# Patient Record
Sex: MALE | Race: WHITE | NOT HISPANIC OR LATINO | Employment: OTHER | URBAN - METROPOLITAN AREA
[De-identification: names, ages, dates, MRNs, and addresses within clinical notes are randomized per-mention and may not be internally consistent; named-entity substitution may affect disease eponyms.]

---

## 2017-03-28 ENCOUNTER — HISTORICAL (OUTPATIENT)
Dept: ADMINISTRATIVE | Facility: HOSPITAL | Age: 67
End: 2017-03-28

## 2017-09-21 ENCOUNTER — HISTORICAL (OUTPATIENT)
Dept: ADMINISTRATIVE | Facility: HOSPITAL | Age: 67
End: 2017-09-21

## 2017-09-21 LAB
ALBUMIN SERPL-MCNC: 3.6 GM/DL (ref 3.4–5)
ALBUMIN/GLOB SERPL: 1.2 RATIO (ref 1.1–2)
ALP SERPL-CCNC: 73 UNIT/L (ref 50–136)
ALT SERPL-CCNC: 25 UNIT/L (ref 12–78)
AST SERPL-CCNC: 12 UNIT/L (ref 15–37)
BILIRUB SERPL-MCNC: 0.4 MG/DL (ref 0.2–1)
BILIRUBIN DIRECT+TOT PNL SERPL-MCNC: 0.1 MG/DL (ref 0–0.5)
BILIRUBIN DIRECT+TOT PNL SERPL-MCNC: 0.3 MG/DL (ref 0–0.8)
BUN SERPL-MCNC: 13 MG/DL (ref 7–18)
CALCIUM SERPL-MCNC: 9.1 MG/DL (ref 8.5–10.1)
CHLORIDE SERPL-SCNC: 106 MMOL/L (ref 98–107)
CO2 SERPL-SCNC: 29 MMOL/L (ref 21–32)
CREAT SERPL-MCNC: 0.72 MG/DL (ref 0.7–1.3)
GLOBULIN SER-MCNC: 2.9 GM/DL (ref 2.4–3.5)
GLUCOSE SERPL-MCNC: 75 MG/DL (ref 74–106)
POTASSIUM SERPL-SCNC: 4.3 MMOL/L (ref 3.5–5.1)
PROT SERPL-MCNC: 6.5 GM/DL (ref 6.4–8.2)
SODIUM SERPL-SCNC: 144 MMOL/L (ref 136–145)
TSH SERPL-ACNC: 1.1 MIU/ML (ref 0.36–3.74)

## 2018-09-26 ENCOUNTER — HISTORICAL (OUTPATIENT)
Dept: ADMINISTRATIVE | Facility: HOSPITAL | Age: 68
End: 2018-09-26

## 2018-09-26 LAB
ABS NEUT (OLG): 2.91 X10(3)/MCL (ref 2.1–9.2)
ALBUMIN SERPL-MCNC: 3.6 GM/DL (ref 3.4–5)
ALBUMIN/GLOB SERPL: 1.2 {RATIO}
ALP SERPL-CCNC: 68 UNIT/L (ref 50–136)
ALT SERPL-CCNC: 26 UNIT/L (ref 12–78)
AST SERPL-CCNC: 17 UNIT/L (ref 15–37)
BASOPHILS # BLD AUTO: 0 X10(3)/MCL (ref 0–0.2)
BASOPHILS NFR BLD AUTO: 1 %
BILIRUB SERPL-MCNC: 0.4 MG/DL (ref 0.2–1)
BILIRUBIN DIRECT+TOT PNL SERPL-MCNC: 0.1 MG/DL (ref 0–0.2)
BILIRUBIN DIRECT+TOT PNL SERPL-MCNC: 0.3 MG/DL (ref 0–0.8)
BUN SERPL-MCNC: 9 MG/DL (ref 7–18)
CALCIUM SERPL-MCNC: 8.5 MG/DL (ref 8.5–10.1)
CHLORIDE SERPL-SCNC: 110 MMOL/L (ref 98–107)
CHOLEST SERPL-MCNC: 199 MG/DL (ref 0–200)
CHOLEST/HDLC SERPL: 3.6 {RATIO} (ref 0–5)
CO2 SERPL-SCNC: 30 MMOL/L (ref 21–32)
CREAT SERPL-MCNC: 0.8 MG/DL (ref 0.7–1.3)
EOSINOPHIL # BLD AUTO: 0.1 X10(3)/MCL (ref 0–0.9)
EOSINOPHIL NFR BLD AUTO: 2 %
ERYTHROCYTE [DISTWIDTH] IN BLOOD BY AUTOMATED COUNT: 14.3 % (ref 11.5–17)
GLOBULIN SER-MCNC: 2.9 GM/DL (ref 2.4–3.5)
GLUCOSE SERPL-MCNC: 83 MG/DL (ref 74–106)
HCT VFR BLD AUTO: 42.6 % (ref 42–52)
HDLC SERPL-MCNC: 55 MG/DL (ref 35–60)
HGB BLD-MCNC: 13.4 GM/DL (ref 14–18)
LDLC SERPL CALC-MCNC: 115 MG/DL (ref 0–129)
LYMPHOCYTES # BLD AUTO: 2.3 X10(3)/MCL (ref 0.6–4.6)
LYMPHOCYTES NFR BLD AUTO: 40 %
MCH RBC QN AUTO: 29.3 PG (ref 27–31)
MCHC RBC AUTO-ENTMCNC: 31.5 GM/DL (ref 33–36)
MCV RBC AUTO: 93 FL (ref 80–94)
MONOCYTES # BLD AUTO: 0.3 X10(3)/MCL (ref 0.1–1.3)
MONOCYTES NFR BLD AUTO: 6 %
NEUTROPHILS # BLD AUTO: 2.91 X10(3)/MCL (ref 1.4–7.9)
NEUTROPHILS NFR BLD AUTO: 51 %
PLATELET # BLD AUTO: 188 X10(3)/MCL (ref 130–400)
PMV BLD AUTO: 11.7 FL (ref 9.4–12.4)
POTASSIUM SERPL-SCNC: 4.6 MMOL/L (ref 3.5–5.1)
PROT SERPL-MCNC: 6.5 GM/DL (ref 6.4–8.2)
RBC # BLD AUTO: 4.58 X10(6)/MCL (ref 4.7–6.1)
SODIUM SERPL-SCNC: 144 MMOL/L (ref 136–145)
TRIGL SERPL-MCNC: 143 MG/DL (ref 30–150)
TSH SERPL-ACNC: 1.46 MIU/L (ref 0.36–3.74)
VLDLC SERPL CALC-MCNC: 29 MG/DL
WBC # SPEC AUTO: 5.7 X10(3)/MCL (ref 4.5–11.5)

## 2019-09-30 ENCOUNTER — HISTORICAL (OUTPATIENT)
Dept: ADMINISTRATIVE | Facility: HOSPITAL | Age: 69
End: 2019-09-30

## 2019-09-30 LAB
ABS NEUT (OLG): 4.16 X10(3)/MCL (ref 2.1–9.2)
ALBUMIN SERPL-MCNC: 3.8 GM/DL (ref 3.4–5)
ALBUMIN/GLOB SERPL: 1.4 {RATIO}
ALP SERPL-CCNC: 73 UNIT/L (ref 50–136)
ALT SERPL-CCNC: 28 UNIT/L (ref 12–78)
APPEARANCE, UA: CLEAR
AST SERPL-CCNC: 14 UNIT/L (ref 15–37)
BACTERIA SPEC CULT: ABNORMAL /HPF
BASOPHILS # BLD AUTO: 0.1 X10(3)/MCL (ref 0–0.2)
BASOPHILS NFR BLD AUTO: 1 %
BILIRUB SERPL-MCNC: 0.8 MG/DL (ref 0.2–1)
BILIRUB UR QL STRIP: NEGATIVE
BILIRUBIN DIRECT+TOT PNL SERPL-MCNC: 0.1 MG/DL (ref 0–0.2)
BILIRUBIN DIRECT+TOT PNL SERPL-MCNC: 0.7 MG/DL (ref 0–0.8)
BUN SERPL-MCNC: 12 MG/DL (ref 7–18)
CALCIUM SERPL-MCNC: 9.4 MG/DL (ref 8.5–10.1)
CHLORIDE SERPL-SCNC: 107 MMOL/L (ref 98–107)
CHOLEST SERPL-MCNC: 218 MG/DL (ref 0–200)
CHOLEST/HDLC SERPL: 3.4 {RATIO} (ref 0–5)
CO2 SERPL-SCNC: 31 MMOL/L (ref 21–32)
COLOR UR: YELLOW
CREAT SERPL-MCNC: 0.81 MG/DL (ref 0.7–1.3)
EOSINOPHIL # BLD AUTO: 0.1 X10(3)/MCL (ref 0–0.9)
EOSINOPHIL NFR BLD AUTO: 1 %
ERYTHROCYTE [DISTWIDTH] IN BLOOD BY AUTOMATED COUNT: 14.6 % (ref 11.5–17)
GLOBULIN SER-MCNC: 2.8 GM/DL (ref 2.4–3.5)
GLUCOSE (UA): NEGATIVE
GLUCOSE SERPL-MCNC: 89 MG/DL (ref 74–106)
HCT VFR BLD AUTO: 43.4 % (ref 42–52)
HDLC SERPL-MCNC: 65 MG/DL (ref 35–60)
HGB BLD-MCNC: 13.7 GM/DL (ref 14–18)
HGB UR QL STRIP: NEGATIVE
KETONES UR QL STRIP: NEGATIVE
LDLC SERPL CALC-MCNC: 129 MG/DL (ref 0–129)
LEUKOCYTE ESTERASE UR QL STRIP: NEGATIVE
LYMPHOCYTES # BLD AUTO: 2.8 X10(3)/MCL (ref 0.6–4.6)
LYMPHOCYTES NFR BLD AUTO: 37 %
MCH RBC QN AUTO: 28.7 PG (ref 27–31)
MCHC RBC AUTO-ENTMCNC: 31.6 GM/DL (ref 33–36)
MCV RBC AUTO: 90.8 FL (ref 80–94)
MONOCYTES # BLD AUTO: 0.4 X10(3)/MCL (ref 0.1–1.3)
MONOCYTES NFR BLD AUTO: 6 %
NEUTROPHILS # BLD AUTO: 4.16 X10(3)/MCL (ref 2.1–9.2)
NEUTROPHILS NFR BLD AUTO: 55 %
NITRITE UR QL STRIP: NEGATIVE
PH UR STRIP: 5.5 [PH] (ref 5–9)
PLATELET # BLD AUTO: 201 X10(3)/MCL (ref 130–400)
PMV BLD AUTO: 10.9 FL (ref 9.4–12.4)
POTASSIUM SERPL-SCNC: 4.6 MMOL/L (ref 3.5–5.1)
PROT SERPL-MCNC: 6.6 GM/DL (ref 6.4–8.2)
PROT UR QL STRIP: NEGATIVE
RBC # BLD AUTO: 4.78 X10(6)/MCL (ref 4.7–6.1)
RBC #/AREA URNS HPF: 14 /HPF (ref 0–2)
SODIUM SERPL-SCNC: 142 MMOL/L (ref 136–145)
SP GR UR STRIP: 1.02 (ref 1–1.03)
SQUAMOUS EPITHELIAL, UA: ABNORMAL
TRIGL SERPL-MCNC: 122 MG/DL (ref 30–150)
TSH SERPL-ACNC: 2.1 MIU/L (ref 0.36–3.74)
UROBILINOGEN UR STRIP-ACNC: 0.2
VLDLC SERPL CALC-MCNC: 24 MG/DL
WBC # SPEC AUTO: 7.6 X10(3)/MCL (ref 4.5–11.5)
WBC #/AREA URNS HPF: ABNORMAL /HPF

## 2020-01-23 ENCOUNTER — HISTORICAL (OUTPATIENT)
Dept: RADIOLOGY | Facility: HOSPITAL | Age: 70
End: 2020-01-23

## 2020-10-13 ENCOUNTER — HISTORICAL (OUTPATIENT)
Dept: ADMINISTRATIVE | Facility: HOSPITAL | Age: 70
End: 2020-10-13

## 2020-10-13 LAB
ABS NEUT (OLG): 2.78 X10(3)/MCL (ref 2.1–9.2)
ALBUMIN SERPL-MCNC: 4 GM/DL (ref 3.4–5)
ALBUMIN/GLOB SERPL: 1.5 RATIO (ref 1.1–2)
ALP SERPL-CCNC: 71 UNIT/L (ref 40–150)
ALT SERPL-CCNC: 21 UNIT/L (ref 0–55)
APPEARANCE, UA: CLEAR
AST SERPL-CCNC: 20 UNIT/L (ref 5–34)
BACTERIA SPEC CULT: NORMAL /HPF
BASOPHILS # BLD AUTO: 0.1 X10(3)/MCL (ref 0–0.2)
BASOPHILS NFR BLD AUTO: 1 %
BILIRUB SERPL-MCNC: 0.4 MG/DL
BILIRUB UR QL STRIP: NEGATIVE
BILIRUBIN DIRECT+TOT PNL SERPL-MCNC: 0.2 MG/DL (ref 0–0.5)
BILIRUBIN DIRECT+TOT PNL SERPL-MCNC: 0.2 MG/DL (ref 0–0.8)
BUN SERPL-MCNC: 11.8 MG/DL (ref 8.4–25.7)
CALCIUM SERPL-MCNC: 8.9 MG/DL (ref 8.8–10)
CHLORIDE SERPL-SCNC: 106 MMOL/L (ref 98–107)
CHOLEST SERPL-MCNC: 197 MG/DL
CHOLEST/HDLC SERPL: 4 {RATIO} (ref 0–5)
CO2 SERPL-SCNC: 27 MMOL/L (ref 23–31)
COLOR UR: YELLOW
CREAT SERPL-MCNC: 0.82 MG/DL (ref 0.73–1.18)
EOSINOPHIL # BLD AUTO: 0.1 X10(3)/MCL (ref 0–0.9)
EOSINOPHIL NFR BLD AUTO: 2 %
ERYTHROCYTE [DISTWIDTH] IN BLOOD BY AUTOMATED COUNT: 14.5 % (ref 11.5–17)
GLOBULIN SER-MCNC: 2.7 GM/DL (ref 2.4–3.5)
GLUCOSE (UA): NEGATIVE
GLUCOSE SERPL-MCNC: 91 MG/DL (ref 82–115)
HCT VFR BLD AUTO: 41.6 % (ref 42–52)
HDLC SERPL-MCNC: 55 MG/DL (ref 35–60)
HGB BLD-MCNC: 13.7 GM/DL (ref 14–18)
HGB UR QL STRIP: NEGATIVE
KETONES UR QL STRIP: NEGATIVE
LDLC SERPL CALC-MCNC: 124 MG/DL (ref 50–140)
LEUKOCYTE ESTERASE UR QL STRIP: NEGATIVE
LYMPHOCYTES # BLD AUTO: 2.5 X10(3)/MCL (ref 0.6–4.6)
LYMPHOCYTES NFR BLD AUTO: 42 %
MCH RBC QN AUTO: 29.7 PG (ref 27–31)
MCHC RBC AUTO-ENTMCNC: 32.9 GM/DL (ref 33–36)
MCV RBC AUTO: 90.2 FL (ref 80–94)
MONOCYTES # BLD AUTO: 0.4 X10(3)/MCL (ref 0.1–1.3)
MONOCYTES NFR BLD AUTO: 7 %
NEUTROPHILS # BLD AUTO: 2.78 X10(3)/MCL (ref 2.1–9.2)
NEUTROPHILS NFR BLD AUTO: 47 %
NITRITE UR QL STRIP: NEGATIVE
PH UR STRIP: 7.5 [PH] (ref 5–9)
PLATELET # BLD AUTO: 194 X10(3)/MCL (ref 130–400)
PMV BLD AUTO: 11.3 FL (ref 9.4–12.4)
POTASSIUM SERPL-SCNC: 4.8 MMOL/L (ref 3.5–5.1)
PROT SERPL-MCNC: 6.7 GM/DL (ref 5.8–7.6)
PROT UR QL STRIP: NEGATIVE
PSA SERPL-MCNC: 0.39 NG/ML
RBC # BLD AUTO: 4.61 X10(6)/MCL (ref 4.7–6.1)
RBC #/AREA URNS HPF: NORMAL /[HPF]
SODIUM SERPL-SCNC: 142 MMOL/L (ref 136–145)
SP GR UR STRIP: 1.01 (ref 1–1.03)
SQUAMOUS EPITHELIAL, UA: NORMAL
T4 FREE SERPL-MCNC: 0.83 NG/DL (ref 0.7–1.48)
TRIGL SERPL-MCNC: 91 MG/DL (ref 34–140)
TSH SERPL-ACNC: 1.31 UIU/ML (ref 0.35–4.94)
UA WBC MAN: NORMAL
UROBILINOGEN UR STRIP-ACNC: 0.2
VLDLC SERPL CALC-MCNC: 18 MG/DL
WBC # SPEC AUTO: 5.9 X10(3)/MCL (ref 4.5–11.5)

## 2021-01-11 ENCOUNTER — HISTORICAL (OUTPATIENT)
Dept: ADMINISTRATIVE | Facility: HOSPITAL | Age: 71
End: 2021-01-11

## 2021-04-27 LAB — CRC RECOMMENDATION EXT: NORMAL

## 2021-10-12 ENCOUNTER — HISTORICAL (OUTPATIENT)
Dept: ADMINISTRATIVE | Facility: HOSPITAL | Age: 71
End: 2021-10-12

## 2021-10-12 LAB
ABS NEUT (OLG): 3.71 X10(3)/MCL (ref 2.1–9.2)
ALBUMIN SERPL-MCNC: 3.8 GM/DL (ref 3.4–4.8)
ALBUMIN/GLOB SERPL: 1.5 RATIO (ref 1.1–2)
ALP SERPL-CCNC: 76 UNIT/L (ref 40–150)
ALT SERPL-CCNC: 16 UNIT/L (ref 0–55)
AST SERPL-CCNC: 15 UNIT/L (ref 5–34)
BASOPHILS # BLD AUTO: 0.1 X10(3)/MCL (ref 0–0.2)
BASOPHILS NFR BLD AUTO: 1 %
BILIRUB SERPL-MCNC: 0.5 MG/DL
BILIRUBIN DIRECT+TOT PNL SERPL-MCNC: 0.2 MG/DL (ref 0–0.5)
BILIRUBIN DIRECT+TOT PNL SERPL-MCNC: 0.3 MG/DL (ref 0–0.8)
BUN SERPL-MCNC: 7.6 MG/DL (ref 8.4–25.7)
CALCIUM SERPL-MCNC: 9.2 MG/DL (ref 8.8–10)
CHLORIDE SERPL-SCNC: 105 MMOL/L (ref 98–107)
CHOLEST SERPL-MCNC: 201 MG/DL
CHOLEST/HDLC SERPL: 4 {RATIO} (ref 0–5)
CO2 SERPL-SCNC: 27 MMOL/L (ref 23–31)
CREAT SERPL-MCNC: 0.81 MG/DL (ref 0.73–1.18)
EOSINOPHIL # BLD AUTO: 0.1 X10(3)/MCL (ref 0–0.9)
EOSINOPHIL NFR BLD AUTO: 2 %
ERYTHROCYTE [DISTWIDTH] IN BLOOD BY AUTOMATED COUNT: 14.4 % (ref 11.5–17)
GLOBULIN SER-MCNC: 2.6 GM/DL (ref 2.4–3.5)
GLUCOSE SERPL-MCNC: 91 MG/DL (ref 82–115)
HCT VFR BLD AUTO: 42.9 % (ref 42–52)
HDLC SERPL-MCNC: 49 MG/DL (ref 35–60)
HGB BLD-MCNC: 13.6 GM/DL (ref 14–18)
LDLC SERPL CALC-MCNC: 125 MG/DL (ref 50–140)
LYMPHOCYTES # BLD AUTO: 2 X10(3)/MCL (ref 0.6–4.6)
LYMPHOCYTES NFR BLD AUTO: 31 %
MCH RBC QN AUTO: 28.8 PG (ref 27–31)
MCHC RBC AUTO-ENTMCNC: 31.7 GM/DL (ref 33–36)
MCV RBC AUTO: 90.7 FL (ref 80–94)
MONOCYTES # BLD AUTO: 0.4 X10(3)/MCL (ref 0.1–1.3)
MONOCYTES NFR BLD AUTO: 6 %
NEUTROPHILS # BLD AUTO: 3.71 X10(3)/MCL (ref 2.1–9.2)
NEUTROPHILS NFR BLD AUTO: 59 %
PLATELET # BLD AUTO: 216 X10(3)/MCL (ref 130–400)
PMV BLD AUTO: 11.7 FL (ref 9.4–12.4)
POTASSIUM SERPL-SCNC: 4.7 MMOL/L (ref 3.5–5.1)
PROT SERPL-MCNC: 6.4 GM/DL (ref 5.8–7.6)
RBC # BLD AUTO: 4.73 X10(6)/MCL (ref 4.7–6.1)
SODIUM SERPL-SCNC: 144 MMOL/L (ref 136–145)
T4 FREE SERPL-MCNC: 0.87 NG/DL (ref 0.7–1.48)
TRIGL SERPL-MCNC: 133 MG/DL (ref 34–140)
TSH SERPL-ACNC: 1.99 UIU/ML (ref 0.35–4.94)
VLDLC SERPL CALC-MCNC: 27 MG/DL
WBC # SPEC AUTO: 6.2 X10(3)/MCL (ref 4.5–11.5)

## 2021-12-28 ENCOUNTER — HISTORICAL (OUTPATIENT)
Dept: ADMINISTRATIVE | Facility: HOSPITAL | Age: 71
End: 2021-12-28

## 2021-12-28 LAB — SARS-COV-2 RNA RESP QL NAA+PROBE: NEGATIVE

## 2022-01-24 ENCOUNTER — HISTORICAL (OUTPATIENT)
Dept: ADMINISTRATIVE | Facility: HOSPITAL | Age: 72
End: 2022-01-24

## 2022-01-24 LAB
APPEARANCE, UA: NORMAL
BACTERIA SPEC CULT: NORMAL /HPF
BILIRUB UR QL STRIP: NEGATIVE
COLOR UR: YELLOW
GLUCOSE (UA): NEGATIVE
HGB UR QL STRIP: NEGATIVE
KETONES UR QL STRIP: NEGATIVE
LEUKOCYTE ESTERASE UR QL STRIP: NEGATIVE
NITRITE UR QL STRIP: NEGATIVE
PH UR STRIP: 7.5 [PH] (ref 5–9)
PROT UR QL STRIP: NEGATIVE
RBC #/AREA URNS HPF: NORMAL /[HPF]
SP GR UR STRIP: 1.02 (ref 1–1.03)
SQUAMOUS EPITHELIAL, UA: NORMAL /HPF (ref 0–4)
UROBILINOGEN UR STRIP-ACNC: 0.2
WBC #/AREA URNS HPF: NORMAL /HPF

## 2022-01-25 LAB
ABS NEUT (OLG): 11 X10(3)/MCL (ref 2.1–9.2)
ALBUMIN SERPL-MCNC: 4.1 GM/DL (ref 3.4–4.8)
ALBUMIN/GLOB SERPL: 1.3 RATIO (ref 1.1–2)
ALP SERPL-CCNC: 86 UNIT/L (ref 40–150)
ALT SERPL-CCNC: 31 UNIT/L (ref 0–55)
AST SERPL-CCNC: 18 UNIT/L (ref 5–34)
BASOPHILS # BLD AUTO: 0 X10(3)/MCL (ref 0–0.2)
BASOPHILS NFR BLD AUTO: 0 %
BILIRUB SERPL-MCNC: 0.4 MG/DL
BILIRUBIN DIRECT+TOT PNL SERPL-MCNC: 0.2 MG/DL (ref 0–0.5)
BILIRUBIN DIRECT+TOT PNL SERPL-MCNC: 0.2 MG/DL (ref 0–0.8)
BUN SERPL-MCNC: 7.6 MG/DL (ref 8.4–25.7)
CALCIUM SERPL-MCNC: 10.5 MG/DL (ref 8.7–10.5)
CHLORIDE SERPL-SCNC: 101 MMOL/L (ref 98–107)
CO2 SERPL-SCNC: 29 MMOL/L (ref 23–31)
CREAT SERPL-MCNC: 0.83 MG/DL (ref 0.73–1.18)
EOSINOPHIL # BLD AUTO: 0 X10(3)/MCL (ref 0–0.9)
EOSINOPHIL NFR BLD AUTO: 0 %
ERYTHROCYTE [DISTWIDTH] IN BLOOD BY AUTOMATED COUNT: 14.6 % (ref 11.5–17)
GLOBULIN SER-MCNC: 3.2 GM/DL (ref 2.4–3.5)
GLUCOSE SERPL-MCNC: 144 MG/DL (ref 82–115)
HCT VFR BLD AUTO: 45 % (ref 42–52)
HGB BLD-MCNC: 14.5 GM/DL (ref 14–18)
LACTATE SERPL-SCNC: 2 MMOL/L (ref 0.5–2.2)
LACTATE SERPL-SCNC: 4.6 MMOL/L (ref 0.5–2.2)
LIPASE SERPL-CCNC: 36 U/L
LYMPHOCYTES # BLD AUTO: 1.3 X10(3)/MCL (ref 0.6–4.6)
LYMPHOCYTES NFR BLD AUTO: 10 %
MCH RBC QN AUTO: 29.2 PG (ref 27–31)
MCHC RBC AUTO-ENTMCNC: 32.2 GM/DL (ref 33–36)
MCV RBC AUTO: 90.5 FL (ref 80–94)
MONOCYTES # BLD AUTO: 0.5 X10(3)/MCL (ref 0.1–1.3)
MONOCYTES NFR BLD AUTO: 4 %
NEUTROPHILS # BLD AUTO: 11 X10(3)/MCL (ref 2.1–9.2)
NEUTROPHILS NFR BLD AUTO: 85 %
PLATELET # BLD AUTO: 246 X10(3)/MCL (ref 130–400)
PMV BLD AUTO: 11.2 FL (ref 9.4–12.4)
POTASSIUM SERPL-SCNC: 4.7 MMOL/L (ref 3.5–5.1)
PROT SERPL-MCNC: 7.3 GM/DL (ref 5.8–7.6)
RBC # BLD AUTO: 4.97 X10(6)/MCL (ref 4.7–6.1)
SARS-COV-2 AG RESP QL IA.RAPID: NEGATIVE
SODIUM SERPL-SCNC: 142 MMOL/L (ref 136–145)
WBC # SPEC AUTO: 12.9 X10(3)/MCL (ref 4.5–11.5)

## 2022-04-11 ENCOUNTER — HISTORICAL (OUTPATIENT)
Dept: ADMINISTRATIVE | Facility: HOSPITAL | Age: 72
End: 2022-04-11
Payer: MEDICARE

## 2022-04-27 VITALS
OXYGEN SATURATION: 96 % | WEIGHT: 175 LBS | SYSTOLIC BLOOD PRESSURE: 144 MMHG | DIASTOLIC BLOOD PRESSURE: 82 MMHG | HEIGHT: 69 IN | BODY MASS INDEX: 25.92 KG/M2

## 2022-04-30 NOTE — OP NOTE
DATE OF SURGERY:    01/25/2022    SURGEON:  Kirby Payne Jr., MD    RESIDENT SURGEONS:    1. Dr. Elizabeth Perez, PGY2.  2. Dr. Jojo Matthews, PGY4.    PREOPERATIVE DIAGNOSIS:  Acute appendicitis.    POSTOPERATIVE DIAGNOSIS:  Acute appendicitis.    PROCEDURE:  Laparoscopic appendectomy.    ANESTHESIA:  General endotracheal.    COMPLICATIONS:  None.    CONDITION:  Stable.    SPECIMEN:  Appendix.    ESTIMATED BLOOD LOSS:  Minimal.    FINDINGS:  Inflamed acute appendicitis, nonruptured.    PROCEDURE IN DETAIL:  After appropriate consents were obtained, the patient was brought back to the operative suite, placed in supine position, placed under general endotracheal anesthesia.  Next, the abdomen was prepped and draped in the usual sterile fashion.  At this time, a time-out was done to make sure we had the appropriate patient, appropriate operation, appropriate preoperative medications.  Next, a supraumbilical incision was made and a 5 mm optical trocar was used to enter the abdomen.  Once into the abdomen, pneumoperitoneum was created.  Next, a 5 mm suprapubic and a 12 mm left lower quadrant port were placed under direct visualization with no complications.  Next, we placed the patient head down and airplaned to the patient's left, and were able to easily visualize the appendix by following the tenia of the cecum down to the base of the appendix.  Once we found the base of the appendix, we grasped the appendix and made a window with the [place] dissector.  Once we had a large enough window to accommodate a laparoscopic Wiggins 55 mm stapler with a blue load, we then passed that in and stapled the base off.  Next, we used 2 white loads to come across the mesoappendix and then used 4 clips on the staple line to obtain hemostasis where there was some spot bleeding.  Next, we then placed the appendix in a laparoscopic EndoCatch bag and then evaluated the pelvis for any murky fluid; none was seen.  We then  removed the appendix from the left lower quadrant incision site and then closed the fascia with a laparoscopic suture passer of 0 Vicryl and then closed all skin incisions with 4-0 Vicryl and Dermabond.  At case end, all counts were correct.  Patient tolerated the procedure well, was ultimately transferred back to recovery in stable condition.        ______________________________  MD KIRIT Oquendo Jr./LAWSON  DD:  01/25/2022  Time:  08:23AM  DT:  01/25/2022  Time:  08:41AM  Job #:  [id number]

## 2022-04-30 NOTE — ED PROVIDER NOTES
"   Patient:   Scott Echeverria            MRN: [id number]            FIN: [id number]               Age:   71 years     Sex:  Male     :  1950   Associated Diagnoses:   Acute appendicitis   Author:   Jesenia Higgins MD      Basic Information   Time seen: Date & time 2022 01:55:00.   History source: Patient.   Arrival mode: Private vehicle.   History limitation: None.   Additional information: Chief Complaint from Nursing Triage Note : Chief Complaint   2022 22:04 CST      Chief Complaint           Complaint of intermittant abdominal pain, onset this afternoon. Nauseated, vomited x 1 on the way to ED.  .      History of Present Illness   The patient presents with 71 year old male present to the ED for abdominal pain. Pt states at around 1430 today he ate some pralines and drank hot chocolate. Pt states shortly after he began experiencing progressively worse abdominal pain. Pt states he has experienced episodes of belching. Pt's pain is worst when lying flat. Pt's abdominal pain is diffuse..  The onset was 12  hours ago.  The course/duration of symptoms is worsening and fluctuating in intensity.  The character of symptoms is "pain".  The degree at onset was minimal.  The Location of pain at onset was diffuse and abdominal.  The degree at present is moderate.  The Location of pain at present is diffuse and abdominal.  Radiating pain: none. The exacerbating factor is lying flat.  The relieving factor is none.  Therapy today: none.  Risk factors consist of none.  Associated symptoms: none.        Review of Systems   Constitutional symptoms:  No fever, no chills   Skin symptoms:  No jaundice, no rash   Eye symptoms:  Vision unchangedNo blurred vision   Respiratory symptoms:  No shortness of breath, no orthopnea, no cough, no sputum production   Cardiovascular symptoms:  No chest pain, no palpitations, no diaphoresis, no peripheral edema.    Gastrointestinal symptoms:  Abdominal pain, diffuse, " belchingNo nausea, no vomiting, no diarrhea, no constipation   Genitourinary symptoms:  No dysuria, no hematuria.    Neurologic symptoms:  No headache, no dizziness.    Hematologic/Lymphatic symptoms:  Bleeding tendency negative,    Allergy/immunologic symptoms:  No impaired immunity,              Additional review of systems information: All other systems reviewed and otherwise negative.      Health Status   Allergies:    Allergic Reactions (Selected)  Severity Not Documented  Iodine- No reactions were documented..   Medications:  (Selected)   Inpatient Medications  Ordered  Benadryl IV Push: 50 mg, form: Injection, IV Slow, Once, first dose 01/25/22 2:47:00 CST, stop date 01/25/22 2:47:00 CST, STAT  Solumedrol IV push / IM: 125 mg, form: Injection, IV Push, [id number], first dose 01/25/22 2:47:00 CST, STAT  Zofran 2 mg/mL injectable solution: 4 mg, form: Injection, IV Push, Once, first dose 01/25/22 2:46:00 CST, stop date 01/25/22 2:46:00 CST, STAT  morphine 4 mg/mL preservative-free intravenous solution: 4 mg, form: Injection, IV Push, Once, first dose 01/25/22 2:46:00 CST, stop date 01/25/22 2:46:00 CST, STAT  Prescriptions  Prescribed  amitriptyline 25 mg oral tablet: See Instructions, TAKE 1 TABLET BY MOUTH AT BEDTIME, # 90 tab(s), 3 Refill(s), Pharmacy: [facility]/pharmacy #[id number], 175, cm, Height/Length Dosing, 10/20/21 9:19:00 CDT, 79.83, kg, Weight Dosing, 10/20/21 9:19:00 CDT  levothyroxine 50 mcg (0.05 mg) oral tablet: See Instructions, TAKE 1 TABLET BY MOUTH EVERY DAY, # 90 tab(s), 3 Refill(s), Pharmacy: [facility]/pharmacy #[id number], 175, cm, Height/Length Dosing, 10/20/21 9:19:00 CDT, 79.83, kg, Weight Dosing, 10/20/21 9:19:00 CDT  tamsulosin 0.4 mg oral capsule: See Instructions, TAKE ONE CAPSULE BY MOUTH EVERY DAY, # 90 cap(s), 3 Refill(s), Pharmacy: [facility]/pharmacy #[id number], 175, cm, Height/Length Dosing, 10/20/21 9:19:00 CDT, 79.83, kg, Weight Dosing, 10/20/21 9:19:00 CDT  Documented Medications  Documented  aspirin 81 mg  oral tablet: 81 mg = 1 tab(s), Oral, Daily, # 90 tab(s), 0 Refill(s).      Past Medical/ Family/ Social History   Medical history:    Resolved  BPH - Benign prostatic hypertrophy ([id number]):  Resolved.  Chronic neck pain ([id number]):  Resolved.  Detached retina ([id number]):  Resolved.  Thyroid disease ([id number]):  Resolved.  Acute UTI (953283806):  Resolved..   Surgical history:    eye surgery on 3/12/2014 at 63 Years.  Retinal detachment ([id number]) on 7/3/2013 at 62 Years.  Comments:  2/12/2015 16:37 CST - Contributor_system, [email or web address]  09/24/2014 14:38:45 - Viv Woo: july 3 2014  Radiologic examination, chest, two views, frontal and lateral; on 7/22/2011 at 60 Years.  Back Surgery on 9/5/1990 at 39 Years.  eye surgery.  Comments:  2/12/2015 16:37 CST - Contributor_system, [email or web address]  04/07/2014 09:17:20 - Viv Woo: August and June 2013  sinus surgery.  eye surgery.  Comments:  2/12/2015 16:37 CST - Contributor_system, [email or web address]  09/24/2014 14:38:17 - Viv Woo: july 2014  Hemorrhoidectomy ([id number]).  Tonsillectomy ([id number])..   Family history:    Alzheimer's disease  Mother  CVA (cerebral vascular accident)  Father  Sickle cell trait  Sister  Cardiac arrest.  Father  Parkinsons.  Mother  Hypertension.  Brother  Sister  CVA (cerebral infarction).  Father  Thyroid disease.  Mother  Hyperlipidemia  Father  .   Social history:    Social & Psychosocial Habits    Alcohol  09/28/2015  Use: Never    Employment/School  09/28/2015  Status: Employed    Exercise  03/31/2016  Times per week: 1-2 times/week    Exercise type: Walking    Home/Environment  09/28/2015  Lives with: Spouse    Nutrition/Health  09/28/2015  Type of diet: Regular    Substance Use  09/28/2015  Use: Never    Tobacco  06/29/2014 Risk Assessment: Denies Tobacco Use    12/28/2021  Use: Never (less than 100 in l    Patient Wants Consult For Cessation Counseling No    Abuse/Neglect  12/06/2021  SHX Any signs of abuse or  neglect No    12/28/2021  SHX Any signs of abuse or neglect No  .      Physical Examination               Vital Signs   Vital Signs   1/24/2022 22:04 CST      Temperature Oral          36.6 DegC                             Temperature Oral (calculated)             97.88 DegF                             Peripheral Pulse Rate     84 bpm                             Respiratory Rate          20 br/min                             SpO2                      99 %                             Systolic Blood Pressure   151 mmHg  HI                             Diastolic Blood Pressure  91 mmHg  HI  .   Measurements   1/24/2022 22:04 CST      Weight Dosing             78 kg                             Weight Measured and Calculated in Lbs     171.96 lb                             Weight Estimated          78 kg                             Height/Length Dosing      175 cm                             Height/Length Estimated   175 cm                             Body Mass Index Estimated 25.47 kg/m2  .   Basic Oxygen Information   1/24/2022 22:04 CST      SpO2                      99 %  .   General:  Alert, no acute distress   Skin:  Warm, dry   Head:  Normocephalic, atraumatic   Neck:  Supple, trachea midline   Eye:  Normal conjunctiva   Ears, nose, mouth and throat:  Oral mucosa moist.   Cardiovascular:  Regular rate and rhythm, Normal peripheral perfusion, No edema   Respiratory:  Lungs are clear to auscultation, respirations are non-labored   Gastrointestinal:  Soft, abdominal distention, Tenderness: Generalized, right lower quadrant, Guarding: Moderate, Rebound: Negative, Bowel sounds: Hyperactive.    Neurological:  Alert and oriented to person, place, time, and situation   Psychiatric:  Cooperative      Medical Decision Making   Rationale:  Mr. Echeverria presented with progressively worsening abdominal pain over the last 12 hours or so, most tender in the right lower quadrant with a moderate amount of guarding noted.  No rebound  tenderness.  Hemodynamically stable and afebrile here.  Labs with leukocytosis, lactic acidosis, otherwise unremarkable.  CT scan with acute appendicitis without perforation or abscess.  Started on IV fluids, IV antibiotics and will admit to surgical service. Findings and plan discussed with the patient, and he is agreeable to admission at this time.   .   Documents reviewed:  Emergency department nurses' notes.   Orders  Launch Order Profile (Selected)   Inpatient Orders  Ordered  Benadryl IV Push: 50 mg, form: Injection, IV Slow, Once, first dose 01/25/22 2:47:00 CST, stop date 01/25/22 2:47:00 CST, STAT  EKG if >30 years of age and pain at or above umbilicus or history of ischemic heart disease.: 01/24/22 22:09:00 CST, EKG if >30 years of age and pain at or above umbilicus or history of ischemic heart disease.  If hemodynamically unstable (BP < 100 or Pulse > 110-Notify MD Stat). IV bolus with 500 ml NS.: 01/24/22 22:09:00 CST, If hemodynamically unstable (BP < 100 or Pulse > 110-Notify MD Stat). IV bolus with 500 ml NS.  NPO: 01/24/22 22:09:00 CST,  NPO  Saline Lock Insert: 01/24/22 22:09:00 CST, Stop date 01/24/22 22:09:00 CST  Solumedrol IV push / IM: 125 mg, form: Injection, IV Push, [id number], first dose 01/25/22 2:47:00 CST, STAT  Vital Signs Notify Provider: 01/24/22 22:09:00 CST, if hemodynamically unstable (BP < 100 or Pulse > 110-Notify MD Stat)., HR > 110, SBP < 100  Zofran 2 mg/mL injectable solution: 4 mg, form: Injection, IV Push, Once, first dose 01/25/22 2:46:00 CST, stop date 01/25/22 2:46:00 CST, STAT  morphine 4 mg/mL preservative-free intravenous solution: 4 mg, form: Injection, IV Push, Once, first dose 01/25/22 2:46:00 CST, stop date 01/25/22 2:46:00 CST, STAT  Ordered (Dispatched)  Lactic Acid: Stat collect, 01/25/22 2:46:00 CST, Blood, Stop date 01/25/22 2:46:00 CST, Lab Collect, Print Label By Order Location, 01/25/22 2:46:00 CST  Ordered (Exam Ordered)  CT Abdomen and Pelvis W  Contrast: Stat, 01/25/22 2:46:00 CST, Abdominal Pain, None, Wheelchair, Creatinine if needed per protocol, Rad Type, 01/25/22 2:46:00 CST  Ordered (In-Lab)  CMP: STAT collect, 01/25/22 1:14:31 CST, BLOOD, Collected, Stop date 01/25/22 1:14:00 CST, Lab Collect  Lipase Level: STAT collect, 01/25/22 1:14:31 CST, BLOOD, Collected, Stop date 01/25/22 1:14:00 CST, Lab Collect  Completed  Automated Diff: STAT collect, 01/25/22 1:14:00 CST, Blood, Collected, Stop date 01/25/22 1:14:00 CST, Lab Collect, Print Label By Order Location, 01/24/22 22:09:00 CST  CBC w/ Auto Diff: STAT collect, 01/25/22 1:14:31 CST, BLOOD, Collected, Stop date 01/25/22 1:14:00 CST, Lab Collect  EKG Adult 12 Lead: 01/24/22 22:09:00 CST, Stat, 01/24/22 22:09:00 CST, Wheelchair, Standard Precautions, EKG if >30 years of age and pain at or above umbilicus or history of ischemic heart disease., -1, -1, 01/24/22 22:09:00 CST  IVF Normal Saline NS Bolus 500ml 500 mL: 500 mL, 500 mL, IV, 500 mL/hr, hypotension, order duration: 1 dose(s), start date 01/24/22 22:09:00 CST, stop date 01/24/22 23:08:00 CST, bolus dose: 500 mL, Then 125 ml/hr if no history of CHF., 1.94, m2  Urinalysis with Reflex: Stat collect, Urine, 01/24/22 22:09:00 CST, Stop date 01/24/22 22:09:00 CST, Nurse collect, Print Label By Order Location  diPHENhydraMINE: 50 mg, 1 mL, Injection, N/A, Once, Stop date 01/25/22 2:48:36 CST, Physician Stop, 01/25/22 2:48:36 CST  methylPREDNISolone: 125 mg, 2 mL, Injection, N/A, Once, Stop date 01/25/22 2:48:28 CST, Physician Stop, 01/25/22 2:48:28 CST  morphine: 4 mg, 1 mL, Injection, N/A, Once, Stop date 01/25/22 2:48:56 CST, Physician Stop, 01/25/22 2:48:56 CST  ondansetron INJ: 4 mg, 2 mL, Injection, N/A, Once, Stop date 01/25/22 2:49:04 CST, Physician Stop, 01/25/22 2:49:04 CST.   Electrocardiogram:  Time 1/25/2022 22:04:00, rate 72, normal sinus rhythm, No ST-T changes, no ectopy, normal SC & QRS intervals, EP Interp, no STEMI.    Results review:   Lab results : Lab View   1/25/2022 3:11 CST       Est Creat Clearance Ser   82.54 mL/min    1/25/2022 3:10 CST       Lactic Acid Lvl           4.6 mmol/L  CRIT    1/25/2022 1:14 CST       Sodium Lvl                142 mmol/L                             Potassium Lvl             4.7 mmol/L                             Chloride                  101 mmol/L                             CO2                       29 mmol/L                             Calcium Lvl               10.5 mg/dL                             Glucose Lvl               144 mg/dL  HI                             BUN                       7.6 mg/dL  LOW                             Creatinine                0.83 mg/dL                             eGFR-AA                   >60  NA                             eGFR-TERESA                  >60 mL/min/1.73 m2  NA                             Bili Total                0.4 mg/dL                             Bili Direct               0.2 mg/dL                             Bili Indirect             0.20 mg/dL                             AST                       18 unit/L                             ALT                       31 unit/L                             Alk Phos                  86 unit/L                             Total Protein             7.3 gm/dL                             Albumin Lvl               4.1 gm/dL                             Globulin                  3.2 gm/dL                             A/G Ratio                 1.3 ratio                             Lipase Lvl                36 U/L                             WBC                       12.9 x10(3)/mcL  HI                             RBC                       4.97 x10(6)/mcL                             Hgb                       14.5 gm/dL                             Hct                       45.0 %                             Platelet                  246 x10(3)/mcL                             MCV                       90.5 fL                              MCH                       29.2 pg                             MCHC                      32.2 gm/dL  LOW                             RDW                       14.6 %                             MPV                       11.2 fL                             Abs Neut                  11.00 x10(3)/mcL  HI                             Neutro Auto               85 %  NA                             Lymph Auto                10 %  NA                             Mono Auto                 4 %  NA                             Eos Auto                  0 %  NA                             Abs Eos                   0.0 x10(3)/mcL                             Basophil Auto             0 %  NA                             Abs Neutro                11.00 x10(3)/mcL  HI                             Abs Lymph                 1.3 x10(3)/mcL                             Abs Mono                  0.5 x10(3)/mcL                             Abs Baso                  0.0 x10(3)/mcL    1/24/2022 20:09 CST      UA Appear                 CLOUDY                             UA Color                  YELLOW                             UA Spec Grav              1.017                             UA Bili                   Negative                             UA pH                     7.5                             UA Urobilinogen           0.2                             UA Blood                  Negative                             UA Glucose                Negative                             UA Ketones                Negative                             UA Protein                Negative                             UA Nitrite                Negative                             UA Leuk Est               Negative                             UA WBC                    NONE SEEN /HPF                             UA RBC                    NONE SEEN                             UA Bacteria               NONE SEEN /HPF                             UA Squam  Epithelial       NONE SEEN /HPF  , Interpretation Abnormal results  Leukocytosis, lactic acidosis.    Radiology results:  Interpretation:  Preliminary Radiologic Findings  Technique: CT of the abdomen and pelvis was performed with axial images as well as sagittal and coronal  reconstruction images with intravenous contrast.  Comparison: None available.  Clinical History: Complaint of intermittant abdominal pain, onset this afternoon. Nauseated, vomited x 1 on  the way to ED.  Dosage Information: Automated Exposure Control was utilized.  Findings:  Thorax:  Lungs: There is nonspecific dependent change at the lung bases.  Pleura: No effusions or thickening.  Heart: The heart size is within normal limits.  Abdomen:  Abdominal Wall: No abdominal wall pathology is seen.  Liver: The liver appears unremarkable.  Biliary System: No intrahepatic or extrahepatic biliary duct dilatation is seen.  Gallbladder: The gallbladder appears unremarkable.  Pancreas: The pancreas appears unremarkable.  Spleen: The spleen appears unremarkable.  Adrenals: The adrenal glands appear unremarkable.  Kidneys: Small bilateral renal cysts are seen. The kidneys otherwise appear unremarkable.  Aorta: There is moderate calcification of the abdominal aorta and its branches.  IVC: Unremarkable.  Bowel:  Esophagus: The visualized esophagus appears unremarkable.  Stomach: The stomach appears unremarkable.  Duodenum: Unremarkable appearing duodenum.  Small Bowel: The small bowel appears unremarkable.  Colon: There is moderate stool in the cecum and ascending colon which could reflect an element of  constipation.  Appendix: The appendix is distended and measures 11 mm on series 2, images 62-67 and demonstrates  wall thickening wall edema and periappendiceal inflammatory changes. This is consistent with appendicitis.  No perforation or abscess is seen.  Peritoneum: No intraperitoneal free air or ascites is seen. There is a heterogeneous area of a fat  stranding at the root of the mesentery with a thin peripheral curvilinear band of soft tissue attenuation limiting the fat  stranding from surrounding normal mesentery, and resembles a pseudocapsule. There are a few small  mesenteric lymph nodes with surrounding normal mesentery.  Pelvis:  Bladder: The bladder appears unremarkable.  Male:  Prostate gland: The prostate gland appears unremarkable.  Bony structures:  Dorsal Spine: There is mild spondylosis of the visualized dorsal spine.  Notifications: The results were discussed with the emergency room physician Dr. Higgins prior to dictation  at 2022-01-25 03:40:05 CST.  Impression:  1. The appendix is distended and measures 11 mm on series 2, images 62-67 and demonstrates wall  thickening wall edema and periappendiceal inflammatory changes. This is consistent with appendicitis. No  perforation or abscess is seen.  2. Additional findings may reflect superimposed mesenteric panniculitis.  3. Details as above..      Reexamination/ Reevaluation   Vital signs   Basic Oxygen Information   1/24/2022 22:04 CST      SpO2                      99 %        Impression and Plan   Diagnosis   Acute appendicitis ([id number] K35.80)      Calls-Consults   -  1/25/2022 03:43:00 , Erin Martinez MD, surgical hospitalist paged.    Plan   Condition: Stable.    Disposition: Admit time  1/25/2022 04:07:00, Place in Observation Unit, Felicia CRYSTAL, Max SIMEON    Counseled: Patient, Family, Regarding diagnosis, Regarding diagnostic results, Regarding treatment plan, Patient indicated understanding of instructions, family understood.    Notes: ITaz, acted solely as a scribe for and in the presence of Dr. Higgins who performed the service. , IJesenia, have independently performed the history, physical, medical decision making and procedures as documented above and agree with the scribe's documentation. .

## 2022-05-09 ENCOUNTER — OFFICE VISIT (OUTPATIENT)
Dept: ORTHOPEDICS | Facility: CLINIC | Age: 72
End: 2022-05-09
Payer: MEDICARE

## 2022-05-09 VITALS — WEIGHT: 175 LBS | BODY MASS INDEX: 25.92 KG/M2 | HEIGHT: 69 IN

## 2022-05-09 DIAGNOSIS — M75.82 ROTATOR CUFF TENDONITIS, LEFT: Primary | ICD-10-CM

## 2022-05-09 DIAGNOSIS — M54.12 CERVICAL RADICULOPATHY: ICD-10-CM

## 2022-05-09 PROCEDURE — 99213 OFFICE O/P EST LOW 20 MIN: CPT | Mod: ,,, | Performed by: ORTHOPAEDIC SURGERY

## 2022-05-09 PROCEDURE — 99213 PR OFFICE/OUTPT VISIT, EST, LEVL III, 20-29 MIN: ICD-10-PCS | Mod: ,,, | Performed by: ORTHOPAEDIC SURGERY

## 2022-05-09 RX ORDER — TAMSULOSIN HYDROCHLORIDE 0.4 MG/1
CAPSULE ORAL
Status: ON HOLD | COMMUNITY
Start: 2021-10-20 | End: 2022-08-05 | Stop reason: HOSPADM

## 2022-05-09 RX ORDER — NAPROXEN SODIUM 220 MG/1
81 TABLET, FILM COATED ORAL DAILY
Status: ON HOLD | COMMUNITY
End: 2022-08-05 | Stop reason: SDUPTHER

## 2022-05-09 RX ORDER — AMITRIPTYLINE HYDROCHLORIDE 25 MG/1
25 TABLET, FILM COATED ORAL DAILY
Status: ON HOLD | COMMUNITY
Start: 2022-04-12 | End: 2022-08-05 | Stop reason: HOSPADM

## 2022-05-09 RX ORDER — LEVOTHYROXINE SODIUM 50 UG/1
50 TABLET ORAL DAILY
Status: ON HOLD | COMMUNITY
Start: 2022-04-01 | End: 2022-08-05 | Stop reason: HOSPADM

## 2022-05-09 NOTE — PROGRESS NOTES
Subjective:    CC: Pain of the Left Shoulder and Shoulder Pain (F/u L shoulder inj 4/7/22, states it helped for about 2 days, c/o he heard a pop in his neck that caused pain down his L arm with tingling in his fingers)        HPI:  Patient returns to clinic for repeat evaluation of left shoulder.  Status post injection 04/07/2022.  Approximately 4 weeks out.  Patient states that the injection helped for about 2 days.  Patient states he does not have any pain in his shoulder today.  His main concern is pain about his neck that radiates down his arm.  Admits to numbness and tingling about his 2nd, 3rd, and 4th digits. He would like to discuss referral to for neck evaluation.    ROS: Refer to [facility] for pertinent ROS. All other 12 point systems negative.    Objective:    There were no vitals filed for this visit.     Physical Exam:  Left upper extremity compartment soft and warm.  Skin is intact.  There are no signs or symptoms of DVT or infection.  Nontender to palpation about the shoulder.  Able to forward flex and abduct the shoulder to 180°.  Negative Little and Neer's.  Negative empty can.  Negative sulcus.  Stable to stressing.  Neurovascularly intact distally.    Images:  Previous Images Reviewed and discussed with patient.    Assessment:  1. Cervical radiculopathy  - Ambulatory referral/consult to Pain Clinic; Future    2. Rotator cuff tendonitis, left       Plan:  Physical exam and previous imaging findings discussed with patient.  He is most symptomatic about his neck today.  We discussed referral to my partner Dr. Corrales.  I'd like to see the patient back with any problems or difficulties.    Follow up: Follow up if symptoms worsen or fail to improve.

## 2022-05-14 NOTE — DISCHARGE SUMMARY
Admit and Discharge Dates  Admit Date: 01/24/2022  Discharge Date: 01/25/2022  Physicians  Attending Physician - Felicia CRYSTAL, Max TRINIDAD  Admitting Physician - Felicia CRYSTAL, Max TRINIDAD  Primary Care Physician - Gary Reilly II, MD  Discharge Diagnosis  Abdominal pain?[id number]  Acute appendicitis?K35.80  Surgical Procedures  01/25/2022 - [id number] - Appendectomy Laparoscopic  Immunizations  No immunizations recorded for this visit.  Significant Findings  Pt was admitted after presenting with abdominal pain, found to have acute appendicitis. He was started on zosyn and was taken to the OR for a laparoscopic appendectomy. Pt tolerated the procedure well and was taken to PACU afterwards where he continued to recover. Pt had uncomplicated hospital course. At time of discharge, pt was tolerating PO intake without n/v, pain was well controlled.  Time Spent on discharge  30 mins  Objective  Vitals & Measurements  T:?37.4? ?C (Oral)? TMIN:?36.6? ?C (Oral)? TMAX:?37.4? ?C (Oral)? HR:?106(Peripheral)? RR:?19? BP:?121/69? SpO2:?93%? WT:?78?kg?  Physical Exam  NAD  No increased WOB, room air  Tachycardic  Abdomen mildly distended, tenderness greatest in RLQ  Moving all 4  Patient Discharge Condition  stable  Discharge Disposition  home   Discharge Medication Reconciliation  Prescribed  acetaminophen (acetaminophen 325 mg oral tablet)?650 mg, Oral, q4hr  ondansetron (Zofran 4 mg oral tablet)?4 mg, Oral, q8hr  oxyCODONE (oxycodone 5 mg oral tablet)?5 mg, Oral, q4hr, PRN pain, mild  Continue  amitriptyline (amitriptyline 25 mg oral tablet)?See Instructions  aspirin (aspirin 81 mg oral tablet)?81 mg, Oral, Daily  levothyroxine (levothyroxine 50 mcg (0.05 mg) oral tablet)?See Instructions  tamsulosin (tamsulosin 0.4 mg oral capsule)?See Instructions  Discontinue  methocarbamol (methocarbamol 100 mg/mL injectable solution)?750 mg, Oral, TID  Education and Orders Provided  Appendicitis,  Easy-to-Read  Laparoscopic Appendectomy, Adult, Care After  ***OPS Outpatient Sx general instructions (JANETEFORT) (KELEFORT)  Discharge - 01/25/22 9:00:00 CST, Home, Ok to discharge when tolerating dietGive all scheduled vaccinations prior to discharge.?  Discharge Activity - Activity as Tolerated, No heavy lifting above 10lbs for 6 weeks?  Discharge Diet - Regular?  Follow up  [facility]  ????Office will call you with a telemed visit on 2/8/22. Please call with any questions or concerns.POST OP INSTRUCTIONS/RECOVERY CARE:-Okay to shower in 48 hours (2 days) and gently clean incision sites with soap and water. -No soaking/swimming for 2 weeks or until cleared by doctor.-Remove band-aid over incision site before showering. Surgical glue will peel off over time. Do not pick at glue.PAIN CONTROL:-Take pain medications as prescribed. We recommend taking a stool softener with your pain meds to prevent constipation.No driving while on pain medication.-You may also take over the counter medications as directed to assist with pain control. ANESTHESIA/SEDATION:-Following anesthesia, it is recommended that you start with clear liquids and progress your diet as tolerated to avoid nausea.Nausea is common and can be expected up to 24 hours post surgery. INFECTION:-Call doctors office with any signs/symptoms of infection following surgery. See packet for more info.  [employer]  No Qualifying Data

## 2022-05-23 ENCOUNTER — OFFICE VISIT (OUTPATIENT)
Dept: ORTHOPEDICS | Facility: CLINIC | Age: 72
End: 2022-05-23
Payer: MEDICARE

## 2022-05-23 VITALS
BODY MASS INDEX: 25.6 KG/M2 | SYSTOLIC BLOOD PRESSURE: 142 MMHG | DIASTOLIC BLOOD PRESSURE: 86 MMHG | HEART RATE: 87 BPM | HEIGHT: 69 IN | WEIGHT: 172.81 LBS

## 2022-05-23 DIAGNOSIS — M54.12 CERVICAL RADICULOPATHY: ICD-10-CM

## 2022-05-23 DIAGNOSIS — M54.2 CERVICALGIA: Primary | Chronic | ICD-10-CM

## 2022-05-23 DIAGNOSIS — M54.12 CERVICAL RADICULITIS: Chronic | ICD-10-CM

## 2022-05-23 PROCEDURE — 99204 OFFICE O/P NEW MOD 45 MIN: CPT | Mod: ,,, | Performed by: ANESTHESIOLOGY

## 2022-05-23 PROCEDURE — 99204 PR OFFICE/OUTPT VISIT, NEW, LEVL IV, 45-59 MIN: ICD-10-PCS | Mod: ,,, | Performed by: ANESTHESIOLOGY

## 2022-05-23 NOTE — PROGRESS NOTES
Lina Stein MD        PATIENT NAME: Scott Echeverria  : 1950  DATE: 22  MRN: [id number]      Billing Provider: Lina Stein MD  Level of Service:   Patient PCP Information     Provider PCP Type    RON AUGUSTIN MD General          Reason for Visit / Chief Complaint: Neck Pain (Ref from Dr Rivas Walton, pt states he been dealing with neck pain for years but has gotten worse over the last 6 months, taking amitriptyline he got from neuro in [facility], c/o L shoulder pain that goes down his arm, c/o a lot of dizziness)       Update PCP  Update Chief Complaint         History of Present Illness / Problem Focused Workflow     Scott Echeverria presents to the clinic with Neck Pain (Ref from Dr Rivas Walton, pt states he been dealing with neck pain for years but has gotten worse over the last 6 months, taking amitriptyline he got from neuro in [facility], c/o L shoulder pain that goes down his arm, c/o a lot of dizziness)     This is a 71-year-old male who presents to clinic today for his initial consultation as a referral from Dr. Walton.  He complains of neck pain with radiation down the left upper extremity.  His pain has been present for a couple years now.  It initially started after he helped his daughter move 6 hours away.  He had helped her to a moving truck and made a total of 6 trips back and forth.  He began having neck pain radiating down the left upper extremity to her elbow and occasionally to his hand, with numbness and tingling in the 2nd 3rd digits of the left hand.  He went to physical therapy and his symptoms eventually resolved.  His pain came back in March when he had been with his grandson over the course of a few days.  He then saw Dr. Walton and had x-rays of the cervical spine done.  He was referred here for further evaluation and treatment.  Currently, he does not have any pain but it does get up to 10/10 at worst.  He has tried some over-the-counter oral pain relievers  that they did not help so he does not take them.  He does use over the over-the-counter topical medications.    Neck Pain         Review of Systems     Review of Systems   Musculoskeletal: Positive for neck pain and neck stiffness.   All other systems reviewed and are negative.       Medical / Social / Family History     Past Medical History:   Diagnosis Date    Thyroid disease        Past Surgical History:   Procedure Laterality Date    APPENDECTOMY      lower back surgery      SINUS SURGERY         Social History  Mr. Echeverria  reports that he has never smoked. He has never used smokeless tobacco. He reports previous alcohol use. He reports that he does not use drugs.    Family History  Mr.'s Echeverria family history is not on file.    Medications and Allergies     Medications  No outpatient medications have been marked as taking for the 5/23/22 encounter (Office Visit) with Lina Stein MD.       Allergies  Review of patient's allergies indicates:   Allergen Reactions    Iodine        Physical Examination     Vitals:    05/23/22 1306   BP: (!) 142/86   Pulse: 87     Spine Musculoskeletal Exam    General        Constitutional: appears stated age, well-developed and well-nourished    Scleral icterus: no    Labored breathing: no    Psychiatric: normal mood and affect and no acute distress    Neurological: alert and oriented x3    Skin: intact    Palpation      Cervical Spine      Masses: none    Spasms: none    Crepitus: none    Upper extremity muscle tone: normal    Tenderness: present      Paraspinous: right and left      Suboccipital triangle: right and left    Range of Motion      Cervical Spine      Cervical flexion: 3-4 finger breadths    Cervical flexion - associated detail: pain    Cervical extension: reduced extension (26-50%)    Cervical extension - associated detail: pain    Strength      Cervical Spine      Cervical spine motor exam is normal.    Sensory      Cervical Spine      Cervical spine  sensation is normal.       Assessment and Plan (including Health Maintenance)      Problem List  Smart Sets  Document Outside HM   :    Plan:   Cervicalgia  -     MRI Cervical Spine Without Contrast; Future; Expected date: 05/23/2022    Cervical radiculitis  -     MRI Cervical Spine Without Contrast; Future; Expected date: 05/23/2022       MRI C-spine and f/u for results and to discuss possible injections.    Problem List Items Addressed This Visit     Cervicalgia - Primary    Relevant Orders    MRI Cervical Spine Without Contrast    Cervical radiculitis    Relevant Orders    MRI Cervical Spine Without Contrast            Future Appointments   Date Time Provider Department Center   10/26/2022  9:40 AM Gary Reilly II, MD [facility]        There are no Patient Instructions on file for this visit.  No follow-ups on file.     Signature:  Lina Stein MD      Date of encounter: 5/23/22

## 2022-05-30 ENCOUNTER — HOSPITAL ENCOUNTER (OUTPATIENT)
Dept: RADIOLOGY | Facility: HOSPITAL | Age: 72
Discharge: HOME | End: 2022-05-30
Attending: ANESTHESIOLOGY
Payer: MEDICARE

## 2022-05-30 DIAGNOSIS — M54.2 CERVICALGIA: ICD-10-CM

## 2022-05-30 DIAGNOSIS — M54.12 CERVICAL RADICULITIS: ICD-10-CM

## 2022-06-02 ENCOUNTER — OFFICE VISIT (OUTPATIENT)
Dept: ORTHOPEDICS | Facility: CLINIC | Age: 72
End: 2022-06-02
Payer: MEDICARE

## 2022-06-02 VITALS
HEIGHT: 69 IN | DIASTOLIC BLOOD PRESSURE: 84 MMHG | WEIGHT: 172 LBS | HEART RATE: 74 BPM | SYSTOLIC BLOOD PRESSURE: 136 MMHG | BODY MASS INDEX: 25.48 KG/M2

## 2022-06-02 DIAGNOSIS — M54.2 CERVICALGIA: Primary | ICD-10-CM

## 2022-06-02 DIAGNOSIS — M48.02 CERVICAL SPINAL STENOSIS: ICD-10-CM

## 2022-06-02 DIAGNOSIS — M54.12 CERVICAL RADICULITIS: ICD-10-CM

## 2022-06-02 PROCEDURE — 99213 OFFICE O/P EST LOW 20 MIN: CPT | Mod: ,,, | Performed by: ANESTHESIOLOGY

## 2022-06-02 PROCEDURE — 99213 PR OFFICE/OUTPT VISIT, EST, LEVL III, 20-29 MIN: ICD-10-PCS | Mod: ,,, | Performed by: ANESTHESIOLOGY

## 2022-06-02 NOTE — PROGRESS NOTES
Lina Stein MD        PATIENT NAME: Scott Echeverria  : 1950  DATE: 22  MRN: [id number]      Billing Provider: Lina Stein MD  Level of Service:   Patient PCP Information     Provider PCP Type    RON AUGUSTIN MD General          Reason for Visit / Chief Complaint: Results (MRI results Cervical Spine. )       Update PCP  Update Chief Complaint         History of Present Illness / Problem Focused Workflow     Scott Echeverria presents to the clinic with Results (MRI results Cervical Spine. )     This is a 71-year-old male who returns to clinic today for f/u of MRI C-spine results.  He complains of neck pain with radiation down the left upper extremity.  His pain has been present for a couple years now.  It initially started after he helped his daughter move 6 hours away.  He had helped her to a moving truck and made a total of 6 trips back and forth.  He began having neck pain radiating down the left upper extremity to her elbow and occasionally to his hand, with numbness and tingling in the 2nd 3rd digits of the left hand.  He went to physical therapy and his symptoms eventually resolved.  His pain came back in March when he had been with his grandson over the course of a few days.  He then saw Dr. Walton and had x-rays of the cervical spine done.  He was referred here for further evaluation and treatment.  Currently, he does not have any pain but it does get up to 10/10 at worst.  He has tried some over-the-counter oral pain relievers that they did not help so he does not take them.  He does use over the over-the-counter topical medications.    Neck Pain         Review of Systems     Review of Systems   Musculoskeletal: Positive for neck pain and neck stiffness.   All other systems reviewed and are negative.       Medical / Social / Family History     Past Medical History:   Diagnosis Date    Thyroid disease        Past Surgical History:   Procedure Laterality Date    APPENDECTOMY       lower back surgery      SINUS SURGERY         Social History  Mr. Echeverria  reports that he has never smoked. He has never used smokeless tobacco. He reports previous alcohol use. He reports that he does not use drugs.    Family History  Mr.'s Echeverria family history is not on file.    Medications and Allergies     Medications  Outpatient Medications Marked as Taking for the 6/2/22 encounter (Office Visit) with Lina Stein MD   Medication Sig Dispense Refill    amitriptyline (ELAVIL) 25 MG tablet Take 25 mg by mouth once daily.      aspirin 81 MG Chew Take 81 mg by mouth once daily.      levothyroxine (SYNTHROID) 50 MCG tablet Take 50 mcg by mouth once daily.      tamsulosin (FLOMAX) 0.4 mg Cap   See Instructions, TAKE ONE CAPSULE BY MOUTH EVERY DAY, # 90 cap(s), 3 Refill(s), Pharmacy: [facility]/pharmacy #[id number], 175, cm, Height/Length Dosing, 10/20/21 9:19:00 CDT, 79.83, kg, Weight Dosing, 10/20/21 9:19:00 CDT         Allergies  Review of patient's allergies indicates:   Allergen Reactions    Iodine        Physical Examination     Vitals:    06/02/22 1029   BP: 136/84   Pulse: 74     Spine Musculoskeletal Exam    General        Constitutional: appears stated age, well-developed and well-nourished    Scleral icterus: no    Labored breathing: no    Psychiatric: normal mood and affect and no acute distress    Neurological: alert and oriented x3    Skin: intact    Palpation      Cervical Spine      Masses: none    Spasms: none    Crepitus: none    Upper extremity muscle tone: normal    Tenderness: present      Paraspinous: right and left      Suboccipital triangle: right and left    Range of Motion      Cervical Spine      Cervical flexion: 3-4 finger breadths    Cervical flexion - associated detail: pain    Cervical extension: reduced extension (26-50%)    Cervical extension - associated detail: pain    Strength      Cervical Spine      Cervical spine motor exam is normal.    Sensory      Cervical Spine      Cervical  spine sensation is normal.       Assessment and Plan (including Health Maintenance)      Problem List  Smart Sets  Document Outside HM   :    Plan:   Cervicalgia  -     Ambulatory referral/consult to Neurosurgery; Future; Expected date: 06/09/2022    Cervical spinal stenosis  -     Ambulatory referral/consult to Neurosurgery; Future; Expected date: 06/09/2022    Cervical radiculitis  -     Ambulatory referral/consult to Neurosurgery; Future; Expected date: 06/09/2022       Refer to Dr. Rao for neurosurgical consultation.    severe spinal canal stenosis and associated cord impingement at C3-C4 through C5-C6, with cord edema/myelomalacia    Problem List Items Addressed This Visit     Cervicalgia - Primary    Relevant Orders    Ambulatory referral/consult to Neurosurgery    Cervical radiculitis    Relevant Orders    Ambulatory referral/consult to Neurosurgery      Other Visit Diagnoses     Cervical spinal stenosis        Relevant Orders    Ambulatory referral/consult to Neurosurgery            Future Appointments   Date Time Provider Department Center   10/26/2022  9:40 AM Gary Reilly II, MD [facility]        There are no Patient Instructions on file for this visit.  No follow-ups on file.     Signature:  Lina Stein MD    Date of encounter: 6/2/22

## 2022-06-07 ENCOUNTER — TELEPHONE (OUTPATIENT)
Dept: NEUROSURGERY | Facility: CLINIC | Age: 72
End: 2022-06-07
Payer: MEDICARE

## 2022-06-07 DIAGNOSIS — G99.2 STENOSIS OF CERVICAL SPINE WITH MYELOPATHY: Primary | ICD-10-CM

## 2022-06-07 DIAGNOSIS — M48.02 STENOSIS OF CERVICAL SPINE WITH MYELOPATHY: Primary | ICD-10-CM

## 2022-06-07 NOTE — TELEPHONE ENCOUNTER
Patient was referred to Dr. Rao by Dr. Stein for severe spinal canal stenosis and associated cord impingement at C3-C4 through C5-C6, with cord edema/myelomalacia. Imaging is in Epic. He will have cervical xray flex/ext tomorrow at [facility] @ 12 pm. He will see Dr. Rao @ 2 pm.

## 2022-06-08 ENCOUNTER — OFFICE VISIT (OUTPATIENT)
Dept: NEUROSURGERY | Facility: CLINIC | Age: 72
End: 2022-06-08
Payer: MEDICARE

## 2022-06-08 ENCOUNTER — HOSPITAL ENCOUNTER (OUTPATIENT)
Dept: RADIOLOGY | Facility: HOSPITAL | Age: 72
Discharge: HOME | End: 2022-06-08
Attending: NEUROLOGICAL SURGERY
Payer: MEDICARE

## 2022-06-08 VITALS
HEART RATE: 89 BPM | HEIGHT: 69 IN | DIASTOLIC BLOOD PRESSURE: 78 MMHG | RESPIRATION RATE: 16 BRPM | BODY MASS INDEX: 25.48 KG/M2 | SYSTOLIC BLOOD PRESSURE: 122 MMHG | WEIGHT: 172 LBS

## 2022-06-08 DIAGNOSIS — M54.2 CERVICALGIA: ICD-10-CM

## 2022-06-08 DIAGNOSIS — M48.02 CERVICAL SPINAL STENOSIS: ICD-10-CM

## 2022-06-08 DIAGNOSIS — G99.2 STENOSIS OF CERVICAL SPINE WITH MYELOPATHY: ICD-10-CM

## 2022-06-08 DIAGNOSIS — M48.02 STENOSIS OF CERVICAL SPINE WITH MYELOPATHY: Primary | ICD-10-CM

## 2022-06-08 DIAGNOSIS — M48.02 STENOSIS OF CERVICAL SPINE WITH MYELOPATHY: ICD-10-CM

## 2022-06-08 DIAGNOSIS — G99.2 STENOSIS OF CERVICAL SPINE WITH MYELOPATHY: Primary | ICD-10-CM

## 2022-06-08 DIAGNOSIS — M48.02 SPINAL STENOSIS, CERVICAL REGION: ICD-10-CM

## 2022-06-08 DIAGNOSIS — M54.12 CERVICAL RADICULITIS: ICD-10-CM

## 2022-06-08 PROCEDURE — 72052 X-RAY EXAM NECK SPINE 6/>VWS: CPT | Mod: TC

## 2022-06-08 PROCEDURE — 99204 PR OFFICE/OUTPT VISIT, NEW, LEVL IV, 45-59 MIN: ICD-10-PCS | Mod: ,,, | Performed by: NEUROLOGICAL SURGERY

## 2022-06-08 PROCEDURE — 99204 OFFICE O/P NEW MOD 45 MIN: CPT | Mod: ,,, | Performed by: NEUROLOGICAL SURGERY

## 2022-06-08 RX ORDER — CEFDINIR 300 MG/1
300 CAPSULE ORAL 2 TIMES DAILY
COMMUNITY
Start: 2022-05-17 | End: 2022-07-07

## 2022-06-08 RX ORDER — AZITHROMYCIN 500 MG/1
500 TABLET, FILM COATED ORAL DAILY
COMMUNITY
Start: 2022-05-17 | End: 2022-07-07 | Stop reason: ALTCHOICE

## 2022-06-08 RX ORDER — ACETAMINOPHEN 325 MG/1
TABLET ORAL
Status: ON HOLD | COMMUNITY
Start: 2022-01-25 | End: 2022-07-25

## 2022-06-08 RX ORDER — MINERAL OIL
180 ENEMA (ML) RECTAL DAILY
Status: ON HOLD | COMMUNITY
End: 2022-07-25

## 2022-06-08 RX ORDER — ONDANSETRON 4 MG/1
4 TABLET, FILM COATED ORAL EVERY 8 HOURS
Status: ON HOLD | COMMUNITY
Start: 2022-01-25 | End: 2022-07-25

## 2022-06-08 RX ORDER — OXYCODONE HYDROCHLORIDE 5 MG/1
TABLET ORAL
Status: ON HOLD | COMMUNITY
Start: 2022-01-25 | End: 2022-07-25

## 2022-06-08 RX ORDER — PREDNISONE 20 MG/1
20 TABLET ORAL 2 TIMES DAILY
Status: ON HOLD | COMMUNITY
Start: 2022-05-31 | End: 2022-07-25

## 2022-06-08 NOTE — PATIENT INSTRUCTIONS
" - Recommend vestibular therapy at [facility] for dizziness/vertigo; discuss with ENT Friday   - Information given on "Comfy Comfy" buckwheat rosario pillow   - Left C5-6, C6-7 Foraminotomies, C3-7 laminoplasty, will get tentative surgery date and schedule follow up after CT of the cervical spine.  "

## 2022-06-08 NOTE — PROGRESS NOTES
[facility]  Neurosurgery    CHIEF COMPLAINT:  neck pain and arm pain     HPI:  Scott Echeverria is a 71 y.o. male who presents for neurosurgical evaluation.  He complains of intermittent episodes of neck and left arm pain.  Pain seems to radiate from the neck to the left shoulder and down the left arm to the elbow and into the hand.  He has numbness in the 2nd and 3rd fingers of the left hand.   He has had weakness in his  strength in both hands, but denies any problems with dexterity.  The patient rates the pain as a 0 on a pain scale of 1-10 currently, but at it's worst is a 10/10.  Onset of symptoms was gradual, starting about 2 years ago.  It initially started after he helped his daughter move 6 hours away.  He made a total of 6 trips back and forth in a moving truck.  He reports he would get relief of the pain with holding his left arm over his head.  He went to physical therapy and his symptoms eventually resolved.  His pain came back in March when he lifted his grandson.  He says the pain came on about one week later.  He was seen by Dr. Walton and had an injection into the left shoulder, but did not have any relief.  He resumed the home exercises that he was given at physical therapy.  Shortly thereafter his pain resolved.  However, he continued with numbness in the 2nd and 3rd fingers and dizziness.  The numbness is no longer occurring much, but the dizziness has persisted.  He says it occurs when he lays on his left side at night.  It lasts about 30 seconds then resolves.  He describes feeling as though the room is spinning.  He was evaluated at [facility].  He was given Prednisone and exercises for BPPV.  He stopped doing the exercises because he felt they were making the dizziness worse.  He experienced a previous episode of dizziness/vertigo in 2016.  His symptoms resolved at that time after two treatments for BPPV.  He wonders if the dizziness is due to the  problems in his neck, being that he has not had any improvement with the treatments thus far.  He is scheduled to follow up at [facility] on Friday.    Conservative measures he has tried include physical therapy, anti-inflammatories, topical pain creams, and amitriptyline with some relief.  Patient reports history of two previous accidents, one in which a tool fell on his head while wearing a hard hat.  He denies bowel or bladder symptoms, and reports difficulty with balance at times.  He has tightness in his neck and paracervical region, but is not in any pain currently.  However, he has concerns about future flare-ups as well as the persistent dizziness.      (Not in a hospital admission)      Review of patient's allergies indicates:   Allergen Reactions    Iodine        Past Medical History:   Diagnosis Date    Thyroid disease      Past Surgical History:   Procedure Laterality Date    APPENDECTOMY      lower back surgery      SINUS SURGERY       History reviewed. No pertinent family history.  Social History     Tobacco Use    Smoking status: Never Smoker    Smokeless tobacco: Never Used   Substance Use Topics    Alcohol use: Not Currently    Drug use: Never                OBJECTIVE:     Vital Signs (Most Recent)  Pulse: 89 (06/08/22 1323)  Resp: 16 (06/08/22 1323)  BP: 122/78 (06/08/22 1323)    Physical Exam:  General:  Pleasant. Well-nourished. Alert.    Lungs:  Quiet, non-labored     Neurological:    Oriented to Person, Place, Time     Strength  Deltoids Triceps Biceps Wrist Extension Intrinsics Hand    Upper: R 5/5 5/5 5/5 5/5 5/5 5/5    L 5/5 5/5 5/5 5/5 5/5 5/5     Hip Flexors Knee extensor Knee Flexion Ankle Dorsiflexion Plantar Flexion EHL   Lower: R          L           Reflexes:   Left Right   Triceps 1+ 1+   Biceps 1+ 1+   Brachioradialis 1+ 1+   Patellar 2+ 2+   Achilles 2+ 2+     Sensation is intact in bilateral upper extremities to a pinprick.    Positive Arana's: Left    Gait:  "normal  Cervical ROM: Denies pain with cervical ROM. mildly limited left rotation  Cervical spine palpation: Denies tenderness to cervical spine palpation.  Spurling's: Negative Spurling's test bilaterally.        ASSESSMENT/PLAN:     1. Stenosis of cervical spine with myelopathy        - Recommend vestibular therapy at [facility] for dizziness/vertigo.  We did talk about the very slight possibility that vertebral artery impingement when he turns his head to the left could be causing the episodic vertigo.  However, this does not happen if he is sitting up and also goes away after 30 seconds in spite of him keeping his head turned to the left.  Is very claustrophobic.  There is dye shortage for CTA.  We talked about all that and, at this point, I do not think we need to do any further vascular imaging.    - Information given on "Vasiliy Amanda" buckwheat rosario pillow    - Left C5-6, C6-7 Foraminotomies, C3-7 laminoplasty       Angely Vela RN acting as scribe for this encounter on 06/08/2022      I, Dr. Paulo Rao, personally performed the services described in this documentation. All medical record entries made by the scribe were at my direction and in my presence.  I have reviewed the chart and agree that the record reflects my personal performance and is accurate and complete. Paulo Rao MD.  2:02 PM 06/08/2022         "

## 2022-06-14 ENCOUNTER — HOSPITAL ENCOUNTER (OUTPATIENT)
Dept: RADIOLOGY | Facility: HOSPITAL | Age: 72
Discharge: HOME | End: 2022-06-14
Attending: NEUROLOGICAL SURGERY
Payer: MEDICARE

## 2022-06-14 DIAGNOSIS — M48.02 SPINAL STENOSIS, CERVICAL REGION: ICD-10-CM

## 2022-06-14 PROCEDURE — 72125 CT NECK SPINE W/O DYE: CPT | Mod: TC

## 2022-07-06 ENCOUNTER — OFFICE VISIT (OUTPATIENT)
Dept: NEUROSURGERY | Facility: CLINIC | Age: 72
End: 2022-07-06
Payer: MEDICARE

## 2022-07-06 VITALS
HEIGHT: 69 IN | BODY MASS INDEX: 25.48 KG/M2 | HEART RATE: 79 BPM | RESPIRATION RATE: 16 BRPM | SYSTOLIC BLOOD PRESSURE: 104 MMHG | WEIGHT: 172 LBS | DIASTOLIC BLOOD PRESSURE: 66 MMHG

## 2022-07-06 DIAGNOSIS — G99.2 STENOSIS OF CERVICAL SPINE WITH MYELOPATHY: Primary | ICD-10-CM

## 2022-07-06 DIAGNOSIS — M48.02 STENOSIS OF CERVICAL SPINE WITH MYELOPATHY: Primary | ICD-10-CM

## 2022-07-06 DIAGNOSIS — M99.61 OSSEOUS AND SUBLUXATION STENOSIS OF INTERVERTEBRAL FORAMINA OF CERVICAL REGION: ICD-10-CM

## 2022-07-06 PROCEDURE — 99213 OFFICE O/P EST LOW 20 MIN: CPT | Mod: ,,, | Performed by: NURSE PRACTITIONER

## 2022-07-06 PROCEDURE — 99213 PR OFFICE/OUTPT VISIT, EST, LEVL III, 20-29 MIN: ICD-10-PCS | Mod: ,,, | Performed by: NURSE PRACTITIONER
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
## 2022-07-06 NOTE — PATIENT INSTRUCTIONS
-STOP Aspirin  -Nothing to eat or drink after midnight, except for Synthroid with a sip of water the morning of surgery

## 2022-07-08 ENCOUNTER — HOSPITAL ENCOUNTER (OUTPATIENT)
Dept: RADIOLOGY | Facility: HOSPITAL | Age: 72
Discharge: HOME | End: 2022-07-08
Attending: NURSE PRACTITIONER
Payer: MEDICARE

## 2022-07-08 DIAGNOSIS — G99.2 STENOSIS OF CERVICAL SPINE WITH MYELOPATHY: ICD-10-CM

## 2022-07-08 DIAGNOSIS — M48.02 STENOSIS OF CERVICAL SPINE WITH MYELOPATHY: ICD-10-CM

## 2022-07-08 PROCEDURE — 93010 ELECTROCARDIOGRAM REPORT: CPT | Mod: ,,, | Performed by: INTERNAL MEDICINE

## 2022-07-08 PROCEDURE — 93005 ELECTROCARDIOGRAM TRACING: CPT

## 2022-07-08 PROCEDURE — 71046 X-RAY EXAM CHEST 2 VIEWS: CPT | Mod: TC

## 2022-07-08 PROCEDURE — 93010 EKG 12-LEAD: ICD-10-PCS | Mod: ,,, | Performed by: INTERNAL MEDICINE

## 2022-07-11 ENCOUNTER — ANESTHESIA EVENT (OUTPATIENT)
Dept: SURGERY | Facility: HOSPITAL | Age: 72
DRG: 518 | End: 2022-07-11
Payer: MEDICARE

## 2022-07-11 DIAGNOSIS — G99.2 STENOSIS OF CERVICAL SPINE WITH MYELOPATHY: Primary | ICD-10-CM

## 2022-07-11 DIAGNOSIS — M50.00 DISPLACEMENT OF CERVICAL INTERVERTEBRAL DISC WITH MYELOPATHY: ICD-10-CM

## 2022-07-11 DIAGNOSIS — M48.02 STENOSIS OF CERVICAL SPINE WITH MYELOPATHY: Primary | ICD-10-CM

## 2022-07-11 RX ORDER — MUPIROCIN 20 MG/G
1 OINTMENT TOPICAL 2 TIMES DAILY
Status: CANCELLED | OUTPATIENT
Start: 2022-07-11 | End: 2022-07-12

## 2022-07-12 ENCOUNTER — HOSPITAL ENCOUNTER (INPATIENT)
Facility: HOSPITAL | Age: 72
LOS: 13 days | Discharge: NURSING, REHAB OR LONG-TERM CARE | DRG: 518 | End: 2022-07-25
Attending: NEUROLOGICAL SURGERY | Admitting: NEUROLOGICAL SURGERY
Payer: MEDICARE

## 2022-07-12 ENCOUNTER — ANESTHESIA (OUTPATIENT)
Dept: SURGERY | Facility: HOSPITAL | Age: 72
DRG: 518 | End: 2022-07-12
Payer: MEDICARE

## 2022-07-12 DIAGNOSIS — M48.02 STENOSIS OF CERVICAL SPINE WITH MYELOPATHY: ICD-10-CM

## 2022-07-12 DIAGNOSIS — I82.409 DVT (DEEP VENOUS THROMBOSIS): ICD-10-CM

## 2022-07-12 DIAGNOSIS — G99.2 STENOSIS OF CERVICAL SPINE WITH MYELOPATHY: ICD-10-CM

## 2022-07-12 DIAGNOSIS — R60.0 EDEMA OF LEFT UPPER EXTREMITY: ICD-10-CM

## 2022-07-12 DIAGNOSIS — R41.82 ALTERED MENTAL STATUS: ICD-10-CM

## 2022-07-12 DIAGNOSIS — M99.61 OSSEOUS AND SUBLUXATION STENOSIS OF INTERVERTEBRAL FORAMINA OF CERVICAL REGION: ICD-10-CM

## 2022-07-12 DIAGNOSIS — M50.00 DISPLACEMENT OF CERVICAL INTERVERTEBRAL DISC WITH MYELOPATHY: ICD-10-CM

## 2022-07-12 DIAGNOSIS — R47.01 APHASIA: Primary | ICD-10-CM

## 2022-07-12 LAB
GROUP & RH: NORMAL
INDIRECT COOMBS GEL: NORMAL

## 2022-07-12 PROCEDURE — 25000003 PHARM REV CODE 250: Performed by: NURSE ANESTHETIST, CERTIFIED REGISTERED

## 2022-07-12 PROCEDURE — 63051 C-LAMINOPLASTY W/GRAFT/PLATE: CPT | Mod: ,,, | Performed by: NEUROLOGICAL SURGERY

## 2022-07-12 PROCEDURE — 86850 RBC ANTIBODY SCREEN: CPT | Performed by: NEUROLOGICAL SURGERY

## 2022-07-12 PROCEDURE — 25000003 PHARM REV CODE 250: Performed by: NURSE PRACTITIONER

## 2022-07-12 PROCEDURE — 63600175 PHARM REV CODE 636 W HCPCS: Performed by: NURSE PRACTITIONER

## 2022-07-12 PROCEDURE — 63600175 PHARM REV CODE 636 W HCPCS: Performed by: NEUROLOGICAL SURGERY

## 2022-07-12 PROCEDURE — C1713 ANCHOR/SCREW BN/BN,TIS/BN: HCPCS | Performed by: NEUROLOGICAL SURGERY

## 2022-07-12 PROCEDURE — 36620 INSERTION CATHETER ARTERY: CPT | Performed by: ANESTHESIOLOGY

## 2022-07-12 PROCEDURE — 71000039 HC RECOVERY, EACH ADD'L HOUR: Performed by: NEUROLOGICAL SURGERY

## 2022-07-12 PROCEDURE — 63600175 PHARM REV CODE 636 W HCPCS

## 2022-07-12 PROCEDURE — 63600175 PHARM REV CODE 636 W HCPCS: Performed by: NURSE ANESTHETIST, CERTIFIED REGISTERED

## 2022-07-12 PROCEDURE — 25000003 PHARM REV CODE 250: Performed by: NEUROLOGICAL SURGERY

## 2022-07-12 PROCEDURE — 37000009 HC ANESTHESIA EA ADD 15 MINS: Performed by: NEUROLOGICAL SURGERY

## 2022-07-12 PROCEDURE — 37000008 HC ANESTHESIA 1ST 15 MINUTES: Performed by: NEUROLOGICAL SURGERY

## 2022-07-12 PROCEDURE — 27800903 OPTIME MED/SURG SUP & DEVICES OTHER IMPLANTS: Performed by: NEUROLOGICAL SURGERY

## 2022-07-12 PROCEDURE — C1729 CATH, DRAINAGE: HCPCS | Performed by: NEUROLOGICAL SURGERY

## 2022-07-12 PROCEDURE — 63051 C-LAMINOPLASTY W/GRAFT/PLATE: CPT | Mod: AS,,, | Performed by: NURSE PRACTITIONER

## 2022-07-12 PROCEDURE — 27201423 OPTIME MED/SURG SUP & DEVICES STERILE SUPPLY: Performed by: NEUROLOGICAL SURGERY

## 2022-07-12 PROCEDURE — 11000001 HC ACUTE MED/SURG PRIVATE ROOM

## 2022-07-12 PROCEDURE — 36000710: Performed by: NEUROLOGICAL SURGERY

## 2022-07-12 PROCEDURE — 71000033 HC RECOVERY, INTIAL HOUR: Performed by: NEUROLOGICAL SURGERY

## 2022-07-12 PROCEDURE — 25000003 PHARM REV CODE 250: Performed by: ANESTHESIOLOGY

## 2022-07-12 PROCEDURE — 63051 PR C-LAMINOPLASTY W/GRAFT/PLATE, 2 OR MORE: ICD-10-PCS | Mod: ,,, | Performed by: NEUROLOGICAL SURGERY

## 2022-07-12 PROCEDURE — 86900 BLOOD TYPING SEROLOGIC ABO: CPT | Performed by: NEUROLOGICAL SURGERY

## 2022-07-12 PROCEDURE — 36000711: Performed by: NEUROLOGICAL SURGERY

## 2022-07-12 PROCEDURE — 36415 COLL VENOUS BLD VENIPUNCTURE: CPT | Performed by: NEUROLOGICAL SURGERY

## 2022-07-12 PROCEDURE — 63051 PR C-LAMINOPLASTY W/GRAFT/PLATE, 2 OR MORE: ICD-10-PCS | Mod: AS,,, | Performed by: NURSE PRACTITIONER

## 2022-07-12 DEVICE — IMPLANTABLE DEVICE: Type: IMPLANTABLE DEVICE | Site: SPINE CERVICAL | Status: FUNCTIONAL

## 2022-07-12 DEVICE — STIMULAN® RAPID CURE PROVIDED STERILE FOR SINGLE PATIENT USE. STIMULAN® RAPID CURE CONTAINS CALCIUM SULFATE POWDER AND MIXING SOLUTION IN PRE-MEASURED QUANTITIES SO THAT WHEN MIXED TOGETHER IN A STERILE MIXING BOWL, THE RESULTANT PASTE IS TO BE DIGITALLY PACKED INTO OPEN BONE VOID/GAP TO SET INSITU OR PLACED INTO THE MOULD PROVIDED, THE MIXTURE SETS TO FORM BEADS. THE BIODEGRADABLE, RADIOPAQUE BEADS ARE RESORBED IN APPROXIMATELY 30 – 60 DAYS WHEN USED IN ACCORDANCE WITH THE DEVICE LABELLING. STIMULAN® RAPID CURE IS MANUFACTURED FROM SYNTHETIC IMPLANT GRADE CALCIUM SULFATE DIHYDRATE(CASO4.2H2O) THAT RESORBS AND IS REPLACED WITH BONE DURING THE HEALING PROCESS. ALSO, AS THE BONE VOID FILLER BEADS ARE BIODEGRADABLE AND BIOCOMPATIBLE, THEY MAY BE USED AT AN INFECTED SITE.
Type: IMPLANTABLE DEVICE | Site: SPINE CERVICAL | Status: FUNCTIONAL
Brand: STIMULAN® RAPID CURE

## 2022-07-12 RX ORDER — REMIFENTANIL HYDROCHLORIDE 1 MG/ML
INJECTION, POWDER, LYOPHILIZED, FOR SOLUTION INTRAVENOUS CONTINUOUS PRN
Status: DISCONTINUED | OUTPATIENT
Start: 2022-07-12 | End: 2022-07-12

## 2022-07-12 RX ORDER — EPHEDRINE SULFATE 50 MG/ML
INJECTION, SOLUTION INTRAVENOUS
Status: DISCONTINUED | OUTPATIENT
Start: 2022-07-12 | End: 2022-07-12

## 2022-07-12 RX ORDER — LIDOCAINE HYDROCHLORIDE 10 MG/ML
1 INJECTION, SOLUTION EPIDURAL; INFILTRATION; INTRACAUDAL; PERINEURAL ONCE
Status: DISCONTINUED | OUTPATIENT
Start: 2022-07-12 | End: 2022-07-12

## 2022-07-12 RX ORDER — DEXAMETHASONE SODIUM PHOSPHATE 4 MG/ML
INJECTION, SOLUTION INTRA-ARTICULAR; INTRALESIONAL; INTRAMUSCULAR; INTRAVENOUS; SOFT TISSUE
Status: DISCONTINUED | OUTPATIENT
Start: 2022-07-12 | End: 2022-07-12

## 2022-07-12 RX ORDER — HYDROMORPHONE HYDROCHLORIDE 2 MG/ML
0.4 INJECTION, SOLUTION INTRAMUSCULAR; INTRAVENOUS; SUBCUTANEOUS EVERY 5 MIN PRN
Status: DISCONTINUED | OUTPATIENT
Start: 2022-07-12 | End: 2022-07-12

## 2022-07-12 RX ORDER — BISACODYL 10 MG
10 SUPPOSITORY, RECTAL RECTAL DAILY PRN
Status: DISCONTINUED | OUTPATIENT
Start: 2022-07-12 | End: 2022-07-25 | Stop reason: HOSPADM

## 2022-07-12 RX ORDER — GABAPENTIN 300 MG/1
600 CAPSULE ORAL
Status: COMPLETED | OUTPATIENT
Start: 2022-07-12 | End: 2022-07-12

## 2022-07-12 RX ORDER — MUPIROCIN 20 MG/G
OINTMENT TOPICAL 2 TIMES DAILY
Status: DISPENSED | OUTPATIENT
Start: 2022-07-12 | End: 2022-07-17

## 2022-07-12 RX ORDER — AMITRIPTYLINE HYDROCHLORIDE 25 MG/1
25 TABLET, FILM COATED ORAL DAILY
Status: DISCONTINUED | OUTPATIENT
Start: 2022-07-12 | End: 2022-07-15

## 2022-07-12 RX ORDER — AMOXICILLIN 250 MG
2 CAPSULE ORAL NIGHTLY PRN
Status: DISCONTINUED | OUTPATIENT
Start: 2022-07-12 | End: 2022-07-24

## 2022-07-12 RX ORDER — CEFAZOLIN SODIUM 1 G/3ML
INJECTION, POWDER, FOR SOLUTION INTRAMUSCULAR; INTRAVENOUS
Status: DISPENSED
Start: 2022-07-12 | End: 2022-07-12

## 2022-07-12 RX ORDER — BACITRACIN 500 [USP'U]/G
OINTMENT TOPICAL
Status: DISCONTINUED | OUTPATIENT
Start: 2022-07-12 | End: 2022-07-12 | Stop reason: HOSPADM

## 2022-07-12 RX ORDER — MORPHINE SULFATE 10 MG/ML
2 INJECTION INTRAMUSCULAR; INTRAVENOUS; SUBCUTANEOUS
Status: DISCONTINUED | OUTPATIENT
Start: 2022-07-12 | End: 2022-07-14

## 2022-07-12 RX ORDER — ONDANSETRON 4 MG/1
8 TABLET, ORALLY DISINTEGRATING ORAL EVERY 6 HOURS PRN
Status: DISCONTINUED | OUTPATIENT
Start: 2022-07-12 | End: 2022-07-25 | Stop reason: HOSPADM

## 2022-07-12 RX ORDER — FENTANYL CITRATE 50 UG/ML
INJECTION, SOLUTION INTRAMUSCULAR; INTRAVENOUS
Status: DISCONTINUED | OUTPATIENT
Start: 2022-07-12 | End: 2022-07-12

## 2022-07-12 RX ORDER — HYDROCODONE BITARTRATE AND ACETAMINOPHEN 7.5; 325 MG/1; MG/1
2 TABLET ORAL EVERY 4 HOURS PRN
Status: DISCONTINUED | OUTPATIENT
Start: 2022-07-12 | End: 2022-07-14

## 2022-07-12 RX ORDER — MAG HYDROX/ALUMINUM HYD/SIMETH 200-200-20
30 SUSPENSION, ORAL (FINAL DOSE FORM) ORAL EVERY 4 HOURS PRN
Status: DISCONTINUED | OUTPATIENT
Start: 2022-07-12 | End: 2022-07-25 | Stop reason: HOSPADM

## 2022-07-12 RX ORDER — VANCOMYCIN HYDROCHLORIDE 500 MG/10ML
INJECTION, POWDER, LYOPHILIZED, FOR SOLUTION INTRAVENOUS
Status: DISCONTINUED | OUTPATIENT
Start: 2022-07-12 | End: 2022-07-12 | Stop reason: HOSPADM

## 2022-07-12 RX ORDER — SODIUM CHLORIDE, SODIUM GLUCONATE, SODIUM ACETATE, POTASSIUM CHLORIDE AND MAGNESIUM CHLORIDE 30; 37; 368; 526; 502 MG/100ML; MG/100ML; MG/100ML; MG/100ML; MG/100ML
1000 INJECTION, SOLUTION INTRAVENOUS CONTINUOUS
Status: DISCONTINUED | OUTPATIENT
Start: 2022-07-12 | End: 2022-07-12

## 2022-07-12 RX ORDER — SODIUM CHLORIDE 9 MG/ML
INJECTION, SOLUTION INTRAVENOUS CONTINUOUS
Status: DISCONTINUED | OUTPATIENT
Start: 2022-07-12 | End: 2022-07-18

## 2022-07-12 RX ORDER — BUPIVACAINE HYDROCHLORIDE AND EPINEPHRINE 5; 5 MG/ML; UG/ML
INJECTION, SOLUTION EPIDURAL; INTRACAUDAL; PERINEURAL
Status: DISCONTINUED | OUTPATIENT
Start: 2022-07-12 | End: 2022-07-12 | Stop reason: HOSPADM

## 2022-07-12 RX ORDER — MIDAZOLAM HYDROCHLORIDE 1 MG/ML
INJECTION INTRAMUSCULAR; INTRAVENOUS
Status: DISPENSED
Start: 2022-07-12 | End: 2022-07-12

## 2022-07-12 RX ORDER — TAMSULOSIN HYDROCHLORIDE 0.4 MG/1
0.4 CAPSULE ORAL DAILY
Status: DISCONTINUED | OUTPATIENT
Start: 2022-07-12 | End: 2022-07-25 | Stop reason: HOSPADM

## 2022-07-12 RX ORDER — ROCURONIUM BROMIDE 10 MG/ML
INJECTION, SOLUTION INTRAVENOUS
Status: DISCONTINUED | OUTPATIENT
Start: 2022-07-12 | End: 2022-07-12

## 2022-07-12 RX ORDER — MUPIROCIN 20 MG/G
1 OINTMENT TOPICAL 2 TIMES DAILY
Status: DISCONTINUED | OUTPATIENT
Start: 2022-07-12 | End: 2022-07-12

## 2022-07-12 RX ORDER — ONDANSETRON 2 MG/ML
4 INJECTION INTRAMUSCULAR; INTRAVENOUS ONCE AS NEEDED
Status: DISCONTINUED | OUTPATIENT
Start: 2022-07-12 | End: 2022-07-12

## 2022-07-12 RX ORDER — PROCHLORPERAZINE EDISYLATE 5 MG/ML
5 INJECTION INTRAMUSCULAR; INTRAVENOUS EVERY 6 HOURS PRN
Status: DISCONTINUED | OUTPATIENT
Start: 2022-07-12 | End: 2022-07-25 | Stop reason: HOSPADM

## 2022-07-12 RX ORDER — METHOCARBAMOL 100 MG/ML
INJECTION, SOLUTION INTRAMUSCULAR; INTRAVENOUS
Status: DISPENSED
Start: 2022-07-12 | End: 2022-07-13

## 2022-07-12 RX ORDER — PHENYLEPHRINE HCL IN 0.9% NACL 1 MG/10 ML
SYRINGE (ML) INTRAVENOUS
Status: DISCONTINUED | OUTPATIENT
Start: 2022-07-12 | End: 2022-07-12

## 2022-07-12 RX ORDER — ACETAMINOPHEN 500 MG
1000 TABLET ORAL
Status: COMPLETED | OUTPATIENT
Start: 2022-07-12 | End: 2022-07-12

## 2022-07-12 RX ORDER — PROPOFOL 10 MG/ML
VIAL (ML) INTRAVENOUS
Status: DISCONTINUED | OUTPATIENT
Start: 2022-07-12 | End: 2022-07-12

## 2022-07-12 RX ORDER — CELECOXIB 200 MG/1
200 CAPSULE ORAL
Status: DISCONTINUED | OUTPATIENT
Start: 2022-07-12 | End: 2022-07-12

## 2022-07-12 RX ORDER — HYDROCODONE BITARTRATE AND ACETAMINOPHEN 7.5; 325 MG/1; MG/1
1 TABLET ORAL EVERY 4 HOURS PRN
Status: DISCONTINUED | OUTPATIENT
Start: 2022-07-12 | End: 2022-07-14

## 2022-07-12 RX ORDER — LEVOTHYROXINE SODIUM 50 UG/1
50 TABLET ORAL DAILY
Status: DISCONTINUED | OUTPATIENT
Start: 2022-07-12 | End: 2022-07-15

## 2022-07-12 RX ORDER — HYDROMORPHONE HYDROCHLORIDE 2 MG/ML
INJECTION, SOLUTION INTRAMUSCULAR; INTRAVENOUS; SUBCUTANEOUS
Status: COMPLETED
Start: 2022-07-12 | End: 2022-07-12

## 2022-07-12 RX ORDER — CETIRIZINE HYDROCHLORIDE 10 MG/1
10 TABLET ORAL DAILY
Status: DISCONTINUED | OUTPATIENT
Start: 2022-07-12 | End: 2022-07-15

## 2022-07-12 RX ORDER — PROPOFOL 10 MG/ML
VIAL (ML) INTRAVENOUS CONTINUOUS PRN
Status: DISCONTINUED | OUTPATIENT
Start: 2022-07-12 | End: 2022-07-12

## 2022-07-12 RX ORDER — CEFAZOLIN SODIUM 1 G/3ML
INJECTION, POWDER, FOR SOLUTION INTRAMUSCULAR; INTRAVENOUS
Status: DISCONTINUED | OUTPATIENT
Start: 2022-07-12 | End: 2022-07-12 | Stop reason: HOSPADM

## 2022-07-12 RX ORDER — MIDAZOLAM HYDROCHLORIDE 1 MG/ML
INJECTION INTRAMUSCULAR; INTRAVENOUS
Status: DISCONTINUED | OUTPATIENT
Start: 2022-07-12 | End: 2022-07-12

## 2022-07-12 RX ORDER — HYDROMORPHONE HYDROCHLORIDE 2 MG/ML
0.2 INJECTION, SOLUTION INTRAMUSCULAR; INTRAVENOUS; SUBCUTANEOUS EVERY 5 MIN PRN
Status: CANCELLED | OUTPATIENT
Start: 2022-07-12

## 2022-07-12 RX ADMIN — EPHEDRINE SULFATE 10 MG: 50 INJECTION INTRAVENOUS at 10:07

## 2022-07-12 RX ADMIN — Medication 100 MCG: at 10:07

## 2022-07-12 RX ADMIN — Medication 100 MCG: at 09:07

## 2022-07-12 RX ADMIN — HYDROCODONE BITARTRATE AND ACETAMINOPHEN 2 TABLET: 7.5; 325 TABLET ORAL at 10:07

## 2022-07-12 RX ADMIN — SODIUM CHLORIDE, SODIUM GLUCONATE, SODIUM ACETATE, POTASSIUM CHLORIDE AND MAGNESIUM CHLORIDE: 526; 502; 368; 37; 30 INJECTION, SOLUTION INTRAVENOUS at 10:07

## 2022-07-12 RX ADMIN — PHENYLEPHRINE HYDROCHLORIDE 0.1 MCG/KG/MIN: 10 INJECTION INTRAVENOUS at 09:07

## 2022-07-12 RX ADMIN — DEXTROSE MONOHYDRATE 1 G: 5 INJECTION INTRAVENOUS at 06:07

## 2022-07-12 RX ADMIN — HYDROMORPHONE HYDROCHLORIDE 0.2 MG: 2 INJECTION, SOLUTION INTRAMUSCULAR; INTRAVENOUS; SUBCUTANEOUS at 04:07

## 2022-07-12 RX ADMIN — FENTANYL CITRATE 100 MCG: 50 INJECTION, SOLUTION INTRAMUSCULAR; INTRAVENOUS at 12:07

## 2022-07-12 RX ADMIN — EPHEDRINE SULFATE 10 MG: 50 INJECTION INTRAVENOUS at 09:07

## 2022-07-12 RX ADMIN — PROPOFOL 150 MG: 10 INJECTION, EMULSION INTRAVENOUS at 08:07

## 2022-07-12 RX ADMIN — MIDAZOLAM 2 MG: 1 INJECTION INTRAMUSCULAR; INTRAVENOUS at 08:07

## 2022-07-12 RX ADMIN — SODIUM CHLORIDE: 9 INJECTION, SOLUTION INTRAVENOUS at 03:07

## 2022-07-12 RX ADMIN — ROCURONIUM BROMIDE 50 MG: 10 SOLUTION INTRAVENOUS at 09:07

## 2022-07-12 RX ADMIN — FENTANYL CITRATE 100 MCG: 50 INJECTION, SOLUTION INTRAMUSCULAR; INTRAVENOUS at 09:07

## 2022-07-12 RX ADMIN — Medication 50 MCG: at 12:07

## 2022-07-12 RX ADMIN — REMIFENTANIL HYDROCHLORIDE 0.1 MCG/KG/MIN: 1 INJECTION, POWDER, LYOPHILIZED, FOR SOLUTION INTRAVENOUS at 09:07

## 2022-07-12 RX ADMIN — PROPOFOL 100 MCG/KG/MIN: 10 INJECTION, EMULSION INTRAVENOUS at 09:07

## 2022-07-12 RX ADMIN — GABAPENTIN 600 MG: 300 CAPSULE ORAL at 06:07

## 2022-07-12 RX ADMIN — SODIUM CHLORIDE, SODIUM GLUCONATE, SODIUM ACETATE, POTASSIUM CHLORIDE AND MAGNESIUM CHLORIDE: 526; 502; 368; 37; 30 INJECTION, SOLUTION INTRAVENOUS at 08:07

## 2022-07-12 RX ADMIN — DEXAMETHASONE SODIUM PHOSPHATE 8 MG: 4 INJECTION, SOLUTION INTRA-ARTICULAR; INTRALESIONAL; INTRAMUSCULAR; INTRAVENOUS; SOFT TISSUE at 09:07

## 2022-07-12 RX ADMIN — FENTANYL CITRATE 100 MCG: 50 INJECTION, SOLUTION INTRAMUSCULAR; INTRAVENOUS at 08:07

## 2022-07-12 RX ADMIN — ACETAMINOPHEN 1000 MG: 500 TABLET, FILM COATED ORAL at 06:07

## 2022-07-12 RX ADMIN — METHOCARBAMOL 1000 MG: 100 INJECTION INTRAMUSCULAR; INTRAVENOUS at 03:07

## 2022-07-12 RX ADMIN — HYDROCODONE BITARTRATE AND ACETAMINOPHEN 2 TABLET: 7.5; 325 TABLET ORAL at 06:07

## 2022-07-12 NOTE — ANESTHESIA PROCEDURE NOTES
Arterial (Left Radial)    Diagnosis: Cervical Radiculopathy    Patient location during procedure: holding area  Procedure start time: 7/12/2022 8:26 AM  Timeout: 7/12/2022 8:24 AM  Procedure end time: 7/12/2022 8:31 AM    Staffing  Authorizing Provider: Abhi Erwin MD  Performing Provider: Abhi Erwin MD    Anesthesiologist was present at the time of the procedure.    Preanesthetic Checklist  Completed: patient identified, IV checked, site marked, risks and benefits discussed, surgical consent, monitors and equipment checked, pre-op evaluation, timeout performed and anesthesia consent givenArterial (Left Radial)  Skin Prep: chlorhexidine gluconate and isopropyl alcohol  Local Infiltration: lidocaine  Orientation: left  Location: radial    Catheter Size: 20 G  Catheter placement by Ultrasound guidance. Heme positive aspiration all ports.   Vessel Caliber: medium, patent, compressibility normal  Vascular Doppler:  not done  Needle advanced into vessel with real time Ultrasound guidance.  Guidewire confirmed in vessel.  Sterile sheath used.Insertion Attempts: 1  Assessment  Dressing: secured with tape and tegaderm  Patient: Tolerated well

## 2022-07-12 NOTE — ANESTHESIA PROCEDURE NOTES
Intubation    Date/Time: 7/12/2022 9:02 AM  Performed by: Joey Gerhardt, CRNA  Authorized by: Abhi Erwin MD     Intubation:     Induction:  Intravenous    Intubated:  Postinduction    Mask Ventilation:  Easy mask    Attempts:  1    Attempted By:  CRNA    Method of Intubation:  Video laryngoscopy    Blade:  Glidescope 3    Laryngeal View Grade: Grade I - full view of cords      Difficult Airway Encountered?: No      Complications:  None    Airway Device:  Oral endotracheal tube    Airway Device Size:  7.5    Style/Cuff Inflation:  Cuffed (inflated to minimal occlusive pressure)    Tube secured:  22    Secured at:  The lips    Placement Verified By:  Capnometry    Complicating Factors:  None    Findings Post-Intubation:  BS equal bilateral

## 2022-07-12 NOTE — BRIEF OP NOTE
[facility] - Periop Services  Brief Operative Note    SUMMARY     Surgery Date: 7/12/2022     Surgeon(s) and Role:     * Paulo Rao MD - Primary    Assisting Surgeon: None    Pre-op Diagnosis:  * No pre-op diagnosis entered *    Post-op Diagnosis:  Post-Op Diagnosis Codes:     * HNP (herniated nucleus pulposus) with myelopathy, cervical [M50.00]     * Spinal stenosis in cervical region [M48.02]    Procedure(s) (LRB):  LAMINOPLASTY (N/A)    -left C3-4, C4-5, C5-6, C6-7 posterior foraminotomies  -C3-7 laminoplasty (globus)  -repair of dural tear (gortex suture, adherus glue)  -microdissection for spinal procedure  -c-arn  -ELISA x1  -Stimulan beads  -NTI (MEPs)    Anesthesia: General    Operative Findings: Patient tolerated procedure well and was transferred to PACU.     Estimated Blood Loss: 300 mL    Estimated Blood Loss has been documented.         Specimens:   Specimen (24h ago, onward)            None          [id number]

## 2022-07-12 NOTE — ANESTHESIA PREPROCEDURE EVALUATION
07/11/2022  Scott Echeverria is a 71 y.o., male , who presents for the following:    Procedure: LAMINOPLASTY (N/A Spine Cervical) - left C3-4, C4-5, C5-6, C6-7 foraminotomies, C3-7 laminoplasty //  TIVA SET UP   Anesthesia type: General   Diagnosis:        HNP (herniated nucleus pulposus) with myelopathy, cervical [M50.00]       Spinal stenosis in cervical region [M48.02]       Pre-op Assessment    I have reviewed the Patient Summary Reports.    I have reviewed the NPO Status.   I have reviewed the Medications.     Review of Systems  Anesthesia Hx:  No problems with previous Anesthesia    Social:  Non-Smoker    Cardiovascular:  Cardiovascular Normal     Pulmonary:   Sleep Apnea    Neurological:   Cervical Stenosis w/ Radiculopathy   Endocrine:   Hypothyroidism        Physical Exam  General: Alert and Oriented    Airway:  Mouth Opening: Normal  TM Distance: Normal  Tongue: Normal    Chest/Lungs:  Normal Respiratory Rate    Heart:  Rate: Normal  Rhythm: Regular Rhythm       LAB:  CBC:  Lab Results   Component Value Date    WBC 6.5 07/08/2022    RBC 4.75 07/08/2022    HGB 13.8 (L) 07/08/2022    HCT 41.7 (L) 07/08/2022     PLT's 225 K    CMP:   Lab Results   Component Value Date    CHLORIDE 105 07/08/2022    CO2 28 07/08/2022    BUN 8.7 07/08/2022    CREATININE 0.82 07/08/2022    GLUCOSE 83 07/08/2022    CALCIUM 9.2 07/08/2022     K+ 4.4    INR:  No results found for: PT, INR, PROTIME, APTT    Anesthesia Plan  Type of Anesthesia, risks & benefits discussed:    Anesthesia Type: Gen ETT  Intra-op Monitoring Plan: Standard ASA Monitors and Art Line  Post Op Pain Control Plan: multimodal analgesia and IV/PO Opioids PRN  Induction:  IV  Airway Plan: Direct and Video  Informed Consent: Informed consent signed with the Patient and all parties understand the risks and agree with anesthesia plan.  All questions answered.  Patient consented to blood products? Yes  ASA Score: 2  Day of Surgery Review of History & Physical: H&P Update referred to the surgeon/provider.  Anesthesia Plan Notes: Intubate maintaining Cervical Alignment w/ Glidescope   Multi level Cervical surgery, will benefit from A-Line for close BP monitoring  VIJI precautions    Ready For Surgery From Anesthesia Perspective.     .

## 2022-07-12 NOTE — TRANSFER OF CARE
"Anesthesia Transfer of Care Note    Patient: Scott Echeverria    Procedure(s) Performed: Procedure(s) (LRB):  LAMINOPLASTY (N/A)    Patient location: PACU    Anesthesia Type: general    Transport from OR: Transported from OR on room air with adequate spontaneous ventilation    Post pain: adequate analgesia    Post assessment: no apparent anesthetic complications and tolerated procedure well    Post vital signs: stable    Level of consciousness: sedated    Nausea/Vomiting: no nausea/vomiting    Complications: none    Transfer of care protocol was followed      Last vitals:   Visit Vitals  /79   Pulse 75   Temp 36.9 °C (98.5 °F) (Oral)   Resp 18   Ht 5' 9" (1.753 m)   Wt 75.7 kg (166 lb 14.2 oz)   SpO2 97%   BMI 24.65 kg/m²     "

## 2022-07-12 NOTE — OP NOTE
DATE OF OPERATION:   Nanette the  12, 2022     PREOPERATIVE DIAGNOSIS:   1. Cervical stenosis with myelopathy  2. Cervical spondylosis and foraminal stenosis with radiculopathy    POSTOPERATIVE DIAGNOSIS:   1. Cervical stenosis with myelopathy  2. Cervical spondylosis and foraminal stenosis with radiculopathy    SURGEON:  Paulo Rao M.D.    ASSISTANT: LUIS MIGUEL Tucker     PROCEDURE:   1. C3, C4, C5, C6, C7 laminoplasties with the Globus Canopy laminoplasty system  2. Left C3-4 laminoforaminotomy  3. Left C4-5 laminoforaminotomy  4. Left C5-6 laminoforaminotomy  5. Left C6-7 laminoforaminotomy  6. Microdissection for spinal procedure    ANESTHESIA:   General endotracheal     BLOOD LOSS:   300 cc     SPECIMEN(s):   None     COMPLICATIONS:   There is a small dural tear on the left just at the takeoff of the left C4 nerve root that was created with the Sonopet device.  This was closed primarily with 6 0 Cumming-Jef suture in a near watertight fashion. Adherus dural sealant was placed over the closure.    HISTORY:   Scott Echeverria is a 71-year-old gentleman with a couple years of progressively worsening neck shoulder and arm pain with numbness and weakness.  He also has episodes of dizziness with neck extension.  Imaging study showed multilevel stenosis and cord compression with perhaps some high signal within the cord as well as severe multilevel spondylosis and disc degeneration.  The initial plan was for C5-6 and C6-7 foraminotomies.  However, after reviewing his CT,  I recommended that we also decompress C3-4 and C4-5 on the left.   The patient understood and accepted the nature of this surgery as well as its attendant risks.     FINDINGS:   There were no untoward findings.  As expected, there was significant stenosis with excellent decompression of the thecal sac completion with nice pulsations.  The exiting nerve roots were under significant impingement from the rostral aspect of the caudal facet at all 4  "levels.  There was excellent nerve root decompression completion of this portion of the procedure.  Patient tolerated procedure well.    PROCEDURE IN DETAIL:After endotracheal intubation and induction of general anesthesia, the patient was given intravenous antibiotics.  The Pratt 3-point pin fixation head rest was affixed to the skull, and then the patient was placed into the prone position on chest rolls with pressure points appropriately padded.  Appropriate positioning of the neck was confirmed with the intraoperative C-arm.       The neck was prepped and draped in the usual fashion after marking a midline incision.  The midline subcutaneous tissue as well as the paraspinal muscles bilaterally were then infiltrated with local anesthetic containing epinephrine.  The incision was carried down through the skin and subcutaneous tissues in the midline, then unipolar cautery was used to incise the ligamentum nuchae and cervicodorsal fascia in the midline.  Then the paraspinal muscles were elevated bilaterally off of the spinous processes and laminae at the appropriate levels.  Facet joints were exposed bilaterally, but care was taken not to compromise the facet capsules or joints where possible.   Self-retaining retractors were put deeper into the wound.  The SonSecond Sight ultrasonic bone scalpel was then used to create a trough bilaterally at each operated level.  On the ipsilateral side, the trough was completed down to the epidural space.  As described above, there was small dural puncture on the left at C3-4 that was repaired primarily.  On the contralateral side, the bony connection was left intact in order to create a "greenstick" fracture.  Then the ligamentum flavum was released at each level. The spinal laminar unit was then elevated at the operated levels from the ipsilateral side, allowing decompression of the thecal sac.  Further lateral bone removal was carried out as needed to complete the decompression on " the ipsilateral side.  Then, at each level an appropriate shaped and length plate was chosen so that screws could be placed into the facet/lamina laterally and into the spinal laminar junction medially. Where possible, two screws were used on each side.  Epidural bleeding was controlled temporarily with Gelfoam pledgets.  These were then removed at the completion of the procedure.    Attention was then paid to the foraminotomies.  The operating microscope was then brought into place and, using combination of the Sonopet micro claw and a high-speed drill with a ivette bur, foraminotomies were carried out such that it was free passage of the nerve root past pedicles.  As expected, there is severe facet arthropathy.  There do not appear to be any significant instability.  Gelfoam was placed over each foraminotomy defect.    The wound was irrigated copiously with antibiotic irrigation and then a drain was placed in the submuscular space and brought out through a separate stab incision.  Stimulan antibiotic impregnated beads were then placed in the subfascial space.  The wound was then closed with 0 Vicryl for the fascia, 2-0 Vicryl for the subcutaneous tissue, and staples for the skin edges.  A local dressing was applied.  The patient was then taken to the Post Anesthetic Care Unit in satisfactory condition with correct sponge and needle counts.

## 2022-07-12 NOTE — H&P
The patient has been examined and the H&P has been reviewed:     I concur with the findings and no changes have occurred since H&P was written.     Surgery risks, benefits and alternative options discussed and understood by patient/family.

## 2022-07-13 PROCEDURE — 11000001 HC ACUTE MED/SURG PRIVATE ROOM

## 2022-07-13 PROCEDURE — 99900031 HC PATIENT EDUCATION (STAT)

## 2022-07-13 PROCEDURE — 63600175 PHARM REV CODE 636 W HCPCS: Performed by: NURSE PRACTITIONER

## 2022-07-13 PROCEDURE — 25000003 PHARM REV CODE 250: Performed by: NURSE PRACTITIONER

## 2022-07-13 PROCEDURE — 99024 PR POST-OP FOLLOW-UP VISIT: ICD-10-PCS | Mod: POP,,, | Performed by: NURSE PRACTITIONER

## 2022-07-13 PROCEDURE — 99024 POSTOP FOLLOW-UP VISIT: CPT | Mod: POP,,, | Performed by: NURSE PRACTITIONER

## 2022-07-13 PROCEDURE — 97162 PT EVAL MOD COMPLEX 30 MIN: CPT

## 2022-07-13 PROCEDURE — 99024 POSTOP FOLLOW-UP VISIT: CPT | Mod: POP,,, | Performed by: NEUROLOGICAL SURGERY

## 2022-07-13 PROCEDURE — 97166 OT EVAL MOD COMPLEX 45 MIN: CPT

## 2022-07-13 PROCEDURE — 99024 PR POST-OP FOLLOW-UP VISIT: ICD-10-PCS | Mod: POP,,, | Performed by: NEUROLOGICAL SURGERY

## 2022-07-13 PROCEDURE — 97530 THERAPEUTIC ACTIVITIES: CPT

## 2022-07-13 PROCEDURE — 94799 UNLISTED PULMONARY SVC/PX: CPT

## 2022-07-13 PROCEDURE — 99900035 HC TECH TIME PER 15 MIN (STAT)

## 2022-07-13 RX ORDER — SCOLOPAMINE TRANSDERMAL SYSTEM 1 MG/1
1 PATCH, EXTENDED RELEASE TRANSDERMAL
Status: DISCONTINUED | OUTPATIENT
Start: 2022-07-13 | End: 2022-07-15

## 2022-07-13 RX ORDER — METHOCARBAMOL 100 MG/ML
1000 INJECTION, SOLUTION INTRAMUSCULAR; INTRAVENOUS EVERY 8 HOURS PRN
Status: DISCONTINUED | OUTPATIENT
Start: 2022-07-13 | End: 2022-07-14

## 2022-07-13 RX ADMIN — ONDANSETRON 8 MG: 4 TABLET, ORALLY DISINTEGRATING ORAL at 08:07

## 2022-07-13 RX ADMIN — ONDANSETRON 8 MG: 4 TABLET, ORALLY DISINTEGRATING ORAL at 01:07

## 2022-07-13 RX ADMIN — HYDROCODONE BITARTRATE AND ACETAMINOPHEN 2 TABLET: 7.5; 325 TABLET ORAL at 10:07

## 2022-07-13 RX ADMIN — SCOPOLAMINE 1 PATCH: 1 PATCH TRANSDERMAL at 02:07

## 2022-07-13 RX ADMIN — HYDROCODONE BITARTRATE AND ACETAMINOPHEN 2 TABLET: 7.5; 325 TABLET ORAL at 02:07

## 2022-07-13 RX ADMIN — METHOCARBAMOL 1000 MG: 100 INJECTION, SOLUTION INTRAMUSCULAR; INTRAVENOUS at 08:07

## 2022-07-13 RX ADMIN — HYDROCODONE BITARTRATE AND ACETAMINOPHEN 2 TABLET: 7.5; 325 TABLET ORAL at 03:07

## 2022-07-13 RX ADMIN — DEXTROSE MONOHYDRATE 1 G: 5 INJECTION INTRAVENOUS at 02:07

## 2022-07-13 RX ADMIN — MUPIROCIN: 20 OINTMENT TOPICAL at 09:07

## 2022-07-13 RX ADMIN — HYDROCODONE BITARTRATE AND ACETAMINOPHEN 2 TABLET: 7.5; 325 TABLET ORAL at 06:07

## 2022-07-13 RX ADMIN — CETIRIZINE HYDROCHLORIDE 10 MG: 10 TABLET, FILM COATED ORAL at 08:07

## 2022-07-13 RX ADMIN — TAMSULOSIN HYDROCHLORIDE 0.4 MG: 0.4 CAPSULE ORAL at 08:07

## 2022-07-13 RX ADMIN — MUPIROCIN: 20 OINTMENT TOPICAL at 08:07

## 2022-07-13 RX ADMIN — DEXTROSE MONOHYDRATE 1 G: 5 INJECTION INTRAVENOUS at 09:07

## 2022-07-13 RX ADMIN — HYDROCODONE BITARTRATE AND ACETAMINOPHEN 2 TABLET: 7.5; 325 TABLET ORAL at 08:07

## 2022-07-13 RX ADMIN — AMITRIPTYLINE HYDROCHLORIDE 25 MG: 25 TABLET, FILM COATED ORAL at 08:07

## 2022-07-13 NOTE — PLAN OF CARE
Problem: Adult Inpatient Plan of Care  Goal: Patient-Specific Goal (Individualized)  Outcome: Ongoing, Progressing  Flowsheets (Taken 7/13/2022 0054)  Patient-Specific Goals (Include Timeframe): TO GET MY SHOULDER FEELING BETTER TONIGHT     Problem: Fall Injury Risk  Goal: Absence of Fall and Fall-Related Injury  Outcome: Ongoing, Progressing     Problem: Pain Acute  Goal: Acceptable Pain Control and Functional Ability  Outcome: Ongoing, Progressing

## 2022-07-13 NOTE — PLAN OF CARE
07/13/22 1332   Discharge Assessment   Assessment Type Discharge Planning Assessment   Source of Information patient;family   Communicated MCKINLEY with patient/caregiver Date not available/Unable to determine   Lives With spouse   Do you expect to return to your current living situation? Yes   Do you have help at home or someone to help you manage your care at home? Yes   Prior to hospitilization cognitive status: Alert/Oriented   Current cognitive status: Alert/Oriented   Walking or Climbing Stairs Difficulty none   Dressing/Bathing Difficulty none   Equipment Currently Used at Home none   Readmission within 30 days? No   Patient currently being followed by outpatient case management? No   Do you currently have service(s) that help you manage your care at home? No   Do you take prescription medications? Yes   Do you have prescription coverage? Yes   Do you have any problems affording any of your prescribed medications? No   Who is going to help you get home at discharge? spouse   How do you get to doctors appointments? car, drives self;family or friend will provide   Are you on dialysis? No   Do you take coumadin? No   Discharge Plan A Home   Discharge Plan B Home   DME Needed Upon Discharge  other (see comments)  (TBD)   Discharge Plan discussed with: Patient   Discharge Barriers Identified None   PCP is Dr. Gary Reilly Jr.  Uses [facility] in [place].  Plan is to return home at [place].  Will wait for therapy to [facility].

## 2022-07-13 NOTE — PROGRESS NOTES
Hospital Progress Note  [facility] Neurosurgery    Admit Date: 7/12/2022  Post-operative Day: 1 Day Post-Op  Hospital Day: 2    SUBJECTIVE:     Follow-up For:  Procedure(s) (LRB):  LAMINOPLASTY (N/A)    POD #1 s/p C3-7 laminoplasty, left C3-4, C4-5, C5-6, C6-7 foraminotomies    Patient reports incisional pain and spasms. He has some pain into the left shoulder. Some weakness with left deltoid. His pre-operative pain seems improved. Denies any headaches. He is sitting in his recliner eating lunch during rounds. Reports some balance issues when working with therapy this morning. Pain controlled with current pain regimen.     Scheduled Meds:   amitriptyline  25 mg Oral Daily    cetirizine  10 mg Oral Daily    levothyroxine  50 mcg Oral Daily    mupirocin   Nasal BID    tamsulosin  0.4 mg Oral Daily     Continuous Infusions:   sodium chloride 0.9% 75 mL/hr at 07/12/22 1554     PRN Meds:aluminum-magnesium hydroxide-simethicone, bisacodyL, HYDROcodone-acetaminophen, HYDROcodone-acetaminophen, methocarbamol (ROBAXIN) IVPB, morphine, ondansetron, prochlorperazine, senna-docusate 8.6-50 mg    Review of patient's allergies indicates:   Allergen Reactions    Iodine        OBJECTIVE:     Vital Signs (Most Recent)  Temp: 97.9 °F (36.6 °C) (07/13/22 1115)  Pulse: 78 (07/13/22 1115)  Resp: 18 (07/13/22 1115)  BP: 137/78 (07/13/22 1115)  SpO2: (!) 93 % (07/13/22 1115)    Vital Signs Range (Last 24H):  Temp:  [97.3 °F (36.3 °C)-97.9 °F (36.6 °C)]   Pulse:  []   Resp:  [10-20]   BP: (121-137)/(68-85)   SpO2:  [80 %-99 %]     I & O (Last 24H):    Intake/Output Summary (Last 24 hours) at 7/13/2022 1252  Last data filed at 7/13/2022 0800  Gross per 24 hour   Intake 805 ml   Output 2710 ml   Net -1905 ml     Physical Exam:  AAO x4  MAEW except for some weakness with left deltoid 4-/5 and left biceps 5-/5.     Wound/Incision:  Dressing is c/d/i. ELISA output CSF, blood tinged - 80, 370, 106ml    Lines/Drains:        Peripheral IV - Single Lumen 07/12/22 0714 18 G Anterior;Distal;Right Forearm (Active)   Site Assessment Clean;Dry;Intact 07/13/22 1121   Extremity Assessment Distal to IV No abnormal discoloration 07/12/22 2000   Line Status Capped 07/13/22 0800   Dressing Status Clean;Dry;Intact 07/13/22 0800   Dressing Intervention Integrity maintained 07/13/22 0800   Number of days: 1            Closed/Suction Drain 07/12/22 1303 Posterior Neck Bulb 10 Fr. (Active)   Dressing Type Gauze 07/13/22 0800   Dressing Status Clean;Dry;Intact 07/13/22 0800   Dressing Intervention Integrity maintained 07/13/22 0800   Drainage Serosanguineous 07/13/22 0800   Status To bulb suction 07/13/22 0800   Output (mL) 80 mL 07/13/22 0800   Number of days: 0       Laboratory:  I have reviewed all pertinent lab results within the past 24 hours.  CBC:   Recent Labs   Lab 07/08/22 0921   WBC 6.5   RBC 4.75   HGB 13.8*   HCT 41.7*      MCV 87.8   MCH 29.1   MCHC 33.1     BMP:   Recent Labs   Lab 07/08/22  0921      K 4.4   CO2 28   BUN 8.7   CREATININE 0.82   CALCIUM 9.2         ASSESSMENT/PLAN:     Problem List Items Addressed This Visit        Neuro    Stenosis of cervical spine with myelopathy    Relevant Orders    Admit to Inpatient (Completed)    Osseous and subluxation stenosis of intervertebral foramina of cervical region - Primary    Relevant Orders    EKG 12-lead (Completed)      Other Visit Diagnoses     Displacement of cervical intervertebral disc with myelopathy              PLAN  ELISA output CSF - placed to gravity. Will discuss with Dr. Rao.   Continue current pain regimen  Soft collar with activity/prn for comfort  PT/OT  SCDs  Likely will be ready for d/c home over next day or two.

## 2022-07-13 NOTE — PT/OT/SLP EVAL
Occupational Therapy   Evaluation    Name: Scott Echeverria  MRN: [id number]  Admitting Diagnosis:C3,C4,C5,C6,C7 laminoplasty   Recent Surgery: Procedure(s) (LRB):  LAMINOPLASTY (N/A) 1 Day Post-Op    Recommendations:     Discharge Recommendations: outpatient OT  Discharge Equipment Recommendations:  walker, rolling  Barriers to discharge:  Other (Comment) (Severity of deficits)    Assessment:     Scott Echeverria is a 71 y.o. male with a medical diagnosis of <principal problem not specified>.  Performance deficits affecting function: weakness, impaired functional mobilty, impaired endurance, pain, impaired balance, impaired self care skills, decreased upper extremity function. Pt presents c LUE weakness, pain, and decreased balance impairing ability to complete ADLs/mobility INDly. Would benefit from outpatient OT in order to improve strength and function.      Rehab Prognosis: Good; patient would benefit from acute skilled OT services to address these deficits and reach maximum level of function.       Plan:     Patient to be seen 4 x/week, daily to address the above listed problems via self-care/home management, therapeutic activities, therapeutic exercises  · Plan of Care Expires: 07/27/22  · Plan of Care Reviewed with:      Subjective     Chief Complaint: Pain in neck and across B shoulders   Patient/Family Comments/goals: To get stronger     Occupational Profile:  Living Environment: Pt lives in a [facility] c his spouse. Has a handicap walk in shower c a bench.   Previous level of function: IND c ADLs and mobility without AD  Equipment Used at Home:  none  Assistance upon Discharge: Pt's spouse can assist at d/c.     Pain/Comfort:  · Pain Rating 1: 5/10  · Location 1: neck  · Pain Addressed 1: Reposition    Patients cultural, spiritual, Protestant conflicts given the current situation:      Objective:     Communicated with: RN prior to session.  Patient found supine with peripheral IV upon OT entry to room.    General  Precautions: Standard, fall   Orthopedic Precautions: (C-spine pxns, soft collar OOB), Venetie IRA  Braces: Cervical collar  Respiratory Status: Room air    Occupational Performance:    Bed Mobility:    · Patient completed Supine to Sit with stand by assistance    Functional Mobility/Transfers:  · Patient completed Sit <> Stand Transfer with minimum assistance  with  rolling walker   · Patient completed Toilet Transfer Step Transfer technique with minimum assistance with  rolling walker  · Functional Mobility: Pt ambulated to toilet in bathroom using RW c Min A     Activities of Daily Living:  · Lower Body Dressing: stand by assistance Don socks in fig 4    Cognitive/Visual Perceptual:  Cognitive/Psychosocial Skills:     -       Oriented to: Person, Place, Time and Situation   -       Mood/Affect/Coping skills/emotional control: Appropriate to situation and Cooperative    Physical Exam:  Upper Extremity Strength:    -       Right Upper Extremity: WNL  -       Left Upper Extremity: Deficits: Shoulder flexion 3-, elbow flex/ext: 4, digit flexion 4    Treatment & Education:  Educated pt on OT POC and fall precautions- pt verbalized understanding.   Education:    Patient left up in chair with all lines intact and call button in reach    GOALS:   Multidisciplinary Problems     Occupational Therapy Goals        Problem: Occupational Therapy    Goal Priority Disciplines Outcome Interventions   Occupational Therapy Goal     OT, PT/OT Ongoing, Progressing    Description: Goals to be met by: 7/27    Patient will increase functional independence with ADLs by performing:    UE Dressing with Modified [place].  LE Dressing with Modified [place].  Grooming while standing at sink with Modified [place].  Toileting from toilet with Modified [place] for hygiene and clothing management.   Toilet transfer to toilet with Modified [place].  Upper extremity exercise program 10 reps per handout, with independence.                      History:     Past Medical History:   Diagnosis Date    BPH (benign prostatic hyperplasia)     Cervical pain     Cervical radiculitis     Cervical spinal stenosis     Hypothyroidism, unspecified     Sleep apnea     resolved since surgery       Past Surgical History:   Procedure Laterality Date    APPENDECTOMY  01/25/2022    Dr. Kerry Coats    COLONOSCOPY  2021    HEMORRHOID SURGERY  2002    lower back surgery  1990    RETINAL DETACHMENT SURGERY Right 2015    SINUS SURGERY      TONSILLECTOMY  1956       Time Tracking:     OT Date of Treatment: 07/13/22  OT Start Time: 1000  OT Stop Time: 1020  OT Total Time (min): 20 min    Billable Minutes:Evaluation Moderate complexity     7/13/2022

## 2022-07-13 NOTE — PLAN OF CARE
Problem: Physical Therapy  Goal: Physical Therapy Goal  Description: Goals to be met by: 2022     Patient will increase functional independence with mobility by performin. Sit to stand transfer with Stand-by Assistance  2. Gait  x 300 feet with Stand-by Assistance using Rolling Walker.     Outcome: Ongoing, Progressing

## 2022-07-13 NOTE — PT/OT/SLP EVAL
Physical Therapy Evaluation    Patient Name:  Scott Echeverria   MRN:  [id number]    Recommendations:     Discharge Recommendations:  rehabilitation facility, home with home health   Discharge Equipment Recommendations: walker, rolling   Barriers to discharge: acuity     Assessment:     Scott Echeverria is a 71 y.o. male admitted with a medical diagnosis of <principal problem not specified>.  He presents with the following impairments/functional limitations:  weakness, impaired endurance, impaired functional mobilty, gait instability, decreased lower extremity function, impaired balance, pain. Pt was feeling weak and off balance so required Beni w/ use of RW for ambulation.     Rehab Prognosis: Good; patient would benefit from acute skilled PT services to address these deficits and reach maximum level of function.    Recent Surgery: Procedure(s) (LRB):  LAMINOPLASTY (N/A) 1 Day Post-Op    Plan:     During this hospitalization, patient to be seen daily to address the identified rehab impairments via gait training, therapeutic activities, therapeutic exercises, neuromuscular re-education and progress toward the following goals:    · Plan of Care Expires:  08/12/22    Subjective     Chief Complaint: pain  Patient/Family Comments/goals: to go home  Pain/Comfort:  · Pain Rating 1: 5/10  · Location 1: neck  · Pain Addressed 1: Nurse notified    Patients cultural, spiritual, Yazdanism conflicts given the current situation: no    Living Environment:  Pt lives w/ his wife in a [facility] no steps to enter.   Prior to admission, patients level of function was independent.  Equipment used at home: none.  DME owned (not currently used): none.  Upon discharge, patient will have assistance from wife.    Objective:     Communicated with RN prior to session.  Patient found HOB elevated with peripheral IV  upon PT entry to room.    General Precautions: Standard, fall (spinal pxns)   Orthopedic Precautions:N/A   Braces: Cervical  collar  Respiratory Status: Room air    Exams:  · Cognitive Exam:  Patient is oriented to Person, Place and Time    Functional Mobility:  · Bed Mobility:     · Supine to Sit: contact guard assistance  · Transfers:     · Sit to Stand:  minimum assistance with rolling walker  · Gait: pt demo'd an unsteady step through gt pattern w/ use of RW and Beni for balance.         AM-PAC 6 CLICK MOBILITY  Total Score:18     Patient left up in chair with all lines intact, call button in reach and RN notified.    GOALS:   Multidisciplinary Problems     Physical Therapy Goals        Problem: Physical Therapy    Goal Priority Disciplines Outcome Goal Variances Interventions   Physical Therapy Goal     PT, PT/OT Ongoing, Progressing     Description: Goals to be met by: 2022     Patient will increase functional independence with mobility by performin. Sit to stand transfer with Stand-by Assistance  2. Gait  x 300 feet with Stand-by Assistance using Rolling Walker.                      History:     Past Medical History:   Diagnosis Date    BPH (benign prostatic hyperplasia)     Cervical pain     Cervical radiculitis     Cervical spinal stenosis     Hypothyroidism, unspecified     Sleep apnea     resolved since surgery       Past Surgical History:   Procedure Laterality Date    APPENDECTOMY  2022    Dr. Kerry Coats    COLONOSCOPY      HEMORRHOID SURGERY      lower back surgery      RETINAL DETACHMENT SURGERY Right 2015    SINUS SURGERY      TONSILLECTOMY         Time Tracking:     PT Received On: 22  PT Start Time: 1002     PT Stop Time: 1022  PT Total Time (min): 20 min     Billable Minutes: Evaluation , moderate complexity      2022

## 2022-07-13 NOTE — PLAN OF CARE
Problem: Occupational Therapy  Goal: Occupational Therapy Goal  Description: Goals to be met by: 7/27    Patient will increase functional independence with ADLs by performing:    UE Dressing with Modified [place].  LE Dressing with Modified [place].  Grooming while standing at sink with Modified [place].  Toileting from toilet with Modified [place] for hygiene and clothing management.   Toilet transfer to toilet with Modified [place].  Upper extremity exercise program 10 reps per handout, with independence.    Outcome: Ongoing, Progressing

## 2022-07-13 NOTE — PT/OT/SLP PROGRESS
Physical Therapy Treatment    Patient Name:  Scott Echeverria   MRN:  [id number]    Recommendations:     Discharge Recommendations:  rehabilitation facility, home with home health (pending progress)   Discharge Equipment Recommendations: walker, rolling   Barriers to discharge: acuity of illness    Assessment:     Scott Echeverria is a 71 y.o. male admitted with a medical diagnosis of <principal problem not specified>.  He presents with the following impairments/functional limitations:  weakness, impaired endurance, impaired functional mobilty, gait instability, impaired balance, decreased safety awareness, decreased lower extremity function, decreased upper extremity function, pain, orthopedic precautions.    Rehab Prognosis: Good; patient would benefit from acute skilled PT services to address these deficits and reach maximum level of function.    Recent Surgery: Procedure(s) (LRB):  LAMINOPLASTY (N/A) 1 Day Post-Op    Plan:     During this hospitalization, patient to be seen daily to BID to address the identified rehab impairments via gait training, therapeutic activities, therapeutic exercises, neuromuscular re-education and progress toward the following goals:    · Plan of Care Expires:  08/12/22    Subjective     Chief Complaint: pain  Patient/Family Comments/goals: to be independent  Pain/Comfort:  · Pain Rating 1: 5/10  · Location 1: neck  · Pain Addressed 1: Nurse notified      Objective:     Communicated with RN prior to session.  Patient found HOB elevated with peripheral IV upon PT entry to room.     General Precautions: Standard, fall (spinal pxns)   Orthopedic Precautions:N/A   Braces: Cervical collar  Respiratory Status: Room air     Functional Mobility:  · Transfers:     · Sit to Stand:  minimum assistance with rolling walker  · Gait: pt demo'd a slow step through gt pattern w/ short step length bilat and use of RW, Beni for stability. Pt ambulation limited by pt getting nauseated and starting to dry  roseanne      -[facility] 6 CLICK MOBILITY  Turning over in bed (including adjusting bedclothes, sheets and blankets)?: 4  Sitting down on and standing up from a chair with arms (e.g., wheelchair, bedside commode, etc.): 3  Moving from lying on back to sitting on the side of the bed?: 3  Moving to and from a bed to a chair (including a wheelchair)?: 3  Need to walk in hospital room?: 3  Climbing 3-5 steps with a railing?: 1  Basic Mobility Total Score: 17       Patient left HOB elevated with all lines intact, call button in reach and RN notified..    GOALS:   Multidisciplinary Problems     Physical Therapy Goals        Problem: Physical Therapy    Goal Priority Disciplines Outcome Goal Variances Interventions   Physical Therapy Goal     PT, PT/OT Ongoing, Progressing     Description: Goals to be met by: 2022     Patient will increase functional independence with mobility by performin. Sit to stand transfer with Stand-by Assistance  2. Gait  x 300 feet with Stand-by Assistance using Rolling Walker.                      Time Tracking:     PT Received On: 22  PT Start Time: 1332     PT Stop Time: 1342  PT Total Time (min): 10 min     Billable Minutes: Therapeutic Activity 10 mins    Treatment Type: Treatment  PT/PTA: PT     PTA Visit Number: 0     2022

## 2022-07-14 PROBLEM — R47.01 APHASIA: Status: ACTIVE | Noted: 2022-07-14

## 2022-07-14 LAB
ALBUMIN SERPL-MCNC: 3.2 GM/DL (ref 3.4–4.8)
ALBUMIN SERPL-MCNC: 3.5 GM/DL (ref 3.4–4.8)
ALBUMIN/GLOB SERPL: 1.4 RATIO (ref 1.1–2)
ALBUMIN/GLOB SERPL: 1.5 RATIO (ref 1.1–2)
ALP SERPL-CCNC: 68 UNIT/L (ref 40–150)
ALP SERPL-CCNC: 71 UNIT/L (ref 40–150)
ALT SERPL-CCNC: 17 UNIT/L (ref 0–55)
ALT SERPL-CCNC: 18 UNIT/L (ref 0–55)
AMPHET UR QL SCN: NEGATIVE
AST SERPL-CCNC: 18 UNIT/L (ref 5–34)
AST SERPL-CCNC: 19 UNIT/L (ref 5–34)
BARBITURATE SCN PRESENT UR: NEGATIVE
BASE EXCESS ARTERIAL: 0.9 MMOL/L (ref -2–2)
BASOPHILS # BLD AUTO: 0.02 X10(3)/MCL (ref 0–0.2)
BASOPHILS # BLD AUTO: 0.03 X10(3)/MCL (ref 0–0.2)
BASOPHILS NFR BLD AUTO: 0.2 %
BASOPHILS NFR BLD AUTO: 0.3 %
BENZODIAZ UR QL SCN: POSITIVE
BILIRUBIN DIRECT+TOT PNL SERPL-MCNC: 0.3 MG/DL
BILIRUBIN DIRECT+TOT PNL SERPL-MCNC: 0.5 MG/DL
BUN SERPL-MCNC: 9.1 MG/DL (ref 8.4–25.7)
BUN SERPL-MCNC: 9.7 MG/DL (ref 8.4–25.7)
CALCIUM SERPL-MCNC: 8.8 MG/DL (ref 8.8–10)
CALCIUM SERPL-MCNC: 9 MG/DL (ref 8.8–10)
CANNABINOIDS UR QL SCN: NEGATIVE
CHLORIDE SERPL-SCNC: 105 MMOL/L (ref 98–107)
CHLORIDE SERPL-SCNC: 105 MMOL/L (ref 98–107)
CO2 SERPL-SCNC: 27 MMOL/L (ref 23–31)
CO2 SERPL-SCNC: 27 MMOL/L (ref 23–31)
COCAINE UR QL SCN: NEGATIVE
CORRECTED TEMPERATURE (PCO2): 49 MMHG (ref 35–45)
CORRECTED TEMPERATURE (PH): 7.35 (ref 7.35–7.45)
CORRECTED TEMPERATURE (PO2): 311 MMHG (ref 80–100)
CREAT SERPL-MCNC: 0.7 MG/DL (ref 0.73–1.18)
CREAT SERPL-MCNC: 0.73 MG/DL (ref 0.73–1.18)
EOSINOPHIL # BLD AUTO: 0 X10(3)/MCL (ref 0–0.9)
EOSINOPHIL # BLD AUTO: 0.01 X10(3)/MCL (ref 0–0.9)
EOSINOPHIL NFR BLD AUTO: 0 %
EOSINOPHIL NFR BLD AUTO: 0.1 %
ERYTHROCYTE [DISTWIDTH] IN BLOOD BY AUTOMATED COUNT: 15.4 % (ref 11.5–17)
ERYTHROCYTE [DISTWIDTH] IN BLOOD BY AUTOMATED COUNT: 15.5 % (ref 11.5–17)
FENTANYL UR QL SCN: NEGATIVE
GLOBULIN SER-MCNC: 2.3 GM/DL (ref 2.4–3.5)
GLOBULIN SER-MCNC: 2.4 GM/DL (ref 2.4–3.5)
GLUCOSE SERPL-MCNC: 128 MG/DL (ref 82–115)
GLUCOSE SERPL-MCNC: 176 MG/DL (ref 82–115)
HCO3 ARTERIAL: 27.1 MMOL/L (ref 18–23)
HCO3 UR-SCNC: 27.1 MMOL/L
HCT VFR BLD AUTO: 36.7 % (ref 42–52)
HCT VFR BLD AUTO: 39.8 % (ref 42–52)
HGB BLD-MCNC: 11.6 GM/DL (ref 14–18)
HGB BLD-MCNC: 12 G/DL (ref 12–16)
HGB BLD-MCNC: 12.7 GM/DL (ref 14–18)
HGB BLD-MCNC: ABNORMAL G/DL
IMM GRANULOCYTES # BLD AUTO: 0.05 X10(3)/MCL (ref 0–0.04)
IMM GRANULOCYTES # BLD AUTO: 0.05 X10(3)/MCL (ref 0–0.04)
IMM GRANULOCYTES NFR BLD AUTO: 0.5 %
IMM GRANULOCYTES NFR BLD AUTO: 0.5 %
INR BLD: 1.02 (ref 0–1.3)
LACTATE SERPL-SCNC: 1.5 MMOL/L (ref 0.5–2.2)
LYMPHOCYTES # BLD AUTO: 0.41 X10(3)/MCL (ref 0.6–4.6)
LYMPHOCYTES # BLD AUTO: 1.23 X10(3)/MCL (ref 0.6–4.6)
LYMPHOCYTES NFR BLD AUTO: 11.4 %
LYMPHOCYTES NFR BLD AUTO: 4.3 %
MAGNESIUM SERPL-MCNC: 1.7 MG/DL (ref 1.6–2.6)
MCH RBC QN AUTO: 29 PG (ref 27–31)
MCH RBC QN AUTO: 29.1 PG (ref 27–31)
MCHC RBC AUTO-ENTMCNC: 31.6 MG/DL (ref 33–36)
MCHC RBC AUTO-ENTMCNC: 31.9 MG/DL (ref 33–36)
MCV RBC AUTO: 90.9 FL (ref 80–94)
MCV RBC AUTO: 92 FL (ref 80–94)
MDMA UR QL SCN: NEGATIVE
MONOCYTES # BLD AUTO: 0.36 X10(3)/MCL (ref 0.1–1.3)
MONOCYTES # BLD AUTO: 0.57 X10(3)/MCL (ref 0.1–1.3)
MONOCYTES NFR BLD AUTO: 3.8 %
MONOCYTES NFR BLD AUTO: 5.3 %
NEUTROPHILS # BLD AUTO: 8.6 X10(3)/MCL (ref 2.1–9.2)
NEUTROPHILS # BLD AUTO: 8.9 X10(3)/MCL (ref 2.1–9.2)
NEUTROPHILS NFR BLD AUTO: 82.4 %
NEUTROPHILS NFR BLD AUTO: 91.2 %
NRBC BLD AUTO-RTO: 0 %
NRBC BLD AUTO-RTO: 0 %
OPIATES UR QL SCN: POSITIVE
PCO2 BLDA: 49 MMHG (ref 35–45)
PCO2 BLDA: ABNORMAL MM[HG]
PCO2 BLDA: ABNORMAL MM[HG]
PCP UR QL: NEGATIVE
PH SMN: 7.35 [PH] (ref 7.35–7.45)
PH SMN: ABNORMAL [PH]
PH UR: 6 [PH] (ref 3–11)
PHOSPHATE SERPL-MCNC: 4.8 MG/DL (ref 2.3–4.7)
PLATELET # BLD AUTO: 162 X10(3)/MCL (ref 130–400)
PLATELET # BLD AUTO: 182 X10(3)/MCL (ref 130–400)
PMV BLD AUTO: 12.1 FL (ref 7.4–10.4)
PMV BLD AUTO: 12.2 FL (ref 7.4–10.4)
PO2 BLDA: 311 MMHG (ref 80–100)
PO2 BLDA: ABNORMAL MM[HG]
POC BASE DEFICIT: 0.9 MMOL/L (ref -2–2)
POC COHB: 1.5 %
POC COHB: ABNORMAL
POC IONIZED CALCIUM: 1.17 MMOL/L (ref 1.12–1.23)
POC IONIZED CALCIUM: ABNORMAL
POC METHB: 1.3 % (ref 0.4–1.5)
POC METHB: ABNORMAL
POC O2HB: 97.4 % (ref 94–97)
POC O2HB: ABNORMAL
POC SATURATED O2: 99.9 %
POC TEMPERATURE: 37 °C
POCT GLUCOSE: 189 MG/DL (ref 70–110)
POCT GLUCOSE: 80 MG/DL (ref 70–110)
POTASSIUM BLD-SCNC: 4.2 MMOL/L (ref 3.5–5)
POTASSIUM BLD-SCNC: ABNORMAL MMOL/L
POTASSIUM SERPL-SCNC: 4.6 MMOL/L (ref 3.5–5.1)
POTASSIUM SERPL-SCNC: 5.2 MMOL/L (ref 3.5–5.1)
PROT SERPL-MCNC: 5.5 GM/DL (ref 5.8–7.6)
PROT SERPL-MCNC: 5.9 GM/DL (ref 5.8–7.6)
PROTHROMBIN TIME: 13.4 SECONDS (ref 12.5–14.5)
RBC # BLD AUTO: 3.99 X10(6)/MCL (ref 4.7–6.1)
RBC # BLD AUTO: 4.38 X10(6)/MCL (ref 4.7–6.1)
SATURATED O2 ARTERIAL, I-STAT: ABNORMAL
SODIUM BLD-SCNC: 135 MMOL/L (ref 137–145)
SODIUM BLD-SCNC: ABNORMAL MMOL/L
SODIUM SERPL-SCNC: 140 MMOL/L (ref 136–145)
SODIUM SERPL-SCNC: 141 MMOL/L (ref 136–145)
SPECIFIC GRAVITY, URINE AUTO (.000) (OHS): 1.02 (ref 1–1.03)
SPECIMEN SOURCE: ABNORMAL
TROPONIN I SERPL-MCNC: <0.01 NG/ML (ref 0–0.04)
TSH SERPL-ACNC: 0.47 UIU/ML (ref 0.35–4.94)
WBC # SPEC AUTO: 10.8 X10(3)/MCL (ref 4.5–11.5)
WBC # SPEC AUTO: 9.5 X10(3)/MCL (ref 4.5–11.5)

## 2022-07-14 PROCEDURE — 94003 VENT MGMT INPAT SUBQ DAY: CPT

## 2022-07-14 PROCEDURE — 36415 COLL VENOUS BLD VENIPUNCTURE: CPT | Performed by: STUDENT IN AN ORGANIZED HEALTH CARE EDUCATION/TRAINING PROGRAM

## 2022-07-14 PROCEDURE — 99024 POSTOP FOLLOW-UP VISIT: CPT | Mod: POP,,, | Performed by: NEUROLOGICAL SURGERY

## 2022-07-14 PROCEDURE — 51702 INSERT TEMP BLADDER CATH: CPT

## 2022-07-14 PROCEDURE — 99024 POSTOP FOLLOW-UP VISIT: CPT | Mod: POP,,, | Performed by: NURSE PRACTITIONER

## 2022-07-14 PROCEDURE — 63600175 PHARM REV CODE 636 W HCPCS: Performed by: NURSE PRACTITIONER

## 2022-07-14 PROCEDURE — 85025 COMPLETE CBC W/AUTO DIFF WBC: CPT | Performed by: STUDENT IN AN ORGANIZED HEALTH CARE EDUCATION/TRAINING PROGRAM

## 2022-07-14 PROCEDURE — 99024 PR POST-OP FOLLOW-UP VISIT: ICD-10-PCS | Mod: POP,,, | Performed by: NEUROLOGICAL SURGERY

## 2022-07-14 PROCEDURE — 63600175 PHARM REV CODE 636 W HCPCS

## 2022-07-14 PROCEDURE — 99223 1ST HOSP IP/OBS HIGH 75: CPT | Mod: ,,, | Performed by: PSYCHIATRY & NEUROLOGY

## 2022-07-14 PROCEDURE — 99024 PR POST-OP FOLLOW-UP VISIT: ICD-10-PCS | Mod: POP,,, | Performed by: NURSE PRACTITIONER

## 2022-07-14 PROCEDURE — 84484 ASSAY OF TROPONIN QUANT: CPT | Performed by: STUDENT IN AN ORGANIZED HEALTH CARE EDUCATION/TRAINING PROGRAM

## 2022-07-14 PROCEDURE — 93010 EKG 12-LEAD: ICD-10-PCS | Mod: ,,, | Performed by: INTERNAL MEDICINE

## 2022-07-14 PROCEDURE — 85610 PROTHROMBIN TIME: CPT | Performed by: NEUROLOGICAL SURGERY

## 2022-07-14 PROCEDURE — 20000000 HC ICU ROOM

## 2022-07-14 PROCEDURE — 27000221 HC OXYGEN, UP TO 24 HOURS

## 2022-07-14 PROCEDURE — 25000003 PHARM REV CODE 250

## 2022-07-14 PROCEDURE — 94761 N-INVAS EAR/PLS OXIMETRY MLT: CPT

## 2022-07-14 PROCEDURE — 80053 COMPREHEN METABOLIC PANEL: CPT | Performed by: NURSE PRACTITIONER

## 2022-07-14 PROCEDURE — 99223 PR INITIAL HOSPITAL CARE,LEVL III: ICD-10-PCS | Mod: ,,, | Performed by: PSYCHIATRY & NEUROLOGY

## 2022-07-14 PROCEDURE — 84443 ASSAY THYROID STIM HORMONE: CPT | Performed by: STUDENT IN AN ORGANIZED HEALTH CARE EDUCATION/TRAINING PROGRAM

## 2022-07-14 PROCEDURE — 99900035 HC TECH TIME PER 15 MIN (STAT)

## 2022-07-14 PROCEDURE — 25000003 PHARM REV CODE 250: Performed by: STUDENT IN AN ORGANIZED HEALTH CARE EDUCATION/TRAINING PROGRAM

## 2022-07-14 PROCEDURE — 95718 PR EEG, W/VIDEO, CONT RECORD, I&R, 2-12 HRS: ICD-10-PCS | Mod: ,,, | Performed by: PSYCHIATRY & NEUROLOGY

## 2022-07-14 PROCEDURE — 80307 DRUG TEST PRSMV CHEM ANLYZR: CPT | Performed by: STUDENT IN AN ORGANIZED HEALTH CARE EDUCATION/TRAINING PROGRAM

## 2022-07-14 PROCEDURE — 36600 WITHDRAWAL OF ARTERIAL BLOOD: CPT

## 2022-07-14 PROCEDURE — 85025 COMPLETE CBC W/AUTO DIFF WBC: CPT | Performed by: NURSE PRACTITIONER

## 2022-07-14 PROCEDURE — 25000003 PHARM REV CODE 250: Performed by: NURSE PRACTITIONER

## 2022-07-14 PROCEDURE — 99900026 HC AIRWAY MAINTENANCE (STAT)

## 2022-07-14 PROCEDURE — 80053 COMPREHEN METABOLIC PANEL: CPT | Performed by: STUDENT IN AN ORGANIZED HEALTH CARE EDUCATION/TRAINING PROGRAM

## 2022-07-14 PROCEDURE — 93005 ELECTROCARDIOGRAM TRACING: CPT

## 2022-07-14 PROCEDURE — 83735 ASSAY OF MAGNESIUM: CPT | Performed by: STUDENT IN AN ORGANIZED HEALTH CARE EDUCATION/TRAINING PROGRAM

## 2022-07-14 PROCEDURE — 84100 ASSAY OF PHOSPHORUS: CPT | Performed by: STUDENT IN AN ORGANIZED HEALTH CARE EDUCATION/TRAINING PROGRAM

## 2022-07-14 PROCEDURE — 93010 ELECTROCARDIOGRAM REPORT: CPT | Mod: ,,, | Performed by: INTERNAL MEDICINE

## 2022-07-14 PROCEDURE — 94002 VENT MGMT INPAT INIT DAY: CPT

## 2022-07-14 PROCEDURE — 83605 ASSAY OF LACTIC ACID: CPT | Performed by: STUDENT IN AN ORGANIZED HEALTH CARE EDUCATION/TRAINING PROGRAM

## 2022-07-14 PROCEDURE — 95718 EEG PHYS/QHP 2-12 HR W/VEEG: CPT | Mod: ,,, | Performed by: PSYCHIATRY & NEUROLOGY

## 2022-07-14 PROCEDURE — 82803 BLOOD GASES ANY COMBINATION: CPT

## 2022-07-14 PROCEDURE — 36415 COLL VENOUS BLD VENIPUNCTURE: CPT | Performed by: NURSE PRACTITIONER

## 2022-07-14 RX ORDER — SODIUM CHLORIDE 0.9 % (FLUSH) 0.9 %
10 SYRINGE (ML) INJECTION
Status: DISCONTINUED | OUTPATIENT
Start: 2022-07-14 | End: 2022-07-25 | Stop reason: HOSPADM

## 2022-07-14 RX ORDER — NALOXONE HCL 0.4 MG/ML
VIAL (ML) INJECTION
Status: COMPLETED
Start: 2022-07-14 | End: 2022-07-14

## 2022-07-14 RX ORDER — FAMOTIDINE 10 MG/ML
20 INJECTION INTRAVENOUS EVERY 12 HOURS
Status: DISCONTINUED | OUTPATIENT
Start: 2022-07-14 | End: 2022-07-21

## 2022-07-14 RX ORDER — FLUMAZENIL 0.1 MG/ML
INJECTION INTRAVENOUS
Status: COMPLETED
Start: 2022-07-14 | End: 2022-07-14

## 2022-07-14 RX ORDER — FLUMAZENIL 0.1 MG/ML
0.5 INJECTION INTRAVENOUS ONCE
Status: COMPLETED | OUTPATIENT
Start: 2022-07-14 | End: 2022-07-14

## 2022-07-14 RX ORDER — LORAZEPAM 2 MG/ML
INJECTION INTRAMUSCULAR
Status: DISPENSED
Start: 2022-07-14 | End: 2022-07-14

## 2022-07-14 RX ADMIN — HYDROCODONE BITARTRATE AND ACETAMINOPHEN 2 TABLET: 7.5; 325 TABLET ORAL at 04:07

## 2022-07-14 RX ADMIN — FLUMAZENIL 0.5 MG: 0.1 INJECTION INTRAVENOUS at 09:07

## 2022-07-14 RX ADMIN — NALXONE HYDROCHLORIDE 0.4 MG: 0.4 INJECTION INTRAMUSCULAR; INTRAVENOUS; SUBCUTANEOUS at 05:07

## 2022-07-14 RX ADMIN — METHOCARBAMOL 1000 MG: 100 INJECTION, SOLUTION INTRAMUSCULAR; INTRAVENOUS at 04:07

## 2022-07-14 RX ADMIN — AMITRIPTYLINE HYDROCHLORIDE 25 MG: 25 TABLET, FILM COATED ORAL at 09:07

## 2022-07-14 RX ADMIN — TAMSULOSIN HYDROCHLORIDE 0.4 MG: 0.4 CAPSULE ORAL at 09:07

## 2022-07-14 RX ADMIN — FAMOTIDINE 20 MG: 10 INJECTION INTRAVENOUS at 09:07

## 2022-07-14 RX ADMIN — MUPIROCIN: 20 OINTMENT TOPICAL at 09:07

## 2022-07-14 RX ADMIN — FLUMAZENIL 0.5 MG: 0.1 INJECTION, SOLUTION INTRAVENOUS at 09:07

## 2022-07-14 RX ADMIN — LORAZEPAM 2 MG: 2 INJECTION INTRAMUSCULAR; INTRAVENOUS at 08:07

## 2022-07-14 RX ADMIN — HYDROCODONE BITARTRATE AND ACETAMINOPHEN 2 TABLET: 7.5; 325 TABLET ORAL at 12:07

## 2022-07-14 NOTE — H&P
[facility] - [facility] ICU  Pulmonary Critical Care Note    Patient Name: Scott Echeverria  MRN: [id number]  Admission Date: 7/12/2022  Hospital Length of Stay: 2 days  Code Status: Full Code  Attending Provider: Paulo Rao MD  Primary Care Provider: RON AUGUSTIN MD     Subjective:     HPI:     70 y/o man with history of cervical spinal stenosis, hypothyroidism, originally presented to [facility] for C3-C7 laminiopasty and laminoforminotomies.  Patient underwent procedure on 7/12 and tolerated it well.  On postoperative day 1, the patient seemed to be improving well, working with PT, getting out of the bed to the chair.  However, the morning of postop day 2, the patient's wife noted that patient started to have slurred speech.  He had received Norco an hour or 2 prior to that, which he has been receiving q4h since surgery. He also received IV robaxin prior to onset of aphasia.  A code stroke was called.  Patient had a negative CT head, as well as negative MRI and MRA.  For the MRI, the patient did require Ativan administration.  He also did reportedly began to develop some left-sided neglect.  This afternoon, it was reported the patient started to become hypoxic, and it was found that he was having agonal respirations.  Flumazenil was given on the floor.  Ventilation with BVM was initiated.  ICU was called for intubation and step-up to the ICU. Upon arrival to the floor, patient was obtunded, not arousable to verbal or painful stimuli. Intubation was performed successfully and patient was started on mechanical ventilation. Transferred to ICU for further management. He was given naloxone upon arrival, and is now slowly waking up and is now beginning to have spontaneous movement.    Hospital Course/Significant events:    7/14: Intubated    24 Hour Interval History:      Past Medical History:   Diagnosis Date    BPH (benign prostatic hyperplasia)     Cervical pain     Cervical radiculitis     Cervical  spinal stenosis     Hypothyroidism, unspecified     Sleep apnea     resolved since surgery       Past Surgical History:   Procedure Laterality Date    APPENDECTOMY  01/25/2022    Dr. Kerry Coats    COLONOSCOPY  2021    HEMORRHOID SURGERY  2002    lower back surgery  1990    RETINAL DETACHMENT SURGERY Right 2015    SINUS SURGERY      TONSILLECTOMY  1956       Social History     Socioeconomic History    Marital status:    Tobacco Use    Smoking status: Never Smoker    Smokeless tobacco: Never Used   Substance and Sexual Activity    Alcohol use: Not Currently    Drug use: Never           Objective:     Current Outpatient Medications   Medication Instructions    acetaminophen (TYLENOL) 325 MG tablet As needed (PRN)    amitriptyline (ELAVIL) 25 mg, Oral, Daily    aspirin 81 mg, Oral, Daily    fexofenadine (ALLEGRA) 180 mg, Oral, Daily    iron/vitamin B complex (GERITOL ORAL) Oral    krill/om-3/dha/epa/phospho/ast (MAXIMUM RED KRILL OMEGA-3 ORAL) Oral    Lactobac no.41/Bifidobact no.7 (PROBIOTIC-10 ORAL) Oral    levothyroxine (SYNTHROID) 50 mcg, Oral, Daily    ondansetron (ZOFRAN) 4 mg, Oral, Every 8 hours    oxyCODONE (ROXICODONE) 5 MG immediate release tablet TAKE 1 TABLET BY MOUTH EVERY 4 HOURS AS NEEDED FOR MILD PAIN    predniSONE (DELTASONE) 20 mg, Oral, 2 times daily    tamsulosin (FLOMAX) 0.4 mg Cap   See Instructions, TAKE ONE CAPSULE BY MOUTH EVERY DAY, # 90 cap(s), 3 Refill(s), Pharmacy: [facility]/pharmacy #[id number], 175, cm, Height/Length Dosing, 10/20/21 9:19:00 CDT, 79.83, kg, Weight Dosing, 10/20/21 9:19:00 CDT       Current Inpatient Medications   amitriptyline  25 mg Oral Daily    cetirizine  10 mg Oral Daily    famotidine (PF)  20 mg Intravenous Q12H    levothyroxine  50 mcg Oral Daily    lorazepam        mupirocin   Nasal BID    naloxone        scopolamine  1 patch Transdermal Q3 Days    tamsulosin  0.4 mg Oral Daily           Intake/Output Summary (Last 24 hours) at  7/14/2022 1647  Last data filed at 7/14/2022 0500  Gross per 24 hour   Intake 120 ml   Output --   Net 120 ml       Review of Systems   Unable to perform ROS: Mental status change        Vital Signs (Most Recent):  Temp: 97.8 °F (36.6 °C) (07/14/22 1516)  Pulse: (!) 118 (07/14/22 1537)  Resp: (!) 0 (assisted w/ bag ventilations) (07/14/22 1520)  BP: (!) 97/57 (07/14/22 1537)  SpO2: 96 % (07/14/22 1537)  Body mass index is 24.65 kg/m².  Weight: 75.7 kg (166 lb 14.2 oz) Vital Signs (24h Range):  Temp:  [97.6 °F (36.4 °C)-98.6 °F (37 °C)] 97.8 °F (36.6 °C)  Pulse:  [] 118  Resp:  [0-20] 0  SpO2:  [23 %-100 %] 96 %  BP: ()/(57-78) 97/57     Physical Exam  Constitutional:       General: He is in acute distress (Acute respirotary distress).      Comments: Obtunded   HENT:      Head: Normocephalic and atraumatic.   Eyes:      Conjunctiva/sclera: Conjunctivae normal.      Comments: Right pupil 3mm, s/p cataract surgery. Left pupil 2mm. Both not reacting to light   Cardiovascular:      Rate and Rhythm: Normal rate and regular rhythm.   Pulmonary:      Comments: Agonal breathing. Being ventilated by BVM  Abdominal:      General: There is no distension.      Palpations: Abdomen is soft.   Musculoskeletal:         General: No deformity.   Skin:     General: Skin is warm and dry.   Neurological:      Comments: Obtunded, unresponsive to verbal and painful stimuli           Mechanical ventilation support:  Oxygen Concentration (%): 100 (07/14/22 1531)    Lines/Drains/Airways     Airway  Duration                Airway - Non-Surgical 07/14/22 1534 <1 day          Peripheral Intravenous Line  Duration                Peripheral IV - Single Lumen 07/12/22 0714 18 G Anterior;Distal;Right Forearm 2 days                Significant Labs:    Lab Results   Component Value Date    WBC 10.8 07/14/2022    HGB 12.7 (L) 07/14/2022    HCT 39.8 (L) 07/14/2022    MCV 90.9 07/14/2022     07/14/2022         BMP  Lab Results    Component Value Date     07/14/2022    K 4.6 07/14/2022    CO2 27 07/14/2022    BUN 9.1 07/14/2022    CREATININE 0.73 07/14/2022    CALCIUM 9.0 07/14/2022    EGFRNONAA >60 07/14/2022       ABG  No results for input(s): PH, PO2, PCO2, HCO3, BE in the last 168 hours.        Significant Imaging:  I have reviewed all pertinent imaging within the past 24 hours.        Assessment/Plan:     Assessment  1. Acute respiratory failure, apnea possibly 2/2 opioid/sedative use vs hypercarbia vs seizure  2. Stenosis of cervical spine (s/p C3-7 laminoplasty, left C3-4, C4-5, C5-6, C6-7 forminotomies)  3. Hypothyroidism      Plan    -Admit to ICU for further workup and monitoring  -Was intubated and started on mechanical ventilation  -Naloxone given upon arrival to ICU, patient is now slowly waking up, opening his eyes, and starting to move his extremities. Patient had previous been given a dose of romazicon   -Stroke workup performed earlier in the day was unremarkable. EEG revealed moderate generalized slowing  -Patient without history of liver disease, do not suspect hepatic encephalopathy. CMP earlier this morning does not indicate uremia  -Labwork ordered: CBC, CMP, Mg, Phos, Troponins, lactic acid. Also ordered EKG, CXR, ABG  -Hold chemical DVT prophylaxis. Mechanical DVT prophylaxis with SCDs  -GI prophylaxis with IV pepcid  -Hold and avoid all sedating medications    DVT Prophylaxis: Mechanical, holding chemical ppx  GI Prophylaxis: Pepcid     Greater than 30 minutes of critical care was time spent personally by me on the following activities: development of treatment plan with patient or surrogate and bedside caregivers, discussions with consultants, evaluation of patient's response to treatment, examination of patient, ordering and performing treatments and interventions, ordering and review of laboratory studies, ordering and review of radiographic studies, pulse oximetry, re-evaluation of patient's condition.   This critical care time did not overlap with that of any other provider or involve time for any procedures.     Sergio Jovel MD  Pulmonary Critical Care Medicine  [facility] - [facility] ICU

## 2022-07-14 NOTE — CONSULTS
[facility] Neuro  Neurology  Consult Note    Patient Name: Scott Echeverria  MRN: [id number]  Admission Date: 7/12/2022  Hospital Length of Stay: 2 days  Code Status: No Order   Attending Provider: Paulo Rao MD   Consulting Provider: Yan Rainey MD  Primary Care Physician: RON AUGUSTIN MD  Principal Problem:<principal problem not specified>    Inpatient consult to Neurology  Consult performed by: LALO Tena  Consult ordered by: Paulo Rao MD         Subjective:     Chief Complaint:  No chief complaint on file.        HPI:   Mr Echeverria is a 71-year-old male with past medical history of BPH, cervical spine stenosis, hypothyroidism, presented to [facility] on 7/12 and underwent C3-7 laminoplasty and left C3-7 foraminotomies. On 7/14, RRT was activated due to neuro change.  Per wife, patient had pain medication around 4:30am and then sat in a chair at the bedside.  Wife reports he was at his baseline at that time.  Just before 6am, he attempted to ask his wife a question and she noticed he had slurred speech.  RRT activated ... patient's speech worsened and he became aphasic.  Stroke alert activated ... stat Cth was unremarkable.  NIH 9.  Discussed with neurologist on call ... no recommendation for IV tPA due to recent surgery.  Will order stat MRI brain and MRA h/n.       Past Medical History:   Diagnosis Date    BPH (benign prostatic hyperplasia)     Cervical pain     Cervical radiculitis     Cervical spinal stenosis     Hypothyroidism, unspecified     Sleep apnea     resolved since surgery       Past Surgical History:   Procedure Laterality Date    APPENDECTOMY  01/25/2022    Dr. Kerry Coats    COLONOSCOPY  2021    HEMORRHOID SURGERY  2002    lower back surgery  1990    RETINAL DETACHMENT SURGERY Right 2015    SINUS SURGERY      TONSILLECTOMY  1956       Review of patient's allergies indicates:   Allergen Reactions    Iodine        No current  facility-administered medications on file prior to encounter.     Current Outpatient Medications on File Prior to Encounter   Medication Sig    amitriptyline (ELAVIL) 25 MG tablet Take 25 mg by mouth once daily.    aspirin 81 MG Chew Take 81 mg by mouth once daily.    fexofenadine (ALLEGRA) 180 MG tablet Take 180 mg by mouth once daily.    levothyroxine (SYNTHROID) 50 MCG tablet Take 50 mcg by mouth once daily.    tamsulosin (FLOMAX) 0.4 mg Cap   See Instructions, TAKE ONE CAPSULE BY MOUTH EVERY DAY, # 90 cap(s), 3 Refill(s), Pharmacy: [facility]/pharmacy #[id number], 175, cm, Height/Length Dosing, 10/20/21 9:19:00 CDT, 79.83, kg, Weight Dosing, 10/20/21 9:19:00 CDT    acetaminophen (TYLENOL) 325 MG tablet as needed.    ondansetron (ZOFRAN) 4 MG tablet Take 4 mg by mouth every 8 (eight) hours.    oxyCODONE (ROXICODONE) 5 MG immediate release tablet TAKE 1 TABLET BY MOUTH EVERY 4 HOURS AS NEEDED FOR MILD PAIN    predniSONE (DELTASONE) 20 MG tablet Take 20 mg by mouth 2 (two) times daily.     Family History       Problem Relation (Age of Onset)    Alzheimer's disease Mother    Heart attack Father    Hyperlipidemia Father    Hypertension Sister, Brother    Parkinsonism Mother    Sickle cell trait Sister    Stroke Father    Thyroid disease Mother          Tobacco Use    Smoking status: Never Smoker    Smokeless tobacco: Never Used   Substance and Sexual Activity    Alcohol use: Not Currently    Drug use: Never    Sexual activity: Not on file     Review of Systems   Unable to perform ROS: Acuity of condition (aphasia)     Objective:     Vital Signs (Most Recent):  Temp: 98 °F (36.7 °C) (07/14/22 0700)  Pulse: 84 (07/14/22 0700)  Resp: 20 (07/14/22 0700)  BP: 125/71 (07/14/22 0700)  SpO2: (!) 94 % (07/14/22 0700)   Vital Signs (24h Range):  Temp:  [97.6 °F (36.4 °C)-98.6 °F (37 °C)] 98 °F (36.7 °C)  Pulse:  [71-84] 84  Resp:  [14-20] 20  SpO2:  [91 %-97 %] 94 %  BP: (119-153)/(67-92) 125/71     Weight: 75.7 kg (166 lb  14.2 oz)  Body mass index is 24.65 kg/m².    Physical Exam  Constitutional:       General: He is awake.      Appearance: He is not ill-appearing.   Eyes:      General: Vision grossly intact.      Extraocular Movements: Extraocular movements intact.      Pupils:      Right eye: Pupil is not round (oval (chronic per wife report)) and not reactive.   Cardiovascular:      Rate and Rhythm: Normal rate.   Pulmonary:      Effort: Pulmonary effort is normal.   Skin:     General: Skin is warm and dry.   Neurological:      Comments: Mental Status: Alert, looking around the room  Language/Speech: expressive and receptive aphasia ... does not follow simple commands  Cranial Nerves:  -CN II, III: right pupil oval shaped/nonreactive, left pupil 2mm/reactive, no visual field deficit (blinks to threat)  -CN III, IV, VI: EOMI, no gaze preference, no nystagmus  -CN VII: left facial droop  Motor: BUE 5/5, BLE 4/5 ... moves all extremities spontaneously, does not follow commands  Sensory: MIKE due to aphasia  Gait: not observed        Significant Labs: BMP: No results for input(s): GLU, NA, K, CL, CO2, BUN, CREATININE, CALCIUM, MG in the last 48 hours.  CBC: No results for input(s): WBC, HGB, HCT, PLT in the last 48 hours.    Significant Imaging: I have reviewed all pertinent imaging results/findings within the past 24 hours.    CTh (stroke alert): no acute intracranial findings    Assessment and Plan:     Aphasia  RRT/stroke alert activated 2/2 aphasia  Stat CTh was negative.  Unable to get contrast 2/2 iodine allergy.  Discussed with neurologist on call ... no recommendations for IV tPA due to recent surgery  Will order stat MRI brain and MRA h/n            Thank you for your consult. Further recommendations to follow by MD.    Jenny Choudhary, P  Neurology  [facility] - Ortho Neuro

## 2022-07-14 NOTE — PT/OT/SLP PROGRESS
Attempted to see pt for PT. Pt not appropriate to mobilize 2/2 to change in medical status. PT to f/u tomorrow.

## 2022-07-14 NOTE — SUBJECTIVE & OBJECTIVE
Past Medical History:   Diagnosis Date    BPH (benign prostatic hyperplasia)     Cervical pain     Cervical radiculitis     Cervical spinal stenosis     Hypothyroidism, unspecified     Sleep apnea     resolved since surgery       Past Surgical History:   Procedure Laterality Date    APPENDECTOMY  01/25/2022    Dr. Kerry Coats    COLONOSCOPY  2021    HEMORRHOID SURGERY  2002    lower back surgery  1990    RETINAL DETACHMENT SURGERY Right 2015    SINUS SURGERY      TONSILLECTOMY  1956       Review of patient's allergies indicates:   Allergen Reactions    Iodine        No current facility-administered medications on file prior to encounter.     Current Outpatient Medications on File Prior to Encounter   Medication Sig    amitriptyline (ELAVIL) 25 MG tablet Take 25 mg by mouth once daily.    aspirin 81 MG Chew Take 81 mg by mouth once daily.    fexofenadine (ALLEGRA) 180 MG tablet Take 180 mg by mouth once daily.    levothyroxine (SYNTHROID) 50 MCG tablet Take 50 mcg by mouth once daily.    tamsulosin (FLOMAX) 0.4 mg Cap   See Instructions, TAKE ONE CAPSULE BY MOUTH EVERY DAY, # 90 cap(s), 3 Refill(s), Pharmacy: [facility]/pharmacy #[id number], 175, cm, Height/Length Dosing, 10/20/21 9:19:00 CDT, 79.83, kg, Weight Dosing, 10/20/21 9:19:00 CDT    acetaminophen (TYLENOL) 325 MG tablet as needed.    ondansetron (ZOFRAN) 4 MG tablet Take 4 mg by mouth every 8 (eight) hours.    oxyCODONE (ROXICODONE) 5 MG immediate release tablet TAKE 1 TABLET BY MOUTH EVERY 4 HOURS AS NEEDED FOR MILD PAIN    predniSONE (DELTASONE) 20 MG tablet Take 20 mg by mouth 2 (two) times daily.     Family History       Problem Relation (Age of Onset)    Alzheimer's disease Mother    Heart attack Father    Hyperlipidemia Father    Hypertension Sister, Brother    Parkinsonism Mother    Sickle cell trait Sister    Stroke Father    Thyroid disease Mother          Tobacco Use    Smoking status: Never Smoker    Smokeless tobacco: Never Used   Substance and Sexual  Activity    Alcohol use: Not Currently    Drug use: Never    Sexual activity: Not on file     Review of Systems   Unable to perform ROS: Acuity of condition (aphasia)     Objective:     Vital Signs (Most Recent):  Temp: 98 °F (36.7 °C) (07/14/22 0700)  Pulse: 84 (07/14/22 0700)  Resp: 20 (07/14/22 0700)  BP: 125/71 (07/14/22 0700)  SpO2: (!) 94 % (07/14/22 0700)   Vital Signs (24h Range):  Temp:  [97.6 °F (36.4 °C)-98.6 °F (37 °C)] 98 °F (36.7 °C)  Pulse:  [71-84] 84  Resp:  [14-20] 20  SpO2:  [91 %-97 %] 94 %  BP: (119-153)/(67-92) 125/71     Weight: 75.7 kg (166 lb 14.2 oz)  Body mass index is 24.65 kg/m².    Physical Exam  Constitutional:       General: He is awake.      Appearance: He is not ill-appearing.   Eyes:      General: Vision grossly intact.      Extraocular Movements: Extraocular movements intact.      Pupils:      Right eye: Pupil is not round (oval (chronic per wife report)) and not reactive.   Cardiovascular:      Rate and Rhythm: Normal rate.   Pulmonary:      Effort: Pulmonary effort is normal.   Skin:     General: Skin is warm and dry.   Neurological:      Comments: Mental Status: Alert, looking around the room  Language/Speech: expressive and receptive aphasia ... does not follow simple commands  Cranial Nerves:  -CN II, III: right pupil oval shaped/nonreactive, left pupil 2mm/reactive, no visual field deficit (blinks to threat)  -CN III, IV, VI: EOMI, no gaze preference, no nystagmus  -CN VII: left facial droop  Motor: BUE 5/5, BLE 4/5 ... moves all extremities spontaneously, does not follow commands  Sensory: MIKE due to aphasia  Gait: not observed        Significant Labs: BMP: No results for input(s): GLU, NA, K, CL, CO2, BUN, CREATININE, CALCIUM, MG in the last 48 hours.  CBC: No results for input(s): WBC, HGB, HCT, PLT in the last 48 hours.    Significant Imaging: I have reviewed all pertinent imaging results/findings within the past 24 hours.    CTh (stroke alert): no acute intracranial  findings

## 2022-07-14 NOTE — PT/OT/SLP PROGRESS
Attempted to see pt for PT. Pt unable to be seen this am due to tests being run. Will f/u this afternoon.

## 2022-07-14 NOTE — PROCEDURES
EEG    Date/Time: 7/12/2022 5:47 AM  Performed by: Nela Hawkins MD  Authorized by: LALO Tena       reason for exam: aphasia    Duration: 3 hours 53 minutes    Technical description:  A standard digital EEG was performed at [facility].  The 10 20 international system of electrode placement is used.  Standard montages were recorded.      Description:  No posterior dominant rhythm was identified.  The record was dominated by admixed polymorphic theta and delta activity. stage 2 sleep was not identified.  There were no electrographic seizures or epileptiform discharges.    Impression:  This is an abnormal EEG due to moderate generalized slowing.  Generalized slowing can be seen with generalized cerebral dysfunction as seen in metabolic, toxic, infectious, or multifocal structural abnormalities.

## 2022-07-14 NOTE — ASSESSMENT & PLAN NOTE
RRT/stroke alert activated 2/2 aphasia  Stat CTh was negative.  Unable to get contrast 2/2 iodine allergy.  Discussed with neurologist on call ... no recommendations for IV tPA due to recent surgery  Will order stat MRI brain and MRA h/n

## 2022-07-14 NOTE — PLAN OF CARE
Problem: Adult Inpatient Plan of Care  Goal: Patient-Specific Goal (Individualized)  Outcome: Ongoing, Progressing     Problem: Fall Injury Risk  Goal: Absence of Fall and Fall-Related Injury  Outcome: Ongoing, Progressing     Problem: Pain Acute  Goal: Acceptable Pain Control and Functional Ability  Outcome: Ongoing, Progressing

## 2022-07-14 NOTE — NURSING
"1515 "bluish" skin tone noted,see VS, O2 sat 40-50s on oxymask, no response w/ sternal rub, wife at bedside, began bagging pt  1519 RRT called, Kristan CUMMINS & Bravo RT at bedside  1525 Dr Harp at bedside  1530 oral airway inserted, 20G RW started per Yves RN  1534 20mg of etomidate given per Yves RN  1535 intubated per Dr Harp, with positive color change noted  1538 pt transferred via bed per ICU RN x2 and RT bagging pt  -pt transferred to ICU bed,placed on monitor, report given to Deepthi CMUMINS, wife in waiting room with all belongings  "

## 2022-07-14 NOTE — PROGRESS NOTES
Hospital Progress Note  [facility] Neurosurgery    Admit Date: 7/12/2022  Post-operative Day: 2 Days Post-Op  Hospital Day: 3    SUBJECTIVE:     Follow-up For:  Procedure(s) (LRB):  LAMINOPLASTY (N/A)   POD #2 s/p C3-7 laminoplasty, left C3-4, C4-5, C5-6, C6-7 foraminotomies     His nausea improved yesterday afternoon after receiving Decadron and scopolamine patch.  He did not have any further episodes of vomiting. Got from bed to chair this morning at about 4:00 a.m..  His wife reports he was at his baseline at that time.  At about 6:00 a.m., she heard him shuffling in his recliner.  At this time, she noted that his speech was slurred. RRT/code fast activated.  CT head did not reveal any acute changes.  When the stroke nurse practitioner arrived at the bedside, he was completely aphasic.  Stat MRI/MRA of the brain were ordered.  During transportation down to MRI, stroke nurse practitioner noted that he had developed some left neglect.  He did receive some Ativan while being scanned due to restlessness/agitation.  He has returned to the room.  Awaiting official radiology read on MRI/MRA.  EEG has been ordered.  Will be reversing Ativan with Romazicon shortly.    Scheduled Meds:   amitriptyline  25 mg Oral Daily    cetirizine  10 mg Oral Daily    levothyroxine  50 mcg Oral Daily    lorazepam        mupirocin   Nasal BID    scopolamine  1 patch Transdermal Q3 Days    tamsulosin  0.4 mg Oral Daily     Continuous Infusions:   sodium chloride 0.9% 75 mL/hr at 07/12/22 1554     PRN Meds:aluminum-magnesium hydroxide-simethicone, bisacodyL, HYDROcodone-acetaminophen, HYDROcodone-acetaminophen, methocarbamoL, morphine, ondansetron, prochlorperazine, senna-docusate 8.6-50 mg    Review of patient's allergies indicates:   Allergen Reactions    Iodine        OBJECTIVE:     Vital Signs (Most Recent)  Temp: 98 °F (36.7 °C) (07/14/22 0700)  Pulse: 84 (07/14/22 0700)  Resp: 20 (07/14/22 0700)  BP: 125/71  (07/14/22 0700)  SpO2: (!) 94 % (07/14/22 0700)    Vital Signs Range (Last 24H):  Temp:  [97.6 °F (36.4 °C)-98.6 °F (37 °C)]   Pulse:  [71-84]   Resp:  [14-20]   BP: (119-153)/(67-92)   SpO2:  [91 %-97 %]     I & O (Last 24H):    Intake/Output Summary (Last 24 hours) at 7/14/2022 0932  Last data filed at 7/14/2022 0500  Gross per 24 hour   Intake 120 ml   Output 300 ml   Net -180 ml     Physical Exam:  Patient is currently lethargic, did recently receive some Ativan for MRI  He does not open his eyes  He does not follow commands  Nonverbal  Right pupil is fixed and irregularly-shaped then, chronic secondary to injury during cataract surgery.  Left pupil is round and reactive to light, 3-2 mm.  He does withdraw from pain in all extremities and grimaces to pain.    Lines/Drains:       Peripheral IV - Single Lumen 07/12/22 0714 18 G Anterior;Distal;Right Forearm (Active)   Site Assessment Clean;Dry;Intact;No redness;No swelling;No warmth;No drainage 07/14/22 0400   Extremity Assessment Distal to IV No abnormal discoloration 07/13/22 2000   Line Status Saline locked 07/14/22 0400   Dressing Status Clean;Dry;Intact 07/14/22 0400   Dressing Intervention Integrity maintained 07/14/22 0400   Number of days: 2       Laboratory:  I have reviewed all pertinent lab results within the past 24 hours.  CBC:   Recent Labs   Lab 07/08/22 0921   WBC 6.5   RBC 4.75   HGB 13.8*   HCT 41.7*      MCV 87.8   MCH 29.1   MCHC 33.1     BMP:   Recent Labs   Lab 07/08/22 0921      K 4.4   CO2 28   BUN 8.7   CREATININE 0.82   CALCIUM 9.2       Diagnostic Results:  CT head performed this morning reveals some postoperative pneumocephalus.  There is no evidence of acute injury/insult.    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  EEG ordered  CMP/CBC ordered  Stroke nurse practitioner at bedside when patient returned from MRI.  Discussed with the patient's wife.  Further follow once additional testing is completed.        Addendum:  MRI brain:   Impression:     1. Evaluation limited by motion artifact.  2. No acute intracranial abnormality.  3. Mild chronic microvascular ischemic changes.      MRA brain:  Impression:     Evaluation significantly limited by motion artifact.  No proximal large vessel occlusion.      Patient was seen at about 1200. He was arousable and would track with his eyes. Remained aphasic. Moving all of his extremities but not following commands. EEG in progress. Notified by Nursing at 1530 that patient became unresponsive, being intubated and transferred to ICU.

## 2022-07-14 NOTE — HPI
Mr Echeverria is a 71-year-old male with past medical history of BPH, cervical spine stenosis, hypothyroidism, presented to [facility] on 7/12 and underwent C3-7 laminoplasty and left C3-7 foraminotomies. On 7/14, RRT was activated due to neuro change.  Per wife, patient had pain medication around 4:30am and then sat in a chair at the bedside.  Wife reports he was at his baseline at that time.  Just before 6am, he attempted to ask his wife a question and she noticed he had slurred speech.  RRT activated ... patient's speech worsened and he became aphasic.  Stroke alert activated ... stat Cth was unremarkable.  No recommendation for IV tPA due to recent surgery.  Stat MRI brain negative for acute infarct.

## 2022-07-14 NOTE — PT/OT/SLP PROGRESS
Occupational Therapy      Patient Name:  Scott Echeverria   MRN:  [id number]    Patient not seen today secondary to RRT this AM. Currently having EEG test done. Will follow-up in afternoon as appropriate.    7/14/2022

## 2022-07-14 NOTE — PROCEDURES
[facility]   Endotracheal Intubation  Procedure Note    SUMMARY     Date of Procedure: 7/14/2022    Procedure: Endotracheal Intubation    Perfomed by: Timothy Allen MD        Indication: respiratory failure.    Pre-Procedure Diagnosis: respiratory failure    Post-Procedure Diagnosis: respiratory failure        Description of the Findings of the Procedure:     Sedation: etomidate.  Paralytic: none.  Lidocaine: no.  Atropine: no.  Equipment: 7.5mm cuffed endotracheal tube.  Cricoid Pressure: no  Number of attempts: 1.  ETT location confirmed by ETCO2 monitor.        Complications: None; patient tolerated the procedure well.       Condition: Critical        Disposition: ICU - intubated and hemodynamically stable.    Attestation: I performed the procedure.

## 2022-07-15 LAB
ANION GAP SERPL CALC-SCNC: 7 MEQ/L
APPEARANCE UR: CLEAR
BACTERIA #/AREA URNS AUTO: ABNORMAL /HPF
BILIRUB UR QL STRIP.AUTO: ABNORMAL MG/DL
BUN SERPL-MCNC: 10.2 MG/DL (ref 8.4–25.7)
CALCIUM SERPL-MCNC: 8.7 MG/DL (ref 8.8–10)
CHLORIDE SERPL-SCNC: 111 MMOL/L (ref 98–107)
CO2 SERPL-SCNC: 23 MMOL/L (ref 23–31)
COLOR UR AUTO: ABNORMAL
CREAT SERPL-MCNC: 0.58 MG/DL (ref 0.73–1.18)
CREAT/UREA NIT SERPL: 18
GLUCOSE SERPL-MCNC: 76 MG/DL (ref 82–115)
GLUCOSE UR QL STRIP.AUTO: NEGATIVE MG/DL
KETONES UR QL STRIP.AUTO: ABNORMAL MG/DL
LEUKOCYTE ESTERASE UR QL STRIP.AUTO: ABNORMAL UNIT/L
NITRITE UR QL STRIP.AUTO: NEGATIVE
PCO2 BLDA: 39 MMHG
PH SMN: 7.48 [PH] (ref 7.35–7.45)
PH UR STRIP.AUTO: 5.5 [PH]
PO2 BLDA: 76 MMHG
POC BASE DEFICIT: 5.2 MMOL/L
POC HCO3: 29 MMOL/L
POC IONIZED CALCIUM: 1.19 MMOL/L
POC SATURATED O2: 96 %
POC TEMPERATURE: 37 C
POTASSIUM BLD-SCNC: 3.3 MMOL/L
POTASSIUM SERPL-SCNC: 4.6 MMOL/L (ref 3.5–5.1)
PROT UR QL STRIP.AUTO: ABNORMAL MG/DL
RBC #/AREA URNS AUTO: 105 /HPF
RBC UR QL AUTO: ABNORMAL UNIT/L
SODIUM BLD-SCNC: 139 MMOL/L
SODIUM SERPL-SCNC: 141 MMOL/L (ref 136–145)
SP GR UR STRIP.AUTO: 1.02 (ref 1–1.03)
SPECIMEN SOURCE: ABNORMAL
SQUAMOUS #/AREA URNS AUTO: <5 /HPF
UROBILINOGEN UR STRIP-ACNC: 1 MG/DL
WBC #/AREA URNS AUTO: 8 /HPF

## 2022-07-15 PROCEDURE — 20000000 HC ICU ROOM

## 2022-07-15 PROCEDURE — 99900035 HC TECH TIME PER 15 MIN (STAT)

## 2022-07-15 PROCEDURE — 25000003 PHARM REV CODE 250

## 2022-07-15 PROCEDURE — 94003 VENT MGMT INPAT SUBQ DAY: CPT

## 2022-07-15 PROCEDURE — 80048 BASIC METABOLIC PNL TOTAL CA: CPT | Performed by: HOSPITALIST

## 2022-07-15 PROCEDURE — 82803 BLOOD GASES ANY COMBINATION: CPT

## 2022-07-15 PROCEDURE — 99024 PR POST-OP FOLLOW-UP VISIT: ICD-10-PCS | Mod: POP,,, | Performed by: NURSE PRACTITIONER

## 2022-07-15 PROCEDURE — 36415 COLL VENOUS BLD VENIPUNCTURE: CPT | Performed by: PSYCHIATRY & NEUROLOGY

## 2022-07-15 PROCEDURE — 94761 N-INVAS EAR/PLS OXIMETRY MLT: CPT

## 2022-07-15 PROCEDURE — 99024 PR POST-OP FOLLOW-UP VISIT: ICD-10-PCS | Mod: POP,,, | Performed by: NEUROLOGICAL SURGERY

## 2022-07-15 PROCEDURE — 99024 POSTOP FOLLOW-UP VISIT: CPT | Mod: POP,,, | Performed by: NEUROLOGICAL SURGERY

## 2022-07-15 PROCEDURE — 27200966 HC CLOSED SUCTION SYSTEM

## 2022-07-15 PROCEDURE — 25000003 PHARM REV CODE 250: Performed by: STUDENT IN AN ORGANIZED HEALTH CARE EDUCATION/TRAINING PROGRAM

## 2022-07-15 PROCEDURE — 27000221 HC OXYGEN, UP TO 24 HOURS

## 2022-07-15 PROCEDURE — 81001 URINALYSIS AUTO W/SCOPE: CPT | Performed by: PSYCHIATRY & NEUROLOGY

## 2022-07-15 PROCEDURE — 25000003 PHARM REV CODE 250: Performed by: NURSE PRACTITIONER

## 2022-07-15 PROCEDURE — 36600 WITHDRAWAL OF ARTERIAL BLOOD: CPT

## 2022-07-15 PROCEDURE — 87040 BLOOD CULTURE FOR BACTERIA: CPT | Performed by: PSYCHIATRY & NEUROLOGY

## 2022-07-15 PROCEDURE — 25000003 PHARM REV CODE 250: Performed by: NEUROLOGICAL SURGERY

## 2022-07-15 PROCEDURE — 36415 COLL VENOUS BLD VENIPUNCTURE: CPT | Performed by: HOSPITALIST

## 2022-07-15 PROCEDURE — 99900026 HC AIRWAY MAINTENANCE (STAT)

## 2022-07-15 PROCEDURE — 99024 POSTOP FOLLOW-UP VISIT: CPT | Mod: POP,,, | Performed by: NURSE PRACTITIONER

## 2022-07-15 RX ORDER — LEVOTHYROXINE SODIUM 50 UG/1
50 TABLET ORAL DAILY
Status: DISCONTINUED | OUTPATIENT
Start: 2022-07-16 | End: 2022-07-21

## 2022-07-15 RX ORDER — LABETALOL HYDROCHLORIDE 5 MG/ML
10 INJECTION, SOLUTION INTRAVENOUS
Status: DISCONTINUED | OUTPATIENT
Start: 2022-07-15 | End: 2022-07-25 | Stop reason: HOSPADM

## 2022-07-15 RX ORDER — CARVEDILOL 3.12 MG/1
3.12 TABLET ORAL ONCE
Status: COMPLETED | OUTPATIENT
Start: 2022-07-15 | End: 2022-07-15

## 2022-07-15 RX ORDER — ACETAMINOPHEN 650 MG/20.3ML
650 LIQUID ORAL EVERY 6 HOURS PRN
Status: DISCONTINUED | OUTPATIENT
Start: 2022-07-15 | End: 2022-07-23

## 2022-07-15 RX ORDER — NOREPINEPHRINE BITARTRATE/D5W 8 MG/250ML
PLASTIC BAG, INJECTION (ML) INTRAVENOUS
Status: COMPLETED
Start: 2022-07-15 | End: 2022-07-15

## 2022-07-15 RX ORDER — NOREPINEPHRINE BITARTRATE 0.03 MG/ML
0-3 INJECTION, SOLUTION INTRAVENOUS CONTINUOUS
Status: DISCONTINUED | OUTPATIENT
Start: 2022-07-15 | End: 2022-07-21

## 2022-07-15 RX ORDER — HYDRALAZINE HYDROCHLORIDE 20 MG/ML
10 INJECTION INTRAMUSCULAR; INTRAVENOUS EVERY 4 HOURS PRN
Status: DISCONTINUED | OUTPATIENT
Start: 2022-07-15 | End: 2022-07-25 | Stop reason: HOSPADM

## 2022-07-15 RX ADMIN — FAMOTIDINE 20 MG: 10 INJECTION INTRAVENOUS at 08:07

## 2022-07-15 RX ADMIN — ACETAMINOPHEN 650 MG: 650 SOLUTION ORAL at 04:07

## 2022-07-15 RX ADMIN — MUPIROCIN: 20 OINTMENT TOPICAL at 08:07

## 2022-07-15 RX ADMIN — TAMSULOSIN HYDROCHLORIDE 0.4 MG: 0.4 CAPSULE ORAL at 09:07

## 2022-07-15 RX ADMIN — CARVEDILOL 3.12 MG: 3.12 TABLET, FILM COATED ORAL at 03:07

## 2022-07-15 RX ADMIN — NOREPINEPHRINE BITARTRATE 0.01 MCG/KG/MIN: 8 INJECTION, SOLUTION INTRAVENOUS at 07:07

## 2022-07-15 RX ADMIN — LEVOTHYROXINE SODIUM 50 MCG: 50 TABLET ORAL at 08:07

## 2022-07-15 RX ADMIN — FAMOTIDINE 20 MG: 10 INJECTION INTRAVENOUS at 09:07

## 2022-07-15 RX ADMIN — SODIUM CHLORIDE: 9 INJECTION, SOLUTION INTRAVENOUS at 02:07

## 2022-07-15 RX ADMIN — MUPIROCIN: 20 OINTMENT TOPICAL at 09:07

## 2022-07-15 NOTE — PROGRESS NOTES
[facility] [address]  Adult Nutrition  Progress Note    SUMMARY       Recommendations    Recommendation/Intervention:   1- Initiate TF once medically feasible. TF recs:  Start @ 10mL/hr (trickle feeds) per MD then advance as tolerated to goal rate of 70mL/hr. At goal (based on TF running ~20 hrs per day), TF will provide:  1680 kcal (101% est needs)  106 gm protein (93% est needs)  1134mL free water (60% est needs)  2- Monitor elevated potassium.     Malnutrition Assessment  Malnutrition in the context of acute illness or injury    Degree of Malnutrition:  Does not meet criteria  Energy Intake:  unable to obtain  Interpretation of Weight Loss:  unable to obtain  Body Fat: does not meet criteria  Area of Body Fat Loss:  does not meet criteria and unable to obtain  Muscle Mass Loss:  unable to obtain  Area of Muscle Mass Loss: does not meet criteria  Fluid Accumulation:  does not meet criteria  Edema:  does not meet criteria   Reduced  Strength:  unable to obtain    A minimum of two characteristics is recommended for diagnosis of either severe or non-severe malnutrition.      Reason for Assessment    Reason For Assessment: identified at risk by screening criteria  Diagnosis:   1. Acute respiratory failure, apnea possibly 2/2 opioid/sedative use vs hypercarbia vs seizure  2. Stenosis of cervical spine (s/p C3-7 laminoplasty, left C3-4, C4-5, C5-6, C6-7 forminotomies)  3. Hypothyroidism    Relevant Medical History: BPH, Hypothyroidism, Sleep apnea  General Information Comments:   7/15/22: Pt remains intubated; discussed with RN and MD would like to start trickle feeds today.    Nutrition Risk Screen    Nutrition Risk Screen: other (see comments) (vent)    Nutrition/Diet History    Spiritual, Cultural Beliefs, Sabianist Practices, Values that Affect Care: no  Factors Affecting Nutritional Intake: on mechanical ventilation    Anthropometrics    Temp: 98.3 °F (36.8 °C)  Height Method: Stated  Height:  "5' 9.02" (175.3 cm)  Height (inches): 69.02 in  Weight Method: Standard Scale  Weight: 75.7 kg (166 lb 14.2 oz)  Weight (lb): 166.89 lb  Ideal Body Weight (IBW), Male: 160.12 lb  % Ideal Body Weight, Male (lb): 104.23 %  BMI (Calculated): 24.6  BMI Grade: 18.5-24.9 - normal    Lab/Procedures/Meds    Pertinent Labs Reviewed: reviewed  Pertinent Labs Comments: 7/14: K 5.2, Glu 176, GFR>60  Pertinent Medications Reviewed: reviewed  Pertinent Medications Comments: NS, Zofran PRN    Estimated/Assessed Needs    Weight Used For Calorie Calculations: 75.7 kg (166 lb 14.2 oz)  Energy Calorie Requirements (kcal): 1657 kcal (Ve: 8.7L/min)  Energy Need Method: [facility]  Protein Requirements: 114 gm  Weight Used For Protein Calculations: 75.7 kg (166 lb 14.2 oz)  Fluid Requirements (mL): 1893  Estimated Fluid Requirement Method: other (see comments) (25mL/kg)    Nutrition Prescription Ordered    Current Diet Order: NPO    Evaluation of Received Nutrient/Fluid Intake    Energy Calories Required: not meeting needs  Protein Required: not meeting needs  Fluid Required: not meeting needs  % Intake of Estimated Energy Needs: 0 - 25 %  % Meal Intake: NPO    Nutrition Plan and Goals    Goal: Provide adequate nutrition to meet estimated needs by follow-up. (new)    Diagnosis (PES): Inadequate oral intake related to acute respiratory failure as evidenced by intubation/NPO since admit. (new)    Intervention(s): modified composition of enteral nutrition, modified rate of enteral nutrition and collaboration with other providers    Monitoring: Dietitian will monitor enteral nutrition formula/solution.    Nutrition Risk: high (follow-up in 1-4 days)     "

## 2022-07-15 NOTE — PROGRESS NOTES
Hospital Progress Note  [facility] Neurosurgery    Admit Date: 7/12/2022  Post-operative Day: 3 Days Post-Op  Hospital Day: 4    SUBJECTIVE:     Follow-up For:  Procedure(s) (LRB):  LAMINOPLASTY (N/A)   POD #3 s/p C3-7 laminoplasty, left C3-4, C4-5, C5-6, C6-7 foraminotomies    Patient remains intubated.  He is not on any sedation.  He has not received any sedating medicines overnight.  Daughter and son are at bedside during rounds.    Nursing reports no following commands.  He does still appear to have some left neglect/left-sided weakness, more pronounced in left arm.  He has been opening his eyes and moving his extremities spontaneously.    Scheduled Meds:   famotidine (PF)  20 mg Intravenous Q12H    [START ON 7/16/2022] levothyroxine  50 mcg Per NG tube Daily    mupirocin   Nasal BID    tamsulosin  0.4 mg Oral Daily     Continuous Infusions:   sodium chloride 0.9% 75 mL/hr at 07/15/22 0539     PRN Meds:aluminum-magnesium hydroxide-simethicone, bisacodyL, ondansetron, prochlorperazine, senna-docusate 8.6-50 mg, sodium chloride 0.9%    Review of patient's allergies indicates:   Allergen Reactions    Iodine        OBJECTIVE:     Vital Signs (Most Recent)  Temp: 98.2 °F (36.8 °C) (07/15/22 0800)  Pulse: 88 (07/15/22 1000)  Resp: 20 (07/15/22 1000)  BP: 125/71 (07/15/22 1000)  SpO2: 97 % (07/15/22 1000)    Vital Signs Range (Last 24H):  Temp:  [97.6 °F (36.4 °C)-98.3 °F (36.8 °C)]   Pulse:  []   Resp:  [0-24]   BP: ()/(44-86)   SpO2:  [23 %-100 %]     I & O (Last 24H):    Intake/Output Summary (Last 24 hours) at 7/15/2022 1031  Last data filed at 7/15/2022 0539  Gross per 24 hour   Intake 2283 ml   Output 1400 ml   Net 883 ml     Physical Exam:  Intubated on mechanical ventilation  Opens eyes spontaneously  Right pupil fixed and irregular, chronic  Left pupil round and reactive to light  He is moving his right arm and both legs (R>L) spontaneously in bed.  He does not currently move  his left upper extremity, but he does withdrawal to pain.  Grimaces when pain elicited in left arm.  Does not follow commands.  Does not track, does not look to left.  He does appear to focus with his eyes on his family who is on his right side.    Lines/Drains:       Peripheral IV - Single Lumen 07/12/22 0714 18 G Anterior;Distal;Right Forearm (Active)   Site Assessment Clean;Dry;Intact 07/15/22 0400   Extremity Assessment Distal to IV No abnormal discoloration 07/15/22 0400   Line Status Flushed;Infusing 07/15/22 0400   Dressing Status Clean;Dry;Intact 07/15/22 0400   Dressing Intervention Integrity maintained 07/15/22 0400   Number of days: 3            Peripheral IV - Single Lumen 07/14/22 1718 18 G Anterior;Distal;Right Antecubital (Active)   Site Assessment Clean;Dry;Intact 07/15/22 0400   Extremity Assessment Distal to IV No abnormal discoloration 07/15/22 0400   Line Status Flushed;Saline locked 07/15/22 0400   Dressing Status Clean;Dry;Intact 07/15/22 0400   Dressing Intervention Integrity maintained 07/15/22 0400   Reason Not Rotated Not due 07/14/22 1800   Number of days: 0            NG/OG Tube 07/14/22 1600 [place] sump Right nostril (Active)   Placement Check placement verified by aspirate characteristics 07/15/22 0400   Distal Tube Length (cm) 65 07/14/22 2000   Tolerance no signs/symptoms of discomfort 07/15/22 0400   Securement secured to commercial device 07/15/22 0400   Clamp Status/Tolerance unclamped 07/15/22 0400   Suction Setting/Drainage Method low;intermittent setting;suction at 07/15/22 0400   Insertion Site Appearance no redness, warmth, tenderness, skin breakdown, drainage 07/15/22 0400   Drainage Bile 07/15/22 0400   Number of days: 0            Urethral Catheter 07/14/22 1600 Double-lumen 16 Fr. (Active)   $ Puri Insertion Bedside Insertion Performed 07/14/22 1600   Site Assessment Clean;Intact 07/15/22 0400   Collection Container Urimeter 07/15/22 0400   Securement Method secured to top  "of thigh w/ adhesive device 07/15/22 0400   Catheter Care Performed yes 07/15/22 0400   Reason for Continuing Urinary Catheterization Critically ill in ICU and requiring hourly monitoring of intake/output 07/15/22 0400   CAUTI Prevention Bundle Securement Device in place with 1" slack;Intact seal between catheter & drainage tubing;Drainage bag/urimeter off the floor;Sheeting clip in use;No dependent loops or kinks;Drainage bag/urimeter not overfilled (<2/3 full);Drainage bag/urimeter below bladder 07/14/22 2000   Output (mL) 300 mL 07/15/22 0539   Number of days: 0       Laboratory:  I have reviewed all pertinent lab results within the past 24 hours.  CBC:   Recent Labs   Lab 07/14/22  1629   WBC 9.5   RBC 3.99*   HGB 11.6*   HCT 36.7*      MCV 92.0   MCH 29.1   MCHC 31.6*     BMP:   Recent Labs   Lab 07/14/22  1629 07/15/22  0916    141   K 5.2* 4.6   CO2 27 23   BUN 9.7 10.2   CREATININE 0.70* 0.58*   CALCIUM 8.8 8.7*   MG 1.70  --      ABGs:   Recent Labs   Lab 07/15/22  0904   PH 7.48*   PCO2 39   PO2 76*   HCO3 29.0   POCSATURATED 96       ASSESSMENT/PLAN:     Problem List Items Addressed This Visit        Neuro    Stenosis of cervical spine with myelopathy    Relevant Orders    Admit to Inpatient (Completed)    Osseous and subluxation stenosis of intervertebral foramina of cervical region - Primary    Relevant Orders    EKG 12-lead (Completed)    Aphasia    Relevant Orders    EEG (Completed)      Other Visit Diagnoses     Displacement of cervical intervertebral disc with myelopathy        Altered mental status        Relevant Orders    EKG 12-lead (Completed)        PLAN  Still unclear etiology surrounding the events yesterday, suspect secondary to medications.  Continue holding sedating medicines  Ventilator management per ICU  Neurology following  No additional recommendations at this time from Neurosurgery standpoint  SCDs, OK for lovenox    Discussed testing/patient condition with the " patient's children at bedside.  All of their questions were answered to best of my ability.

## 2022-07-15 NOTE — PT/OT/SLP PROGRESS
Occupational Therapy      Patient Name:  Scott Echeverria   MRN:  [id number]    Patient not appropriate for therapy at this time 2/2 t/f to ICU and medical change in status. Per RN, pt is intubated and unstable. Will follow-up as appropriate.      7/15/2022

## 2022-07-15 NOTE — PROGRESS NOTES
[facility] ICU  Pulmonary Critical Care Note    Patient Name: Scott Echeverria  MRN: [id number]  Admission Date: 7/12/2022  Hospital Length of Stay: 3 days  Code Status: Full Code  Attending Provider: Paulo Rao MD  Primary Care Provider: RON AUGUSTIN MD     Subjective:     HPI:     72 y/o man with history of cervical spinal stenosis, hypothyroidism, originally presented to [facility] for C3-C7 laminiopasty and laminoforminotomies.  Patient underwent procedure on 7/12 and tolerated it well.  On postoperative day 1, the patient seemed to be improving well, working with PT, getting out of the bed to the chair.  However, the morning of postop day 2, the patient's wife noted that patient started to have slurred speech.  He had received Norco an hour or 2 prior to that, which he has been receiving q4h since surgery. He also received IV robaxin prior to onset of aphasia.  A code stroke was called.  Patient had a negative CT head, as well as negative MRI and MRA.  For the MRI, the patient did require Ativan administration.  He also did reportedly began to develop some left-sided neglect.  This afternoon, it was reported the patient started to become hypoxic, and it was found that he was having agonal respirations.  Flumazenil was given on the floor.  Ventilation with BVM was initiated.  ICU was called for intubation and step-up to the ICU. Upon arrival to the floor, patient was obtunded, not arousable to verbal or painful stimuli. Intubation was performed successfully and patient was started on mechanical ventilation. Transferred to ICU for further management. He was given naloxone upon arrival, and is now slowly waking up and is now beginning to have spontaneous movement.    Hospital Course/Significant events:    7/14: Intubated    24 Hour Interval History:  Patient remains intubated and mechanically ventilated.  He is on no sedation therapy.  He withdraws to stimuli especially on the right.  There  is occasional spontaneous movement    Past Medical History:   Diagnosis Date    BPH (benign prostatic hyperplasia)     Cervical pain     Cervical radiculitis     Cervical spinal stenosis     Hypothyroidism, unspecified     Sleep apnea     resolved since surgery       Past Surgical History:   Procedure Laterality Date    APPENDECTOMY  01/25/2022    Dr. Kerry Coats    COLONOSCOPY  2021    HEMORRHOID SURGERY  2002    lower back surgery  1990    RETINAL DETACHMENT SURGERY Right 2015    SINUS SURGERY      TONSILLECTOMY  1956       Social History     Socioeconomic History    Marital status:    Tobacco Use    Smoking status: Never Smoker    Smokeless tobacco: Never Used   Substance and Sexual Activity    Alcohol use: Not Currently    Drug use: Never           Objective:     Current Outpatient Medications   Medication Instructions    acetaminophen (TYLENOL) 325 MG tablet As needed (PRN)    amitriptyline (ELAVIL) 25 mg, Oral, Daily    aspirin 81 mg, Oral, Daily    fexofenadine (ALLEGRA) 180 mg, Oral, Daily    iron/vitamin B complex (GERITOL ORAL) Oral    krill/om-3/dha/epa/phospho/ast (MAXIMUM RED KRILL OMEGA-3 ORAL) Oral    Lactobac no.41/Bifidobact no.7 (PROBIOTIC-10 ORAL) Oral    levothyroxine (SYNTHROID) 50 mcg, Oral, Daily    ondansetron (ZOFRAN) 4 mg, Oral, Every 8 hours    oxyCODONE (ROXICODONE) 5 MG immediate release tablet TAKE 1 TABLET BY MOUTH EVERY 4 HOURS AS NEEDED FOR MILD PAIN    predniSONE (DELTASONE) 20 mg, Oral, 2 times daily    tamsulosin (FLOMAX) 0.4 mg Cap   See Instructions, TAKE ONE CAPSULE BY MOUTH EVERY DAY, # 90 cap(s), 3 Refill(s), Pharmacy: [facility]/pharmacy #[id number], 175, cm, Height/Length Dosing, 10/20/21 9:19:00 CDT, 79.83, kg, Weight Dosing, 10/20/21 9:19:00 CDT       Current Inpatient Medications   amitriptyline  25 mg Oral Daily    cetirizine  10 mg Oral Daily    famotidine (PF)  20 mg Intravenous Q12H    levothyroxine  50 mcg Oral Daily    mupirocin   Nasal  BID    scopolamine  1 patch Transdermal Q3 Days    tamsulosin  0.4 mg Oral Daily           Intake/Output Summary (Last 24 hours) at 7/15/2022 0837  Last data filed at 7/15/2022 0539  Gross per 24 hour   Intake 2283 ml   Output 1400 ml   Net 883 ml       Review of Systems   Unable to perform ROS: Mental status change        Vital Signs (Most Recent):  Temp: 98.3 °F (36.8 °C) (07/15/22 0400)  Pulse: 84 (07/15/22 0600)  Resp: 20 (07/15/22 0600)  BP: 112/68 (07/15/22 0600)  SpO2: 99 % (07/15/22 0600)  Body mass index is 24.65 kg/m².  Weight: 75.7 kg (166 lb 14.2 oz) Vital Signs (24h Range):  Temp:  [97.6 °F (36.4 °C)-98.3 °F (36.8 °C)] 98.3 °F (36.8 °C)  Pulse:  [] 84  Resp:  [0-24] 20  SpO2:  [23 %-100 %] 99 %  BP: ()/(44-86) 112/68     Physical Exam  Constitutional:       General: He is in acute distress (Acute respirotary distress).      Appearance: He is not toxic-appearing.      Comments: Obtunded   HENT:      Head: Normocephalic and atraumatic.   Eyes:      Extraocular Movements: Extraocular movements intact.      Conjunctiva/sclera: Conjunctivae normal.      Pupils: Pupils are equal, round, and reactive to light.      Comments: Right pupil 3mm, s/p cataract surgery. Left pupil 2mm. Both not reacting to light   Cardiovascular:      Rate and Rhythm: Normal rate and regular rhythm.      Pulses: Normal pulses.      Heart sounds: No murmur heard.    No gallop.   Pulmonary:      Effort: No respiratory distress.      Breath sounds: No stridor. No wheezing, rhonchi or rales.   Abdominal:      General: Abdomen is flat. There is no distension.      Palpations: Abdomen is soft.   Musculoskeletal:         General: No swelling or deformity.      Left lower leg: No edema.   Skin:     General: Skin is warm and dry.      Coloration: Skin is not jaundiced.      Findings: No bruising.           Mechanical ventilation support:  Vent Mode: PRVC A/C (07/15/22 0400)  Ventilator Initiated: Yes (07/14/22 1610)  Set Rate:  20 BPM (07/15/22 0400)  Vt Set: 500 mL (07/15/22 0400)  PEEP/CPAP: 5 cmH20 (07/15/22 0400)  Oxygen Concentration (%): 60 (07/14/22 1959)  Peak Airway Pressure: 17 cmH2O (07/15/22 0400)  Total Ve: 8.7 mL (07/15/22 0400)  F/VT Ratio<105 (RSBI): (!) 39.13 (07/15/22 0400)    Lines/Drains/Airways     Drain  Duration                NG/OG Tube 07/14/22 1600 [place] sump Right nostril <1 day         Urethral Catheter 07/14/22 1600 Double-lumen 16 Fr. <1 day          Airway  Duration                Airway - Non-Surgical 07/14/22 1534 <1 day          Peripheral Intravenous Line  Duration                Peripheral IV - Single Lumen 07/12/22 0714 18 G Anterior;Distal;Right Forearm 3 days         Peripheral IV - Single Lumen 07/14/22 1718 18 G Anterior;Distal;Right Antecubital <1 day                Significant Labs:    Lab Results   Component Value Date    WBC 9.5 07/14/2022    HGB 11.6 (L) 07/14/2022    HCT 36.7 (L) 07/14/2022    MCV 92.0 07/14/2022     07/14/2022         BMP  Lab Results   Component Value Date     07/14/2022    K 5.2 (H) 07/14/2022    CO2 27 07/14/2022    BUN 9.7 07/14/2022    CREATININE 0.70 (L) 07/14/2022    CALCIUM 8.8 07/14/2022    EGFRNONAA >60 07/14/2022       ABG  Recent Labs   Lab 07/14/22  1701   PH 7.35   PO2 311*   PCO2 49*   HCO3 27.1           Significant Imaging:  I have reviewed all pertinent imaging within the past 24 hours.        Assessment/Plan:     Assessment  1. Acute respiratory failure requiring intubation and mechanical ventilation on July 14. Etiology is probably multifactorial including hypercapnia opioid and sedative use with possible seizure activity or cerebrovascular accident.    2. Stenosis of the cervical spine status post posterior C3 through 7 laminoplasty  3. Altered mental status.  Etiology as above similar to the acute respiratory failure.  He remains altered and has not received any sedating medication now for some over 12 hours.  MRI and EEG were within normal  limits  4. History of left-sided weakness  5. History of hypothyroidism      Plan    -Admit to ICU for further workup and monitoring  -continue mechanical ventilation until there is improvement in his mental status  -Naloxone and romazicon had been given with little response and therefore we will not repeat-Hold and avoid all sedating medications  - follow neurology recommendations  - intermittent labs  - maintenance IV fluid  - DC scopolamine    DVT Prophylaxis: Mechanical, holding chemical ppx  GI Prophylaxis: Pepcid     Greater than 30 minutes of critical care was time spent personally by me on the following activities: development of treatment plan with patient or surrogate and bedside caregivers, discussions with consultants, evaluation of patient's response to treatment, examination of patient, ordering and performing treatments and interventions, ordering and review of laboratory studies, ordering and review of radiographic studies, pulse oximetry, re-evaluation of patient's condition.  This critical care time did not overlap with that of any other provider or involve time for any procedures.     Timothy Allen MD  Pulmonary Critical Care Medicine  [facility] - [address] [place] [address]

## 2022-07-15 NOTE — PT/OT/SLP PROGRESS
Physical Therapy      Patient Name:  Scott Echeverria   MRN:  [id number]    Patient not seen today secondary to pt tf to ICU, intubated, and unstable per RN. Will follow-up on Monday.

## 2022-07-15 NOTE — PLAN OF CARE
Problem: Adult Inpatient Plan of Care  Goal: Plan of Care Review  Outcome: Ongoing, Progressing  Goal: Patient-Specific Goal (Individualized)  Outcome: Ongoing, Progressing  Goal: Absence of Hospital-Acquired Illness or Injury  Outcome: Ongoing, Progressing  Goal: Optimal Comfort and Wellbeing  Outcome: Ongoing, Progressing  Goal: Readiness for Transition of Care  Outcome: Ongoing, Progressing     Problem: Fall Injury Risk  Goal: Absence of Fall and Fall-Related Injury  Outcome: Ongoing, Progressing  Intervention: Promote Injury-Free Environment  Flowsheets (Taken 7/15/2022 4130)  Safety Promotion/Fall Prevention:   bed alarm set   side rails raised x 3   room near unit station   pulse ox   medications reviewed     Problem: Pain Acute  Goal: Acceptable Pain Control and Functional Ability  Outcome: Ongoing, Progressing     Problem: Skin Injury Risk Increased  Goal: Skin Health and Integrity  Outcome: Ongoing, Progressing     Problem: Communication Impairment (Mechanical Ventilation, Invasive)  Goal: Effective Communication  Outcome: Ongoing, Progressing     Problem: Device-Related Complication Risk (Mechanical Ventilation, Invasive)  Goal: Optimal Device Function  Outcome: Ongoing, Progressing     Problem: Inability to Wean (Mechanical Ventilation, Invasive)  Goal: Mechanical Ventilation Liberation  Outcome: Ongoing, Progressing     Problem: Nutrition Impairment (Mechanical Ventilation, Invasive)  Goal: Optimal Nutrition Delivery  Outcome: Ongoing, Progressing  Intervention: Optimize Nutrition Delivery  Flowsheets (Taken 7/15/2022 4689)  Nutrition Support Management: tube feeding initiated     Problem: Skin and Tissue Injury (Mechanical Ventilation, Invasive)  Goal: Absence of Device-Related Skin and Tissue Injury  Outcome: Ongoing, Progressing     Problem: Ventilator-Induced Lung Injury (Mechanical Ventilation, Invasive)  Goal: Absence of Ventilator-Induced Lung Injury  Outcome: Ongoing, Progressing     Problem:  Communication Impairment (Artificial Airway)  Goal: Effective Communication  Outcome: Ongoing, Progressing     Problem: Device-Related Complication Risk (Artificial Airway)  Goal: Optimal Device Function  Outcome: Ongoing, Progressing     Problem: Skin and Tissue Injury (Artificial Airway)  Goal: Absence of Device-Related Skin or Tissue Injury  Outcome: Ongoing, Progressing     Problem: Noninvasive Ventilation Acute  Goal: Effective Unassisted Ventilation and Oxygenation  Outcome: Ongoing, Progressing     Problem: Infection  Goal: Absence of Infection Signs and Symptoms  Outcome: Ongoing, Progressing

## 2022-07-16 LAB
ANION GAP SERPL CALC-SCNC: 8 MEQ/L
BASE EXCESS ARTERIAL: 1.2 MMOL/L (ref -2–2)
BASOPHILS # BLD AUTO: 0.06 X10(3)/MCL (ref 0–0.2)
BASOPHILS NFR BLD AUTO: 0.6 %
BUN SERPL-MCNC: 9.9 MG/DL (ref 8.4–25.7)
CALCIUM SERPL-MCNC: 8.5 MG/DL (ref 8.8–10)
CHLORIDE SERPL-SCNC: 109 MMOL/L (ref 98–107)
CO2 SERPL-SCNC: 22 MMOL/L (ref 23–31)
CORRECTED TEMPERATURE (PCO2): 41 MMHG (ref 35–45)
CORRECTED TEMPERATURE (PH): 7.41 (ref 7.35–7.45)
CORRECTED TEMPERATURE (PO2): 74 MMHG (ref 80–100)
CREAT SERPL-MCNC: 0.57 MG/DL (ref 0.73–1.18)
CREAT/UREA NIT SERPL: 17
EOSINOPHIL # BLD AUTO: 0.01 X10(3)/MCL (ref 0–0.9)
EOSINOPHIL NFR BLD AUTO: 0.1 %
ERYTHROCYTE [DISTWIDTH] IN BLOOD BY AUTOMATED COUNT: 15.5 % (ref 11.5–17)
GLUCOSE SERPL-MCNC: 113 MG/DL (ref 82–115)
HCO3 ARTERIAL: 26 MMOL/L (ref 18–23)
HCO3 UR-SCNC: 26 MMOL/L
HCT VFR BLD AUTO: 32.8 % (ref 42–52)
HGB BLD-MCNC: 10.6 GM/DL (ref 14–18)
HGB BLD-MCNC: 10.9 G/DL (ref 12–16)
HGB BLD-MCNC: ABNORMAL G/DL
IMM GRANULOCYTES # BLD AUTO: 0.02 X10(3)/MCL (ref 0–0.04)
IMM GRANULOCYTES NFR BLD AUTO: 0.2 %
LYMPHOCYTES # BLD AUTO: 1.4 X10(3)/MCL (ref 0.6–4.6)
LYMPHOCYTES NFR BLD AUTO: 14.9 %
MCH RBC QN AUTO: 29.3 PG (ref 27–31)
MCHC RBC AUTO-ENTMCNC: 32.3 MG/DL (ref 33–36)
MCV RBC AUTO: 90.6 FL (ref 80–94)
MONOCYTES # BLD AUTO: 0.33 X10(3)/MCL (ref 0.1–1.3)
MONOCYTES NFR BLD AUTO: 3.5 %
NEUTROPHILS # BLD AUTO: 7.6 X10(3)/MCL (ref 2.1–9.2)
NEUTROPHILS NFR BLD AUTO: 80.7 %
NRBC BLD AUTO-RTO: 0 %
PCO2 BLDA: 41 MMHG (ref 35–45)
PCO2 BLDA: 42 MMHG
PCO2 BLDA: ABNORMAL MM[HG]
PCO2 BLDA: ABNORMAL MM[HG]
PH SMN: 7.41 [PH] (ref 7.35–7.45)
PH SMN: 7.45 [PH]
PH SMN: ABNORMAL [PH]
PLATELET # BLD AUTO: 143 X10(3)/MCL (ref 130–400)
PMV BLD AUTO: 11.7 FL (ref 7.4–10.4)
PO2 BLDA: 73 MMHG
PO2 BLDA: 74 MMHG (ref 80–100)
PO2 BLDA: ABNORMAL MM[HG]
POC BASE DEFICIT: 1.2 MMOL/L (ref -2–2)
POC BASE DEFICIT: 4.7 MMOL/L
POC COHB: 1.9 %
POC COHB: ABNORMAL
POC HCO3: 29.2 MMOL/L
POC IONIZED CALCIUM: 1.15 MMOL/L
POC IONIZED CALCIUM: 1.22 MMOL/L (ref 1.12–1.23)
POC IONIZED CALCIUM: ABNORMAL
POC METHB: 1.4 % (ref 0.4–1.5)
POC METHB: ABNORMAL
POC O2HB: 93.4 % (ref 94–97)
POC O2HB: ABNORMAL
POC SATURATED O2: 94.8 %
POC SATURATED O2: 95 %
POC TEMPERATURE: 37 C
POC TEMPERATURE: 37 °C
POTASSIUM BLD-SCNC: 3.1 MMOL/L
POTASSIUM BLD-SCNC: 3.2 MMOL/L (ref 3.5–5)
POTASSIUM BLD-SCNC: ABNORMAL MMOL/L
POTASSIUM SERPL-SCNC: 3.6 MMOL/L (ref 3.5–5.1)
RBC # BLD AUTO: 3.62 X10(6)/MCL (ref 4.7–6.1)
SATURATED O2 ARTERIAL, I-STAT: ABNORMAL
SODIUM BLD-SCNC: 135 MMOL/L (ref 137–145)
SODIUM BLD-SCNC: 137 MMOL/L
SODIUM BLD-SCNC: ABNORMAL MMOL/L
SODIUM SERPL-SCNC: 139 MMOL/L (ref 136–145)
SPECIMEN SOURCE: ABNORMAL
SPECIMEN SOURCE: ABNORMAL
WBC # SPEC AUTO: 9.4 X10(3)/MCL (ref 4.5–11.5)

## 2022-07-16 PROCEDURE — 25000003 PHARM REV CODE 250: Performed by: NEUROLOGICAL SURGERY

## 2022-07-16 PROCEDURE — 95718 PR EEG, W/VIDEO, CONT RECORD, I&R, 2-12 HRS: ICD-10-PCS | Mod: ,,, | Performed by: PSYCHIATRY & NEUROLOGY

## 2022-07-16 PROCEDURE — 85025 COMPLETE CBC W/AUTO DIFF WBC: CPT | Performed by: STUDENT IN AN ORGANIZED HEALTH CARE EDUCATION/TRAINING PROGRAM

## 2022-07-16 PROCEDURE — 25000003 PHARM REV CODE 250: Performed by: NURSE PRACTITIONER

## 2022-07-16 PROCEDURE — 99900035 HC TECH TIME PER 15 MIN (STAT)

## 2022-07-16 PROCEDURE — 20000000 HC ICU ROOM

## 2022-07-16 PROCEDURE — 63600175 PHARM REV CODE 636 W HCPCS: Performed by: PHYSICIAN ASSISTANT

## 2022-07-16 PROCEDURE — 82803 BLOOD GASES ANY COMBINATION: CPT

## 2022-07-16 PROCEDURE — 80048 BASIC METABOLIC PNL TOTAL CA: CPT | Performed by: HOSPITALIST

## 2022-07-16 PROCEDURE — 99900031 HC PATIENT EDUCATION (STAT)

## 2022-07-16 PROCEDURE — 95718 EEG PHYS/QHP 2-12 HR W/VEEG: CPT | Mod: ,,, | Performed by: PSYCHIATRY & NEUROLOGY

## 2022-07-16 PROCEDURE — 27000221 HC OXYGEN, UP TO 24 HOURS

## 2022-07-16 PROCEDURE — 25000003 PHARM REV CODE 250: Performed by: STUDENT IN AN ORGANIZED HEALTH CARE EDUCATION/TRAINING PROGRAM

## 2022-07-16 PROCEDURE — 94003 VENT MGMT INPAT SUBQ DAY: CPT

## 2022-07-16 PROCEDURE — 36415 COLL VENOUS BLD VENIPUNCTURE: CPT | Performed by: STUDENT IN AN ORGANIZED HEALTH CARE EDUCATION/TRAINING PROGRAM

## 2022-07-16 PROCEDURE — 25000003 PHARM REV CODE 250: Performed by: HOSPITALIST

## 2022-07-16 PROCEDURE — 36600 WITHDRAWAL OF ARTERIAL BLOOD: CPT

## 2022-07-16 PROCEDURE — 99233 PR SUBSEQUENT HOSPITAL CARE,LEVL III: ICD-10-PCS | Mod: ,,, | Performed by: PSYCHIATRY & NEUROLOGY

## 2022-07-16 PROCEDURE — 99233 SBSQ HOSP IP/OBS HIGH 50: CPT | Mod: ,,, | Performed by: PSYCHIATRY & NEUROLOGY

## 2022-07-16 PROCEDURE — 87077 CULTURE AEROBIC IDENTIFY: CPT | Performed by: STUDENT IN AN ORGANIZED HEALTH CARE EDUCATION/TRAINING PROGRAM

## 2022-07-16 PROCEDURE — 36415 COLL VENOUS BLD VENIPUNCTURE: CPT | Performed by: HOSPITALIST

## 2022-07-16 PROCEDURE — 94761 N-INVAS EAR/PLS OXIMETRY MLT: CPT

## 2022-07-16 PROCEDURE — 99900026 HC AIRWAY MAINTENANCE (STAT)

## 2022-07-16 RX ORDER — ENOXAPARIN SODIUM 100 MG/ML
40 INJECTION SUBCUTANEOUS EVERY 24 HOURS
Status: DISCONTINUED | OUTPATIENT
Start: 2022-07-16 | End: 2022-07-25 | Stop reason: HOSPADM

## 2022-07-16 RX ADMIN — ENOXAPARIN SODIUM 40 MG: 40 INJECTION SUBCUTANEOUS at 06:07

## 2022-07-16 RX ADMIN — ACETAMINOPHEN 650 MG: 650 SOLUTION ORAL at 08:07

## 2022-07-16 RX ADMIN — TAMSULOSIN HYDROCHLORIDE 0.4 MG: 0.4 CAPSULE ORAL at 08:07

## 2022-07-16 RX ADMIN — SODIUM CHLORIDE: 9 INJECTION, SOLUTION INTRAVENOUS at 06:07

## 2022-07-16 RX ADMIN — FAMOTIDINE 20 MG: 10 INJECTION INTRAVENOUS at 09:07

## 2022-07-16 RX ADMIN — BISACODYL 10 MG: 10 SUPPOSITORY RECTAL at 08:07

## 2022-07-16 RX ADMIN — FAMOTIDINE 20 MG: 10 INJECTION INTRAVENOUS at 08:07

## 2022-07-16 RX ADMIN — LEVOTHYROXINE SODIUM 50 MCG: 50 TABLET ORAL at 09:07

## 2022-07-16 RX ADMIN — MUPIROCIN: 20 OINTMENT TOPICAL at 09:07

## 2022-07-16 RX ADMIN — SENNOSIDES AND DOCUSATE SODIUM 2 TABLET: 50; 8.6 TABLET ORAL at 08:07

## 2022-07-16 RX ADMIN — MUPIROCIN: 20 OINTMENT TOPICAL at 08:07

## 2022-07-16 NOTE — SUBJECTIVE & OBJECTIVE
Past Medical History:   Diagnosis Date    BPH (benign prostatic hyperplasia)     Cervical pain     Cervical radiculitis     Cervical spinal stenosis     Hypothyroidism, unspecified     Sleep apnea     resolved since surgery       Past Surgical History:   Procedure Laterality Date    APPENDECTOMY  01/25/2022    Dr. Kerry Coats    COLONOSCOPY  2021    HEMORRHOID SURGERY  2002    lower back surgery  1990    RETINAL DETACHMENT SURGERY Right 2015    SINUS SURGERY      TONSILLECTOMY  1956       Review of patient's allergies indicates:   Allergen Reactions    Iodine        No current facility-administered medications on file prior to encounter.     Current Outpatient Medications on File Prior to Encounter   Medication Sig    amitriptyline (ELAVIL) 25 MG tablet Take 25 mg by mouth once daily.    aspirin 81 MG Chew Take 81 mg by mouth once daily.    fexofenadine (ALLEGRA) 180 MG tablet Take 180 mg by mouth once daily.    levothyroxine (SYNTHROID) 50 MCG tablet Take 50 mcg by mouth once daily.    tamsulosin (FLOMAX) 0.4 mg Cap   See Instructions, TAKE ONE CAPSULE BY MOUTH EVERY DAY, # 90 cap(s), 3 Refill(s), Pharmacy: [facility]/pharmacy #[id number], 175, cm, Height/Length Dosing, 10/20/21 9:19:00 CDT, 79.83, kg, Weight Dosing, 10/20/21 9:19:00 CDT    acetaminophen (TYLENOL) 325 MG tablet as needed.    ondansetron (ZOFRAN) 4 MG tablet Take 4 mg by mouth every 8 (eight) hours.    oxyCODONE (ROXICODONE) 5 MG immediate release tablet TAKE 1 TABLET BY MOUTH EVERY 4 HOURS AS NEEDED FOR MILD PAIN    predniSONE (DELTASONE) 20 MG tablet Take 20 mg by mouth 2 (two) times daily.     Family History       Problem Relation (Age of Onset)    Alzheimer's disease Mother    Heart attack Father    Hyperlipidemia Father    Hypertension Sister, Brother    Parkinsonism Mother    Sickle cell trait Sister    Stroke Father    Thyroid disease Mother          Tobacco Use    Smoking status: Never Smoker    Smokeless tobacco: Never Used   Substance and Sexual  Activity    Alcohol use: Not Currently    Drug use: Never    Sexual activity: Not on file     Review of Systems   Unable to perform ROS: Intubated     Objective:     Vital Signs (Most Recent):  Temp: 98.2 °F (36.8 °C) (07/16/22 1200)  Pulse: 100 (07/16/22 1500)  Resp: (!) 22 (07/16/22 1500)  BP: 133/71 (07/16/22 1500)  SpO2: (!) 94 % (07/16/22 1500)   Vital Signs (24h Range):  Temp:  [98.2 °F (36.8 °C)-101.6 °F (38.7 °C)] 98.2 °F (36.8 °C)  Pulse:  [] 100  Resp:  [13-22] 22  SpO2:  [94 %-100 %] 94 %  BP: ()/(50-79) 133/71     Weight: 75.7 kg (166 lb 14.2 oz)  Body mass index is 24.63 kg/m².    Physical Exam  Constitutional:       Interventions: He is intubated.      Comments: Not on sedation   Cardiovascular:      Rate and Rhythm: Normal rate and regular rhythm.   Pulmonary:      Effort: He is intubated.   Neurological:      Comments: Opens eyes to painful stimuli ... appears to track  Does not follow commands with BUE; ? Appeared to wiggle toes to command  Continuous EEG in progress  Limited PE ... full exam to follow by MD       Significant Labs: BMP:   Recent Labs   Lab 07/14/22  1629 07/15/22  0916 07/16/22  0030    141 139   K 5.2* 4.6 3.6   CO2 27 23 22*   BUN 9.7 10.2 9.9   CREATININE 0.70* 0.58* 0.57*   CALCIUM 8.8 8.7* 8.5*   MG 1.70  --   --      CBC:   Recent Labs   Lab 07/14/22  1629 07/16/22  0405   WBC 9.5 9.4   HGB 11.6* 10.6*   HCT 36.7* 32.8*    143       Significant Imaging: I have reviewed all pertinent imaging results/findings within the past 24 hours.      -CTh (7/14): no acute abnormalities  -MRI brain (7/14): no acute infarct  -MRA brain (7/14): no proximal LVO    -CTh (7/15): no acute abnormalities  -CT C-spine (7/15): multiple spinal fracture seen from C3-C7 involving posterior arches at those levels with postsurgical changes seen in the cervical spine mainly on the left side at C3 through C7; fluid/edema seen in the surgical bed throughout cervical spine  -MRI brain  (7/15): no acute infarct  -MRI C-spine (7/15):  1. Postoperative changes are seen in the left posterior elements from C3 through C7 vertebrae. There is a postoperative collection in the surgical bed/ posterior paraspinal soft tissues surrounding the spinous process and posterior to the laminae. The collection measures approximately 7.3 cm in length and 2 cm in AP dimension. Similar findings are also seen on the prior examination. Correlate clinically as regards additional evaluation and follow-up.  2. There are displaced fractures involving the laminae of C3 through C7 vertebrae appreciated better on the earlier CT study.  3. There is moderate canal stenosis at C3-C4 and C4-C5 secondary to broad-based disc protrusions and degenerative joint disease.

## 2022-07-16 NOTE — PROGRESS NOTES
[facility] - [facility] ICU  Pulmonary Critical Care Note    Patient Name: Scott Echeverria  MRN: [id number]  Admission Date: 7/12/2022  Hospital Length of Stay: 4 days  Code Status: Full Code  Attending Provider: Paulo Rao MD  Primary Care Provider: RON AUGUSTIN MD     Subjective:     HPI:     70 y/o man with history of cervical spinal stenosis, hypothyroidism, originally presented to [facility] for C3-C7 laminiopasty and laminoforminotomies.  Patient underwent procedure on 7/12 and tolerated it well.  On postoperative day 1, the patient seemed to be improving well, working with PT, getting out of the bed to the chair.  However, the morning of postop day 2, the patient's wife noted that patient started to have slurred speech.  He had received Norco an hour or 2 prior to that, which he has been receiving q4h since surgery. He also received IV robaxin prior to onset of aphasia.  A code stroke was called.  Patient had a negative CT head, as well as negative MRI and MRA.  For the MRI, the patient did require Ativan administration.  He also did reportedly began to develop some left-sided neglect.  This afternoon, it was reported the patient started to become hypoxic, and it was found that he was having agonal respirations.  Flumazenil was given on the floor.  Ventilation with BVM was initiated.  ICU was called for intubation and step-up to the ICU. Upon arrival to the floor, patient was obtunded, not arousable to verbal or painful stimuli. Intubation was performed successfully and patient was started on mechanical ventilation. Transferred to ICU for further management. He was given naloxone upon arrival, and is now slowly waking up and is now beginning to have spontaneous movement.    Hospital Course/Significant events:    7/14: Intubated    24 Hour Interval History:  Patient remains intubated and mechanically ventilated.  He is on no sedation therapy and is now responding to verbal stimuli; opening eyes and  nodding appropriately intermittently.  He withdraws to stimuli in all 4 extremities. There is occasional spontaneous movement of RUE & RLE. Unable to assess ROS s/t patient's current neuro status    Past Medical History:   Diagnosis Date    BPH (benign prostatic hyperplasia)     Cervical pain     Cervical radiculitis     Cervical spinal stenosis     Hypothyroidism, unspecified     Sleep apnea     resolved since surgery       Past Surgical History:   Procedure Laterality Date    APPENDECTOMY  01/25/2022    Dr. Kerry Coats    COLONOSCOPY  2021    HEMORRHOID SURGERY  2002    lower back surgery  1990    RETINAL DETACHMENT SURGERY Right 2015    SINUS SURGERY      TONSILLECTOMY  1956       Social History     Socioeconomic History    Marital status:    Tobacco Use    Smoking status: Never Smoker    Smokeless tobacco: Never Used   Substance and Sexual Activity    Alcohol use: Not Currently    Drug use: Never           Objective:     Current Outpatient Medications   Medication Instructions    acetaminophen (TYLENOL) 325 MG tablet As needed (PRN)    amitriptyline (ELAVIL) 25 mg, Oral, Daily    aspirin 81 mg, Oral, Daily    fexofenadine (ALLEGRA) 180 mg, Oral, Daily    iron/vitamin B complex (GERITOL ORAL) Oral    krill/om-3/dha/epa/phospho/ast (MAXIMUM RED KRILL OMEGA-3 ORAL) Oral    Lactobac no.41/Bifidobact no.7 (PROBIOTIC-10 ORAL) Oral    levothyroxine (SYNTHROID) 50 mcg, Oral, Daily    ondansetron (ZOFRAN) 4 mg, Oral, Every 8 hours    oxyCODONE (ROXICODONE) 5 MG immediate release tablet TAKE 1 TABLET BY MOUTH EVERY 4 HOURS AS NEEDED FOR MILD PAIN    predniSONE (DELTASONE) 20 mg, Oral, 2 times daily    tamsulosin (FLOMAX) 0.4 mg Cap   See Instructions, TAKE ONE CAPSULE BY MOUTH EVERY DAY, # 90 cap(s), 3 Refill(s), Pharmacy: [facility]/pharmacy #[id number], 175, cm, Height/Length Dosing, 10/20/21 9:19:00 CDT, 79.83, kg, Weight Dosing, 10/20/21 9:19:00 CDT       Current Inpatient Medications    famotidine (PF)  20 mg Intravenous Q12H    levothyroxine  50 mcg Per NG tube Daily    mupirocin   Nasal BID    tamsulosin  0.4 mg Oral Daily           Intake/Output Summary (Last 24 hours) at 7/16/2022 1026  Last data filed at 7/16/2022 0800  Gross per 24 hour   Intake 3495.79 ml   Output 1375 ml   Net 2120.79 ml       Review of Systems   Unable to perform ROS: Mental status change        Vital Signs (Most Recent):  Temp: 98.7 °F (37.1 °C) (07/16/22 0400)  Pulse: 84 (07/16/22 0600)  Resp: 20 (07/16/22 0600)  BP: 106/65 (07/16/22 0600)  SpO2: 97 % (07/16/22 0905)  Body mass index is 24.63 kg/m².  Weight: 75.7 kg (166 lb 14.2 oz) Vital Signs (24h Range):  Temp:  [98.6 °F (37 °C)-101.6 °F (38.7 °C)] 98.7 °F (37.1 °C)  Pulse:  [] 84  Resp:  [13-28] 20  SpO2:  [92 %-100 %] 97 %  BP: ()/(50-79) 106/65     Physical Exam  Constitutional:       General: He is not in acute distress.     Appearance: He is not toxic-appearing.      Comments: Mechanically ventilated, not on sedation, arousable to verbal stimuli   HENT:      Head: Normocephalic and atraumatic.   Eyes:      General: No scleral icterus.     Extraocular Movements: Extraocular movements intact.      Conjunctiva/sclera: Conjunctivae normal.      Comments: Right pupil 3mm, s/p cataract surgery. Left pupil 2mm. Both not reacting to light   Cardiovascular:      Rate and Rhythm: Normal rate and regular rhythm.      Pulses: Normal pulses.      Heart sounds: Normal heart sounds. No murmur heard.    No gallop.   Pulmonary:      Effort: Pulmonary effort is normal. No respiratory distress.      Breath sounds: No stridor. No wheezing, rhonchi or rales.      Comments: On mechanical ventilation  Abdominal:      General: Abdomen is flat. There is no distension.      Palpations: Abdomen is soft.      Tenderness: There is no guarding or rebound.   Musculoskeletal:         General: Swelling (Warm/mild swelling without pitting edema to distal RUE) present. No deformity.       Left lower leg: No edema.   Skin:     General: Skin is warm and dry.      Capillary Refill: Capillary refill takes less than 2 seconds.      Coloration: Skin is not jaundiced.      Findings: No bruising.   Neurological:      Comments: Unable to assess CN s/t current condition. Moves RUE/RLE spontaneously; not on command           Mechanical ventilation support:  Vent Mode: A/C (07/16/22 0905)  Ventilator Initiated: Yes (07/14/22 1610)  Set Rate: 20 BPM (07/16/22 0905)  Vt Set: 500 mL (07/16/22 0905)  PEEP/CPAP: 5 cmH20 (07/16/22 0905)  Oxygen Concentration (%): 30 (07/16/22 0905)  Peak Airway Pressure: 16 cmH2O (07/16/22 0905)  Total Ve: 8.1 mL (07/16/22 0905)  F/VT Ratio<105 (RSBI): (!) 48.78 (07/16/22 0452)    Lines/Drains/Airways     Drain  Duration                NG/OG Tube 07/14/22 1600 [place] sump Right nostril 1 day         Urethral Catheter 07/14/22 1600 Double-lumen 16 Fr. 1 day          Airway  Duration                Airway - Non-Surgical 07/14/22 1534 1 day          Peripheral Intravenous Line  Duration                Peripheral IV - Single Lumen 07/12/22 0714 18 G Anterior;Distal;Right Forearm 4 days         Peripheral IV - Single Lumen 07/14/22 1718 18 G Anterior;Distal;Right Antecubital 1 day                Significant Labs:    Lab Results   Component Value Date    WBC 9.4 07/16/2022    HGB 10.6 (L) 07/16/2022    HCT 32.8 (L) 07/16/2022    MCV 90.6 07/16/2022     07/16/2022         BMP  Lab Results   Component Value Date     07/16/2022    K 3.6 07/16/2022    CO2 22 (L) 07/16/2022    BUN 9.9 07/16/2022    CREATININE 0.57 (L) 07/16/2022    CALCIUM 8.5 (L) 07/16/2022    EGFRNONAA >60 07/16/2022       ABG  Recent Labs   Lab 07/16/22  0502   PH 7.41   PO2 74*   PCO2 41   HCO3 26.0           Significant Imaging:  I have reviewed all pertinent imaging within the past 24 hours.        Assessment/Plan:     Assessment  1. Acute respiratory failure requiring intubation and mechanical ventilation  on July 14. Etiology is probably multifactorial including hypercapnia opioid and sedative use with possible seizure activity or cerebrovascular accident.    2. Stenosis of the cervical spine status post posterior C3 through 7 laminoplasty  3. Altered mental status.  Etiology as above similar to the acute respiratory failure.  He remains altered and has not received any sedating medication now for some over 12 hours.  MRI and EEG were within normal limits  4. RUE warmth/edema suspicious for DVT  5. History of left-sided weakness  6. History of hypothyroidism      Plan  - Continue ICU care for further workup and monitoring  - Continue mechanical ventilation until there is improvement in his mental status  - Naloxone and romazicon had been given with little response and therefore we will not repeat  - Continue holding and avoiding all sedating medications  - Follow neurology recommendations; currently undergoing 24 hour EEG  - Ordered CV U/S of RUE venous s/t warmth/erthyema; not currently on DVT PPx  - S/t Levophed infiltration of RUE AC, may benefit from subq phentolamine; plan to discuss with Attending  - Continue intermittent lab draws  - Maintenance IV fluid    DVT Prophylaxis: Mechanical, holding chemical ppx  GI Prophylaxis: Rob Carranza MD  Internal Medicine PGY-II  [facility] - [facility] ICU    Attending Addendum to Follow:

## 2022-07-16 NOTE — CONSULTS
[facility] - [facility] ICU  Neurology  Consult Note    Patient Name: Scott Echeverria  MRN: [id number]  Admission Date: 7/12/2022  Hospital Length of Stay: 4 days  Code Status: Full Code   Attending Provider: Paulo Rao MD   Consulting Provider: Nela Hawkins MD  Primary Care Physician: RON AUGUSTIN MD  Principal Problem:<principal problem not specified>    Inpatient consult to Neurology  Consult performed by: LALO Tena  Consult ordered by: Paulo Rao MD         Subjective:     Chief Complaint:  No chief complaint on file.        HPI:   Mr Echeverria is a 71-year-old male with past medical history of BPH, cervical spine stenosis, hypothyroidism, presented to [facility] on 7/12 and underwent C3-7 laminoplasty and left C3-7 foraminotomies. On 7/14, RRT was activated due to neuro change.  Per wife, patient had pain medication around 4:30am and then sat in a chair at the bedside.  Wife reports he was at his baseline at that time.  Just before 6am, he attempted to ask his wife a question and she noticed he had slurred speech.  RRT activated ... patient's speech worsened and he became aphasic.  Stroke alert activated ... stat Cth was unremarkable.  No recommendation for IV tPA due to recent surgery.  Stat MRI brain negative for acute infarct.      (7/14/22) Neuro change prompted RRT activation followed by stroke alert activation ... stat MRI brain negative for acute infarct.  Received Ativan for MRI ... reversed with Romazicon.  Stat EEG showed moderate generalized slowing.  Later that day, he became obtunded with agonal respirations and O2 sat 40-50s.  He was intubated and transferred to ICU.  (7/15/22) Nursing reports patient beginning to open his eyes and moving extremities spontaneously.   Nursing also reported left sided neglect.  Repeat MRI brain negative for acute infarct.       Past Medical History:   Diagnosis Date    BPH (benign prostatic hyperplasia)     Cervical pain      Cervical radiculitis     Cervical spinal stenosis     Hypothyroidism, unspecified     Sleep apnea     resolved since surgery       Past Surgical History:   Procedure Laterality Date    APPENDECTOMY  01/25/2022    Dr. Kerry Coats    COLONOSCOPY  2021    HEMORRHOID SURGERY  2002    lower back surgery  1990    RETINAL DETACHMENT SURGERY Right 2015    SINUS SURGERY      TONSILLECTOMY  1956       Review of patient's allergies indicates:   Allergen Reactions    Iodine        No current facility-administered medications on file prior to encounter.     Current Outpatient Medications on File Prior to Encounter   Medication Sig    amitriptyline (ELAVIL) 25 MG tablet Take 25 mg by mouth once daily.    aspirin 81 MG Chew Take 81 mg by mouth once daily.    fexofenadine (ALLEGRA) 180 MG tablet Take 180 mg by mouth once daily.    levothyroxine (SYNTHROID) 50 MCG tablet Take 50 mcg by mouth once daily.    tamsulosin (FLOMAX) 0.4 mg Cap   See Instructions, TAKE ONE CAPSULE BY MOUTH EVERY DAY, # 90 cap(s), 3 Refill(s), Pharmacy: [facility]/pharmacy #[id number], 175, cm, Height/Length Dosing, 10/20/21 9:19:00 CDT, 79.83, kg, Weight Dosing, 10/20/21 9:19:00 CDT    acetaminophen (TYLENOL) 325 MG tablet as needed.    ondansetron (ZOFRAN) 4 MG tablet Take 4 mg by mouth every 8 (eight) hours.    oxyCODONE (ROXICODONE) 5 MG immediate release tablet TAKE 1 TABLET BY MOUTH EVERY 4 HOURS AS NEEDED FOR MILD PAIN    predniSONE (DELTASONE) 20 MG tablet Take 20 mg by mouth 2 (two) times daily.     Family History       Problem Relation (Age of Onset)    Alzheimer's disease Mother    Heart attack Father    Hyperlipidemia Father    Hypertension Sister, Brother    Parkinsonism Mother    Sickle cell trait Sister    Stroke Father    Thyroid disease Mother          Tobacco Use    Smoking status: Never Smoker    Smokeless tobacco: Never Used   Substance and Sexual Activity    Alcohol use: Not Currently    Drug use: Never    Sexual activity: Not  on file     Review of Systems   Unable to perform ROS: Intubated     Objective:     Vital Signs (Most Recent):  Temp: 98.2 °F (36.8 °C) (07/16/22 1200)  Pulse: 100 (07/16/22 1500)  Resp: (!) 22 (07/16/22 1500)  BP: 133/71 (07/16/22 1500)  SpO2: (!) 94 % (07/16/22 1500)   Vital Signs (24h Range):  Temp:  [98.2 °F (36.8 °C)-101.6 °F (38.7 °C)] 98.2 °F (36.8 °C)  Pulse:  [] 100  Resp:  [13-22] 22  SpO2:  [94 %-100 %] 94 %  BP: ()/(50-79) 133/71     Weight: 75.7 kg (166 lb 14.2 oz)  Body mass index is 24.63 kg/m².    Physical Exam  Constitutional:       Interventions: He is intubated.      Comments: Not on sedation   Cardiovascular:      Rate and Rhythm: Normal rate and regular rhythm.   Pulmonary:      Effort: He is intubated.   Neurological:      Comments: Opens eyes to painful stimuli ... appears to track  Does not follow commands with BUE; ? Appeared to wiggle toes to command  Continuous EEG in progress  Limited PE ... full exam to follow by MD       Significant Labs: BMP:   Recent Labs   Lab 07/14/22  1629 07/15/22  0916 07/16/22  0030    141 139   K 5.2* 4.6 3.6   CO2 27 23 22*   BUN 9.7 10.2 9.9   CREATININE 0.70* 0.58* 0.57*   CALCIUM 8.8 8.7* 8.5*   MG 1.70  --   --      CBC:   Recent Labs   Lab 07/14/22  1629 07/16/22  0405   WBC 9.5 9.4   HGB 11.6* 10.6*   HCT 36.7* 32.8*    143       Significant Imaging: I have reviewed all pertinent imaging results/findings within the past 24 hours.      -CTh (7/14): no acute abnormalities  -MRI brain (7/14): no acute infarct  -MRA brain (7/14): no proximal LVO    -CTh (7/15): no acute abnormalities  -CT C-spine (7/15): multiple spinal fracture seen from C3-C7 involving posterior arches at those levels with postsurgical changes seen in the cervical spine mainly on the left side at C3 through C7; fluid/edema seen in the surgical bed throughout cervical spine  -MRI brain (7/15): no acute infarct  -MRI C-spine (7/15):  1. Postoperative changes are seen  in the left posterior elements from C3 through C7 vertebrae. There is a postoperative collection in the surgical bed/ posterior paraspinal soft tissues surrounding the spinous process and posterior to the laminae. The collection measures approximately 7.3 cm in length and 2 cm in AP dimension. Similar findings are also seen on the prior examination. Correlate clinically as regards additional evaluation and follow-up.  2. There are displaced fractures involving the laminae of C3 through C7 vertebrae appreciated better on the earlier CT study.  3. There is moderate canal stenosis at C3-C4 and C4-C5 secondary to broad-based disc protrusions and degenerative joint disease.    Assessment and Plan:     Aphasia  MRI brain negative for acute infarct  Continuous EEG in progress  Continue to avoid sedating medications        VTE Risk Mitigation (From admission, onward)         Ordered     enoxaparin injection 40 mg  Daily         07/16/22 1138     IP VTE HIGH RISK PATIENT  Once         07/14/22 1629     Place sequential compression device  Until discontinued         07/14/22 1629                Thank you for your consult. Further recommendations to follow by MD.    Jenny Choudhary, AFTABP  Neurology  [facility] - [facility] ICU

## 2022-07-16 NOTE — PROCEDURES
"Scott Echeverria is a 71 y.o. male patient.    Temp: 98.2 °F (36.8 °C) (07/16/22 1200)  Pulse: 100 (07/16/22 1500)  Resp: (!) 22 (07/16/22 1500)  BP: 133/71 (07/16/22 1500)  SpO2: (!) 94 % (07/16/22 1500)  Weight: 75.7 kg (166 lb 14.2 oz) (07/15/22 0939)  Height: 5' 9.02" (175.3 cm) (07/15/22 0939)       24 hr. Video EEG Monitoring    Date/Time: 7/16/2022 4:02 PM  Performed by: Nela Hawkins MD  Authorized by: Nela Hawkins MD           7/16/2022     Reason for exam: altered mental status    Duration: 9 hours 51 minutes    Technical description:  A standard digital EEG with prolonged monitoring and video was performed at [facility].  The 10 20 international system of electrode placement is used.  Standard montages were recorded.  Activation procedures were performed.    Description:  No posterior dominant rhythm was identified.  The record was dominated by admixed polymorphic theta and delta activity. stage 2 sleep was identified.  There were no electrographic seizures or epileptiform discharges.    Impression:  This is an abnormal EEG due to moderate generalized slowing.  Generalized slowing can be seen with generalized cerebral dysfunction as seen in metabolic, toxic, infectious, or multifocal structural abnormalities.  "

## 2022-07-16 NOTE — PROGRESS NOTES
[facility] [address]  Neurosurgery  Progress Note    Subjective:     Interval History: Patient has been admitted to ICU. He is intubated. Currently undergoing EEG. He has been off of sedation since transfer. His exam has not improved. He is currently on levophed for hypotension. He is opening his eyes intermittently, much more responsive to family. He spontaneously moves his right UE and LE, not following commands.  MRI done yesterday showed no acute findings. CT of neck was reviewed and showed post op changes.    History of Present Illness: 70 y/o man with history of cervical spinal stenosis, hypothyroidism, originally presented to [facility] for C3-C7 laminiopasty and laminoforminotomies.  Patient underwent procedure on 7/12 and tolerated it well.  On postoperative day 1, the patient seemed to be improving well, working with PT, getting out of the bed to the chair.  However, the morning of postop day 2, the patient's wife noted that patient started to have slurred speech.  He had received Norco an hour or 2 prior to that, which he has been receiving q4h since surgery. He also received IV robaxin prior to onset of aphasia.  A code stroke was called.  Patient had a negative CT head, as well as negative MRI and MRA.  For the MRI, the patient did require Ativan administration.  He also did reportedly began to develop some left-sided neglect.  This afternoon, it was reported the patient started to become hypoxic, and it was found that he was having agonal respirations.  Flumazenil was given on the floor.  Ventilation with BVM was initiated.  ICU was called for intubation and step-up to the ICU. Upon arrival to the floor, patient was obtunded, not arousable to verbal or painful stimuli. Intubation was performed successfully and patient was started on mechanical ventilation. Transferred to ICU for further management.    Post-Op Info:  Procedure(s) (LRB):  LAMINOPLASTY (N/A)   4 Days Post-Op       Medications:  Continuous Infusions:   sodium chloride 0.9% 75 mL/hr at 07/16/22 0617    NORepinephrine bitartrate-D5W 0.02 mcg/kg/min (07/16/22 0617)     Scheduled Meds:   famotidine (PF)  20 mg Intravenous Q12H    levothyroxine  50 mcg Per NG tube Daily    mupirocin   Nasal BID    tamsulosin  0.4 mg Oral Daily     PRN Meds:acetaminophen, aluminum-magnesium hydroxide-simethicone, bisacodyL, hydrALAZINE, labetaloL, ondansetron, prochlorperazine, senna-docusate 8.6-50 mg, sodium chloride 0.9%     Review of Systems  Objective:     Weight: 75.7 kg (166 lb 14.2 oz)  Body mass index is 24.63 kg/m².  Vital Signs (Most Recent):  Temp: 98.7 °F (37.1 °C) (07/16/22 0400)  Pulse: 84 (07/16/22 0600)  Resp: 20 (07/16/22 0600)  BP: 106/65 (07/16/22 0600)  SpO2: 97 % (07/16/22 0905) Vital Signs (24h Range):  Temp:  [98.6 °F (37 °C)-101.6 °F (38.7 °C)] 98.7 °F (37.1 °C)  Pulse:  [] 84  Resp:  [13-28] 20  SpO2:  [92 %-100 %] 97 %  BP: ()/(50-79) 106/65     Date 07/16/22 0700 - 07/17/22 0659   Shift 7950-1068 6191-7437 8860-9152 24 Hour Total   INTAKE   Shift Total(mL/kg)       OUTPUT   Urine(mL/kg/hr) 75   75   Shift Total(mL/kg) 75(1)   75(1)   Weight (kg) 75.7 75.7 75.7 75.7              Vent Mode: A/C  Oxygen Concentration (%):  [30] 30  Resp Rate Total:  [20 br/min-22 br/min] 20 br/min  Vt Set:  [500 mL] 500 mL  PEEP/CPAP:  [5 cmH20] 5 cmH20  Mean Airway Pressure:  [9 cmH20-10 cmH20] 9 cmH20         NG/OG Tube 07/14/22 1600 [place] sump Right nostril (Active)   Placement Check placement verified by aspirate characteristics 07/16/22 0400   Distal Tube Length (cm) 65 07/16/22 0000   Tolerance no signs/symptoms of discomfort 07/16/22 0400   Securement secured to commercial device 07/16/22 0400   Clamp Status/Tolerance unclamped 07/16/22 0400   Suction Setting/Drainage Method low;intermittent setting;suction at 07/15/22 1954   Insertion Site Appearance no redness, warmth, tenderness, skin breakdown, drainage  "07/16/22 0400   Drainage Bile 07/15/22 1954   Feeding Type continuous;by pump 07/16/22 0400   Feeding Action feeding restarted 07/16/22 0400   Current Rate (mL/hr) 30 mL/hr 07/16/22 0400   Goal Rate (mL/hr) 70 mL/hr 07/16/22 0000   Intake (mL) 202 mL 07/16/22 0617   Water Bolus (mL) 100 mL 07/16/22 0617            Urethral Catheter 07/14/22 1600 Double-lumen 16 Fr. (Active)   $ Puri Insertion Bedside Insertion Performed 07/14/22 1600   Site Assessment Clean;Intact 07/16/22 0400   Collection Container Urimeter 07/16/22 0400   Securement Method secured to top of thigh w/ adhesive device 07/16/22 0400   Catheter Care Performed yes 07/16/22 0400   Reason for Continuing Urinary Catheterization Critically ill in ICU and requiring hourly monitoring of intake/output 07/16/22 0400   CAUTI Prevention Bundle Securement Device in place with 1" slack;Intact seal between catheter & drainage tubing;Drainage bag/urimeter off the floor;Sheeting clip in use;Drainage bag/urimeter below bladder;No dependent loops or kinks;Drainage bag/urimeter not overfilled (<2/3 full) 07/15/22 1954   Output (mL) 75 mL 07/16/22 0800       Neurosurgery Physical Exam    Opens eyes spontaneously. He does not track.  He does not follow commands for me  Spontaneous movement noted in right UE and LE  Withdraws to pain in all ext.    Significant Labs:  Recent Labs   Lab 07/14/22  1629 07/15/22  0916 07/16/22  0030    141 139   K 5.2* 4.6 3.6   CO2 27 23 22*   BUN 9.7 10.2 9.9   CREATININE 0.70* 0.58* 0.57*   CALCIUM 8.8 8.7* 8.5*   MG 1.70  --   --      Recent Labs   Lab 07/14/22 1629 07/16/22  0405   WBC 9.5 9.4   HGB 11.6* 10.6*   HCT 36.7* 32.8*    143     No results for input(s): LABPT, INR, APTT in the last 48 hours.  Microbiology Results (last 7 days)     Procedure Component Value Units Date/Time    Blood Culture [[id number]] Collected: 07/15/22 1959    Order Status: Resulted Specimen: Blood from Antecubital, Left Updated: 07/15/22 2103 "          Assessment/Plan:     Active Diagnoses:    Diagnosis Date Noted POA    Aphasia [R47.01] 07/14/2022 No    Osseous and subluxation stenosis of intervertebral foramina of cervical region [M99.61] 07/06/2022 Yes    Stenosis of cervical spine with myelopathy [M48.02, G99.2] 07/06/2022 Yes      Problems Resolved During this Admission:     Still unclear etiology surrounding the events. Appreciate neurology recs.  Ventilator management per ICU  No additional recommendations at this time from Neurosurgery standpoint  SCDs for DVT prophylaxis. Will start lovenox today    ATIYA Garcia  Neurosurgery  [facility]

## 2022-07-16 NOTE — ASSESSMENT & PLAN NOTE
MRI brain negative for acute infarct  Continuous EEG in progress  Continue to avoid sedating medications

## 2022-07-17 LAB
ABS NEUT (OLG): 5.78 X10(3)/MCL (ref 2.1–9.2)
AMMONIA PLAS-MSCNC: 30.3 UMOL/L (ref 18–72)
ANION GAP SERPL CALC-SCNC: 13 MEQ/L
BASE EXCESS ARTERIAL: 3.7 MMOL/L (ref -2–2)
BASE EXCESS ARTERIAL: ABNORMAL
BUN SERPL-MCNC: 8.9 MG/DL (ref 8.4–25.7)
CALCIUM SERPL-MCNC: 8.6 MG/DL (ref 8.8–10)
CHLORIDE SERPL-SCNC: 108 MMOL/L (ref 98–107)
CO2 SERPL-SCNC: 25 MMOL/L (ref 23–31)
CREAT SERPL-MCNC: 0.59 MG/DL (ref 0.73–1.18)
CREAT/UREA NIT SERPL: 15
ERYTHROCYTE [DISTWIDTH] IN BLOOD BY AUTOMATED COUNT: 15 % (ref 11.5–17)
GLUCOSE SERPL-MCNC: 85 MG/DL (ref 82–115)
HCO3 ARTERIAL: 27.7 MMOL/L (ref 18–23)
HCO3 ARTERIAL: 27.7 MMOL/L (ref 18–23)
HCT VFR BLD AUTO: 33.5 % (ref 42–52)
HGB BLD-MCNC: 11.2 GM/DL (ref 14–18)
HGB BLD-MCNC: ABNORMAL G/DL
HGB BLD-MCNC: ABNORMAL G/DL
IMM GRANULOCYTES # BLD AUTO: 0.04 X10(3)/MCL (ref 0–0.04)
IMM GRANULOCYTES NFR BLD AUTO: 0.5 %
INSTRUMENT WBC (OLG): 7.5 X10(3)/MCL
LYMPHOCYTES NFR BLD MANUAL: 1.12 X10(3)/MCL
LYMPHOCYTES NFR BLD MANUAL: 15 %
MAGNESIUM SERPL-MCNC: 2 MG/DL (ref 1.6–2.6)
MCH RBC QN AUTO: 28.9 PG (ref 27–31)
MCHC RBC AUTO-ENTMCNC: 33.4 MG/DL (ref 33–36)
MCV RBC AUTO: 86.3 FL (ref 80–94)
MONOCYTES NFR BLD MANUAL: 0.6 X10(3)/MCL (ref 0.1–1.3)
MONOCYTES NFR BLD MANUAL: 8 %
NEUTROPHILS NFR BLD MANUAL: 77 %
NRBC BLD AUTO-RTO: 0 %
PCO2 BLDA: 37 MMHG
PCO2 BLDA: 39 MM[HG]
PCO2 BLDA: 39 MM[HG]
PCO2 BLDA: ABNORMAL MM[HG]
PCO2 BLDA: ABNORMAL MM[HG]
PH SMN: 7.46 [PH]
PH SMN: 7.48 [PH] (ref 7.35–7.45)
PH SMN: ABNORMAL [PH]
PHOSPHATE SERPL-MCNC: 2.1 MG/DL (ref 2.3–4.7)
PLATELET # BLD AUTO: 177 X10(3)/MCL (ref 130–400)
PLATELET # BLD EST: ADEQUATE 10*3/UL
PMV BLD AUTO: 12.2 FL (ref 7.4–10.4)
PO2 BLDA: 69 MMHG
PO2 BLDA: 91 MM[HG]
PO2 BLDA: ABNORMAL MM[HG]
POC BASE DEFICIT: 4 MMOL/L
POC COHB: ABNORMAL
POC COHB: ABNORMAL
POC HCO3: 27.6 MMOL/L
POC IONIZED CALCIUM: 1.15 MMOL/L
POC IONIZED CALCIUM: 1.16
POC IONIZED CALCIUM: ABNORMAL
POC METHB: ABNORMAL
POC METHB: ABNORMAL
POC O2HB: ABNORMAL
POC O2HB: ABNORMAL
POC SATURATED O2: 95 %
POC TEMPERATURE: 37 C
POTASSIUM BLD-SCNC: 2.8 MMOL/L
POTASSIUM BLD-SCNC: 2.9 MMOL/L
POTASSIUM BLD-SCNC: ABNORMAL MMOL/L
POTASSIUM SERPL-SCNC: 3.4 MMOL/L (ref 3.5–5.1)
RBC # BLD AUTO: 3.88 X10(6)/MCL (ref 4.7–6.1)
SARS-COV-2 RNA RESP QL NAA+PROBE: NOT DETECTED
SATURATED O2 ARTERIAL, I-STAT: 97
SATURATED O2 ARTERIAL, I-STAT: ABNORMAL
SODIUM BLD-SCNC: 137 MMOL/L
SODIUM BLD-SCNC: 142 MMOL/L
SODIUM BLD-SCNC: ABNORMAL MMOL/L
SODIUM SERPL-SCNC: 146 MMOL/L (ref 136–145)
SPECIMEN SOURCE: ABNORMAL
VIT B12 SERPL-MCNC: 469 PG/ML (ref 213–816)
WBC # SPEC AUTO: 7.5 X10(3)/MCL (ref 4.5–11.5)

## 2022-07-17 PROCEDURE — 86255 FLUORESCENT ANTIBODY SCREEN: CPT

## 2022-07-17 PROCEDURE — 27000249 HC VAPOTHERM CIRCUIT

## 2022-07-17 PROCEDURE — 25000003 PHARM REV CODE 250: Performed by: STUDENT IN AN ORGANIZED HEALTH CARE EDUCATION/TRAINING PROGRAM

## 2022-07-17 PROCEDURE — 99900035 HC TECH TIME PER 15 MIN (STAT)

## 2022-07-17 PROCEDURE — 31720 CLEARANCE OF AIRWAYS: CPT

## 2022-07-17 PROCEDURE — 87040 BLOOD CULTURE FOR BACTERIA: CPT | Performed by: INTERNAL MEDICINE

## 2022-07-17 PROCEDURE — 99233 PR SUBSEQUENT HOSPITAL CARE,LEVL III: ICD-10-PCS | Mod: 95,,, | Performed by: PSYCHIATRY & NEUROLOGY

## 2022-07-17 PROCEDURE — 82607 VITAMIN B-12: CPT | Performed by: NURSE PRACTITIONER

## 2022-07-17 PROCEDURE — 20000000 HC ICU ROOM

## 2022-07-17 PROCEDURE — 25000003 PHARM REV CODE 250: Performed by: HOSPITALIST

## 2022-07-17 PROCEDURE — 94761 N-INVAS EAR/PLS OXIMETRY MLT: CPT

## 2022-07-17 PROCEDURE — 25000003 PHARM REV CODE 250: Performed by: INTERNAL MEDICINE

## 2022-07-17 PROCEDURE — 82803 BLOOD GASES ANY COMBINATION: CPT

## 2022-07-17 PROCEDURE — 63600175 PHARM REV CODE 636 W HCPCS: Performed by: INTERNAL MEDICINE

## 2022-07-17 PROCEDURE — 27100171 HC OXYGEN HIGH FLOW UP TO 24 HOURS

## 2022-07-17 PROCEDURE — 36415 COLL VENOUS BLD VENIPUNCTURE: CPT | Performed by: INTERNAL MEDICINE

## 2022-07-17 PROCEDURE — 63600175 PHARM REV CODE 636 W HCPCS: Performed by: PHYSICIAN ASSISTANT

## 2022-07-17 PROCEDURE — 85025 COMPLETE CBC W/AUTO DIFF WBC: CPT | Performed by: INTERNAL MEDICINE

## 2022-07-17 PROCEDURE — 87635 SARS-COV-2 COVID-19 AMP PRB: CPT | Performed by: INTERNAL MEDICINE

## 2022-07-17 PROCEDURE — 80048 BASIC METABOLIC PNL TOTAL CA: CPT | Performed by: INTERNAL MEDICINE

## 2022-07-17 PROCEDURE — 86255 FLUORESCENT ANTIBODY SCREEN: CPT | Performed by: NURSE PRACTITIONER

## 2022-07-17 PROCEDURE — 27000221 HC OXYGEN, UP TO 24 HOURS

## 2022-07-17 PROCEDURE — 25000003 PHARM REV CODE 250: Performed by: NEUROLOGICAL SURGERY

## 2022-07-17 PROCEDURE — 83735 ASSAY OF MAGNESIUM: CPT | Performed by: INTERNAL MEDICINE

## 2022-07-17 PROCEDURE — 99233 SBSQ HOSP IP/OBS HIGH 50: CPT | Mod: 95,,, | Performed by: PSYCHIATRY & NEUROLOGY

## 2022-07-17 PROCEDURE — 82140 ASSAY OF AMMONIA: CPT | Performed by: NURSE PRACTITIONER

## 2022-07-17 PROCEDURE — 84100 ASSAY OF PHOSPHORUS: CPT | Performed by: INTERNAL MEDICINE

## 2022-07-17 PROCEDURE — 25000003 PHARM REV CODE 250: Performed by: NURSE PRACTITIONER

## 2022-07-17 PROCEDURE — 94799 UNLISTED PULMONARY SVC/PX: CPT

## 2022-07-17 PROCEDURE — 83519 RIA NONANTIBODY: CPT | Performed by: NURSE PRACTITIONER

## 2022-07-17 PROCEDURE — 36600 WITHDRAWAL OF ARTERIAL BLOOD: CPT

## 2022-07-17 PROCEDURE — 99900026 HC AIRWAY MAINTENANCE (STAT)

## 2022-07-17 PROCEDURE — 27200966 HC CLOSED SUCTION SYSTEM

## 2022-07-17 RX ORDER — GUAIFENESIN 100 MG/5ML
400 SOLUTION ORAL EVERY 4 HOURS
Status: DISCONTINUED | OUTPATIENT
Start: 2022-07-17 | End: 2022-07-21

## 2022-07-17 RX ADMIN — LEVOTHYROXINE SODIUM 50 MCG: 50 TABLET ORAL at 08:07

## 2022-07-17 RX ADMIN — GUAIFENESIN 400 MG: 200 SOLUTION ORAL at 08:07

## 2022-07-17 RX ADMIN — MUPIROCIN: 20 OINTMENT TOPICAL at 08:07

## 2022-07-17 RX ADMIN — FAMOTIDINE 20 MG: 10 INJECTION INTRAVENOUS at 08:07

## 2022-07-17 RX ADMIN — SENNOSIDES AND DOCUSATE SODIUM 2 TABLET: 50; 8.6 TABLET ORAL at 08:07

## 2022-07-17 RX ADMIN — ENOXAPARIN SODIUM 40 MG: 40 INJECTION SUBCUTANEOUS at 04:07

## 2022-07-17 RX ADMIN — TAMSULOSIN HYDROCHLORIDE 0.4 MG: 0.4 CAPSULE ORAL at 08:07

## 2022-07-17 RX ADMIN — GUAIFENESIN 400 MG: 200 SOLUTION ORAL at 04:07

## 2022-07-17 RX ADMIN — ACETAMINOPHEN 650 MG: 650 SOLUTION ORAL at 04:07

## 2022-07-17 RX ADMIN — CEFTRIAXONE SODIUM 2 G: 2 INJECTION, POWDER, FOR SOLUTION INTRAMUSCULAR; INTRAVENOUS at 11:07

## 2022-07-17 RX ADMIN — SODIUM CHLORIDE: 9 INJECTION, SOLUTION INTRAVENOUS at 08:07

## 2022-07-17 RX ADMIN — GUAIFENESIN 400 MG: 200 SOLUTION ORAL at 11:07

## 2022-07-17 NOTE — PT/OT/SLP PROGRESS
Occupational Therapy      Patient Name:  Scott Echeverria   MRN:  [id number]    Met w/ RN to discuss pt status as pt is now under ICU management. RN reports pt is now extubated, however experiencing difficulty managing secretions. Additionally, pt has been noted to spontaneously move all extremities except for LUE, with some resting tremors noted. Pt somewhat alert and inconsistently following commands. Pt not appropriate for re-eval at this time. Will f/u tomorrow.    7/17/2022

## 2022-07-17 NOTE — PROGRESS NOTES
[facility]  Pulmonary/Critical Care - Medicine  Progress Note    Patient Name: Scott Echeverria  MRN: [id number]  Admission Date: 7/12/2022  Hospital Length of Stay: 5 days  Code Status: Full Code  Attending Provider: Paulo Rao MD  Primary Care Provider: RON AUGUSTIN MD     Subjective:     HPI: 70 y/o male with history of cervical spinal stenosis, hypothyroidism, admitted 07/12/22 for C3-C7 laminiopasty and laminoforminotomies. On postoperative day 1, he was reported as working with PT, getting out of the bed to the chair.  However, the morning of postop day 2, the patient's wife noted that patient started to have slurred speech.  He had received Norco an hour or 2 prior to that, which he had been receiving q4h since surgery. He also received IV robaxin prior to onset of aphasia.  A code stroke was called. CT brain without contrast revealed no acute abnormal findings.  MRI and MRA brain on 07/15/2022 at 1000hrs was noted significantly limited by motion artifact with no obvious LVO, no acute intracranial abnormalities, and mild chronic microvascular ischemic changes.   For the MRI, the patient did require Ativan administration, and he was noted to have a scopolamine patch in place was was discontinued 07/15/22 at 0850hrs.  He also did reportedly began to develop some left-sided neglect.  He was then reported as having significant desaturation with near agonal respiratory effort.  Flumazenil was given on the floor.  Ventilation with BVM was initiated. Stat ICU consultation was then obtained,  patient was noted obtunded, not arousable to verbal or painful stimuli at time of intensivist arrival to floor hospital room.  He was intubated and transferred to ICU on mechanical ventilatory support.    Interval History/Significant Events:  T-max 100.8° axillary last p.m..  He was extubated around 0200hrs this a.m. without incident.  He has remained on nasal cannula O2 since that time.  He  demonstrates a poor cough effort, requiring occasional nasotracheal suctioning of clear blood-tinged thick secretions.  Venous Dopplers right upper extremity no evident to DVT with superficial thrombus right cephalic vein.  Repeat CT of brain this a.m. with no acute abnormal findings.  He is reported is much more awake today in comparison to the prior 24 hours.  He is noted to have some left upper extremity weakness.  SARS-CoV-2 PCR this a.m. negative.    Objective:     Current Medications:    Current Facility-Administered Medications:     0.9%  NaCl infusion, , Intravenous, Continuous, BERENICE TanCNP-BC, Last Rate: 75 mL/hr at 07/16/22 1855, New Bag at 07/16/22 1855    acetaminophen oral solution 650 mg, 650 mg, Oral, Q6H PRN, Paulo Rao MD, 650 mg at 07/16/22 2028    aluminum-magnesium hydroxide-simethicone 200-200-20 mg/5 mL suspension 30 mL, 30 mL, Oral, Q4H PRN, Mary Fong AGACNP-BC    bisacodyL suppository 10 mg, 10 mg, Rectal, Daily PRN, Mary Fong AGACNP-BC, 10 mg at 07/16/22 2028    enoxaparin injection 40 mg, 40 mg, Subcutaneous, Daily, ATIYA Garcia, 40 mg at 07/16/22 1855    famotidine (PF) injection 20 mg, 20 mg, Intravenous, Q12H, Sergio Jovel MD, 20 mg at 07/17/22 0842    hydrALAZINE injection 10 mg, 10 mg, Intravenous, Q4H PRN, Paulo Rao MD    labetaloL injection 10 mg, 10 mg, Intravenous, Q2H PRN, Paulo Rao MD    levothyroxine tablet 50 mcg, 50 mcg, Per NG tube, Daily, Timothy Allen MD, 50 mcg at 07/17/22 0842    mupirocin 2 % ointment, , Nasal, BID, LEONARD TanP-BC, Given at 07/17/22 0842    NORepinephrine bitartrate-NaCl 8 mg/250 mL (32 mcg/mL) infusion, 0-3 mcg/kg/min, Intravenous, Continuous, Sergio Jovel MD, Last Rate: 0 mL/hr at 07/16/22 0935, 0 mcg/kg/min at 07/16/22 0935    ondansetron disintegrating tablet 8 mg, 8 mg, Oral, Q6H PRN, NADIA Tan-BC, 8 mg at 07/13/22 1351    prochlorperazine injection Soln  5 mg, 5 mg, Intravenous, Q6H PRN, Mary Fong AGACNP-BC    senna-docusate 8.6-50 mg per tablet 2 tablet, 2 tablet, Oral, Nightly PRN, Mary Olivany, AGACNP-BC, 2 tablet at 07/16/22 2028    sodium chloride 0.9% flush 10 mL, 10 mL, Intravenous, PRN, Sergio Jovel MD    tamsulosin 24 hr capsule 0.4 mg, 0.4 mg, Oral, Daily, Mary Fong, AGACNP-BC, 0.4 mg at 07/16/22 2028    Input/output:    Intake/Output Summary (Last 24 hours) at 7/17/2022 0909  Last data filed at 7/17/2022 0615  Gross per 24 hour   Intake 0 ml   Output 2750 ml   Net -2750 ml       Vital Signs (Most Recent):  Temp: 98.9 °F (37.2 °C) (07/17/22 0400)  Pulse: 98 (07/17/22 0600)  Resp: (!) 25 (07/17/22 0600)  BP: (!) 139/108 (07/17/22 0600)  SpO2: 96 % (07/17/22 0832)  Body mass index is 24.63 kg/m².  Weight: 75.7 kg (166 lb 14.2 oz) Vital Signs (24h Range):  Temp:  [98.2 °F (36.8 °C)-100.8 °F (38.2 °C)] 98.9 °F (37.2 °C)  Pulse:  [] 98  Resp:  [20-32] 25  SpO2:  [93 %-100 %] 96 %  BP: (106-160)/() 139/108     Physical Exam  HENT:      Mouth/Throat:      Mouth: Mucous membranes are moist.      Pharynx: Oropharynx is clear.   Eyes:      Conjunctiva/sclera: Conjunctivae normal.      Pupils: Pupils are equal, round, and reactive to light.   Cardiovascular:      Rate and Rhythm: Normal rate and regular rhythm.      Pulses: Normal pulses.   Pulmonary:      Effort: No respiratory distress.      Breath sounds: No stridor. Rhonchi (Coarse bilateral rhonchi) present. No wheezing or rales.   Abdominal:      General: Abdomen is flat. Bowel sounds are normal.   Musculoskeletal:         General: No swelling.      Right lower leg: No edema.      Left lower leg: No edema.   Skin:     General: Skin is warm and dry.   Neurological:      Mental Status: He is alert.      Comments: He is awake and alert, answers yes no questions readily.  He is able to speak, somewhat hoarse  Equal movement bilateral lower extremities  He demonstrates 4/5 left  upper extremity motor predominantly proximal weakness, 5/5 right upper extremity motor       Lines/Drains/Airways     Drain  Duration                NG/OG Tube 07/14/22 1600 [place] sump Right nostril 2 days         Urethral Catheter 07/14/22 1600 Double-lumen 16 Fr. 2 days          Peripheral Intravenous Line  Duration                Peripheral IV - Single Lumen 07/12/22 0714 18 G Anterior;Distal;Right Forearm 5 days         Peripheral IV - Single Lumen 07/16/22 0900 20 G Left Forearm 1 day              ABGs:  Lab Results   Component Value Date    PH 7.460 07/17/2022    PO2 91 07/17/2022    PCO2 39 07/17/2022    PCO2 39 07/17/2022    [id number] 27.7 (A) 07/17/2022    [id number] 27.7 (A) 07/17/2022     Significant Labs:    Lab Results   Component Value Date    WBC 9.4 07/16/2022    HGB 10.6 (L) 07/16/2022    HCT 32.8 (L) 07/16/2022    MCV 90.6 07/16/2022     07/16/2022     No results for input(s): NA, K, CL, CO2, BUN, CREATININE, CALCIUM, MG, TRIG, ANIONGAP, AST, ALT, ALKPHOS, ALBUMIN, CKTOTAL, CKMB, TROPONINI, BNP, INR, PROTIME, PTT, EGFRNONAA in the last 24 hours.    Invalid input(s):  PHOS, TBIL    Imaging:  Chest x-ray (07/17/2022, my reading of images):  Endotracheal tube has been removed.  Very poor inspiratory effort with bilateral vascular crowding.  Costophrenic angles are sharp bilateral.    Assessment/Plan:     1. Severe cervical stenosis, post left C3-4, C4-5, C5-6, C6-7 posterior foraminotomies, C3-C7 laminoplasty, and repair of dural tear on 07/12/2022.  2. Episode of severe decreased level of consciousness/deep obtundation on 07/14/2022, noted with near agonal respiratory effort and severe desaturation requiring intubation and mechanical ventilatory support, extubated 07/17/2022.  CT brain, MRI brain, MRA neck/brain with no evidence of stroke, EEG with diffuse slowing.  Findings most consistent with metabolic encephalopathy at this point, possibly medication related (Ativan, Scopolamine).  Patient is  now awake alert and oriented.  He demonstrates persistence of left upper extremity weakness of uncertain etiology at this point.  Neurology is closely following.  3. New onset fever, no leukocytosis.    Plan:  1. Limit all sedation as tolerated  2. Vapotherm nasal cannula and aggressive mucolytic/pulmonary toilet with close ICU observation  3. Continue to closely follow all cultures.  Will begin IV Rocephin for coverage of Gram-negative rods noted in respiratory secretions.     Greater than 30 minutes of critical care was time spent personally by me on the following activities: development of treatment plan with patient or surrogate and bedside caregivers, discussions with consultants, evaluation of patient's response to treatment, examination of patient, ordering and performing treatments and interventions, ordering and review of laboratory studies, ordering and review of radiographic studies, pulse oximetry, re-evaluation of patient's condition.  This critical care time did not overlap with that of any other provider or involve time for any procedures.     Nancy Hedrick MD, [facility]  Pulmonary/Critical Care  [facility]

## 2022-07-17 NOTE — SUBJECTIVE & OBJECTIVE
Subjective:     Interval History: Wife and daughter at bedside.  Extubated earlier today.  Moves extremities spontaneously; nurse reports decreased movement in left arm.      Current Facility-Administered Medications   Medication Dose Route Frequency Provider Last Rate Last Admin    0.9%  NaCl infusion   Intravenous Continuous Mary R Wattigny, AGACNP-BC 75 mL/hr at 07/16/22 1855 New Bag at 07/16/22 1855    acetaminophen oral solution 650 mg  650 mg Oral Q6H PRN Paulo Rao MD   650 mg at 07/16/22 2028    aluminum-magnesium hydroxide-simethicone 200-200-20 mg/5 mL suspension 30 mL  30 mL Oral Q4H PRN Mary R Wattigny, AGACNP-BC        bisacodyL suppository 10 mg  10 mg Rectal Daily PRN Mary R Wattigny, AGACNP-BC   10 mg at 07/16/22 2028    cefTRIAXone (ROCEPHIN) 2 g in dextrose 5 % in water (D5W) 5 % 50 mL IVPB (MB+)  2 g Intravenous Q24H Anibal Hedrick Jr., MD, FCCP 100 mL/hr at 07/17/22 1150 2 g at 07/17/22 1150    enoxaparin injection 40 mg  40 mg Subcutaneous Daily ATIYA Garcia   40 mg at 07/16/22 1855    famotidine (PF) injection 20 mg  20 mg Intravenous Q12H Sergio Jovel MD   20 mg at 07/17/22 0842    guaiFENesin 100 mg/5 ml syrup 400 mg  400 mg Per NG tube Q4H Anibal Hedrick Jr., MD, FCCP   400 mg at 07/17/22 1139    hydrALAZINE injection 10 mg  10 mg Intravenous Q4H PRN Paulo Rao MD        labetaloL injection 10 mg  10 mg Intravenous Q2H PRN Paulo Rao MD        levothyroxine tablet 50 mcg  50 mcg Per NG tube Daily Timothy Allen MD   50 mcg at 07/17/22 0842    mupirocin 2 % ointment   Nasal BID Mary R Wattigny, AGACNP-BC   Given at 07/17/22 0842    NORepinephrine bitartrate-NaCl 8 mg/250 mL (32 mcg/mL) infusion  0-3 mcg/kg/min Intravenous Continuous Sergio Jovel MD 0 mL/hr at 07/16/22 0935 0 mcg/kg/min at 07/16/22 0935    ondansetron disintegrating tablet 8 mg  8 mg Oral Q6H PRN STACIE Tan   8 mg at 07/13/22 1354    prochlorperazine injection Soln 5 mg  5 mg  Intravenous Q6H PRN Mary PisanoNADIA jordan-BC        senna-docusate 8.6-50 mg per tablet 2 tablet  2 tablet Oral Nightly PRN Mary PisanokeyannaBERENICE bakerMALOUP-BC   2 tablet at 07/16/22 2028    sodium chloride 0.9% flush 10 mL  10 mL Intravenous PRN Sergio Jovel MD        tamsulosin 24 hr capsule 0.4 mg  0.4 mg Oral Daily Mary PisanojulianBERENICECNP-BC   0.4 mg at 07/16/22 2028       Review of Systems   Unable to perform ROS: Acuity of condition     Objective:     Vital Signs (Most Recent):  Temp: 98.9 °F (37.2 °C) (07/17/22 0400)  Pulse: 98 (07/17/22 0600)  Resp: (!) 25 (07/17/22 0600)  BP: (!) 139/108 (07/17/22 0600)  SpO2: 99 % (07/17/22 1136)   Vital Signs (24h Range):  Temp:  [98.4 °F (36.9 °C)-100.8 °F (38.2 °C)] 98.9 °F (37.2 °C)  Pulse:  [] 98  Resp:  [21-32] 25  SpO2:  [93 %-100 %] 99 %  BP: (125-160)/() 139/108     Weight: 75.7 kg (166 lb 14.2 oz)  Body mass index is 24.63 kg/m².    Physical Exam  Constitutional:       General: He is awake.      Comments: Restless, impulsive   Cardiovascular:      Rate and Rhythm: Normal rate and regular rhythm.   Skin:     General: Skin is warm and dry.   Neurological:      Cranial Nerves: No facial asymmetry.      Motor: Weakness (decreased spontaneous movement left arm) present.      Comments: Does not follow commands.  Attempted to say his name ... voice very weak.         Significant Labs: BMP:   Recent Labs   Lab 07/16/22  0030 07/17/22  0937    146*   K 3.6 3.4*   CO2 22* 25   BUN 9.9 8.9   CREATININE 0.57* 0.59*   CALCIUM 8.5* 8.6*   MG  --  2.00     CBC:   Recent Labs   Lab 07/16/22  0405 07/17/22  1013   WBC 9.4 7.5   HGB 10.6* 11.2*   HCT 32.8* 33.5*    177       Significant Imaging: I have reviewed all pertinent imaging results/findings within the past 24 hours.

## 2022-07-17 NOTE — PROGRESS NOTES
[facility] [address]  Neurosurgery  Progress Note    Subjective:     Interval History: Patient was extubated this am. He is struggling to clear his secretions; plan for vapo therm. He is alert and following commands on all ext. He is very hard of hearing.    History of Present Illness: 70 y/o man with history of cervical spinal stenosis, hypothyroidism, originally presented to [facility] for C3-C7 laminiopasty and laminoforminotomies.  Patient underwent procedure on 7/12 and tolerated it well.  On postoperative day 1, the patient seemed to be improving well, working with PT, getting out of the bed to the chair.  However, the morning of postop day 2, the patient's wife noted that patient started to have slurred speech.  He had received Norco an hour or 2 prior to that, which he has been receiving q4h since surgery. He also received IV robaxin prior to onset of aphasia.  A code stroke was called.  Patient had a negative CT head, as well as negative MRI and MRA.  For the MRI, the patient did require Ativan administration.  He also did reportedly began to develop some left-sided neglect.  This afternoon, it was reported the patient started to become hypoxic, and it was found that he was having agonal respirations.  Flumazenil was given on the floor.  Ventilation with BVM was initiated.  ICU was called for intubation and step-up to the ICU. Upon arrival to the floor, patient was obtunded, not arousable to verbal or painful stimuli. Intubation was performed successfully and patient was started on mechanical ventilation. Transferred to ICU for further management.    Post-Op Info:  Procedure(s) (LRB):  LAMINOPLASTY (N/A)   5 Days Post-Op      Medications:  Continuous Infusions:   sodium chloride 0.9% 75 mL/hr at 07/16/22 1854    NORepinephrine bitartrate-D5W 0 mcg/kg/min (07/16/22 0059)     Scheduled Meds:   cefTRIAXone (ROCEPHIN) IVPB  2 g Intravenous Q24H    enoxaparin  40 mg Subcutaneous Daily     famotidine (PF)  20 mg Intravenous Q12H    guaiFENesin 100 mg/5 ml  400 mg Per NG tube Q4H    levothyroxine  50 mcg Per NG tube Daily    mupirocin   Nasal BID    tamsulosin  0.4 mg Oral Daily     PRN Meds:acetaminophen, aluminum-magnesium hydroxide-simethicone, bisacodyL, hydrALAZINE, labetaloL, ondansetron, prochlorperazine, senna-docusate 8.6-50 mg, sodium chloride 0.9%     Review of Systems  Objective:     Weight: 75.7 kg (166 lb 14.2 oz)  Body mass index is 24.63 kg/m².  Vital Signs (Most Recent):  Temp: 98.9 °F (37.2 °C) (07/17/22 0400)  Pulse: 98 (07/17/22 0600)  Resp: (!) 25 (07/17/22 0600)  BP: (!) 139/108 (07/17/22 0600)  SpO2: 97 % (07/17/22 1024) Vital Signs (24h Range):  Temp:  [98.2 °F (36.8 °C)-100.8 °F (38.2 °C)] 98.9 °F (37.2 °C)  Pulse:  [] 98  Resp:  [20-32] 25  SpO2:  [93 %-100 %] 97 %  BP: (119-160)/() 139/108                Vent Mode: CPAP / PSV  Oxygen Concentration (%):  [30] 30  Resp Rate Total:  [18 br/min-27 br/min] 22 br/min  Vt Set:  [500 mL] 500 mL  PEEP/CPAP:  [5 cmH20] 5 cmH20  Pressure Support:  [10 cmH20] 10 cmH20  Mean Airway Pressure:  [9 cmH20] 9 cmH20         NG/OG Tube 07/14/22 1600 [place] sump Right nostril (Active)   Placement Check placement verified by aspirate characteristics 07/17/22 0400   Distal Tube Length (cm) 65 07/17/22 0400   Tolerance no signs/symptoms of discomfort 07/16/22 1600   Securement secured to commercial device 07/17/22 0400   Clamp Status/Tolerance unclamped 07/17/22 0400   Suction Setting/Drainage Method low;intermittent setting 07/17/22 0400   Insertion Site Appearance no redness, warmth, tenderness, skin breakdown, drainage 07/17/22 0400   Drainage Bile 07/17/22 0400   Flush/Irrigation flushed w/;water;no resistance met 07/16/22 1600   Feeding Type continuous;by pump 07/16/22 1200   Feeding Action feeding held 07/17/22 0400   Current Rate (mL/hr) 30 mL/hr 07/16/22 0800   Goal Rate (mL/hr) 70 mL/hr 07/16/22 0800   Intake (mL) 202 mL  "07/16/22 0617   Water Bolus (mL) 100 mL 07/16/22 0617   Tube Output(mL)(Include Discarded Residual) 400 mL 07/16/22 1600   Formula Name Peptamen AF 07/16/22 1200            Urethral Catheter 07/14/22 1600 Double-lumen 16 Fr. (Active)   $ Puri Insertion Bedside Insertion Performed 07/14/22 1600   Site Assessment Clean;Intact 07/17/22 0400   Collection Container Urimeter 07/17/22 0400   Securement Method secured to top of thigh w/ adhesive device 07/17/22 0400   Catheter Care Performed yes 07/17/22 0400   Reason for Continuing Urinary Catheterization Critically ill in ICU and requiring hourly monitoring of intake/output 07/17/22 0400   CAUTI Prevention Bundle Securement Device in place with 1" slack;Intact seal between catheter & drainage tubing;Drainage bag/urimeter off the floor;Sheeting clip in use;No dependent loops or kinks;Drainage bag/urimeter not overfilled (<2/3 full);Drainage bag/urimeter below bladder 07/16/22 1945   Output (mL) 1600 mL 07/17/22 0615       Neurosurgery Physical Exam    Alert, currently with oxygen mask  He nods appropriately to questions  Follows commands all ext. Lifts left arm off of bed and strong in left LE.  Incision c/d/i    Significant Labs:  Recent Labs   Lab 07/16/22  0030 07/17/22  0937    146*   K 3.6 3.4*   CO2 22* 25   BUN 9.9 8.9   CREATININE 0.57* 0.59*   CALCIUM 8.5* 8.6*   MG  --  2.00     Recent Labs   Lab 07/16/22  0405 07/17/22  1013   WBC 9.4 7.5   HGB 10.6* 11.2*   HCT 32.8* 33.5*    177     No results for input(s): LABPT, INR, APTT in the last 48 hours.  Microbiology Results (last 7 days)     Procedure Component Value Units Date/Time    Blood Culture [[id number]] Collected: 07/17/22 1013    Order Status: Sent Specimen: Blood Updated: 07/17/22 1057    Blood Culture [[id number]] Collected: 07/17/22 1013    Order Status: Sent Specimen: Blood Updated: 07/17/22 1056    Respiratory Culture [[id number]]  (Abnormal) Collected: 07/16/22 1645    Order Status: " Completed Specimen: Sputum from Trachael Aspirate Updated: 07/17/22 0916     Respiratory Culture Many Gram-negative Rods     Comment: with normal respiratory pam       Blood Culture [[id number]]  (Normal) Collected: 07/15/22 1959    Order Status: Completed Specimen: Blood from Antecubital, Left Updated: 07/16/22 2303     CULTURE, BLOOD ([facility]) No Growth At 24 Hours            Assessment/Plan:     Active Diagnoses:    Diagnosis Date Noted POA    Aphasia [R47.01] 07/14/2022 No    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

## 2022-07-17 NOTE — HOSPITAL COURSE
(7/14/22) Neuro change prompted RRT activation followed by stroke alert activation ... stat MRI brain negative for acute infarct.  Received Ativan for MRI ... reversed with Romazicon.  Stat EEG showed moderate generalized slowing.  Later that day, he became obtunded with agonal respirations and O2 sat 40-50s.  He was intubated and transferred to ICU.  (7/15/22) Nursing reports patient beginning to open his eyes and moving extremities spontaneously.   Nursing also reported left sided neglect.  Repeat MRI brain negative for acute infarct.  (7/17/22) Extubated.        -CTh (7/14): no acute abnormalities  -MRI brain (7/14): no acute infarct  -MRA brain (7/14): no proximal LVO     -CTh (7/15): no acute abnormalities  -CT C-spine (7/15): multiple spinal fracture seen from C3-C7 involving posterior arches at those levels with postsurgical changes seen in the cervical spine mainly on the left side at C3 through C7; fluid/edema seen in the surgical bed throughout cervical spine  -MRI brain (7/15): no acute infarct  -MRI C-spine (7/15):  1. Postoperative changes are seen in the left posterior elements from C3 through C7 vertebrae. There is a postoperative collection in the surgical bed/ posterior paraspinal soft tissues surrounding the spinous process and posterior to the laminae. The collection measures approximately 7.3 cm in length and 2 cm in AP dimension. Similar findings are also seen on the prior examination. Correlate clinically as regards additional evaluation and follow-up.  2. There are displaced fractures involving the laminae of C3 through C7 vertebrae appreciated better on the earlier CT study.  3. There is moderate canal stenosis at C3-C4 and C4-C5 secondary to broad-based disc protrusions and degenerative joint disease.     -CTh (7/17): no acute change

## 2022-07-17 NOTE — ASSESSMENT & PLAN NOTE
Ammonia level, Thiamine level, B12 level, ionized calcium, and Myasthenia panel ordered  Consider MRI brain w/ contrast and LP if not improving  Further recommendations to follow by MD.

## 2022-07-17 NOTE — PROGRESS NOTES
[facility]  Neurology  Progress Note    Patient Name: Scott Echeverria  MRN: [id number]  Admission Date: 7/12/2022  Hospital Length of Stay: 5 days  Code Status: Full Code   Attending Provider: Paulo Rao MD  Primary Care Physician: RON AUGUTSIN MD   Principal Problem:<principal problem not specified>    HPI:   Mr Echeverria is a 71-year-old male with past medical history of BPH, cervical spine stenosis, hypothyroidism, presented to [facility] on 7/12 and underwent C3-7 laminoplasty and left C3-7 foraminotomies. On 7/14, RRT was activated due to neuro change.  Per wife, patient had pain medication around 4:30am and then sat in a chair at the bedside.  Wife reports he was at his baseline at that time.  Just before 6am, he attempted to ask his wife a question and she noticed he had slurred speech.  RRT activated ... patient's speech worsened and he became aphasic.  Stroke alert activated ... stat Cth was unremarkable.  No recommendation for IV tPA due to recent surgery.  Stat MRI brain negative for acute infarct.        Overview/Hospital Course:  (7/14/22) Neuro change prompted RRT activation followed by stroke alert activation ... stat MRI brain negative for acute infarct.  Received Ativan for MRI ... reversed with Romazicon.  Stat EEG showed moderate generalized slowing.  Later that day, he became obtunded with agonal respirations and O2 sat 40-50s.  He was intubated and transferred to ICU.  (7/15/22) Nursing reports patient beginning to open his eyes and moving extremities spontaneously.   Nursing also reported left sided neglect.  Repeat MRI brain negative for acute infarct.  (7/17/22) Extubated.        -CTh (7/14): no acute abnormalities  -MRI brain (7/14): no acute infarct  -MRA brain (7/14): no proximal LVO     -CTh (7/15): no acute abnormalities  -CT C-spine (7/15): multiple spinal fracture seen from C3-C7 involving posterior arches at those levels with postsurgical changes seen in the  cervical spine mainly on the left side at C3 through C7; fluid/edema seen in the surgical bed throughout cervical spine  -MRI brain (7/15): no acute infarct  -MRI C-spine (7/15):  1. Postoperative changes are seen in the left posterior elements from C3 through C7 vertebrae. There is a postoperative collection in the surgical bed/ posterior paraspinal soft tissues surrounding the spinous process and posterior to the laminae. The collection measures approximately 7.3 cm in length and 2 cm in AP dimension. Similar findings are also seen on the prior examination. Correlate clinically as regards additional evaluation and follow-up.  2. There are displaced fractures involving the laminae of C3 through C7 vertebrae appreciated better on the earlier CT study.  3. There is moderate canal stenosis at C3-C4 and C4-C5 secondary to broad-based disc protrusions and degenerative joint disease.     -CTh (7/17): no acute change          Subjective:     Interval History: Wife and daughter at bedside.  Extubated earlier today.  Moves extremities spontaneously; nurse reports decreased movement in left arm.      Current Facility-Administered Medications   Medication Dose Route Frequency Provider Last Rate Last Admin    0.9%  NaCl infusion   Intravenous Continuous Mary KENNEDY Fong AGACNP-BC 75 mL/hr at 07/16/22 1855 New Bag at 07/16/22 1855    acetaminophen oral solution 650 mg  650 mg Oral Q6H PRN Paulo Rao MD   650 mg at 07/16/22 2028    aluminum-magnesium hydroxide-simethicone 200-200-20 mg/5 mL suspension 30 mL  30 mL Oral Q4H PRN Mary Fong, AGACNP-BC        bisacodyL suppository 10 mg  10 mg Rectal Daily PRN Mary Fong AGACNP-BC   10 mg at 07/16/22 2028    cefTRIAXone (ROCEPHIN) 2 g in dextrose 5 % in water (D5W) 5 % 50 mL IVPB (MB+)  2 g Intravenous Q24H Anibal Hedrick Jr., MD, FCCP 100 mL/hr at 07/17/22 1150 2 g at 07/17/22 1150    enoxaparin injection 40 mg  40 mg Subcutaneous Daily Mariely Friedman,  PA   40 mg at 07/16/22 1855    famotidine (PF) injection 20 mg  20 mg Intravenous Q12H Sergio Jovel MD   20 mg at 07/17/22 0842    guaiFENesin 100 mg/5 ml syrup 400 mg  400 mg Per NG tube Q4H Anibal Hedrick Jr., MD, FCCP   400 mg at 07/17/22 1139    hydrALAZINE injection 10 mg  10 mg Intravenous Q4H PRN Paulo Rao MD        labetaloL injection 10 mg  10 mg Intravenous Q2H PRN Paulo Rao MD        levothyroxine tablet 50 mcg  50 mcg Per NG tube Daily Timothy Allen MD   50 mcg at 07/17/22 0842    mupirocin 2 % ointment   Nasal BID Mary R Wattigny, AGACNP-BC   Given at 07/17/22 0842    NORepinephrine bitartrate-NaCl 8 mg/250 mL (32 mcg/mL) infusion  0-3 mcg/kg/min Intravenous Continuous Sergio Jovel MD 0 mL/hr at 07/16/22 0935 0 mcg/kg/min at 07/16/22 0935    ondansetron disintegrating tablet 8 mg  8 mg Oral Q6H PRN Mary R Wattigny, AGACNP-BC   8 mg at 07/13/22 1354    prochlorperazine injection Soln 5 mg  5 mg Intravenous Q6H PRN Mary R Wattigny, AGACNP-BC        senna-docusate 8.6-50 mg per tablet 2 tablet  2 tablet Oral Nightly PRN Mary R Wattigny, AGACNP-BC   2 tablet at 07/16/22 2028    sodium chloride 0.9% flush 10 mL  10 mL Intravenous PRN Sergio Jovel MD        tamsulosin 24 hr capsule 0.4 mg  0.4 mg Oral Daily Mary R Wattigny, AGACNP-BC   0.4 mg at 07/16/22 2028       Review of Systems   Unable to perform ROS: Acuity of condition     Objective:     Vital Signs (Most Recent):  Temp: 98.9 °F (37.2 °C) (07/17/22 0400)  Pulse: 98 (07/17/22 0600)  Resp: (!) 25 (07/17/22 0600)  BP: (!) 139/108 (07/17/22 0600)  SpO2: 99 % (07/17/22 1136)   Vital Signs (24h Range):  Temp:  [98.4 °F (36.9 °C)-100.8 °F (38.2 °C)] 98.9 °F (37.2 °C)  Pulse:  [] 98  Resp:  [21-32] 25  SpO2:  [93 %-100 %] 99 %  BP: (125-160)/() 139/108     Weight: 75.7 kg (166 lb 14.2 oz)  Body mass index is 24.63 kg/m².    Physical Exam  Constitutional:       General: He is awake.      Comments: Restless, impulsive    Cardiovascular:      Rate and Rhythm: Normal rate and regular rhythm.   Skin:     General: Skin is warm and dry.   Neurological:      Cranial Nerves: No facial asymmetry.      Motor: Weakness (decreased spontaneous movement left arm) present.      Comments: Does not follow commands.  Attempted to say his name ... voice very weak.         Significant Labs: BMP:   Recent Labs   Lab 07/16/22  0030 07/17/22  0937    146*   K 3.6 3.4*   CO2 22* 25   BUN 9.9 8.9   CREATININE 0.57* 0.59*   CALCIUM 8.5* 8.6*   MG  --  2.00     CBC:   Recent Labs   Lab 07/16/22  0405 07/17/22  1013   WBC 9.4 7.5   HGB 10.6* 11.2*   HCT 32.8* 33.5*    177       Significant Imaging: I have reviewed all pertinent imaging results/findings within the past 24 hours.      Assessment and Plan:     Aphasia  Ammonia level, Thiamine level, B12 level, ionized calcium, and Myasthenia panel ordered  Consider MRI brain w/ contrast and LP if not improving  Further recommendations to follow by MD.          VTE Risk Mitigation (From admission, onward)         Ordered     enoxaparin injection 40 mg  Daily         07/16/22 1138     IP VTE HIGH RISK PATIENT  Once         07/14/22 1629     Place sequential compression device  Until discontinued         07/14/22 1629                LALO Tena  Neurology  [facility]

## 2022-07-18 LAB
ABS NEUT (OLG): 4.56 X10(3)/MCL (ref 2.1–9.2)
ALBUMIN SERPL-MCNC: 2.1 GM/DL (ref 3.4–4.8)
ALBUMIN/GLOB SERPL: 0.6 RATIO (ref 1.1–2)
ALP SERPL-CCNC: 186 UNIT/L (ref 40–150)
ALT SERPL-CCNC: 58 UNIT/L (ref 0–55)
AST SERPL-CCNC: 32 UNIT/L (ref 5–34)
BACTERIA SPEC CULT: ABNORMAL
BILIRUBIN DIRECT+TOT PNL SERPL-MCNC: 1.1 MG/DL
BUN SERPL-MCNC: 11.2 MG/DL (ref 8.4–25.7)
CALCIUM SERPL-MCNC: 9.1 MG/DL (ref 8.8–10)
CHLORIDE SERPL-SCNC: 111 MMOL/L (ref 98–107)
CO2 SERPL-SCNC: 22 MMOL/L (ref 23–31)
CREAT SERPL-MCNC: 0.6 MG/DL (ref 0.73–1.18)
ERYTHROCYTE [DISTWIDTH] IN BLOOD BY AUTOMATED COUNT: 15.4 % (ref 11.5–17)
GLOBULIN SER-MCNC: 3.4 GM/DL (ref 2.4–3.5)
GLUCOSE SERPL-MCNC: 107 MG/DL (ref 82–115)
GRAM STN SPEC: ABNORMAL
HCT VFR BLD AUTO: 30.5 % (ref 42–52)
HGB BLD-MCNC: 10 GM/DL (ref 14–18)
IMM GRANULOCYTES # BLD AUTO: 0.05 X10(3)/MCL (ref 0–0.04)
IMM GRANULOCYTES NFR BLD AUTO: 0.8 %
INR BLD: 1.19 (ref 0–1.3)
INSTRUMENT WBC (OLG): 6 X10(3)/MCL
LYMPHOCYTES NFR BLD MANUAL: 0.9 X10(3)/MCL
LYMPHOCYTES NFR BLD MANUAL: 15 %
MAGNESIUM SERPL-MCNC: 2.01 MG/DL (ref 1.6–2.6)
MCH RBC QN AUTO: 29 PG (ref 27–31)
MCHC RBC AUTO-ENTMCNC: 32.8 MG/DL (ref 33–36)
MCV RBC AUTO: 88.4 FL (ref 80–94)
MONOCYTES NFR BLD MANUAL: 0.54 X10(3)/MCL (ref 0.1–1.3)
MONOCYTES NFR BLD MANUAL: 9 %
NEUTROPHILS NFR BLD MANUAL: 76 %
NRBC BLD AUTO-RTO: 0 %
PCO2 BLDA: 43 MMHG
PH SMN: 7.46 [PH] (ref 7.35–7.45)
PHOSPHATE SERPL-MCNC: 2.1 MG/DL (ref 2.3–4.7)
PLATELET # BLD AUTO: 174 X10(3)/MCL (ref 130–400)
PLATELET # BLD EST: ADEQUATE 10*3/UL
PMV BLD AUTO: 10.8 FL (ref 7.4–10.4)
PO2 BLDA: 79 MMHG
POC BASE DEFICIT: 6.1 MMOL/L
POC HCO3: 30.6 MMOL/L
POC IONIZED CALCIUM: 1.14 MMOL/L
POC SATURATED O2: 96 %
POC TEMPERATURE: 37 C
POLYCHROMASIA BLD QL SMEAR: ABNORMAL
POTASSIUM BLD-SCNC: 3.7 MMOL/L
POTASSIUM SERPL-SCNC: 2.8 MMOL/L (ref 3.5–5.1)
PROT SERPL-MCNC: 5.5 GM/DL (ref 5.8–7.6)
PROTHROMBIN TIME: 15 SECONDS (ref 12.5–14.5)
RBC # BLD AUTO: 3.45 X10(6)/MCL (ref 4.7–6.1)
RBC MORPH BLD: ABNORMAL
SODIUM BLD-SCNC: 143 MMOL/L
SODIUM SERPL-SCNC: 144 MMOL/L (ref 136–145)
SPECIMEN SOURCE: ABNORMAL
WBC # SPEC AUTO: 6.3 X10(3)/MCL (ref 4.5–11.5)

## 2022-07-18 PROCEDURE — 85025 COMPLETE CBC W/AUTO DIFF WBC: CPT | Performed by: INTERNAL MEDICINE

## 2022-07-18 PROCEDURE — 31622 DX BRONCHOSCOPE/WASH: CPT

## 2022-07-18 PROCEDURE — 94002 VENT MGMT INPAT INIT DAY: CPT

## 2022-07-18 PROCEDURE — 97164 PT RE-EVAL EST PLAN CARE: CPT

## 2022-07-18 PROCEDURE — 20000000 HC ICU ROOM

## 2022-07-18 PROCEDURE — 36415 COLL VENOUS BLD VENIPUNCTURE: CPT | Performed by: INTERNAL MEDICINE

## 2022-07-18 PROCEDURE — 27200966 HC CLOSED SUCTION SYSTEM

## 2022-07-18 PROCEDURE — 25000003 PHARM REV CODE 250: Performed by: NEUROLOGICAL SURGERY

## 2022-07-18 PROCEDURE — 82803 BLOOD GASES ANY COMBINATION: CPT

## 2022-07-18 PROCEDURE — 99900025 HC BRONCHOSCOPY-ASST (STAT)

## 2022-07-18 PROCEDURE — 25000003 PHARM REV CODE 250: Performed by: INTERNAL MEDICINE

## 2022-07-18 PROCEDURE — 97168 OT RE-EVAL EST PLAN CARE: CPT

## 2022-07-18 PROCEDURE — 63600175 PHARM REV CODE 636 W HCPCS: Performed by: PHYSICIAN ASSISTANT

## 2022-07-18 PROCEDURE — 63600175 PHARM REV CODE 636 W HCPCS: Performed by: STUDENT IN AN ORGANIZED HEALTH CARE EDUCATION/TRAINING PROGRAM

## 2022-07-18 PROCEDURE — 25000242 PHARM REV CODE 250 ALT 637 W/ HCPCS: Performed by: NURSE PRACTITIONER

## 2022-07-18 PROCEDURE — 36600 WITHDRAWAL OF ARTERIAL BLOOD: CPT

## 2022-07-18 PROCEDURE — 84100 ASSAY OF PHOSPHORUS: CPT | Performed by: INTERNAL MEDICINE

## 2022-07-18 PROCEDURE — 27100171 HC OXYGEN HIGH FLOW UP TO 24 HOURS

## 2022-07-18 PROCEDURE — 94667 MNPJ CHEST WALL 1ST: CPT

## 2022-07-18 PROCEDURE — 85610 PROTHROMBIN TIME: CPT | Performed by: INTERNAL MEDICINE

## 2022-07-18 PROCEDURE — 63600175 PHARM REV CODE 636 W HCPCS: Performed by: NURSE PRACTITIONER

## 2022-07-18 PROCEDURE — 99900035 HC TECH TIME PER 15 MIN (STAT)

## 2022-07-18 PROCEDURE — 25000003 PHARM REV CODE 250: Performed by: STUDENT IN AN ORGANIZED HEALTH CARE EDUCATION/TRAINING PROGRAM

## 2022-07-18 PROCEDURE — 83735 ASSAY OF MAGNESIUM: CPT | Performed by: INTERNAL MEDICINE

## 2022-07-18 PROCEDURE — 80053 COMPREHEN METABOLIC PANEL: CPT | Performed by: INTERNAL MEDICINE

## 2022-07-18 PROCEDURE — 25000003 PHARM REV CODE 250: Performed by: HOSPITALIST

## 2022-07-18 PROCEDURE — 99900026 HC AIRWAY MAINTENANCE (STAT)

## 2022-07-18 PROCEDURE — 87070 CULTURE OTHR SPECIMN AEROBIC: CPT | Performed by: INTERNAL MEDICINE

## 2022-07-18 PROCEDURE — 25000003 PHARM REV CODE 250: Performed by: NURSE PRACTITIONER

## 2022-07-18 PROCEDURE — 94761 N-INVAS EAR/PLS OXIMETRY MLT: CPT

## 2022-07-18 RX ORDER — ROCURONIUM BROMIDE 10 MG/ML
INJECTION, SOLUTION INTRAVENOUS CODE/TRAUMA/SEDATION MEDICATION
Status: COMPLETED | OUTPATIENT
Start: 2022-07-18 | End: 2022-07-18

## 2022-07-18 RX ORDER — LEVOFLOXACIN 5 MG/ML
500 INJECTION, SOLUTION INTRAVENOUS
Status: COMPLETED | OUTPATIENT
Start: 2022-07-18 | End: 2022-07-24

## 2022-07-18 RX ORDER — MAGNESIUM SULFATE 1 G/100ML
1 INJECTION INTRAVENOUS ONCE
Status: COMPLETED | OUTPATIENT
Start: 2022-07-18 | End: 2022-07-18

## 2022-07-18 RX ORDER — FENTANYL CITRATE 50 UG/ML
100 INJECTION, SOLUTION INTRAMUSCULAR; INTRAVENOUS
Status: DISCONTINUED | OUTPATIENT
Start: 2022-07-18 | End: 2022-07-23

## 2022-07-18 RX ORDER — LIDOCAINE HYDROCHLORIDE 20 MG/ML
15 SOLUTION OROPHARYNGEAL ONCE
Status: COMPLETED | OUTPATIENT
Start: 2022-07-18 | End: 2022-07-18

## 2022-07-18 RX ORDER — POTASSIUM CHLORIDE 14.9 MG/ML
40 INJECTION INTRAVENOUS ONCE
Status: COMPLETED | OUTPATIENT
Start: 2022-07-18 | End: 2022-07-18

## 2022-07-18 RX ORDER — DEXMEDETOMIDINE HYDROCHLORIDE 4 UG/ML
0-1.4 INJECTION, SOLUTION INTRAVENOUS CONTINUOUS
Status: DISCONTINUED | OUTPATIENT
Start: 2022-07-18 | End: 2022-07-21

## 2022-07-18 RX ORDER — DEXMEDETOMIDINE HYDROCHLORIDE 4 UG/ML
INJECTION, SOLUTION INTRAVENOUS
Status: DISPENSED
Start: 2022-07-18 | End: 2022-07-19

## 2022-07-18 RX ORDER — ETOMIDATE 2 MG/ML
INJECTION INTRAVENOUS CODE/TRAUMA/SEDATION MEDICATION
Status: COMPLETED | OUTPATIENT
Start: 2022-07-18 | End: 2022-07-18

## 2022-07-18 RX ORDER — POTASSIUM CHLORIDE 7.45 MG/ML
10 INJECTION INTRAVENOUS
Status: DISCONTINUED | OUTPATIENT
Start: 2022-07-18 | End: 2022-07-18

## 2022-07-18 RX ORDER — FENTANYL CITRATE 50 UG/ML
INJECTION, SOLUTION INTRAMUSCULAR; INTRAVENOUS
Status: DISCONTINUED
Start: 2022-07-18 | End: 2022-07-18 | Stop reason: WASHOUT

## 2022-07-18 RX ORDER — ACETYLCYSTEINE 200 MG/ML
4 SOLUTION ORAL; RESPIRATORY (INHALATION) 3 TIMES DAILY
Status: DISCONTINUED | OUTPATIENT
Start: 2022-07-18 | End: 2022-07-21

## 2022-07-18 RX ORDER — SODIUM,POTASSIUM PHOSPHATES 280-250MG
1 POWDER IN PACKET (EA) ORAL ONCE
Status: COMPLETED | OUTPATIENT
Start: 2022-07-18 | End: 2022-07-18

## 2022-07-18 RX ADMIN — LEVOFLOXACIN 500 MG: 5 INJECTION, SOLUTION INTRAVENOUS at 11:07

## 2022-07-18 RX ADMIN — LIDOCAINE HYDROCHLORIDE 15 ML: 20 SOLUTION ORAL at 02:07

## 2022-07-18 RX ADMIN — GUAIFENESIN 400 MG: 200 SOLUTION ORAL at 08:07

## 2022-07-18 RX ADMIN — GUAIFENESIN 400 MG: 200 SOLUTION ORAL at 04:07

## 2022-07-18 RX ADMIN — ROCURONIUM BROMIDE 100 MG: 10 SOLUTION INTRAVENOUS at 01:07

## 2022-07-18 RX ADMIN — Medication 1 PACKET: at 05:07

## 2022-07-18 RX ADMIN — FAMOTIDINE 20 MG: 10 INJECTION INTRAVENOUS at 08:07

## 2022-07-18 RX ADMIN — ETOMIDATE 20 MG: 2 INJECTION INTRAVENOUS at 01:07

## 2022-07-18 RX ADMIN — ACETYLCYSTEINE 4 ML: 200 SOLUTION ORAL; RESPIRATORY (INHALATION) at 08:07

## 2022-07-18 RX ADMIN — ENOXAPARIN SODIUM 40 MG: 40 INJECTION SUBCUTANEOUS at 04:07

## 2022-07-18 RX ADMIN — POTASSIUM CHLORIDE 40 MEQ: 200 INJECTION, SOLUTION INTRAVENOUS at 05:07

## 2022-07-18 RX ADMIN — ACETAMINOPHEN 650 MG: 650 SOLUTION ORAL at 08:07

## 2022-07-18 RX ADMIN — TAMSULOSIN HYDROCHLORIDE 0.4 MG: 0.4 CAPSULE ORAL at 08:07

## 2022-07-18 RX ADMIN — ACETAMINOPHEN 650 MG: 650 SOLUTION ORAL at 12:07

## 2022-07-18 RX ADMIN — DEXMEDETOMIDINE HYDROCHLORIDE 0.2 MCG/KG/HR: 400 INJECTION INTRAVENOUS at 03:07

## 2022-07-18 RX ADMIN — LEVOTHYROXINE SODIUM 50 MCG: 50 TABLET ORAL at 08:07

## 2022-07-18 RX ADMIN — MAGNESIUM SULFATE IN DEXTROSE 1 G: 10 INJECTION, SOLUTION INTRAVENOUS at 05:07

## 2022-07-18 RX ADMIN — POTASSIUM BICARBONATE 50 MEQ: 978 TABLET, EFFERVESCENT ORAL at 05:07

## 2022-07-18 RX ADMIN — DEXMEDETOMIDINE HYDROCHLORIDE 0.4 MCG/KG/HR: 400 INJECTION INTRAVENOUS at 08:07

## 2022-07-18 RX ADMIN — GUAIFENESIN 400 MG: 200 SOLUTION ORAL at 12:07

## 2022-07-18 RX ADMIN — SODIUM CHLORIDE: 9 INJECTION, SOLUTION INTRAVENOUS at 09:07

## 2022-07-18 RX ADMIN — GUAIFENESIN 400 MG: 200 SOLUTION ORAL at 11:07

## 2022-07-18 NOTE — PLAN OF CARE
Problem: Physical Therapy  Goal: Physical Therapy Goal  Description: Goals to be met by: 2022     Goals revised 22 2/2 pt change in status    Patient will increase functional independence with mobility by performin. Supine to/from  sit with minimal assistance  2. Sit to stand transfer with minimal assistance.  3. Sit EOB x 10 minutes with stand by assist.    Outcome: Ongoing, Progressing

## 2022-07-18 NOTE — PT/OT/SLP PROGRESS
SLP attempting clinical swallowing evaluation, however pt with wet vocal quality and weak cough. SLP to reattempt tomorrow as appropriate.

## 2022-07-18 NOTE — PROCEDURES
[facility]  Bronchoscopy   Procedure Note    SUMMARY     Date of Procedure: 7/18/2022    Procedure: Bronchoscopy, Diagnostic  Bronchoscopy, Therapeutic    Performed by: Randal Hummel MD    Pre-Procedure Diagnosis:  Acute hypoxemic respiratory failure, copious secretions    Post-Procedure Diagnosis:  Same    Anesthesia:  Status post bronchoscopy with neuromuscular blockade with rocuronium and sedation with etomidate        Description of the Findings of the Procedure:     Consent obtained from the patient's spouse.  The bronchocope was inserted into the main airway via the endotracheal tube. An anatomical survey was done of the main airways and the subsegmental bronchus.  The trachea was visualized to be widely patent without any signs of mucosal abnormalities or obstructions.  The tremaine was visualized to be sharp without any signs of mucosal abnormalities or obstructions.  The right mainstem bronchus was visualized to be widely patent without any signs of mucosal abnormalities or obstructions.  The right upper lobe takeoff was obstructed by whitish and blood-tinged secretions which were easily aspirated.  There was additional secretions aspirated around the takeoff of the bronchus intermedius and right lower lobe takeoff.  There is some degree of bronchomalacia around the right lower lobe bronchus takeoff and subsegments.  All segments of the right upper lobe right middle lobe and right lower lobe were visualized to be widely patent without any signs of mucosal abnormalities or obstructions.  The left mainstem bronchus was visualized to be widely patent without any signs of mucosal abnormalities or obstructions.  All segments of the left upper lobe lingula and left lower lobe were visualized to be widely patent without any signs of mucosal abnormalities or obstructions.  Whitish mucus was aspirated from multiple subsegments of the left lung in addition the right lung.  All airways were visualized  to be clean and free of secretions prior to removal of bronchoscope from the airway.      Complications: None; patient tolerated the procedure well.    Estimated Blood Loss (EBL): none           Specimens:  Tracheal aspirate         Condition: Stable        Disposition: ICU - intubated and critically ill.    Randal Hummel MD  [facility]

## 2022-07-18 NOTE — PROGRESS NOTES
[facility] [address]  Neurosurgery  Progress Note    Subjective:     Interval History: He is sitting up in bed, currently with oxygen mask. Very weak cough, difficulty with secretions. His exam is about the same although less conversive today.    History of Present Illness: Mr Echeverria is a 71-year-old male with past medical history of BPH, cervical spine stenosis, hypothyroidism, presented to [facility] on 7/12 and underwent C3-7 laminoplasty and left C3-7 foraminotomies. On 7/14, RRT was activated due to neuro change.  Per wife, patient had pain medication around 4:30am and then sat in a chair at the bedside.  Wife reports he was at his baseline at that time.  Just before 6am, he attempted to ask his wife a question and she noticed he had slurred speech.  RRT activated ... patient's speech worsened and he became aphasic.  Stroke alert activated ... stat Cth was unremarkable.  No recommendation for IV tPA due to recent surgery.  Stat MRI brain negative for acute infarct.      Post-Op Info:  Procedure(s) (LRB):  LAMINOPLASTY (N/A)   6 Days Post-Op      Medications:  Continuous Infusions:   sodium chloride 0.9% 75 mL/hr at 07/18/22 0956    NORepinephrine bitartrate-D5W 0 mcg/kg/min (07/16/22 0935)     Scheduled Meds:   acetylcysteine 200 mg/ml (20%)  4 mL Nebulization TID    enoxaparin  40 mg Subcutaneous Daily    famotidine (PF)  20 mg Intravenous Q12H    guaiFENesin 100 mg/5 ml  400 mg Per NG tube Q4H    levoFLOXacin  500 mg Intravenous Q24H    levothyroxine  50 mcg Per NG tube Daily    tamsulosin  0.4 mg Oral Daily     PRN Meds:acetaminophen, aluminum-magnesium hydroxide-simethicone, bisacodyL, hydrALAZINE, labetaloL, ondansetron, prochlorperazine, senna-docusate 8.6-50 mg, sodium chloride 0.9%     Review of Systems  Objective:     Weight: 75.7 kg (166 lb 14.2 oz)  Body mass index is 24.63 kg/m².  Vital Signs (Most Recent):  Temp: 98.9 °F (37.2 °C) (07/18/22 0400)  Pulse: 97 (07/18/22  0600)  Resp: (!) 26 (07/18/22 0600)  BP: (!) 152/79 (07/18/22 0600)  SpO2: 96 % (07/18/22 0600) Vital Signs (24h Range):  Temp:  [98.9 °F (37.2 °C)-101 °F (38.3 °C)] 98.9 °F (37.2 °C)  Pulse:  [] 97  Resp:  [22-37] 26  SpO2:  [85 %-100 %] 96 %  BP: (114-152)/(61-90) 152/79     Date 07/18/22 0700 - 07/19/22 0659   Shift 2893-2102 7074-3459 6946-8840 24 Hour Total   INTAKE   Shift Total(mL/kg)       OUTPUT   Urine(mL/kg/hr) 80   80   Shift Total(mL/kg) 80(1.1)   80(1.1)   Weight (kg) 75.7 75.7 75.7 75.7              Oxygen Concentration (%):  [] 30         NG/OG Tube 07/14/22 1600 Rowan sump Right nostril (Active)   Placement Check placement verified by aspirate characteristics 07/18/22 0400   Distal Tube Length (cm) 65 07/17/22 2000   Tolerance no signs/symptoms of discomfort 07/18/22 0400   Securement secured to commercial device 07/18/22 0400   Clamp Status/Tolerance clamped 07/18/22 0400   Suction Setting/Drainage Method low;intermittent setting 07/17/22 0800   Insertion Site Appearance no redness, warmth, tenderness, skin breakdown, drainage 07/18/22 0400   Drainage Bile 07/17/22 0800   Flush/Irrigation flushed w/;water;no resistance met 07/17/22 1600   Feeding Type continuous;by pump 07/16/22 1200   Feeding Action feeding held 07/18/22 0400   Current Rate (mL/hr) 30 mL/hr 07/16/22 0800   Goal Rate (mL/hr) 70 mL/hr 07/16/22 0800   Intake (mL) 202 mL 07/16/22 0617   Water Bolus (mL) 100 mL 07/16/22 0617   Tube Output(mL)(Include Discarded Residual) 400 mL 07/16/22 1600   Formula Name Peptamen AF 07/16/22 1200            Urethral Catheter 07/14/22 1600 Double-lumen 16 Fr. (Active)   $ Puri Insertion Bedside Insertion Performed 07/14/22 1600   Site Assessment Clean;Intact 07/18/22 0400   Collection Container Urimeter 07/18/22 0400   Securement Method secured to top of thigh w/ adhesive device 07/18/22 0400   Catheter Care Performed yes 07/18/22 0400   Reason for Continuing Urinary Catheterization  "Critically ill in ICU and requiring hourly monitoring of intake/output 07/18/22 0400   CAUTI Prevention Bundle Securement Device in place with 1" slack;Intact seal between catheter & drainage tubing;Drainage bag/urimeter off the floor;Sheeting clip in use;No dependent loops or kinks;Drainage bag/urimeter not overfilled (<2/3 full);Drainage bag/urimeter below bladder 07/17/22 2000   Output (mL) 80 mL 07/18/22 0800       Neurosurgery Physical Exam    Alert, currently with oxygen mask  He nods appropriately to some questions  Not following commands as consistently as yesterday. He did not lift his left arm for me; nurse reports he did squeeze his hand on the left earlier  Incision c/d/i    Significant Labs:  Recent Labs   Lab 07/17/22  0937 07/18/22  0144   * 144   K 3.4* 2.8*   CO2 25 22*   BUN 8.9 11.2   CREATININE 0.59* 0.60*   CALCIUM 8.6* 9.1   MG 2.00 2.01     Recent Labs   Lab 07/17/22  1013 07/18/22  0144   WBC 7.5 6.3   HGB 11.2* 10.0*   HCT 33.5* 30.5*    174     No results for input(s): LABPT, INR, APTT in the last 48 hours.  Microbiology Results (last 7 days)     Procedure Component Value Units Date/Time    Respiratory Culture [[id number]]  (Abnormal)  (Susceptibility) Collected: 07/16/22 1645    Order Status: Completed Specimen: Sputum from Trachael Aspirate Updated: 07/18/22 0727     Respiratory Culture Many Enterobacter cloacae     Comment: with normal respiratory pam        GRAM STAIN Quality 2+       Many Gram Positive Rods      Many Gram positive cocci      Moderate Gram Negative Rods    Blood Culture [[id number]]  (Normal) Collected: 07/15/22 1959    Order Status: Completed Specimen: Blood from Antecubital, Left Updated: 07/17/22 2300     CULTURE, BLOOD ([facility]) No Growth At 48 Hours    Blood Culture [[id number]] Collected: 07/17/22 1013    Order Status: Resulted Specimen: Blood Updated: 07/17/22 1057    Blood Culture [[id number]] Collected: 07/17/22 1013    Order Status: Resulted Specimen: " Blood Updated: 07/17/22 1056          Assessment/Plan:     Active Diagnoses:    Diagnosis Date Noted POA    Aphasia [R47.01] 07/14/2022 No    Osseous and subluxation stenosis of intervertebral foramina of cervical region [M99.61] 07/06/2022 Yes    Stenosis of cervical spine with myelopathy [M48.02, G99.2] 07/06/2022 Yes      Problems Resolved During this Admission:     No new recs from our standpoint  Neurology plans to repeat MRI of his brain and perform LP for further analysis  SCDs and lovenox for DVT prophylaxis  Will follow    ATIYA Garcia  Neurosurgery  [facility] - [facility]

## 2022-07-18 NOTE — PT/OT/SLP RE-EVAL
Physical Therapy Re-evaluation    Patient Name:  Scott Echeverria   MRN:  [id number]    Recommendations:     Discharge Recommendations:  rehabilitation facility   Discharge Equipment Recommendations: walker, rolling   Barriers to discharge: severity of deficits    Assessment:     Scott Echeverria is a 71 y.o. male admitted with a medical diagnosis of cervical sx.  He presents with the following impairments/functional limitations:  weakness, impaired endurance, impaired functional mobility, gait instability, impaired balance, impaired cognition, decreased lower extremity function, decreased upper extremity function, decreased safety awareness. Pt had a decline in status and was intubated on a  Ventilator. Pt has since been extubated and neuro PA cleared pt to continue working w/ therapy so re-evaluation completed. Family reported that Dr. Rao has now cleared pt from needing to wear the soft collar.     Rehab Prognosis:  good; patient would benefit from acute skilled PT services to address these deficits and reach maximum level of function.      Recent Surgery: Procedure(s) (LRB):  LAMINOPLASTY (N/A) 6 Days Post-Op    Plan:     During this hospitalization, patient to be seen daily to address the above listed problems via gait training, therapeutic activities, therapeutic exercises, neuromuscular re-education  · Plan of Care Expires:  08/17/22   Plan of Care Reviewed with: family    Subjective     Communicated with RN prior to session.  Patient found HOB elevated with peripheral IV, telemetry, NG tube, oxygen, catheter, wrist restraints upon PT entry to room, agreeable to evaluation.      Chief Complaint: na  Patient comments/goals: family would like pt to walk again  Pain/Comfort:  ·      Patients cultural, spiritual, Scientologist conflicts given the current situation: no      Objective:     Patient found with: peripheral IV, telemetry, NG tube, oxygen     General Precautions: Standard, fall (spinal pxns)   Orthopedic  Precautions:N/A   Braces:    Respiratory Status: oxymask at 6L w/ SpO2 95-98%    Exams:  · Cognitive Exam:  Patient is oriented to Person  · RLE Strength: WFL  · LLE Strength: Deficits: appeared to be 2+ to 3-/5 grossly    Functional Mobility:  · Bed Mobility:     · Supine to Sit: maximal assistance  · Sit to Supine: maximal assistance  · Balance: pt required maxA to sit EOB w/ increased posterior, R lateral lean. Pt able to correct to midline w/ max VCs and Beni but unable to maintain for long.     Patient left HOB elevated with all lines intact, call button in reach, RN notified and family present.    GOALS:   Multidisciplinary Problems     Physical Therapy Goals        Problem: Physical Therapy    Goal Priority Disciplines Outcome Goal Variances Interventions   Physical Therapy Goal     PT, PT/OT Ongoing, Progressing     Description: Goals to be met by: 2022     Goals revised 22 2/2 pt change in status    Patient will increase functional independence with mobility by performin. Supine to/from  sit with minimal assistance  2. Sit to stand transfer with minimal assistance.  3. Sit EOB x 10 minutes with stand by assist.                     History:     Past Medical History:   Diagnosis Date    BPH (benign prostatic hyperplasia)     Cervical pain     Cervical radiculitis     Cervical spinal stenosis     Hypothyroidism, unspecified     Sleep apnea     resolved since surgery       Past Surgical History:   Procedure Laterality Date    APPENDECTOMY  2022    Dr. Kerry Coats    COLONOSCOPY      HEMORRHOID SURGERY      lower back surgery  1990    RETINAL DETACHMENT SURGERY Right 2015    SINUS SURGERY      TONSILLECTOMY         Time Tracking:     PT Received On: 22  PT Start Time: 852     PT Stop Time: 913  PT Total Time (min): 21 min     Billable Minutes: Re-eval 21 mins      2022

## 2022-07-18 NOTE — PROGRESS NOTES
[facility] - [facility]  Pulmonary/Critical Care - Medicine  Progress Note    Patient Name: Scott Echeverria  MRN: [id number]  Admission Date: 7/12/2022  Hospital Length of Stay: 6 days  Code Status: Full Code  Attending Provider: Paulo Rao MD  Primary Care Provider: RON AUGUSTIN MD     Subjective:     HPI:   This is a 70 y/o male with history of cervical spinal stenosis, hypothyroidism, admitted 07/12/22 for C3-C7 laminiopasty and laminoforminotomies. On postoperative day 1, he was reported as working with PT, getting out of the bed to the chair.  However, the morning of postop day 2, the patient's wife noted that patient started to have slurred speech.  He had received Norco an hour or 2 prior to that, which he had been receiving q4h since surgery. He also received IV robaxin prior to onset of aphasia.  A code stroke was called. CT brain without contrast revealed no acute abnormal findings.  MRI and MRA brain on 07/15/2022 at 1000hrs was noted significantly limited by motion artifact with no obvious LVO, no acute intracranial abnormalities, and mild chronic microvascular ischemic changes.   For the MRI, the patient did require Ativan administration, and he was noted to have a scopolamine patch in place was was discontinued 07/15/22 at 0850hrs.  He also did reportedly began to develop some left-sided neglect.  He was then reported as having significant desaturation with near agonal respiratory effort.  Flumazenil was given on the floor.  Ventilation with BVM was initiated. Stat ICU consultation was then obtained and the patient was noted obtunded, not arousable to verbal or painful stimuli at time of intensivist arrival to floor hospital room.  He was intubated and transferred to ICU on mechanical ventilatory support. He tolerated extubation on 7/18/22; the patient has been spiking temps.  Sputum culture did come back positive for Enterobacter Cloacae; resistant to cephalosporins.     Interval  History/Significant Events: He continue to demonstrate a poor cough effort, requiring nasotracheal suctioning of clear blood-tinged thick secretions.   Venous Dopplers right upper extremity no evident to DVT with superficial thrombus right cephalic vein. neurostatus continues to wax and wane. Required vapotherm yesterday. Currently on oxymask.     Objective:     Current Medications:    Current Facility-Administered Medications:     0.9%  NaCl infusion, , Intravenous, Continuous, Mary R Wattigny, AGACNP-BC, Last Rate: 75 mL/hr at 07/18/22 0559, Rate Verify at 07/18/22 0559    acetaminophen oral solution 650 mg, 650 mg, Oral, Q6H PRN, Paulo Rao MD, 650 mg at 07/18/22 0000    aluminum-magnesium hydroxide-simethicone 200-200-20 mg/5 mL suspension 30 mL, 30 mL, Oral, Q4H PRN, Mary R Wattigny, AGACNP-BC    bisacodyL suppository 10 mg, 10 mg, Rectal, Daily PRN, Mary R Wattigny, AGACNP-BC, 10 mg at 07/16/22 2028    cefTRIAXone (ROCEPHIN) 2 g in dextrose 5 % in water (D5W) 5 % 50 mL IVPB (MB+), 2 g, Intravenous, Q24H, Anibal Hedrick Jr., MD, FCCP, Stopped at 07/17/22 1220    enoxaparin injection 40 mg, 40 mg, Subcutaneous, Daily, ATIYA Garcia, 40 mg at 07/17/22 1612    famotidine (PF) injection 20 mg, 20 mg, Intravenous, Q12H, Sergio Jovel MD, 20 mg at 07/18/22 0847    guaiFENesin 100 mg/5 ml syrup 400 mg, 400 mg, Per NG tube, Q4H, Anibal Hedrick Jr., MD, FCCP, 400 mg at 07/18/22 0847    hydrALAZINE injection 10 mg, 10 mg, Intravenous, Q4H PRN, Paulo Rao MD    labetaloL injection 10 mg, 10 mg, Intravenous, Q2H PRN, Paulo Rao MD    levothyroxine tablet 50 mcg, 50 mcg, Per NG tube, Daily, Timothy Allen MD, 50 mcg at 07/18/22 0847    NORepinephrine bitartrate-NaCl 8 mg/250 mL (32 mcg/mL) infusion, 0-3 mcg/kg/min, Intravenous, Continuous, Sergio Jovel MD, Last Rate: 0 mL/hr at 07/16/22 0935, 0 mcg/kg/min at 07/16/22 0935    ondansetron disintegrating tablet 8 mg, 8 mg, Oral, Q6H PRN,  Mary Fong, AGACNP-BC, 8 mg at 07/13/22 1354    prochlorperazine injection Soln 5 mg, 5 mg, Intravenous, Q6H PRN, Mary Fong, AGACNP-BC    senna-docusate 8.6-50 mg per tablet 2 tablet, 2 tablet, Oral, Nightly PRN, Mary Pisanotigny, AGACNP-BC, 2 tablet at 07/17/22 2045    sodium chloride 0.9% flush 10 mL, 10 mL, Intravenous, PRN, Sergio Jovel MD    tamsulosin 24 hr capsule 0.4 mg, 0.4 mg, Oral, Daily, Mary BENAVIDES Wattigny, AGACNP-BC, 0.4 mg at 07/17/22 2044    Input/output:    Intake/Output Summary (Last 24 hours) at 7/18/2022 0850  Last data filed at 7/18/2022 0559  Gross per 24 hour   Intake 3759.41 ml   Output 1675 ml   Net 2084.41 ml       Vital Signs (Most Recent):  Temp: 98.9 °F (37.2 °C) (07/18/22 0400)  Pulse: 97 (07/18/22 0600)  Resp: (!) 26 (07/18/22 0600)  BP: (!) 152/79 (07/18/22 0600)  SpO2: 96 % (07/18/22 0600)  Body mass index is 24.63 kg/m².  Weight: 75.7 kg (166 lb 14.2 oz) Vital Signs (24h Range):  Temp:  [98.9 °F (37.2 °C)-101 °F (38.3 °C)] 98.9 °F (37.2 °C)  Pulse:  [] 97  Resp:  [22-37] 26  SpO2:  [85 %-100 %] 96 %  BP: (114-152)/(61-90) 152/79     Physical Exam  HENT:      Mouth/Throat:      Mouth: Mucous membranes are moist.      Pharynx: Oropharynx is clear.   Eyes:      Conjunctiva/sclera: Conjunctivae normal.      Pupils: Pupils are equal, round, and reactive to light.   Cardiovascular:      Rate and Rhythm: Normal rate and regular rhythm.      Pulses: Normal pulses.   Pulmonary:      Effort: No respiratory distress.      Breath sounds: No stridor. Rhonchi (Coarse bilateral rhonchi) present. No wheezing or rales.   Abdominal:      General: Abdomen is flat. Bowel sounds are normal.   Musculoskeletal:         General: No swelling.      Right lower leg: No edema.      Left lower leg: No edema.   Skin:     General: Skin is warm and dry.   Neurological:      Mental Status: He is alert.      Comments: He is lethargic, but arouseable. Difficult to follow commands.         Lines/Drains/Airways     Drain  Duration                NG/OG Tube 07/14/22 1600 Gosper sump Right nostril 3 days         Urethral Catheter 07/14/22 1600 Double-lumen 16 Fr. 3 days          Peripheral Intravenous Line  Duration                Peripheral IV - Single Lumen 07/17/22 1000 20 G Left;Posterior Forearm <1 day         Peripheral IV - Single Lumen 07/17/22 1030 20 G Anterior;Left Forearm <1 day              ABGs:  Lab Results   Component Value Date    PH 7.48 (A) 07/17/2022    PO2 69 (A) 07/17/2022    PCO2 37 07/17/2022    DLI0MSY 27.7 (A) 07/17/2022    WJC4PFG 27.7 (A) 07/17/2022     Significant Labs:    Lab Results   Component Value Date    WBC 6.3 07/18/2022    HGB 10.0 (L) 07/18/2022    HCT 30.5 (L) 07/18/2022    MCV 88.4 07/18/2022     07/18/2022     Recent Labs   Lab 07/18/22  0144      K 2.8*   CO2 22*   BUN 11.2   CREATININE 0.60*   CALCIUM 9.1   MG 2.01   AST 32   ALT 58*   ALKPHOS 186*   ALBUMIN 2.1*   EGFRNONAA >60       Imaging:  CV Ultrasound doppler venous arm right  No evidence of deep vein thrombosis in the right upper extremity.  A superficial thrombosis was identified in the right cephalic vein.  X-Ray Chest 1 View  Narrative: EXAMINATION:  XR CHEST 1 VIEW    CLINICAL HISTORY:  ; Hypoxia;    TECHNIQUE:  Portable AP view of the chest.    COMPARISON:  17 July 2022; 04:16    FINDINGS:  Lines/tubes/devices:  Life-support devices remain in similar position.    The cardiac silhouette and central vascular structures are unchanged.  The trachea is midline. Lower zone predominant bilateral lung infiltrates are increased in the interval.  Small bilateral pleural effusions are unchanged.  No convincing pneumothorax.    Regional osseous structures and extrathoracic soft tissues are similar.  Impression: Mildly worsened bilateral lower lung zone infiltrates and small pleural effusions.    Electronically signed by: Cesar Tang  Date:    07/17/2022  Time:    16:16  CT Head Without  Contrast  Narrative: EXAMINATION:  CT HEAD WITHOUT CONTRAST    CLINICAL HISTORY:  Suspicion for CVA;    TECHNIQUE:  Sequential axial images were performed of the brain without contrast.    Dose product length of 989 mGycm. Automated exposure control was utilized to minimize radiation dose.    COMPARISON:  CT brain without contrast July 15, 2022.  MRI brain also July 15, 2022.    FINDINGS:  There is no intracranial mass effect, midline shift, hydrocephalus or hemorrhage. There is no sulcal effacement or low attenuation changes to suggest recent large vessel territory infarction. Chronic similar periventricular and subcortical white matter low attenuation changes are consistent with mild chronic microangiopathic ischemia. The ventricular system and sulcal markings prominence is consistent with atrophy. There is no acute extra axial fluid collection.  Paranasal sinuses findings as described previously.  Impression: No acute intracranial findings identified.    Electronically signed by: Redd Reyes  Date:    07/17/2022  Time:    09:11  X-Ray Chest 1 View  Narrative: EXAMINATION:  XR CHEST 1 VIEW    CPT 21727    CLINICAL HISTORY:  Serial in setting of MV;    COMPARISON:  July 16, 2022    FINDINGS:  Cardiomediastinal silhouette improved parenchymal changes to be essentially unchanged as compared with the previous exam.    Endotracheal tube has been removed  Impression: No significant change.    Interval removal of endotracheal tube    Electronically signed by: Tony Bagley  Date:    07/17/2022  Time:    08:19      Assessment/Plan:     1. Severe cervical stenosis, post left C3-4, C4-5, C5-6, C6-7 posterior foraminotomies, C3-C7 laminoplasty, and repair of dural tear on 07/12/2022.  2. Episode of severe decreased level of consciousness/deep obtundation on 07/14/2022, noted with near agonal respiratory effort and severe desaturation requiring intubation and mechanical ventilatory support, extubated 07/17/2022.  CT  brain, MRI brain, MRA neck/brain with no evidence of stroke, EEG with diffuse slowing.  Findings most consistent with metabolic encephalopathy at this point, possibly medication related (Ativan, Scopolamine).  Patient is now awake alert and oriented.  He demonstrates persistence of left upper extremity weakness of uncertain etiology at this point.  Neurology is closely following.  3. New onset fever, no leukocytosis. Sputum positive for Enterobacter     Plan:  1. Limit all sedation as tolerated  2. continue aggressive mucolytic/pulmonary toilet with close ICU observation  3. Continue to closely follow all cultures.  Discontinue IV Rocephin and switch to Levaquin.   4. Replace KCL   5. Labs in the AM  6. SLP has been consulted. TFs are on hold will need to reassess in near future  7. Continue guaifenesin; add Mucomyst nebs 3 times daily with CPT to assist with secretion mobilization  8. Continue GI and DVT prophylaxis with Pepcid and lovenox.   9. Repeat ABGs      Greater than 30 minutes of critical care was time spent personally by me on the following activities: development of treatment plan with patient or surrogate and bedside caregivers, discussions with consultants, evaluation of patient's response to treatment, examination of patient, ordering and performing treatments and interventions, ordering and review of laboratory studies, ordering and review of radiographic studies, pulse oximetry, re-evaluation of patient's condition.  This critical care time did not overlap with that of any other provider or involve time for any procedures.     Meghan Li, ANP  Pulmonary/Critical Care  [facility] - [facility]

## 2022-07-18 NOTE — PLAN OF CARE
Problem: Occupational Therapy  Goal: Occupational Therapy Goal  Description: Goals to be met by: 7/27    Patient will increase functional independence with ADLs by performing:    UE Dressing with SBA.  LE Dressing with SBA.  Grooming while seated EOB with SBA.  Toileting from bs with SBA for hygiene and clothing management.   Toilet transfer to bs with SBA.  Pt will sit EOB x10 minutes c SBA   Upper extremity exercise program 10 reps per handout, with assistance as needed     Pt c medical change in status. Goals updated to reflect pt function.     Outcome: Ongoing, Progressing

## 2022-07-18 NOTE — PROCEDURES
Intubation    Date/Time: 2022 2:38 PM  Location procedure was performed: [facility]  Performed by: Randal Hummel MD  Authorized by: Randal Hummel MD   Pre-operative diagnosis: Acute hypoxemic respiratory failure  Post-operative diagnosis: Acute hypoxemic respiratory failure  Consent Done: Yes  Consent: Verbal consent obtained. Written consent obtained.  Risks and benefits: risks, benefits and alternatives were discussed  Consent given by: spouse  Procedure consent: procedure consent matches procedure scheduled  Relevant documents: relevant documents present and verified  Patient identity confirmed: , MRN, name and provided demographic data  Indications: respiratory failure,  airway protection,  hypoxemia and  pulmonary toilet  Intubation method: video-assisted (Glide scope used)  Patient status: paralyzed (RSI)  Preoxygenation: BVM and bag valve mask  Sedatives: etomidate (20 mg)  Paralytic: rocuronium (100 mg)  Laryngoscope size: Mac 3  Tube size: 8.0 mm  Tube type: cuffed  Number of attempts: 1  Ventilation between attempts: BVM  Cricoid pressure: no  Cords visualized: yes (Grade 3 view of the cords with video assistance)  Post-procedure assessment: chest rise and CO2 detector (Direct visualization, tube fog)  Breath sounds: clear and diminished  Cuff inflated: yes  ETT to lip: 25 cm  ETT to teeth: 24 cm  Tube secured with: ETT mckeon  Patient tolerance: Patient tolerated the procedure well with no immediate complications  Complications: No

## 2022-07-18 NOTE — PT/OT/SLP RE-EVAL
Occupational Therapy   Re-evaluation    Name: Scott Echeverria  MRN: [id number]  Admitting Diagnosis:  C3-C7 laminoplasty, encephalopathy   Recent Surgery: Procedure(s) (LRB):  LAMINOPLASTY (N/A) 6 Days Post-Op    Recommendations:     Discharge Recommendations: rehabilitation facility  Discharge Equipment Recommendations:  other (see comments) (TBD pending progress c therapy)  Barriers to discharge:  Other (Comment) (Severity of deficits)    Assessment:     Scott Echeverria is a 71 y.o. male with a medical diagnosis of C3-C7 laminoplasty, encephalopathy. OT re-eval following medical change in status requiring t/f to ICU an intubation. Performance deficits affecting function are weakness, decreased upper extremity function, impaired endurance, impaired balance, decreased safety awareness, impaired self care skills, pain, impaired cognition, orthopedic precautions, impaired functional mobility.  Pt presents with increased LUE weakness, poor sitting balance, L inattention, and impaired cognition.     Rehab Prognosis:  Fair/Good; patient would benefit from acute skilled OT services to address these deficits and reach maximum level of function.       Plan:     Patient to be seen 5 x/week, daily to address the above listed problems via self-care/home management, therapeutic activities, therapeutic exercises  · Plan of Care Expires: 08/01/22  · Plan of Care Reviewed with: patient, spouse, son    Subjective     Chief Complaint: No complaints verbalized   Patient/Family stated goals: To return to PLOF   Communicated with: RN prior to session.  Pain/Comfort:  · Location 1: neck  · Pain Addressed 1: Reposition    Objective:     Communicated with: RN prior to session.  Patient found supine with: blood pressure cuff, preciado catheter, peripheral IV, oxygen, NG tube, pulse ox (continuous), telemetry, wrist restraints upon OT entry to room.    General Precautions: Standard, fall   Orthopedic Precautions:spinal precautions   Braces: N/A  (Family reported that MD cleared pt to mobilize without soft c-collar)  Respiratory Status: Oxymask- 4L  BP: 142/82  HR: 84 bpm  O2 sats: 98%  Occupational Performance:    Bed Mobility:    · Patient completed Scooting/Bridging with maximal assistance  · Patient completed Supine to Sit with maximal assistance  · Patient completed Sit to Supine with maximal assistance    Functional Mobility/Transfers:  · Pt required Max A for sitting balance at EOB- presented c R lateral lean     Activities of Daily Living:  · Lower Body Dressing: total assistance Don socks    Cognitive/Visual Perceptual:  Cognitive/Psychosocial Skills:     -       Oriented to: Person   -       Follows Commands/attention:Inattentive, Easily distracted and Follows one-step commands  -       Safety awareness/insight to disability: impaired   -       Mood/Affect/Coping skills/emotional control: Lethargic    Physical Exam:  Pt c difficulty following commands for formal MMT- able to squeeze R and L hand on command x1. Observed pt lifting R arm to hug wife.  Pt presents c L sided inattention. Requires Max cues to attend to L side     Treatment & Education:    Patient left supine with all lines intact, call button in reach and Family present    GOALS:   Multidisciplinary Problems     Occupational Therapy Goals        Problem: Occupational Therapy    Goal Priority Disciplines Outcome Interventions   Occupational Therapy Goal     OT, PT/OT Ongoing, Progressing    Description: Goals to be met by: 7/27    Patient will increase functional independence with ADLs by performing:    UE Dressing with SBA.  LE Dressing with SBA.  Grooming while seated EOB with SBA.  Toileting from [facility] [place], [place] with SBA for hygiene and clothing management.   Toilet transfer to [facility] [place] [place] with SBA.  Upper extremity exercise program 10 reps per handout, with assistance as needed     Pt c medical change in status. Goals updated to reflect pt function.                      History:     Past Medical  History:   Diagnosis Date    BPH (benign prostatic hyperplasia)     Cervical pain     Cervical radiculitis     Cervical spinal stenosis     Hypothyroidism, unspecified     Sleep apnea     resolved since surgery       Past Surgical History:   Procedure Laterality Date    APPENDECTOMY  01/25/2022    Dr. Kerry Coats    COLONOSCOPY  2021    HEMORRHOID SURGERY  2002    lower back surgery  1990    RETINAL DETACHMENT SURGERY Right 2015    SINUS SURGERY      TONSILLECTOMY  1956       Time Tracking:     OT Date of Treatment: 07/18/22  OT Start Time: 0850  OT Stop Time: 0912  OT Total Time (min): 22 min    Billable Minutes:Re-eval 1 unit    7/18/2022

## 2022-07-19 ENCOUNTER — ANESTHESIA EVENT (OUTPATIENT)
Dept: INTERVENTIONAL RADIOLOGY/VASCULAR | Facility: HOSPITAL | Age: 72
DRG: 518 | End: 2022-07-19
Payer: MEDICARE

## 2022-07-19 LAB
ANION GAP SERPL CALC-SCNC: 8 MEQ/L
APPEARANCE CSF: CLEAR
BASOPHILS # BLD AUTO: 0.05 X10(3)/MCL (ref 0–0.2)
BASOPHILS NFR BLD AUTO: 0.7 %
BUN SERPL-MCNC: 20.2 MG/DL (ref 8.4–25.7)
CA-I ADJ PH7.4 SERPL ISE-MCNC: 4.95 MG/DL (ref 4.57–5.43)
CALCIUM SERPL-MCNC: 8.8 MG/DL (ref 8.8–10)
CHLORIDE SERPL-SCNC: 111 MMOL/L (ref 98–107)
CO2 SERPL-SCNC: 25 MMOL/L (ref 23–31)
COLOR CSF: YELLOW
CREAT SERPL-MCNC: 0.63 MG/DL (ref 0.73–1.18)
CREAT/UREA NIT SERPL: 32
EOSINOPHIL # BLD AUTO: 0.06 X10(3)/MCL (ref 0–0.9)
EOSINOPHIL NFR BLD AUTO: 0.9 %
ERYTHROCYTE [DISTWIDTH] IN BLOOD BY AUTOMATED COUNT: 15.2 % (ref 11.5–17)
GLUCOSE CSF-MCNC: 45 MG/DL (ref 40–70)
GLUCOSE SERPL-MCNC: 107 MG/DL (ref 82–115)
HCT VFR BLD AUTO: 29.2 % (ref 42–52)
HGB BLD-MCNC: 10 GM/DL (ref 14–18)
IMM GRANULOCYTES # BLD AUTO: 0.05 X10(3)/MCL (ref 0–0.04)
IMM GRANULOCYTES NFR BLD AUTO: 0.7 %
LYMPHOCYTE MANUAL CSF (OHS): 37 %
LYMPHOCYTES # BLD AUTO: 1.34 X10(3)/MCL (ref 0.6–4.6)
LYMPHOCYTES NFR BLD AUTO: 19.7 %
MCH RBC QN AUTO: 29.6 PG (ref 27–31)
MCHC RBC AUTO-ENTMCNC: 34.2 MG/DL (ref 33–36)
MCV RBC AUTO: 86.4 FL (ref 80–94)
MONOCYTE MANUAL CSF (OHS): 33 %
MONOCYTES # BLD AUTO: 0.81 X10(3)/MCL (ref 0.1–1.3)
MONOCYTES NFR BLD AUTO: 11.9 %
NEUTROPHILS # BLD AUTO: 4.5 X10(3)/MCL (ref 2.1–9.2)
NEUTROPHILS MAN CSF (OHS): 30 %
NEUTROPHILS NFR BLD AUTO: 66.1 %
NRBC BLD AUTO-RTO: 0 %
PLATELET # BLD AUTO: 202 X10(3)/MCL (ref 130–400)
PMV BLD AUTO: 11 FL (ref 7.4–10.4)
POTASSIUM SERPL-SCNC: 3.8 MMOL/L (ref 3.5–5.1)
PROT CSF-MCNC: 177.5 MG/DL (ref 15–45)
RBC # BLD AUTO: 3.38 X10(6)/MCL (ref 4.7–6.1)
RBC # CSF MANUAL: 630 /UL
SODIUM SERPL-SCNC: 144 MMOL/L (ref 136–145)
WBC # CSF MANUAL: 12 /UL (ref 0–5)
WBC # SPEC AUTO: 6.8 X10(3)/MCL (ref 4.5–11.5)

## 2022-07-19 PROCEDURE — 87210 SMEAR WET MOUNT SALINE/INK: CPT | Performed by: PSYCHIATRY & NEUROLOGY

## 2022-07-19 PROCEDURE — 36415 COLL VENOUS BLD VENIPUNCTURE: CPT | Performed by: NURSE PRACTITIONER

## 2022-07-19 PROCEDURE — 20000000 HC ICU ROOM

## 2022-07-19 PROCEDURE — 94003 VENT MGMT INPAT SUBQ DAY: CPT

## 2022-07-19 PROCEDURE — 63600175 PHARM REV CODE 636 W HCPCS: Performed by: NURSE ANESTHETIST, CERTIFIED REGISTERED

## 2022-07-19 PROCEDURE — 63600175 PHARM REV CODE 636 W HCPCS: Performed by: NEUROLOGICAL SURGERY

## 2022-07-19 PROCEDURE — 27000221 HC OXYGEN, UP TO 24 HOURS

## 2022-07-19 PROCEDURE — 30000890 MAYO GENERIC ORDERABLE: Performed by: PSYCHIATRY & NEUROLOGY

## 2022-07-19 PROCEDURE — 97168 OT RE-EVAL EST PLAN CARE: CPT

## 2022-07-19 PROCEDURE — 97164 PT RE-EVAL EST PLAN CARE: CPT

## 2022-07-19 PROCEDURE — 25000003 PHARM REV CODE 250: Performed by: NURSE ANESTHETIST, CERTIFIED REGISTERED

## 2022-07-19 PROCEDURE — 99900035 HC TECH TIME PER 15 MIN (STAT)

## 2022-07-19 PROCEDURE — 25000003 PHARM REV CODE 250: Performed by: PSYCHIATRY & NEUROLOGY

## 2022-07-19 PROCEDURE — 87483 CNS DNA AMP PROBE TYPE 12-25: CPT | Performed by: NEUROLOGICAL SURGERY

## 2022-07-19 PROCEDURE — 80048 BASIC METABOLIC PNL TOTAL CA: CPT | Performed by: NURSE PRACTITIONER

## 2022-07-19 PROCEDURE — 89051 BODY FLUID CELL COUNT: CPT | Performed by: INTERNAL MEDICINE

## 2022-07-19 PROCEDURE — 99024 POSTOP FOLLOW-UP VISIT: CPT | Mod: POP,,, | Performed by: NURSE PRACTITIONER

## 2022-07-19 PROCEDURE — 87899 AGENT NOS ASSAY W/OPTIC: CPT | Performed by: PSYCHIATRY & NEUROLOGY

## 2022-07-19 PROCEDURE — 87798 DETECT AGENT NOS DNA AMP: CPT | Performed by: PSYCHIATRY & NEUROLOGY

## 2022-07-19 PROCEDURE — 86255 FLUORESCENT ANTIBODY SCREEN: CPT | Performed by: PSYCHIATRY & NEUROLOGY

## 2022-07-19 PROCEDURE — 63600175 PHARM REV CODE 636 W HCPCS: Performed by: PHYSICIAN ASSISTANT

## 2022-07-19 PROCEDURE — 25000003 PHARM REV CODE 250: Performed by: INTERNAL MEDICINE

## 2022-07-19 PROCEDURE — 25000003 PHARM REV CODE 250: Performed by: NURSE PRACTITIONER

## 2022-07-19 PROCEDURE — 94640 AIRWAY INHALATION TREATMENT: CPT

## 2022-07-19 PROCEDURE — 25000242 PHARM REV CODE 250 ALT 637 W/ HCPCS: Performed by: NURSE PRACTITIONER

## 2022-07-19 PROCEDURE — 25000003 PHARM REV CODE 250: Performed by: PREVENTIVE MEDICINE

## 2022-07-19 PROCEDURE — 87070 CULTURE OTHR SPECIMN AEROBIC: CPT | Performed by: INTERNAL MEDICINE

## 2022-07-19 PROCEDURE — 25000003 PHARM REV CODE 250: Performed by: STUDENT IN AN ORGANIZED HEALTH CARE EDUCATION/TRAINING PROGRAM

## 2022-07-19 PROCEDURE — 63600175 PHARM REV CODE 636 W HCPCS: Performed by: INTERNAL MEDICINE

## 2022-07-19 PROCEDURE — 84157 ASSAY OF PROTEIN OTHER: CPT | Performed by: INTERNAL MEDICINE

## 2022-07-19 PROCEDURE — 94761 N-INVAS EAR/PLS OXIMETRY MLT: CPT

## 2022-07-19 PROCEDURE — 94668 MNPJ CHEST WALL SBSQ: CPT

## 2022-07-19 PROCEDURE — 99900026 HC AIRWAY MAINTENANCE (STAT)

## 2022-07-19 PROCEDURE — 99358 PR PROLONGED SERV,NO CONTACT,1ST HR: ICD-10-PCS | Mod: ,,, | Performed by: PSYCHIATRY & NEUROLOGY

## 2022-07-19 PROCEDURE — 99358 PROLONG SERVICE W/O CONTACT: CPT | Mod: ,,, | Performed by: PSYCHIATRY & NEUROLOGY

## 2022-07-19 PROCEDURE — 87206 SMEAR FLUORESCENT/ACID STAI: CPT | Performed by: INTERNAL MEDICINE

## 2022-07-19 PROCEDURE — 63600175 PHARM REV CODE 636 W HCPCS: Performed by: PSYCHIATRY & NEUROLOGY

## 2022-07-19 PROCEDURE — 85025 COMPLETE CBC W/AUTO DIFF WBC: CPT | Performed by: NURSE PRACTITIONER

## 2022-07-19 PROCEDURE — 63600175 PHARM REV CODE 636 W HCPCS: Performed by: NURSE PRACTITIONER

## 2022-07-19 PROCEDURE — 82945 GLUCOSE OTHER FLUID: CPT | Performed by: INTERNAL MEDICINE

## 2022-07-19 PROCEDURE — 99024 PR POST-OP FOLLOW-UP VISIT: ICD-10-PCS | Mod: POP,,, | Performed by: NURSE PRACTITIONER

## 2022-07-19 RX ORDER — LIDOCAINE HYDROCHLORIDE 10 MG/ML
1 INJECTION, SOLUTION EPIDURAL; INFILTRATION; INTRACAUDAL; PERINEURAL ONCE
Status: CANCELLED | OUTPATIENT
Start: 2022-07-19 | End: 2022-07-19

## 2022-07-19 RX ORDER — ROCURONIUM BROMIDE 10 MG/ML
INJECTION, SOLUTION INTRAVENOUS
Status: DISCONTINUED | OUTPATIENT
Start: 2022-07-19 | End: 2022-07-19

## 2022-07-19 RX ORDER — SODIUM CHLORIDE 9 MG/ML
INJECTION, SOLUTION INTRAVENOUS CONTINUOUS PRN
Status: DISCONTINUED | OUTPATIENT
Start: 2022-07-19 | End: 2022-07-19

## 2022-07-19 RX ORDER — PHENYLEPHRINE HYDROCHLORIDE 10 MG/ML
INJECTION INTRAVENOUS
Status: DISCONTINUED | OUTPATIENT
Start: 2022-07-19 | End: 2022-07-19

## 2022-07-19 RX ORDER — LIDOCAINE HYDROCHLORIDE 20 MG/ML
INJECTION, SOLUTION INFILTRATION; PERINEURAL CODE/TRAUMA/SEDATION MEDICATION
Status: COMPLETED | OUTPATIENT
Start: 2022-07-19 | End: 2022-07-19

## 2022-07-19 RX ADMIN — ACETYLCYSTEINE 4 ML: 200 SOLUTION ORAL; RESPIRATORY (INHALATION) at 12:07

## 2022-07-19 RX ADMIN — GUAIFENESIN 400 MG: 200 SOLUTION ORAL at 03:07

## 2022-07-19 RX ADMIN — DEXMEDETOMIDINE HYDROCHLORIDE 1 MCG/KG/HR: 400 INJECTION INTRAVENOUS at 09:07

## 2022-07-19 RX ADMIN — LEVOFLOXACIN 500 MG: 5 INJECTION, SOLUTION INTRAVENOUS at 10:07

## 2022-07-19 RX ADMIN — SODIUM CHLORIDE: 9 INJECTION, SOLUTION INTRAVENOUS at 10:07

## 2022-07-19 RX ADMIN — LIDOCAINE HYDROCHLORIDE 5 ML: 20 INJECTION, SOLUTION INFILTRATION; PERINEURAL at 11:07

## 2022-07-19 RX ADMIN — TAMSULOSIN HYDROCHLORIDE 0.4 MG: 0.4 CAPSULE ORAL at 09:07

## 2022-07-19 RX ADMIN — FAMOTIDINE 20 MG: 10 INJECTION INTRAVENOUS at 09:07

## 2022-07-19 RX ADMIN — FENTANYL CITRATE 50 MCG: 50 INJECTION INTRAMUSCULAR; INTRAVENOUS at 02:07

## 2022-07-19 RX ADMIN — FENTANYL CITRATE 100 MCG: 50 INJECTION INTRAMUSCULAR; INTRAVENOUS at 03:07

## 2022-07-19 RX ADMIN — PHENYLEPHRINE HYDROCHLORIDE 100 MCG: 10 INJECTION INTRAVENOUS at 11:07

## 2022-07-19 RX ADMIN — SUGAMMADEX 200 MG: 100 INJECTION, SOLUTION INTRAVENOUS at 11:07

## 2022-07-19 RX ADMIN — VANCOMYCIN HYDROCHLORIDE 1500 MG: 1.5 INJECTION, POWDER, LYOPHILIZED, FOR SOLUTION INTRAVENOUS at 11:07

## 2022-07-19 RX ADMIN — GUAIFENESIN 400 MG: 200 SOLUTION ORAL at 12:07

## 2022-07-19 RX ADMIN — ACYCLOVIR SODIUM 710 MG: 500 INJECTION, SOLUTION INTRAVENOUS at 09:07

## 2022-07-19 RX ADMIN — ACETYLCYSTEINE 4 ML: 200 SOLUTION ORAL; RESPIRATORY (INHALATION) at 07:07

## 2022-07-19 RX ADMIN — ROCURONIUM BROMIDE 50 MG: 10 SOLUTION INTRAVENOUS at 10:07

## 2022-07-19 RX ADMIN — DEXMEDETOMIDINE HYDROCHLORIDE 1 MCG/KG/HR: 400 INJECTION INTRAVENOUS at 03:07

## 2022-07-19 RX ADMIN — GUAIFENESIN 400 MG: 200 SOLUTION ORAL at 08:07

## 2022-07-19 RX ADMIN — DEXMEDETOMIDINE HYDROCHLORIDE 0.6 MCG/KG/HR: 400 INJECTION INTRAVENOUS at 12:07

## 2022-07-19 RX ADMIN — ACETYLCYSTEINE 4 ML: 200 SOLUTION ORAL; RESPIRATORY (INHALATION) at 08:07

## 2022-07-19 RX ADMIN — CEFTRIAXONE SODIUM 2 G: 2 INJECTION, POWDER, FOR SOLUTION INTRAMUSCULAR; INTRAVENOUS at 11:07

## 2022-07-19 RX ADMIN — ENOXAPARIN SODIUM 40 MG: 40 INJECTION SUBCUTANEOUS at 04:07

## 2022-07-19 RX ADMIN — HYDRALAZINE HYDROCHLORIDE 10 MG: 20 INJECTION INTRAMUSCULAR; INTRAVENOUS at 02:07

## 2022-07-19 RX ADMIN — GUAIFENESIN 400 MG: 200 SOLUTION ORAL at 04:07

## 2022-07-19 NOTE — PT/OT/SLP PROGRESS
Pt requiring mechanical ventilator and PO intake is unsafe at this time. SLP to sign off at this time. Please reconsult as warranted.

## 2022-07-19 NOTE — ANESTHESIA POSTPROCEDURE EVALUATION
Anesthesia Post Evaluation    Patient: Scott Echeverria    Procedure(s) Performed: * No procedures listed *    Final Anesthesia Type: general      Patient location during evaluation: ICU  Patient participation: Yes- Able to Participate  Level of consciousness: sedated and lethargic  Post-procedure vital signs: reviewed and stable  Pain management: adequate  Airway patency: patent      Anesthetic complications: no      Cardiovascular status: hemodynamically stable  Respiratory status: ventilator and intubated  Hydration status: euvolemic  Follow-up not needed.          Vitals Value Taken Time   /91 07/19/22 1202   Temp 36.7 07/19/22 1210   Pulse 55 07/19/22 1210   Resp 20 07/19/22 1210   SpO2 100 % 07/19/22 1210   Vitals shown include unvalidated device data.      No case tracking events are documented in the log.      Pain/Joaquin Score: Pain Rating Prior to Med Admin: 8 (7/19/2022  2:47 AM)  Pain Rating Post Med Admin: 0 (7/19/2022  3:12 AM)

## 2022-07-19 NOTE — ANESTHESIA PREPROCEDURE EVALUATION
07/19/2022  Scott Echeverria is a 71 y.o., male.      Pre-op Assessment    I have reviewed the Patient Summary Reports.     I have reviewed the Nursing Notes. I have reviewed the NPO Status.   I have reviewed the Medications.     Review of Systems  Anesthesia Hx:  No problems with previous Anesthesia    Social:  Non-Smoker    Cardiovascular:   Denies Hypertension.  Denies MI.    Denies Angina.  Denies CHF.    Pulmonary:   Denies COPD.  Denies Asthma. Sleep Apnea    Renal/:   Denies Chronic Renal Disease. no renal calculi     Neurological:   Neuromuscular Disease, Aphasia  Herniated nucleus pulposis cervical  Cervical radiculopathy  Cervical spine stenosis  Weakness left arm  Left sided neglect   Endocrine:   Hypothyroidism        Physical Exam  General: Well nourished and Lethargic    Airway:  Pre-Existing Airway: Oral Endotracheal tube    POCUS:  Pt sedated      Anesthesia Plan  Type of Anesthesia, risks & benefits discussed:    Anesthesia Type: Gen ETT  Intra-op Monitoring Plan: Standard ASA Monitors  Induction:  Inhalation  Informed Consent: Informed consent signed with the Patient representative and all parties understand the risks and agree with anesthesia plan.  All questions answered.   ASA Score: 3  Day of Surgery Review of History & Physical: H&P Update referred to the surgeon/provider.  Anesthesia Plan Notes: Phone consent with wife 'Anusha'.      Ready For Surgery From Anesthesia Perspective.     .

## 2022-07-19 NOTE — PROGRESS NOTES
[facility]  Neurosurgery  Progress Note    Subjective:     Interval History: Patient re-intubated yesterday afternoon.  Low grade temps overnight. Scheduled for LP at 0930. No family currently at bedside. Remains intubated.     History of Present Illness: Mr Echeverria is a 71-year-old male with past medical history of BPH, cervical spine stenosis, hypothyroidism, presented to [facility] on 7/12 and underwent C3-7 laminoplasty and left C3-7 foraminotomies. On 7/14, RRT was activated due to neuro change.  Per wife, patient had pain medication around 4:30am and then sat in a chair at the bedside.  Wife reports he was at his baseline at that time.  Just before 6am, he attempted to ask his wife a question and she noticed he had slurred speech.  RRT activated ... patient's speech worsened and he became aphasic.  Stroke alert activated ... stat Cth was unremarkable.  No recommendation for IV tPA due to recent surgery.  Stat MRI brain negative for acute infarct.  EEG with diffuse slowing.      Post-Op Info:  Procedure(s) (LRB):  LAMINOPLASTY (N/A)   7 Days Post-Op     C3-7 laminoplasty, left C3-4, C4-5, C5-6, C6-7 foraminotomies on 7/12/2022      Medications:  Continuous Infusions:   dexmedetomidine (PRECEDEX) infusion 0.6 mcg/kg/hr (07/19/22 0607)    NORepinephrine bitartrate-D5W 0 mcg/kg/min (07/16/22 0935)     Scheduled Meds:   acetylcysteine 200 mg/ml (20%)  4 mL Nebulization TID    enoxaparin  40 mg Subcutaneous Daily    famotidine (PF)  20 mg Intravenous Q12H    guaiFENesin 100 mg/5 ml  400 mg Per NG tube Q4H    levoFLOXacin  500 mg Intravenous Q24H    levothyroxine  50 mcg Per NG tube Daily    tamsulosin  0.4 mg Oral Daily     PRN Meds:acetaminophen, aluminum-magnesium hydroxide-simethicone, bisacodyL, fentaNYL, hydrALAZINE, labetaloL, ondansetron, prochlorperazine, senna-docusate 8.6-50 mg, sodium chloride 0.9%     Review of Systems  Objective:     Weight: 75.7 kg (166 lb 14.2 oz)  Body mass index is  24.63 kg/m².  Vital Signs (Most Recent):  Temp: 98.2 °F (36.8 °C) (07/19/22 0400)  Pulse: (!) 59 (07/19/22 0856)  Resp: 20 (07/19/22 0856)  BP: 134/77 (07/19/22 0845)  SpO2: 100 % (07/19/22 0856) Vital Signs (24h Range):  Temp:  [98.2 °F (36.8 °C)-100.2 °F (37.9 °C)] 98.2 °F (36.8 °C)  Pulse:  [59-94] 59  Resp:  [20-31] 20  SpO2:  [85 %-100 %] 100 %  BP: ()/(56-91) 134/77                Vent Mode: VOLUME A/C  Oxygen Concentration (%):  [] 40  Resp Rate Total:  [20 br/min-22 br/min] 20 br/min  Vt Set:  [450 mL] 450 mL  PEEP/CPAP:  [10 cmH20] 10 cmH20  Mean Airway Pressure:  [9 cmH20-15 cmH20] 15 cmH20         NG/OG Tube 07/14/22 1600 [place] sump Right nostril (Active)   Placement Check placement verified by aspirate characteristics 07/18/22 0400   Distal Tube Length (cm) 65 07/17/22 2000   Tolerance no signs/symptoms of discomfort 07/18/22 0400   Securement secured to commercial device 07/18/22 0400   Clamp Status/Tolerance clamped 07/18/22 0400   Suction Setting/Drainage Method low;intermittent setting 07/17/22 0800   Insertion Site Appearance no redness, warmth, tenderness, skin breakdown, drainage 07/18/22 0400   Drainage Bile 07/17/22 0800   Flush/Irrigation flushed w/;water;no resistance met 07/17/22 1600   Feeding Type continuous;by pump 07/16/22 1200   Feeding Action feeding held 07/18/22 0400   Current Rate (mL/hr) 30 mL/hr 07/16/22 0800   Goal Rate (mL/hr) 70 mL/hr 07/16/22 0800   Intake (mL) 202 mL 07/16/22 0617   Water Bolus (mL) 100 mL 07/16/22 0617   Tube Output(mL)(Include Discarded Residual) 400 mL 07/16/22 1600   Formula Name Peptamen AF 07/16/22 1200            Urethral Catheter 07/14/22 1600 Double-lumen 16 Fr. (Active)   $ Puri Insertion Bedside Insertion Performed 07/14/22 1600   Site Assessment Clean;Intact 07/18/22 0400   Collection Container Urimeter 07/18/22 0400   Securement Method secured to top of thigh w/ adhesive device 07/18/22 0400   Catheter Care Performed yes 07/18/22 0400  "  Reason for Continuing Urinary Catheterization Critically ill in ICU and requiring hourly monitoring of intake/output 07/18/22 0400   CAUTI Prevention Bundle Securement Device in place with 1" slack;Intact seal between catheter & drainage tubing;Drainage bag/urimeter off the floor;Sheeting clip in use;No dependent loops or kinks;Drainage bag/urimeter not overfilled (<2/3 full);Drainage bag/urimeter below bladder 07/17/22 2000   Output (mL) 80 mL 07/18/22 0800       Neurosurgery Physical Exam  He is awake and alert  Tracks with eyes  Nods appropriately when asked questions  Follows commands with all extremities. LUE remains weak proximally  Incision is c/d/i - no swelling or drainage. Island dressing in place. Staples.     Significant Labs:  Recent Labs   Lab 07/18/22  0144 07/19/22  0142    144   K 2.8* 3.8   CO2 22* 25   BUN 11.2 20.2   CREATININE 0.60* 0.63*   CALCIUM 9.1 8.8   MG 2.01  --      Recent Labs   Lab 07/17/22  1013 07/18/22  0144 07/19/22  0142   WBC 7.5 6.3 6.8   HGB 11.2* 10.0* 10.0*   HCT 33.5* 30.5* 29.2*    174 202     Recent Labs   Lab 07/18/22  1713   INR 1.19     Microbiology Results (last 7 days)     Procedure Component Value Units Date/Time    Respiratory Culture [[id number]]  (Abnormal) Collected: 07/18/22 1501    Order Status: Completed Specimen: Bronchial Alveolar Lavage from BAL, RUL Updated: 07/19/22 0822     Respiratory Culture Rare normal respiratory pam     GRAM STAIN Many WBC seen      Rare Gram positive cocci      Rare Gram Positive Rods    Blood Culture [[id number]]  (Normal) Collected: 07/15/22 1959    Order Status: Completed Specimen: Blood from Antecubital, Left Updated: 07/18/22 2300     CULTURE, BLOOD ([facility]) No Growth At 72 Hours    Mycobacteria and Nocardia Culture [[id number]]     Order Status: Sent Specimen: CSF (Spinal Fluid) from Cerebrospinal Fluid     AFB Smear [[id number]]     Order Status: Sent Specimen: CSF (Spinal Fluid) from Cerebrospinal Fluid     " Cerebrospinal Fluid Culture [[id number]]     Order Status: Sent Specimen: CSF (Spinal Fluid) from Cerebrospinal Fluid     Blood Culture [[id number]]  (Normal) Collected: 07/17/22 1013    Order Status: Completed Specimen: Blood Updated: 07/18/22 1203     CULTURE, BLOOD ([facility]) No Growth At 24 Hours    Blood Culture [[id number]]  (Normal) Collected: 07/17/22 1013    Order Status: Completed Specimen: Blood Updated: 07/18/22 1203     CULTURE, BLOOD ([facility]) No Growth At 24 Hours    Respiratory Culture [[id number]]  (Abnormal)  (Susceptibility) Collected: 07/16/22 1645    Order Status: Completed Specimen: Sputum from Trachael Aspirate Updated: 07/18/22 0727     Respiratory Culture Many Enterobacter cloacae     Comment: with normal respiratory pam        GRAM STAIN Quality 2+       Many Gram Positive Rods      Many Gram positive cocci      Moderate Gram Negative Rods          Assessment/Plan:     Active Diagnoses:    Diagnosis Date Noted POA    Aphasia [R47.01] 07/14/2022 No    Osseous and subluxation stenosis of intervertebral foramina of cervical region [M99.61] 07/06/2022 Yes    Stenosis of cervical spine with myelopathy [M48.02, G99.2] 07/06/2022 Yes      Problems Resolved During this Admission:     PLAN  No additional recs from Neurosurgery standpoint  Scheduled for LP this morning.   Continue close neurological monitoring  Following    NADIA Tan-BC  Neurosurgery  [facility]

## 2022-07-19 NOTE — PT/OT/SLP RE-EVAL
Physical Therapy Re-evaluation    Patient Name:  Scott Echeverria   MRN:  [id number]    Recommendations:     Discharge Recommendations:  LTACH (long-term acute care hospital), rehabilitation facility   Discharge Equipment Recommendations:  (TBD)   Barriers to discharge: severity of deficits    Assessment:     Scott Echeverria is a 71 y.o. male admitted with a medical diagnosis of cervical laminoplasty, acute hypoxemic respiratory failure.  He presents with the following impairments/functional limitations:  weakness, impaired endurance, impaired functional mobility, gait instability, impaired balance, decreased lower extremity function, pain. Pt was re-intubated yesterday evening. Pt cleared by doctor to continue therapy services so re-evaluation completed. Pt appeared to be more alert and had better command following this date.     Rehab Prognosis:  fair; patient would benefit from acute skilled PT services to address these deficits and reach maximum level of function.      Recent Surgery: Procedure(s) (LRB):  LAMINOPLASTY (N/A) 7 Days Post-Op    Plan:     During this hospitalization, patient to be seen daily to address the above listed problems via gait training, therapeutic activities, therapeutic exercises, neuromuscular re-education  · Plan of Care Expires:  08/18/22   Plan of Care Reviewed with: patient    Subjective     Communicated with RN prior to session.  Patient found HOB elevated with ventilator, telemetry, peripheral IV, SCD, pressure relief boots upon PT entry to room, agreeable to evaluation.      Chief Complaint: pain  Patient comments/goals: to get stronger  Pain/Comfort:  · Location 1: neck  · Pain Addressed 1: Nurse notified, Reposition    Patients cultural, spiritual, Adventist conflicts given the current situation: no      Objective:     Patient found with: ventilator, telemetry, peripheral IV, SCD, pressure relief boots     General Precautions: Standard, fall (spinal pxns)   Orthopedic  Precautions:N/A   Braces: N/A  Respiratory Status: ventilator w/ 40% FiO2 and Peep of 10. Sp02 of 99% throughout session.  BP of 134/77mmhg and HR of 75 bpm    Exams:  · RLE Strength: 3/5  · LLE Strength: Deficits: 3-/5 hip and 3/5 knee and ankle    Functional Mobility:  · Bed Mobility:     · Supine to Sit: maximal assistance  · Sit to Supine: maximal assistance      Therapeutic Activities and Exercises:  Pt's bed was placed in chair position and then therapy sat pt's back off the bed while in chair position w/ maxA. Pt demo'd good head control. Pt was able to perform 10 reps of LAQ bilaterally and ankle pumps.     Patient left HOB elevated with all lines intact, call button in reach and RN notified.     GOALS:   Multidisciplinary Problems     Physical Therapy Goals        Problem: Physical Therapy    Goal Priority Disciplines Outcome Goal Variances Interventions   Physical Therapy Goal     PT, PT/OT Ongoing, Progressing     Description: Goals to be met by: 2022     Goals revised 22 2/2 pt change in status    Patient will increase functional independence with mobility by performin. Supine to/from  sit with minimal assistance  2. Sit to stand transfer with minimal assistance.  3. Sit EOB x 10 minutes with stand by assist.                     History:     Past Medical History:   Diagnosis Date    BPH (benign prostatic hyperplasia)     Cervical pain     Cervical radiculitis     Cervical spinal stenosis     Hypothyroidism, unspecified     Sleep apnea     resolved since surgery       Past Surgical History:   Procedure Laterality Date    APPENDECTOMY  2022    Dr. Kerry Coats    COLONOSCOPY      HEMORRHOID SURGERY      lower back surgery  1990    RETINAL DETACHMENT SURGERY Right 2015    SINUS SURGERY      TONSILLECTOMY         Time Tracking:     PT Received On: 22  PT Start Time: 835     PT Stop Time: 0847  PT Total Time (min): 12 min     Billable Minutes: Re-eval 12  mins      07/19/2022

## 2022-07-19 NOTE — TRANSFER OF CARE
"Anesthesia Transfer of Care Note    Patient: Scott Echeverria    Procedure(s) Performed: * No procedures listed *    Patient location: ICU    Anesthesia Type: general    Transport from OR: Transported from OR intubated on 100% O2 by AMBU with adequate controlled ventilation. Continuous SpO2 monitoring in transport. Continuous ECG monitoring in transport    Post pain: adequate analgesia    Post assessment: no apparent anesthetic complications    Post vital signs: stable    Level of consciousness: sedated    Nausea/Vomiting: no nausea/vomiting    Complications: none    Transfer of care protocol was followed      Last vitals:   Visit Vitals  /77   Pulse (!) 59   Temp 36.8 °C (98.2 °F) (Axillary)   Resp 20   Ht 5' 9.02" (1.753 m)   Wt 75.7 kg (166 lb 14.2 oz)   SpO2 100%   BMI 24.63 kg/m²     "

## 2022-07-19 NOTE — PLAN OF CARE
Problem: Adult Inpatient Plan of Care  Goal: Plan of Care Review  Outcome: Ongoing, Not Progressing  Goal: Patient-Specific Goal (Individualized)  Outcome: Ongoing, Not Progressing  Goal: Absence of Hospital-Acquired Illness or Injury  Outcome: Ongoing, Not Progressing  Goal: Optimal Comfort and Wellbeing  Outcome: Ongoing, Not Progressing  Goal: Readiness for Transition of Care  Outcome: Ongoing, Not Progressing     Problem: Fall Injury Risk  Goal: Absence of Fall and Fall-Related Injury  Outcome: Ongoing, Not Progressing     Problem: Pain Acute  Goal: Acceptable Pain Control and Functional Ability  Outcome: Ongoing, Not Progressing     Problem: Skin Injury Risk Increased  Goal: Skin Health and Integrity  Outcome: Ongoing, Not Progressing     Problem: Communication Impairment (Mechanical Ventilation, Invasive)  Goal: Effective Communication  Outcome: Ongoing, Not Progressing     Problem: Device-Related Complication Risk (Mechanical Ventilation, Invasive)  Goal: Optimal Device Function  Outcome: Ongoing, Not Progressing     Problem: Inability to Wean (Mechanical Ventilation, Invasive)  Goal: Mechanical Ventilation Liberation  Outcome: Ongoing, Not Progressing     Problem: Nutrition Impairment (Mechanical Ventilation, Invasive)  Goal: Optimal Nutrition Delivery  Outcome: Ongoing, Not Progressing     Problem: Skin and Tissue Injury (Mechanical Ventilation, Invasive)  Goal: Absence of Device-Related Skin and Tissue Injury  Outcome: Ongoing, Not Progressing     Problem: Ventilator-Induced Lung Injury (Mechanical Ventilation, Invasive)  Goal: Absence of Ventilator-Induced Lung Injury  Outcome: Ongoing, Not Progressing     Problem: Communication Impairment (Artificial Airway)  Goal: Effective Communication  Outcome: Ongoing, Not Progressing     Problem: Device-Related Complication Risk (Artificial Airway)  Goal: Optimal Device Function  Outcome: Ongoing, Not Progressing     Problem: Skin and Tissue Injury (Artificial  Airway)  Goal: Absence of Device-Related Skin or Tissue Injury  Outcome: Ongoing, Not Progressing     Problem: Noninvasive Ventilation Acute  Goal: Effective Unassisted Ventilation and Oxygenation  Outcome: Ongoing, Not Progressing     Problem: Infection  Goal: Absence of Infection Signs and Symptoms  Outcome: Ongoing, Not Progressing     Problem: Restraint, Behavioral (Acute Care)  Goal: Absence of Harm or Injury  Outcome: Ongoing, Not Progressing     Problem: Restraint, Nonbehavioral (Nonviolent)  Goal: Absence of Harm or Injury  Outcome: Ongoing, Not Progressing

## 2022-07-19 NOTE — PROGRESS NOTES
Pulmonary & Critical Care Medicine   Progress Note      Presenting History/HPI:  This is a 72 y/o male with history of cervical spinal stenosis, hypothyroidism, admitted 07/12/22 for C3-C7 laminiopasty and laminoforminotomies. On postoperative day 1, he was reported as working with PT, getting out of the bed to the chair.  However, the morning of postop day 2, the patient's wife noted that patient started to have slurred speech.  He had received Norco an hour or 2 prior to that, which he had been receiving q4h since surgery. He also received IV robaxin prior to onset of aphasia.  A code stroke was called. CT brain without contrast revealed no acute abnormal findings.  MRI and MRA brain on 07/15/2022 at 1000hrs was noted significantly limited by motion artifact with no obvious LVO, no acute intracranial abnormalities, and mild chronic microvascular ischemic changes.   For the MRI, the patient did require Ativan administration, and he was noted to have a scopolamine patch in place was was discontinued 07/15/22 at 0850hrs.  He also did reportedly began to develop some left-sided neglect.  He was then reported as having significant desaturation with near agonal respiratory effort.  Flumazenil was given on the floor.  Ventilation with BVM was initiated. Stat ICU consultation was then obtained and the patient was noted obtunded, not arousable to verbal or painful stimuli at time of intensivist arrival to floor hospital room.  He was intubated and transferred to ICU on mechanical ventilatory support. He tolerated extubation on 7/18/22; the patient has been spiking temps.  Sputum culture did come back positive for Enterobacter Cloacae; resistant to cephalosporins.       Interval History:  -the patient was intubated on 07/18 secondary to progressive hypoxia and a decrease in level of consciousness with the patient being attended  -patient also underwent bronchoscopy on 07/18 after intubation with no significant findings  of copious secretions except for in the right upper lobe and some scant scattered secretions bilaterally  -no major issues or changes overnight      Scheduled Medications:    acetylcysteine 200 mg/ml (20%)  4 mL Nebulization TID    enoxaparin  40 mg Subcutaneous Daily    famotidine (PF)  20 mg Intravenous Q12H    guaiFENesin 100 mg/5 ml  400 mg Per NG tube Q4H    levoFLOXacin  500 mg Intravenous Q24H    levothyroxine  50 mcg Per NG tube Daily    tamsulosin  0.4 mg Oral Daily       PRN Medications:   acetaminophen, aluminum-magnesium hydroxide-simethicone, bisacodyL, fentaNYL, hydrALAZINE, labetaloL, ondansetron, prochlorperazine, senna-docusate 8.6-50 mg, sodium chloride 0.9%      Infusions:     dexmedetomidine (PRECEDEX) infusion 0.6 mcg/kg/hr (07/19/22 0607)    NORepinephrine bitartrate-D5W 0 mcg/kg/min (07/16/22 0935)         Fluid Balance:     Intake/Output Summary (Last 24 hours) at 7/19/2022 1024  Last data filed at 7/19/2022 0607  Gross per 24 hour   Intake 376.25 ml   Output 780 ml   Net -403.75 ml           Vital Signs:   Vitals:    07/19/22 0856   BP:    Pulse: (!) 59   Resp: 20   Temp:          Physical Exam  Vitals and nursing note reviewed.   Constitutional:       General: He is not in acute distress.     Appearance: Normal appearance. He is not ill-appearing or toxic-appearing.   HENT:      Head: Normocephalic and atraumatic.      Right Ear: External ear normal.      Left Ear: External ear normal.      Nose: Nose normal.      Mouth/Throat:      Pharynx: No posterior oropharyngeal erythema.      Comments: Unable to visualize posterior oropharynx secondary to endotracheal tube  Eyes:      General: No scleral icterus.     Conjunctiva/sclera: Conjunctivae normal.      Pupils: Pupils are equal, round, and reactive to light.   Neck:      Vascular: No carotid bruit.   Cardiovascular:      Rate and Rhythm: Normal rate and regular rhythm.      Pulses: Normal pulses.      Heart sounds: Normal heart  sounds. No murmur heard.    No friction rub. No gallop.   Pulmonary:      Effort: Pulmonary effort is normal. No respiratory distress.      Breath sounds: Normal breath sounds. No wheezing, rhonchi or rales.      Comments: Intubated, on mechanical ventilation  Abdominal:      General: Abdomen is flat. Bowel sounds are normal. There is no distension.      Palpations: Abdomen is soft.      Tenderness: There is no abdominal tenderness. There is no guarding or rebound.   Musculoskeletal:         General: No swelling or deformity.      Cervical back: Neck supple. No rigidity or tenderness.   Skin:     General: Skin is warm and dry.      Capillary Refill: Capillary refill takes less than 2 seconds.      Findings: No erythema or rash.   Neurological:      General: No focal deficit present.      Comments: Unable to fully assess neurologic status and orientation secondary to patient being intubated, sedated and nonverbal   Psychiatric:      Comments: Unable to fully assess as patient is intubated and nonverbal         Laboratory Studies:   Recent Labs   Lab 07/18/22  1030   PH 7.46*   PCO2 43   PO2 79*   HCO3 30.6   POCSATURATED 96     Recent Labs   Lab 07/19/22  0142   WBC 6.8   RBC 3.38*   HGB 10.0*   HCT 29.2*      MCV 86.4   MCH 29.6   MCHC 34.2     Recent Labs   Lab 07/19/22  0142   GLUCOSE 107      K 3.8   CO2 25   BUN 20.2   CREATININE 0.63*         Microbiology Data:   Microbiology Results (last 7 days)     Procedure Component Value Units Date/Time    Respiratory Culture [[id number]]  (Abnormal) Collected: 07/18/22 1501    Order Status: Completed Specimen: Bronchial Alveolar Lavage from BAL, RUL Updated: 07/19/22 0822     Respiratory Culture Rare normal respiratory pam     GRAM STAIN Many WBC seen      Rare Gram positive cocci      Rare Gram Positive Rods    Blood Culture [[id number]]  (Normal) Collected: 07/15/22 1959    Order Status: Completed Specimen: Blood from Antecubital, Left Updated: 07/18/22  2300     CULTURE, BLOOD ([facility]) No Growth At 72 Hours    Mycobacteria and Nocardia Culture [[id number]]     Order Status: Sent Specimen: CSF (Spinal Fluid) from Cerebrospinal Fluid     AFB Smear [[id number]]     Order Status: Sent Specimen: CSF (Spinal Fluid) from Cerebrospinal Fluid     Cerebrospinal Fluid Culture [[id number]]     Order Status: Sent Specimen: CSF (Spinal Fluid) from Cerebrospinal Fluid     Blood Culture [[id number]]  (Normal) Collected: 07/17/22 1013    Order Status: Completed Specimen: Blood Updated: 07/18/22 1203     CULTURE, BLOOD ([facility]) No Growth At 24 Hours    Blood Culture [[id number]]  (Normal) Collected: 07/17/22 1013    Order Status: Completed Specimen: Blood Updated: 07/18/22 1203     CULTURE, BLOOD ([facility]) No Growth At 24 Hours    Respiratory Culture [[id number]]  (Abnormal)  (Susceptibility) Collected: 07/16/22 1645    Order Status: Completed Specimen: Sputum from Trachael Aspirate Updated: 07/18/22 0727     Respiratory Culture Many Enterobacter cloacae     Comment: with normal respiratory pam        GRAM STAIN Quality 2+       Many Gram Positive Rods      Many Gram positive cocci      Moderate Gram Negative Rods            Imaging:   X-Ray Chest 1 View  Narrative: EXAMINATION:  XR CHEST 1 VIEW    CLINICAL HISTORY:  rule out pneumonia;Aphasia    TECHNIQUE:  Frontal view(s) of the chest.    COMPARISON:  Radiography 07/18/2022    FINDINGS:  Irregular bilateral opacities have similar appearance.  Stable positioning of the endotracheal tube and visualized enteric tube.  No pleural effusion or pneumothorax.  Unchanged cardiac silhouette.  Impression: Little interval change.    Electronically signed by: Brian Lucas  Date:    07/19/2022  Time:    06:07          Assessment and Plan    Assessment:  -severe cervical stenosis, post left C3-4, C4-5, C5-6, C6-7 posterior foraminotomies, C3-C7 laminoplasty, and repair of dural tear on 07/12  -episode decreased level consciousness/altered mental status on  07/14 requiring intubation with significant hypoxemic respiratory failure, extubated on 07/17  -intubated on 07/18 for continued alteration mental status and severe hypoxemic respiratory failure  -MRI brain MRA neck, EEG without significant findings of stroke or seizure activity  -Enterobacter pneumonia          Plan:  -supplement oxygen to maintain saturation 94-96%  -titrate mechanical ventilation for ARDS net protocol  -continue on levofloxacin for Enterobacter pneumonia, chest x-ray this morning demonstrates bibasilar perihilar opacities consistent with presentation status post bronchoscopy, continue with antibiotics for now, currently afebrile without significant leukocytosis, await final culture data from bronchoscopy aspirate, currently on levofloxacin finish a total of 7 days  -initiate trickle tube feeds and advance to goal as tolerated  -for planned extubation when the patient's oxygen requirements are more stable and his mental status has returned to baseline  -plan for lumbar puncture today with Interventional Radiology, could not perform bedside secondary to patient having prior lumbar spinal fusion        DVT ppx/tx with lovenox  GI ppx with Pepcid  Keep HOB elevated > 30*      The patient remains at high risk of decompensation and death and will remain in ICU level care    > 36 min was spent reviewing the patient's chart including medications, radiographs, labs, pertinent cultures and pathology data, other consultant notes/recomendations as well as discussion of goals of care with nursing staff, respiratory therapy at the bedside as well as with family at the bedside/via phone       Randal Hummel MD  7/19/2022  Pulmonology/Critical Care

## 2022-07-19 NOTE — PT/OT/SLP RE-EVAL
Occupational Therapy   Re-evaluation    Name: Scott Echeverria  MRN: [id number]  Admitting Diagnosis:  C3-C7 laminoplasty, encephalopathy   Recent Surgery: Procedure(s) (LRB):  LAMINOPLASTY (N/A) 7 Days Post-Op    Recommendations:     Discharge Recommendations: other (see comments) (TBD pending progress c therapy and improvement in medical status)  Discharge Equipment Recommendations:  other (see comments) (TBD pending progress)  Barriers to discharge:  Other (Comment) (Severity of deficits)    Assessment:     Scott Echeverria is a 71 y.o. male with a medical diagnosis of C3-C7 laminoplasty, encephalopathy. OT re-eval following respiratory failure requiring reintubation. Performance deficits affecting function are weakness, decreased upper extremity function, impaired endurance, impaired balance, impaired self care skills, pain, orthopedic precautions, impaired functional mobility.  Pt presented c improved command following during today's session. Pt would benefit from continued skilled therapy services in order to improve strength, safety and IND c ADLs.     Rehab Prognosis:  Fair/Good; patient would benefit from acute skilled OT services to address these deficits and reach maximum level of function.       Plan:     Patient to be seen 5 x/week to address the above listed problems via self-care/home management, therapeutic activities, therapeutic exercises, neuromuscular re-education  · Plan of Care Expires: 08/02/22  · Plan of Care Reviewed with: patient    Subjective     Chief Complaint: No complaints verbalized   Patient/Family stated goals: To return to PLOF   Communicated with: RN prior to session.  Pain/Comfort:  Pain Rating 1: 0/10    Objective:     Communicated with: RN prior to session.  Patient found supine with: blood pressure cuff, peripheral IV, SCD, ventilator, pressure relief boots, pulse ox (continuous), wrist restraints upon OT entry to room.    General Precautions: Standard, fall   Orthopedic  Precautions:spinal precautions   Braces: N/A  Respiratory Status: Oxymask- 4L  BP: 134/77  HR: 64bpm  O2 sats: 99%  Vent Settings:  Volume A/C  40% FiO2  PEEP 10  Occupational Performance:    Bed Mobility:    · Patient completed Supine to Sit with total assistance  · Patient completed Sit to Supine with total assistance   · Total A for long sit in bed; Pt presented c good heaf control     Activities of Daily Living:  · Lower Body Dressing: total assistance Don socks    Cognitive/Visual Perceptual:  Cognitive/Psychosocial Skills:     -       Follows Commands/attention:Easily distracted and Follows one-step commands  -       Safety awareness/insight to disability: impaired   -       Mood/Affect/Coping skills/emotional control: Cooperative    Physical Exam:  Pt c difficulty following commands for formal MMT- able to squeeze R and L hand on command. Followed commands for RUE AROM and LUE AAROM.   Pt presents c L sided inattention.     Treatment & Education:    Patient left supine with all lines intact and call button in reach. Pt cleared by NSGY for therapy.     GOALS:   Multidisciplinary Problems     Occupational Therapy Goals        Problem: Occupational Therapy    Goal Priority Disciplines Outcome Interventions   Occupational Therapy Goal     OT, PT/OT Ongoing, Progressing    Description: Goals to be met by: 7/27    Patient will increase functional independence with ADLs by performing:    UE Dressing with SBA.  LE Dressing with SBA.  Grooming while seated EOB with SBA.  [place] [facility] [place] and clothing management.   [place] [facility] [place]  Upper extremity exercise program 10 reps per handout, with assistance as needed     Pt c medical change in status. Goals updated to reflect pt function.                      History:     Past Medical History:   Diagnosis Date    BPH (benign prostatic hyperplasia)     Cervical pain     Cervical radiculitis     Cervical spinal stenosis      Hypothyroidism, unspecified     Sleep apnea     resolved since surgery         Past Surgical History:   Procedure Laterality Date    APPENDECTOMY  01/25/2022    Dr. Kerry Coats    COLONOSCOPY  2021    HEMORRHOID SURGERY  2002    lower back surgery  1990    RETINAL DETACHMENT SURGERY Right 2015    SINUS SURGERY      TONSILLECTOMY  1956       Time Tracking:     OT Date of Treatment: 07/19/22  OT Start Time: 0835  OT Stop Time: 0845  OT Total Time (min): 10 min    Billable Minutes:Re-eval 1 unit    7/19/2022

## 2022-07-20 LAB
BACTERIA BLD CULT: NORMAL
BACTERIA SPEC CULT: ABNORMAL
CRYPTOC AG CSF QL IA.RAPID: NEGATIVE
CRYPTOC AG TITR CSF IA: NORMAL {TITER}
CRYPTOCOCCUS NEOFORMANS/GATTII (PREMIER): NOT DETECTED
CYTOMEGALOVIRUS (PREMIER): NOT DETECTED
ENTEROVIRUS (PREMIER): NOT DETECTED
ESCHERICHIA COLI K1 (PREMIER): NOT DETECTED
GRAM STN SPEC: ABNORMAL
HAEMOPHILUS INFLUENZAE (PREMIER): NOT DETECTED
HERPES SIMPLEX VIRUS 1 (PREMIER): NOT DETECTED
HERPES SIMPLEX VIRUS 2 (PREMIER): NOT DETECTED
HUMAN HERPESVIRUS 6 (PREMIER): NOT DETECTED
HUMAN PARECHOVIRUS (PREMIER): NOT DETECTED
INDIA INK PREP CSF: NEGATIVE
LISTERIA MONOCYTOGENES (PREMIER): NOT DETECTED
M AVIUM PARATB TISS QL ZN STN: NORMAL
NEISSERIA MENINGITIDIS (PREMIER): NOT DETECTED
PCO2 BLDA: 40 MMHG
PH SMN: 7.46 [PH] (ref 7.35–7.45)
PO2 BLDA: 111 MMHG
POC BASE DEFICIT: 4.2 MMOL/L
POC HCO3: 28.4 MMOL/L
POC IONIZED CALCIUM: 1.11 MMOL/L (ref 1.12–1.23)
POC SATURATED O2: 99 %
POC TEMPERATURE: 37 C
POTASSIUM BLD-SCNC: 3.2 MMOL/L
RHODAMINE-AURAMINE STN SPEC: NORMAL
SODIUM BLD-SCNC: 141 MMOL/L
SPECIMEN SOURCE: ABNORMAL
STREPTOCOCCUS AGALACTIAE (PREMIER): NOT DETECTED
STREPTOCOCCUS PNEUMONIAE (PREMIER): NOT DETECTED
VARICELLA ZOSTER VIRUS (PREMIER): NOT DETECTED

## 2022-07-20 PROCEDURE — 25000003 PHARM REV CODE 250: Performed by: PSYCHIATRY & NEUROLOGY

## 2022-07-20 PROCEDURE — 99900031 HC PATIENT EDUCATION (STAT)

## 2022-07-20 PROCEDURE — 99233 PR SUBSEQUENT HOSPITAL CARE,LEVL III: ICD-10-PCS | Mod: 95,,, | Performed by: PSYCHIATRY & NEUROLOGY

## 2022-07-20 PROCEDURE — 99900017 HC EXTUBATION W/PARAMETERS (STAT)

## 2022-07-20 PROCEDURE — 63600175 PHARM REV CODE 636 W HCPCS: Performed by: PHYSICIAN ASSISTANT

## 2022-07-20 PROCEDURE — 97530 THERAPEUTIC ACTIVITIES: CPT

## 2022-07-20 PROCEDURE — 99900026 HC AIRWAY MAINTENANCE (STAT)

## 2022-07-20 PROCEDURE — 25000242 PHARM REV CODE 250 ALT 637 W/ HCPCS: Performed by: NURSE PRACTITIONER

## 2022-07-20 PROCEDURE — 20000000 HC ICU ROOM

## 2022-07-20 PROCEDURE — 94668 MNPJ CHEST WALL SBSQ: CPT

## 2022-07-20 PROCEDURE — 63600175 PHARM REV CODE 636 W HCPCS: Performed by: NURSE PRACTITIONER

## 2022-07-20 PROCEDURE — 97535 SELF CARE MNGMENT TRAINING: CPT

## 2022-07-20 PROCEDURE — 94003 VENT MGMT INPAT SUBQ DAY: CPT

## 2022-07-20 PROCEDURE — 25000003 PHARM REV CODE 250: Performed by: HOSPITALIST

## 2022-07-20 PROCEDURE — 82803 BLOOD GASES ANY COMBINATION: CPT

## 2022-07-20 PROCEDURE — 94761 N-INVAS EAR/PLS OXIMETRY MLT: CPT

## 2022-07-20 PROCEDURE — 25000003 PHARM REV CODE 250: Performed by: INTERNAL MEDICINE

## 2022-07-20 PROCEDURE — 63600175 PHARM REV CODE 636 W HCPCS: Performed by: INTERNAL MEDICINE

## 2022-07-20 PROCEDURE — 25000003 PHARM REV CODE 250: Performed by: STUDENT IN AN ORGANIZED HEALTH CARE EDUCATION/TRAINING PROGRAM

## 2022-07-20 PROCEDURE — 99024 POSTOP FOLLOW-UP VISIT: CPT | Mod: POP,,, | Performed by: NURSE PRACTITIONER

## 2022-07-20 PROCEDURE — 36600 WITHDRAWAL OF ARTERIAL BLOOD: CPT

## 2022-07-20 PROCEDURE — 27200966 HC CLOSED SUCTION SYSTEM

## 2022-07-20 PROCEDURE — 99233 SBSQ HOSP IP/OBS HIGH 50: CPT | Mod: 95,,, | Performed by: PSYCHIATRY & NEUROLOGY

## 2022-07-20 PROCEDURE — 63600175 PHARM REV CODE 636 W HCPCS: Performed by: PSYCHIATRY & NEUROLOGY

## 2022-07-20 PROCEDURE — 63600175 PHARM REV CODE 636 W HCPCS: Performed by: STUDENT IN AN ORGANIZED HEALTH CARE EDUCATION/TRAINING PROGRAM

## 2022-07-20 PROCEDURE — 94640 AIRWAY INHALATION TREATMENT: CPT

## 2022-07-20 PROCEDURE — 99900035 HC TECH TIME PER 15 MIN (STAT)

## 2022-07-20 PROCEDURE — 27000221 HC OXYGEN, UP TO 24 HOURS

## 2022-07-20 PROCEDURE — 99024 PR POST-OP FOLLOW-UP VISIT: ICD-10-PCS | Mod: POP,,, | Performed by: NURSE PRACTITIONER

## 2022-07-20 RX ORDER — VANCOMYCIN HCL IN 5 % DEXTROSE 1G/250ML
1000 PLASTIC BAG, INJECTION (ML) INTRAVENOUS
Status: DISCONTINUED | OUTPATIENT
Start: 2022-07-20 | End: 2022-07-21

## 2022-07-20 RX ORDER — ACETAMINOPHEN 10 MG/ML
1000 INJECTION, SOLUTION INTRAVENOUS ONCE
Status: COMPLETED | OUTPATIENT
Start: 2022-07-20 | End: 2022-07-20

## 2022-07-20 RX ADMIN — VANCOMYCIN HYDROCHLORIDE 1000 MG: 1 INJECTION, POWDER, LYOPHILIZED, FOR SOLUTION INTRAVENOUS at 11:07

## 2022-07-20 RX ADMIN — LEVOFLOXACIN 500 MG: 5 INJECTION, SOLUTION INTRAVENOUS at 11:07

## 2022-07-20 RX ADMIN — ACETYLCYSTEINE 4 ML: 200 SOLUTION ORAL; RESPIRATORY (INHALATION) at 08:07

## 2022-07-20 RX ADMIN — FAMOTIDINE 20 MG: 10 INJECTION INTRAVENOUS at 08:07

## 2022-07-20 RX ADMIN — GUAIFENESIN 400 MG: 200 SOLUTION ORAL at 12:07

## 2022-07-20 RX ADMIN — CEFTRIAXONE SODIUM 2 G: 2 INJECTION, POWDER, FOR SOLUTION INTRAMUSCULAR; INTRAVENOUS at 11:07

## 2022-07-20 RX ADMIN — ACYCLOVIR SODIUM 710 MG: 500 INJECTION, SOLUTION INTRAVENOUS at 08:07

## 2022-07-20 RX ADMIN — ACYCLOVIR SODIUM 710 MG: 500 INJECTION, SOLUTION INTRAVENOUS at 05:07

## 2022-07-20 RX ADMIN — FENTANYL CITRATE 100 MCG: 50 INJECTION INTRAMUSCULAR; INTRAVENOUS at 01:07

## 2022-07-20 RX ADMIN — GUAIFENESIN 400 MG: 200 SOLUTION ORAL at 08:07

## 2022-07-20 RX ADMIN — ACETYLCYSTEINE 4 ML: 200 SOLUTION ORAL; RESPIRATORY (INHALATION) at 02:07

## 2022-07-20 RX ADMIN — GUAIFENESIN 400 MG: 200 SOLUTION ORAL at 03:07

## 2022-07-20 RX ADMIN — DEXMEDETOMIDINE HYDROCHLORIDE 1 MCG/KG/HR: 400 INJECTION INTRAVENOUS at 01:07

## 2022-07-20 RX ADMIN — ENOXAPARIN SODIUM 40 MG: 40 INJECTION SUBCUTANEOUS at 05:07

## 2022-07-20 RX ADMIN — FENTANYL CITRATE 100 MCG: 50 INJECTION INTRAMUSCULAR; INTRAVENOUS at 05:07

## 2022-07-20 RX ADMIN — ACYCLOVIR SODIUM 710 MG: 500 INJECTION, SOLUTION INTRAVENOUS at 03:07

## 2022-07-20 RX ADMIN — GUAIFENESIN 400 MG: 200 SOLUTION ORAL at 04:07

## 2022-07-20 RX ADMIN — DEXMEDETOMIDINE HYDROCHLORIDE 1 MCG/KG/HR: 400 INJECTION INTRAVENOUS at 08:07

## 2022-07-20 RX ADMIN — GUAIFENESIN 400 MG: 200 SOLUTION ORAL at 11:07

## 2022-07-20 RX ADMIN — LEVOTHYROXINE SODIUM 50 MCG: 50 TABLET ORAL at 08:07

## 2022-07-20 RX ADMIN — FENTANYL CITRATE 100 MCG: 50 INJECTION INTRAMUSCULAR; INTRAVENOUS at 02:07

## 2022-07-20 RX ADMIN — FENTANYL CITRATE 100 MCG: 50 INJECTION INTRAMUSCULAR; INTRAVENOUS at 12:07

## 2022-07-20 RX ADMIN — ACETAMINOPHEN 1000 MG: 10 INJECTION, SOLUTION INTRAVENOUS at 09:07

## 2022-07-20 NOTE — PROGRESS NOTES
Pulmonary & Critical Care Medicine   Progress Note      Presenting History/HPI:  This is a 72 y/o male with history of cervical spinal stenosis, hypothyroidism, admitted 07/12/22 for C3-C7 laminiopasty and laminoforminotomies. On postoperative day 1, he was reported as working with PT, getting out of the bed to the chair.  However, the morning of postop day 2, the patient's wife noted that patient started to have slurred speech.  He had received Norco an hour or 2 prior to that, which he had been receiving q4h since surgery. He also received IV robaxin prior to onset of aphasia.  A code stroke was called. CT brain without contrast revealed no acute abnormal findings.  MRI and MRA brain on 07/15/2022 at 1000hrs was noted significantly limited by motion artifact with no obvious LVO, no acute intracranial abnormalities, and mild chronic microvascular ischemic changes.   For the MRI, the patient did require Ativan administration, and he was noted to have a scopolamine patch in place was was discontinued 07/15/22 at 0850hrs.  He also did reportedly began to develop some left-sided neglect.  He was then reported as having significant desaturation with near agonal respiratory effort.  Flumazenil was given on the floor.  Ventilation with BVM was initiated. Stat ICU consultation was then obtained and the patient was noted obtunded, not arousable to verbal or painful stimuli at time of intensivist arrival to floor hospital room.  He was intubated and transferred to ICU on mechanical ventilatory support. He tolerated extubation on 7/18/22; the patient has been spiking temps.  Sputum culture did come back positive for Enterobacter Cloacae; resistant to cephalosporins.     Interval History:  -the patient was intubated on 07/18 secondary to progressive hypoxia and a decrease in level of consciousness   -patient also underwent bronchoscopy on 07/18 after intubation with no significant findings of copious secretions except for  in the right upper lobe and some scant scattered secretions bilaterally  -no major issues or changes overnight- looks more awake today; currently on CPAP- Abgs Will be obtained later this Am and will consider possible extubation.     Scheduled Medications:    acetylcysteine 200 mg/ml (20%)  4 mL Nebulization TID    acyclovir  10 mg/kg (Ideal) Intravenous Q8H    cefTRIAXone (ROCEPHIN) IVPB  2 g Intravenous Q12H    enoxaparin  40 mg Subcutaneous Daily    famotidine (PF)  20 mg Intravenous Q12H    guaiFENesin 100 mg/5 ml  400 mg Per NG tube Q4H    levoFLOXacin  500 mg Intravenous Q24H    levothyroxine  50 mcg Per NG tube Daily    tamsulosin  0.4 mg Oral Daily    vancomycin (VANCOCIN) IVPB  1,000 mg Intravenous Q12H       PRN Medications:   acetaminophen, aluminum-magnesium hydroxide-simethicone, bisacodyL, fentaNYL, hydrALAZINE, labetaloL, ondansetron, prochlorperazine, senna-docusate 8.6-50 mg, sodium chloride 0.9%, Pharmacy to dose Vancomycin consult **AND** vancomycin - pharmacy to dose    Infusions:     dexmedetomidine (PRECEDEX) infusion 1 mcg/kg/hr (07/20/22 0508)    NORepinephrine bitartrate-D5W 0 mcg/kg/min (07/16/22 0935)       Fluid Balance:     Intake/Output Summary (Last 24 hours) at 7/20/2022 0954  Last data filed at 7/20/2022 0508  Gross per 24 hour   Intake 1456.07 ml   Output 610 ml   Net 846.07 ml       Vital Signs:   Vitals:    07/20/22 0845   BP:    Pulse: 67   Resp: 20   Temp:        Physical Exam  Vitals and nursing note reviewed.   Constitutional:       General: He is not in acute distress.     Comments: Intubated on the vent. NAD    HENT:      Head: Normocephalic and atraumatic.      Mouth/Throat:      Comments: Unable to visualize posterior oropharynx secondary to endotracheal tube  Cardiovascular:      Rate and Rhythm: Normal rate and regular rhythm.   Pulmonary:      Effort: Pulmonary effort is normal. No respiratory distress.      Breath sounds: Normal breath sounds. No wheezing,  rhonchi or rales.      Comments: Intubated, on mechanical ventilation  Abdominal:      General: Bowel sounds are normal. There is no distension.      Palpations: Abdomen is soft.   Musculoskeletal:      Cervical back: Neck supple.   Skin:     General: Skin is warm and dry.   Neurological:      Comments: Unable to fully assess neurologic status and orientation secondary to patient being intubated; however is currently able to follow commands with all ext.    Psychiatric:      Comments: Unable to fully assess as patient is intubated and nonverbal       Laboratory Studies:   No results for input(s): PH, PCO2, PO2, HCO3, POCSATURATED, BE in the last 24 hours.  No results for input(s): WBC, RBC, HGB, HCT, PLT, MCV, MCH, MCHC in the last 24 hours.  No results for input(s): GLUCOSE, NA, K, CL, CO2, BUN, CREATININE, MG in the last 24 hours.    Invalid input(s):  CALCIUM      Microbiology Data:   Microbiology Results (last 7 days)     Procedure Component Value Units Date/Time    Loren Ink Prep CSF [[id number]] Collected: 07/19/22 1130    Order Status: Completed Specimen: CSF (Spinal Fluid) Updated: 07/20/22 0802     LOREN INK PREP CSF ([facility]) Negative    Cerebrospinal Fluid Culture [[id number]] Collected: 07/19/22 1130    Order Status: Completed Specimen: CSF (Spinal Fluid) from Cerebrospinal Fluid Updated: 07/20/22 0746     CULTURE, CSF ([facility]) No Growth At 24 Hours     GRAM STAIN No WBCs, No bacteria seen     Cryptococcal antigen, CSF [[id number]] Collected: 07/19/22 1130    Order Status: Sent Specimen: CSF (Spinal Fluid) Updated: 07/20/22 0732    Cerebrospinal Fluid Culture [[id number]] Collected: 07/19/22 1130    Order Status: Canceled Specimen: CSF (Spinal Fluid) Updated: 07/20/22 0732    AFB Smear [[id number]] Collected: 07/19/22 1130    Order Status: Completed Specimen: CSF (Spinal Fluid) from Cerebrospinal Fluid Updated: 07/20/22 0712     AFB Smear No AFB seen (Direct smear only)    Blood Culture [[id number]]  (Normal)  "Collected: 07/15/22 1959    Order Status: Completed Specimen: Blood from Antecubital, Left Updated: 07/19/22 2301     CULTURE, BLOOD ([facility]) No Growth At 96 Hours    Blood Culture [[id number]]  (Normal) Collected: 07/17/22 1013    Order Status: Completed Specimen: Blood Updated: 07/19/22 1200     CULTURE, BLOOD ([facility]) No Growth At 48 Hours    Blood Culture [[id number]]  (Normal) Collected: 07/17/22 1013    Order Status: Completed Specimen: Blood Updated: 07/19/22 1200     CULTURE, BLOOD ([facility]) No Growth At 48 Hours    Mycobacteria and Nocardia Culture [[id number]] Collected: 07/19/22 1130    Order Status: Sent Specimen: CSF (Spinal Fluid) from Cerebrospinal Fluid Updated: 07/19/22 1153    Respiratory Culture [[id number]]  (Abnormal) Collected: 07/18/22 1501    Order Status: Completed Specimen: Bronchial Alveolar Lavage from BAL, RUL Updated: 07/19/22 0822     Respiratory Culture Rare normal respiratory pam     GRAM STAIN Many WBC seen      Rare Gram positive cocci      Rare Gram Positive Rods    Respiratory Culture [[id number]]  (Abnormal)  (Susceptibility) Collected: 07/16/22 1645    Order Status: Completed Specimen: Sputum from Trachael Aspirate Updated: 07/18/22 0727     Respiratory Culture Many Enterobacter cloacae     Comment: with normal respiratory pam        GRAM STAIN Quality 2+       Many Gram Positive Rods      Many Gram positive cocci      Moderate Gram Negative Rods          Imaging:   IR LUMBAR PUNCTURE DIAGNOSTIC WITH IMAGING  Narrative: EXAMINATION:  IR LUMBAR PUNCTURE DIAGNOSTIC WITH IMAGING    CLINICAL HISTORY:  ; recent cervical surgery, needs an LP, prior lumbar spinal fusion; AMS;    TECHNIQUE:  The procedure and indication(s) were explained to the patient's family member (secondary to patient altered mental status), to include discussion of risks, benefits, and alternatives. Written informed consent was obtained once all questions/concerns had been addressed to satisfaction.    A "time out" was then " "performed in standard fashion, with all participating members of the procedure team present.  The patient was placed in prone position on the fluoroscopy table. Intermittent fluoroscopy was utilized to determine/chelsea the optimal access site. The lower back was prepped and draped in usual sterile fashion. Local anesthesia was achieved at the selected site using approximately 4 mL 1% lidocaine.    COMPARISON:  Pertinent imaging of the head was reviewed prior to start of the procedure (noncontrast head CT dated 17 July 2022).    FINDINGS:  With fluoroscopic assistance, a 20-ga spinal needle was advanced toward the spinal canal. The needle was inserted into the thecal sac at the L4-5 level utilizing right parasagittal interlaminar approach, as confirmed via spontaneous flow of CSF.    Approximately 8.5 mL of initially blood tinged, but subsequently clear yellowish CSF was collected and sent for labs, as ordered by the consulting service.  The needle was then removed and hemostasis achieved with pressure at the skin puncture site.    Opening pressure: 24 cm H2O    Closing pressure: 17 cm H2O    Estimated blood loss: negligible  Impression: 1.  Technically difficult, but successful lumbar puncture.  Initial blood tinged CSF likely secondary to mildly traumatic tap.    2.  Approximately 8.5 mL yellowish CSF obtained and sent for further evaluation.    3.  Opening pressure: 24 cm H2O  //  Closing pressure: 17 cm H2O.    PATIENT STATUS    The patient tolerated the procedure well, with no immediate complications appreciated. The patient was monitored for a short period following procedure completion to ensure no change from baseline condition noted upon arrival to the Radiology Department, with subsequent return to inpatient bed for further care by primary team.    Exposure information    Lowest-rate pulsed fluoroscopy and "last image capture" technique were utilized in order to minimize radiation exposure.    Fluoro time: " 1.1 minute    DAP: 12.3 Gy x m^2    Dose: 11.6 mGy    Number of images: 3    Electronically signed by: Cesar Tang  Date:    07/19/2022  Time:    13:15  X-Ray Chest 1 View  Narrative: EXAMINATION:  XR CHEST 1 VIEW    CLINICAL HISTORY:  rule out pneumonia;Aphasia    TECHNIQUE:  Frontal view(s) of the chest.    COMPARISON:  Radiography 07/18/2022    FINDINGS:  Irregular bilateral opacities have similar appearance.  Stable positioning of the endotracheal tube and visualized enteric tube.  No pleural effusion or pneumothorax.  Unchanged cardiac silhouette.  Impression: Little interval change.    Electronically signed by: Brian Lucas  Date:    07/19/2022  Time:    06:07      Assessment and Plan    Assessment:  1. severe cervical stenosis, post left C3-4, C4-5, C5-6, C6-7 posterior foraminotomies, C3-C7 laminoplasty, and repair of dural tear on 07/12  2. episode decreased level consciousness/altered mental status on 07/14 requiring intubation with significant hypoxemic respiratory failure, extubated on 07/17  3. severe hypoxemic respiratory failure  4. Altered mentation - MRI brain MRA neck, EEG without significant findings of stroke or seizure activity  5. Enterobacter pneumonia    Plan:  1. Continue attempts to wean from the vent. Defer extubation to Dr. Hummel,   2. LP studies pending. Will need to monitor   3. Continue on Levofloxacin for Enterobacter pneumonia  4. Monitor cultures from bronch   5. continue trickle tube feeds and advance to goal as tolerated   6. If extubated will need SLP to evaluate       DVT ppx/tx with lovenox  GI ppx with Pepcid  Keep HOB elevated > 30*    ENEDINA Irene  7/20/2022  Pulmonology/Critical Care

## 2022-07-20 NOTE — PT/OT/SLP PROGRESS
Occupational Therapy  Treatment    Scott Echeverria   MRN: [id number]   Admitting Diagnosis: C3-C7 laminoplasty, encephalopathy   OT Date of Treatment: 07/20/22   OT Start Time: 1410  OT Stop Time: 1425  OT Total Time (min): 15 min     Billable Minutes:  Self Care/Home Management 1 unit  Total Minutes: 15 minutes        OT/TK: OT     TK Visit Number: 1    General Precautions: Standard, fall  Orthopedic Precautions: spinal precautions    Spiritual, Cultural Beliefs, Rastafarian Practices, Values that Affect Care: no    Subjective:  Communicated with RN prior to session.    Pain/Comfort  Pain Rating 1: 0/10    Objective:  Patient found with: oxygen, pulse ox (continuous), telemetry, blood pressure cuff, preciado catheter, SCD, pressure relief boots    Functional Mobility:  Bed Mobility:   Supine to sit: Moderate Assistance   Sit to supine: Maximum Assistance   Rolling: Activity did not occur   Scooting: Moderate Assistance    Transfer Training:   Sit to stand:Moderate Assistance with HHA . Able to take side step along EOB c Mod A.     Balance:   Min A/SBA for sitting balance at EOB.   Additional Treatment:  Pt demonstrated RUE strength WNL. Able to perform A/AAROM of LUE  Patient left HOB elevated with all lines intact and call button in reach    ASSESSMENT:    Rehab potential is good    Activity tolerance: Good    Discharge recommendations: rehabilitation facility     Equipment recommendations:  (TBD pending progress c therapy)     GOALS:   Multidisciplinary Problems     Occupational Therapy Goals        Problem: Occupational Therapy    Goal Priority Disciplines Outcome Interventions   Occupational Therapy Goal     OT, PT/OT Ongoing, Progressing    Description: Goals to be met by: 7/27    Patient will increase functional independence with ADLs by performing:    UE Dressing with SBA.  LE Dressing with SBA.  Grooming while seated EOB with SBA.  [place] [facility] [place] for hygiene and clothing management.   Toilet  transfer to [facility] with SBA.  Upper extremity exercise program 10 reps per handout, with assistance as needed                      Plan:  Patient to be seen 5 x/week, daily to address the above listed problems via self-care/home management, therapeutic activities, therapeutic exercises  Plan of Care expires: 08/03/22  Plan of Care reviewed with: patient         07/20/2022

## 2022-07-20 NOTE — PLAN OF CARE
Problem: Adult Inpatient Plan of Care  Goal: Plan of Care Review  Outcome: Ongoing, Progressing  Goal: Patient-Specific Goal (Individualized)  Outcome: Ongoing, Progressing  Goal: Absence of Hospital-Acquired Illness or Injury  Outcome: Ongoing, Progressing  Goal: Optimal Comfort and Wellbeing  Outcome: Ongoing, Progressing  Goal: Readiness for Transition of Care  Outcome: Ongoing, Progressing     Problem: Fall Injury Risk  Goal: Absence of Fall and Fall-Related Injury  Outcome: Ongoing, Progressing     Problem: Pain Acute  Goal: Acceptable Pain Control and Functional Ability  Outcome: Ongoing, Progressing     Problem: Skin Injury Risk Increased  Goal: Skin Health and Integrity  Outcome: Ongoing, Progressing     Problem: Communication Impairment (Mechanical Ventilation, Invasive)  Goal: Effective Communication  Outcome: Ongoing, Progressing     Problem: Device-Related Complication Risk (Mechanical Ventilation, Invasive)  Goal: Optimal Device Function  Outcome: Ongoing, Progressing     Problem: Inability to Wean (Mechanical Ventilation, Invasive)  Goal: Mechanical Ventilation Liberation  Outcome: Ongoing, Progressing     Problem: Nutrition Impairment (Mechanical Ventilation, Invasive)  Goal: Optimal Nutrition Delivery  Outcome: Ongoing, Progressing     Problem: Skin and Tissue Injury (Mechanical Ventilation, Invasive)  Goal: Absence of Device-Related Skin and Tissue Injury  Outcome: Ongoing, Progressing     Problem: Ventilator-Induced Lung Injury (Mechanical Ventilation, Invasive)  Goal: Absence of Ventilator-Induced Lung Injury  Outcome: Ongoing, Progressing     Problem: Communication Impairment (Artificial Airway)  Goal: Effective Communication  Outcome: Ongoing, Progressing     Problem: Device-Related Complication Risk (Artificial Airway)  Goal: Optimal Device Function  Outcome: Ongoing, Progressing     Problem: Skin and Tissue Injury (Artificial Airway)  Goal: Absence of Device-Related Skin or Tissue  Injury  Outcome: Ongoing, Progressing     Problem: Noninvasive Ventilation Acute  Goal: Effective Unassisted Ventilation and Oxygenation  Outcome: Ongoing, Progressing     Problem: Infection  Goal: Absence of Infection Signs and Symptoms  Outcome: Ongoing, Progressing     Problem: Restraint, Behavioral (Acute Care)  Goal: Absence of Harm or Injury  Outcome: Ongoing, Progressing     Problem: Restraint, Nonbehavioral (Nonviolent)  Goal: Absence of Harm or Injury  Outcome: Ongoing, Progressing

## 2022-07-20 NOTE — PROGRESS NOTES
[facility]  Neurosurgery  Progress Note    Subjective:     Interval History: Patient has been extubated. Family is at bedside during rounds. Patient awake and alert. C/o some posterior neck pain/spasms, as expected post-op. Started on coverage for possible meningitis per neurology    History of Present Illness: Mr Echeverria is a 71-year-old male with past medical history of BPH, cervical spine stenosis, hypothyroidism, presented to [facility] on 7/12 and underwent C3-7 laminoplasty and left C3-7 foraminotomies. On 7/14, RRT was activated due to neuro change.  Per wife, patient had pain medication around 4:30am and then sat in a chair at the bedside.  Wife reports he was at his baseline at that time.  Just before 6am, he attempted to ask his wife a question and she noticed he had slurred speech.  RRT activated ... patient's speech worsened and he became aphasic.  Stroke alert activated ... stat Cth was unremarkable.  No recommendation for IV tPA due to recent surgery.  Stat MRI brain negative for acute infarct.  EEG with diffuse slowing.      Post-Op Info:  Procedure(s) (LRB):  LAMINOPLASTY (N/A)   8 Days Post-Op     C3-7 laminoplasty, left C3-4, C4-5, C5-6, C6-7 foraminotomies on 7/12/2022      Medications:  Continuous Infusions:   dexmedetomidine (PRECEDEX) infusion 1 mcg/kg/hr (07/20/22 0808)    NORepinephrine bitartrate-D5W 0 mcg/kg/min (07/16/22 0935)     Scheduled Meds:   acetylcysteine 200 mg/ml (20%)  4 mL Nebulization TID    acyclovir  10 mg/kg (Ideal) Intravenous Q8H    cefTRIAXone (ROCEPHIN) IVPB  2 g Intravenous Q12H    enoxaparin  40 mg Subcutaneous Daily    famotidine (PF)  20 mg Intravenous Q12H    guaiFENesin 100 mg/5 ml  400 mg Per NG tube Q4H    levoFLOXacin  500 mg Intravenous Q24H    levothyroxine  50 mcg Per NG tube Daily    tamsulosin  0.4 mg Oral Daily    vancomycin (VANCOCIN) IVPB  1,000 mg Intravenous Q12H     PRN Meds:acetaminophen, aluminum-magnesium  hydroxide-simethicone, bisacodyL, fentaNYL, hydrALAZINE, labetaloL, ondansetron, prochlorperazine, senna-docusate 8.6-50 mg, sodium chloride 0.9%, Pharmacy to dose Vancomycin consult **AND** vancomycin - pharmacy to dose     Review of Systems  Objective:     Weight: 75.7 kg (166 lb 14.2 oz)  Body mass index is 24.63 kg/m².  Vital Signs (Most Recent):  Temp: 98.7 °F (37.1 °C) (07/20/22 1200)  Pulse: 73 (07/20/22 1335)  Resp: (!) 24 (07/20/22 1335)  BP: (!) 141/73 (07/20/22 1300)  SpO2: 96 % (07/20/22 1335) Vital Signs (24h Range):  Temp:  [98 °F (36.7 °C)-99 °F (37.2 °C)] 98.7 °F (37.1 °C)  Pulse:  [47-84] 73  Resp:  [20-28] 24  SpO2:  [94 %-100 %] 96 %  BP: (116-161)/(60-87) 141/73     Date 07/20/22 0700 - 07/21/22 0659   Shift 8564-1982 5049-1619 1587-1909 24 Hour Total   INTAKE   Shift Total(mL/kg)       OUTPUT   Urine(mL/kg/hr) 475   475   Shift Total(mL/kg) 475(6.3)   475(6.3)   Weight (kg) 75.7 75.7 75.7 75.7                   NG/OG Tube 07/14/22 1600 King sump Right nostril (Active)   Placement Check placement verified by aspirate characteristics 07/18/22 0400   Distal Tube Length (cm) 65 07/17/22 2000   Tolerance no signs/symptoms of discomfort 07/18/22 0400   Securement secured to commercial device 07/18/22 0400   Clamp Status/Tolerance clamped 07/18/22 0400   Suction Setting/Drainage Method low;intermittent setting 07/17/22 0800   Insertion Site Appearance no redness, warmth, tenderness, skin breakdown, drainage 07/18/22 0400   Drainage Bile 07/17/22 0800   Flush/Irrigation flushed w/;water;no resistance met 07/17/22 1600   Feeding Type continuous;by pump 07/16/22 1200   Feeding Action feeding held 07/18/22 0400   Current Rate (mL/hr) 30 mL/hr 07/16/22 0800   Goal Rate (mL/hr) 70 mL/hr 07/16/22 0800   Intake (mL) 202 mL 07/16/22 0617   Water Bolus (mL) 100 mL 07/16/22 0617   Tube Output(mL)(Include Discarded Residual) 400 mL 07/16/22 1600   Formula Name Peptamen AF 07/16/22 1200            Urethral  "Catheter 07/14/22 1600 Double-lumen 16 Fr. (Active)   $ Puri Insertion Bedside Insertion Performed 07/14/22 1600   Site Assessment Clean;Intact 07/18/22 0400   Collection Container Urimeter 07/18/22 0400   Securement Method secured to top of thigh w/ adhesive device 07/18/22 0400   Catheter Care Performed yes 07/18/22 0400   Reason for Continuing Urinary Catheterization Critically ill in ICU and requiring hourly monitoring of intake/output 07/18/22 0400   CAUTI Prevention Bundle Securement Device in place with 1" slack;Intact seal between catheter & drainage tubing;Drainage bag/urimeter off the floor;Sheeting clip in use;No dependent loops or kinks;Drainage bag/urimeter not overfilled (<2/3 full);Drainage bag/urimeter below bladder 07/17/22 2000   Output (mL) 80 mL 07/18/22 0800       Neurosurgery Physical Exam  He is awake and alert  Follows commands with all extremities.  Lifts left leg off of bed without difficulty. Distal LUE 4/5. 3+ with biceps/triceps. 2/5 with left deltoid  Incision looks good - staples in place    Significant Labs:  Recent Labs   Lab 07/19/22  0142      K 3.8   CO2 25   BUN 20.2   CREATININE 0.63*   CALCIUM 8.8     Recent Labs   Lab 07/19/22  0142   WBC 6.8   HGB 10.0*   HCT 29.2*        Recent Labs   Lab 07/18/22  1713   INR 1.19     Microbiology Results (last 7 days)     Procedure Component Value Units Date/Time    Blood Culture [[id number]]  (Normal) Collected: 07/17/22 1013    Order Status: Completed Specimen: Blood Updated: 07/20/22 1202     CULTURE, BLOOD ([facility]) No Growth At 72 Hours    Blood Culture [[id number]]  (Normal) Collected: 07/17/22 1013    Order Status: Completed Specimen: Blood Updated: 07/20/22 1201     CULTURE, BLOOD ([facility]) No Growth At 72 Hours    Respiratory Culture [[id number]]  (Abnormal) Collected: 07/18/22 1501    Order Status: Completed Specimen: Bronchial Alveolar Lavage from BAL, RUL Updated: 07/20/22 1020     Respiratory Culture Rare normal respiratory " pam     GRAM STAIN Many WBC seen      Rare Gram positive cocci      Rare Gram Positive Rods    Loren Ink Prep CSF [[id number]] Collected: 07/19/22 1130    Order Status: Completed Specimen: CSF (Spinal Fluid) Updated: 07/20/22 0802     LOREN INK PREP CSF ([facility]) Negative    Cerebrospinal Fluid Culture [[id number]] Collected: 07/19/22 1130    Order Status: Completed Specimen: CSF (Spinal Fluid) from Cerebrospinal Fluid Updated: 07/20/22 0746     CULTURE, CSF ([facility]) No Growth At 24 Hours     GRAM STAIN No WBCs, No bacteria seen     Cryptococcal antigen, CSF [[id number]] Collected: 07/19/22 1130    Order Status: Sent Specimen: CSF (Spinal Fluid) Updated: 07/20/22 0732    Cerebrospinal Fluid Culture [[id number]] Collected: 07/19/22 1130    Order Status: Canceled Specimen: CSF (Spinal Fluid) Updated: 07/20/22 0732    AFB Smear [[id number]] Collected: 07/19/22 1130    Order Status: Completed Specimen: CSF (Spinal Fluid) from Cerebrospinal Fluid Updated: 07/20/22 0712     AFB Smear No AFB seen (Direct smear only)    Blood Culture [[id number]]  (Normal) Collected: 07/15/22 1959    Order Status: Completed Specimen: Blood from Antecubital, Left Updated: 07/19/22 2301     CULTURE, BLOOD ([facility]) No Growth At 96 Hours    Mycobacteria and Nocardia Culture [[id number]] Collected: 07/19/22 1130    Order Status: Sent Specimen: CSF (Spinal Fluid) from Cerebrospinal Fluid Updated: 07/19/22 1153    Respiratory Culture [[id number]]  (Abnormal)  (Susceptibility) Collected: 07/16/22 1645    Order Status: Completed Specimen: Sputum from Trachael Aspirate Updated: 07/18/22 0727     Respiratory Culture Many Enterobacter cloacae     Comment: with normal respiratory pam        GRAM STAIN Quality 2+       Many Gram Positive Rods      Many Gram positive cocci      Moderate Gram Negative Rods          Assessment/Plan:     Active Diagnoses:    Diagnosis Date Noted POA    Aphasia [R47.01] 07/14/2022 No    Osseous and subluxation stenosis of intervertebral  foramina of cervical region [M99.61] 07/06/2022 Yes    Stenosis of cervical spine with myelopathy [M48.02, G99.2] 07/06/2022 Yes      Problems Resolved During this Admission:     PLAN  Remove staples  Continue recs per Neurology  No additional recs from Neurosurgery at this time  Discussed with family  PT/OT  Daron, Elaine Fong, [facility]  Neurosurgery  [facility]

## 2022-07-20 NOTE — PLAN OF CARE
Problem: Physical Therapy  Goal: Physical Therapy Goal  Description: Goals to be met by: 2022     Goals updated 22    Patient will increase functional independence with mobility by performin. Supine to/from  sit with minimal assistance  2. Sit to stand transfer with minimal assistance.  3. Sit EOB x 10 minutes with stand by assist.  4. Sit to stand with contact guard assist   5. Gait x 150 feet with minimal assistance and use of rolling walker.   Outcome: Ongoing, Progressing

## 2022-07-20 NOTE — PT/OT/SLP PROGRESS
Physical Therapy Treatment    Patient Name:  Scott Echeverria   MRN:  [id number]    Recommendations:     Discharge Recommendations:  rehabilitation facility   Discharge Equipment Recommendations: walker, rolling   Barriers to discharge: severity of deficits    Assessment:     Scott Echeverria is a 71 y.o. male admitted with a medical diagnosis of cervical lami, respiratory decline.  He presents with the following impairments/functional limitations:  weakness, impaired endurance, impaired functional mobility, decreased lower extremity function, gait instability, impaired balance, impaired cognition, impaired coordination. Pt was able to progress mobility this date so goals updated.     Rehab Prognosis: Good; patient would benefit from acute skilled PT services to address these deficits and reach maximum level of function.    Recent Surgery: Procedure(s) (LRB):  LAMINOPLASTY (N/A) 8 Days Post-Op    Plan:     During this hospitalization, patient to be seen daily to address the identified rehab impairments via gait training, therapeutic activities, neuromuscular re-education, therapeutic exercises and progress toward the following goals:    · Plan of Care Expires:  08/19/22    Subjective     Chief Complaint: pain  Patient/Family Comments/goals: to be independent  Pain/Comfort:  · Pain Rating 1: 0/10      Objective:     Communicated with RN prior to session.  Patient found HOB elevated with peripheral IV, oxygen, telemetry, preciado catheter, SCD, pressure relief boots upon PT entry to room.     General Precautions: Standard, fall (spinal pxns)   Orthopedic Precautions:N/A   Braces: N/A  Respiratory Status: Nasal cannula, flow 5 L/min     Functional Mobility:  · Bed Mobility:     · Supine to Sit: moderate assistance  · Sit to Supine: maximal assistance  · Transfers:     · Sit to Stand:  moderate assistance x 2 with hand-held assist  · Gait: pt was able to take 1 step w/ maxA for balance and B HHA      AM-PAC 6 CLICK  MOBILITY  Turning over in bed (including adjusting bedclothes, sheets and blankets)?: 2  Sitting down on and standing up from a chair with arms (e.g., wheelchair, bedside commode, etc.): 2  Moving from lying on back to sitting on the side of the bed?: 2  Moving to and from a bed to a chair (including a wheelchair)?: 2  Need to walk in hospital room?: 2  Climbing 3-5 steps with a railing?: 1  Basic Mobility Total Score: 11       Patient left HOB elevated with all lines intact, call button in reach and RN notified. All pxns maintained.     GOALS:   Multidisciplinary Problems     Physical Therapy Goals        Problem: Physical Therapy    Goal Priority Disciplines Outcome Goal Variances Interventions   Physical Therapy Goal     PT, PT/OT Ongoing, Progressing     Description: Goals to be met by: 2022     Goals revised 22 2/2 pt change in status    Patient will increase functional independence with mobility by performin. Supine to/from  sit with minimal assistance  2. Sit to stand transfer with minimal assistance.  3. Sit EOB x 10 minutes with stand by assist.  4. Sit to stand with contact guard assist   5. Gait x 150 feet with minimal assistance and use of rolling walker.                    Time Tracking:     PT Received On: 22  PT Start Time: 1415     PT Stop Time: 1425  PT Total Time (min): 10 min     Billable Minutes: Therapeutic Activity 10 mins    Treatment Type: Treatment  PT/PTA: PT     PTA Visit Number: 0     2022

## 2022-07-20 NOTE — PROGRESS NOTES
Pt not seen today.   Chart checked on him.   LP with 12 WBC and 650 RBC.   Protein: 166.   Glucose normal.    Ddx: AIDP, meningitis, but bacterial is less likely.     - MRI lumbar spine, with and with no contrast looking for nerve root enhancement.   - added acyclovir for viral meningitis.   - will talk to ICU team about adding cipro and ampicillin and vanc given that currently already on levofloxacin.   - will get ID involved as well.   - will continue to follow    ----------------------    Added, ceftrixone 2 g Q12 and vanco for pharmacy to dose for possible meningitis.  - will not d/c levofloxacine given the respiratory status, discussed with the resident who talked to Dr Andrade.   - ID consult placed.

## 2022-07-20 NOTE — PROGRESS NOTES
Pharmacokinetic Initial Assessment: IV Vancomycin    Assessment/Plan:    Initiate intravenous vancomycin with loading dose of 1500 mg once followed by a maintenance dose of vancomycin 1000mg IV every 12 hours  Desired empiric serum trough concentration is 15 to 20 mcg/mL  Draw vancomycin trough level 60 min prior to fourth dose on 7/21 at approximately 0900  Pharmacy will continue to follow and monitor vancomycin.      Please contact pharmacy at extension [phone] with any questions regarding this assessment.     Thank you for the consult,   Max Light       Patient brief summary:  Scott Echeverria is a 71 y.o. male initiated on antimicrobial therapy with IV Vancomycin for treatment of suspected  meningitis    Drug Allergies:   Review of patient's allergies indicates:   Allergen Reactions    Iodine        Actual Body Weight:   76 kg    Renal Function:   Estimated Creatinine Clearance: 109.1 mL/min (A) (based on SCr of 0.63 mg/dL (L)).,     Dialysis Method (if applicable):  N/A    CBC (last 72 hours):  Recent Labs   Lab Result Units 07/17/22  1013 07/18/22  0144 07/19/22  0142   WBC x10(3)/mcL 7.5 6.3 6.8   Hgb gm/dL 11.2* 10.0* 10.0*   Hct % 33.5* 30.5* 29.2*   Platelet x10(3)/mcL 177 174 202   Mono % %  --   --  11.9   Monocyte Man % 8 9  --    Eos % %  --   --  0.9   Basophil % %  --   --  0.7       Metabolic Panel (last 72 hours):  Recent Labs   Lab Result Units 07/17/22  0937 07/18/22  0144 07/19/22  0142 07/19/22  1130   Sodium Level mmol/L 146* 144 144  --    Potassium Level mmol/L 3.4* 2.8* 3.8  --    Chloride mmol/L 108* 111* 111*  --    Carbon Dioxide mmol/L 25 22* 25  --    Glucose Level mg/dL 85 107 107  --    Glucose CSF  mg/dL  --   --   --  45   Blood Urea Nitrogen mg/dL 8.9 11.2 20.2  --    Creatinine mg/dL 0.59* 0.60* 0.63*  --    Albumin Level gm/dL  --  2.1*  --   --    Bilirubin Total mg/dL  --  1.1  --   --    Alkaline Phosphatase unit/L  --  186*  --   --    Aspartate Aminotransferase unit/L  --   32  --   --    Alanine Aminotransferase unit/L  --  58*  --   --    Magnesium Level mg/dL 2.00 2.01  --   --    Phosphorus Level mg/dL 2.1* 2.1*  --   --        Drug levels (last 3 results):  No results for input(s): VANCOMYCINRA, VANCORANDOM, VANCOMYCINPE, VANCOPEAK, VANCOMYCINTR, VANCOTROUGH in the last 72 hours.    Microbiologic Results:  Microbiology Results (last 7 days)       Procedure Component Value Units Date/Time    Blood Culture [[id number]]  (Normal) Collected: 07/15/22 1959    Order Status: Completed Specimen: Blood from Antecubital, Left Updated: 07/19/22 2301     CULTURE, BLOOD ([facility]) No Growth At 96 Hours    Cryptococcal antigen, CSF [[id number]]     Order Status: Sent Specimen: CSF (Spinal Fluid)     Cerebrospinal Fluid Culture [[id number]]     Order Status: Sent Specimen: CSF (Spinal Fluid)     Loren Ink Prep CSF [[id number]]     Order Status: Sent Specimen: CSF (Spinal Fluid)     Cerebrospinal Fluid Culture [[id number]] Collected: 07/19/22 1130    Order Status: Completed Specimen: CSF (Spinal Fluid) from Cerebrospinal Fluid Updated: 07/19/22 1604     GRAM STAIN No WBCs, No bacteria seen     Blood Culture [[id number]]  (Normal) Collected: 07/17/22 1013    Order Status: Completed Specimen: Blood Updated: 07/19/22 1200     CULTURE, BLOOD ([facility]) No Growth At 48 Hours    Blood Culture [[id number]]  (Normal) Collected: 07/17/22 1013    Order Status: Completed Specimen: Blood Updated: 07/19/22 1200     CULTURE, BLOOD ([facility]) No Growth At 48 Hours    Mycobacteria and Nocardia Culture [[id number]] Collected: 07/19/22 1130    Order Status: Sent Specimen: CSF (Spinal Fluid) from Cerebrospinal Fluid Updated: 07/19/22 1153    AFB Smear [[id number]] Collected: 07/19/22 1130    Order Status: Sent Specimen: CSF (Spinal Fluid) from Cerebrospinal Fluid Updated: 07/19/22 1153    Respiratory Culture [[id number]]  (Abnormal) Collected: 07/18/22 1501    Order Status: Completed Specimen: Bronchial Alveolar Lavage from BAL, RUL  Updated: 07/19/22 0822     Respiratory Culture Rare normal respiratory pam     GRAM STAIN Many WBC seen      Rare Gram positive cocci      Rare Gram Positive Rods    Respiratory Culture [[id number]]  (Abnormal)  (Susceptibility) Collected: 07/16/22 1645    Order Status: Completed Specimen: Sputum from Trachael Aspirate Updated: 07/18/22 0727     Respiratory Culture Many Enterobacter cloacae     Comment: with normal respiratory pam        GRAM STAIN Quality 2+       Many Gram Positive Rods      Many Gram positive cocci      Moderate Gram Negative Rods

## 2022-07-21 LAB
ALBUMIN SERPL-MCNC: 2.2 GM/DL (ref 3.4–4.8)
ALBUMIN/GLOB SERPL: 0.8 RATIO (ref 1.1–2)
ALP SERPL-CCNC: 581 UNIT/L (ref 40–150)
ALT SERPL-CCNC: 165 UNIT/L (ref 0–55)
AST SERPL-CCNC: 265 UNIT/L (ref 5–34)
BASOPHILS # BLD AUTO: 0.05 X10(3)/MCL (ref 0–0.2)
BASOPHILS NFR BLD AUTO: 0.6 %
BILIRUBIN DIRECT+TOT PNL SERPL-MCNC: 0.6 MG/DL
BUN SERPL-MCNC: 8.6 MG/DL (ref 8.4–25.7)
CALCIUM SERPL-MCNC: 7.6 MG/DL (ref 8.8–10)
CHLORIDE SERPL-SCNC: 106 MMOL/L (ref 98–107)
CO2 SERPL-SCNC: 26 MMOL/L (ref 23–31)
CREAT SERPL-MCNC: 0.6 MG/DL (ref 0.73–1.18)
EOSINOPHIL # BLD AUTO: 0.02 X10(3)/MCL (ref 0–0.9)
EOSINOPHIL NFR BLD AUTO: 0.2 %
ERYTHROCYTE [DISTWIDTH] IN BLOOD BY AUTOMATED COUNT: 15.5 % (ref 11.5–17)
GLOBULIN SER-MCNC: 2.9 GM/DL (ref 2.4–3.5)
GLUCOSE SERPL-MCNC: 108 MG/DL (ref 82–115)
HCT VFR BLD AUTO: 32 % (ref 42–52)
HGB BLD-MCNC: 10.6 GM/DL (ref 14–18)
HSV1 DNA CSF QL NAA+PROBE: NEGATIVE
HSV2 DNA CSF QL NAA+PROBE: NEGATIVE
IMM GRANULOCYTES # BLD AUTO: 0.33 X10(3)/MCL (ref 0–0.04)
IMM GRANULOCYTES NFR BLD AUTO: 3.8 %
LYMPHOCYTES # BLD AUTO: 1.35 X10(3)/MCL (ref 0.6–4.6)
LYMPHOCYTES NFR BLD AUTO: 15.5 %
MCH RBC QN AUTO: 29.6 PG (ref 27–31)
MCHC RBC AUTO-ENTMCNC: 33.1 MG/DL (ref 33–36)
MCV RBC AUTO: 89.4 FL (ref 80–94)
MONOCYTES # BLD AUTO: 0.69 X10(3)/MCL (ref 0.1–1.3)
MONOCYTES NFR BLD AUTO: 7.9 %
NEUTROPHILS # BLD AUTO: 6.3 X10(3)/MCL (ref 2.1–9.2)
NEUTROPHILS NFR BLD AUTO: 72 %
NRBC BLD AUTO-RTO: 0 %
PLATELET # BLD AUTO: 353 X10(3)/MCL (ref 130–400)
PMV BLD AUTO: 11.1 FL (ref 7.4–10.4)
POTASSIUM SERPL-SCNC: 3.1 MMOL/L (ref 3.5–5.1)
PROT SERPL-MCNC: 5.1 GM/DL (ref 5.8–7.6)
RBC # BLD AUTO: 3.58 X10(6)/MCL (ref 4.7–6.1)
SODIUM SERPL-SCNC: 141 MMOL/L (ref 136–145)
VANCOMYCIN TROUGH SERPL-MCNC: 7.7 UG/ML (ref 15–20)
VIT B1 BLD-SCNC: 88 NMOL/L (ref 70–180)
WBC # SPEC AUTO: 8.7 X10(3)/MCL (ref 4.5–11.5)

## 2022-07-21 PROCEDURE — 92610 EVALUATE SWALLOWING FUNCTION: CPT

## 2022-07-21 PROCEDURE — 63600175 PHARM REV CODE 636 W HCPCS: Performed by: PSYCHIATRY & NEUROLOGY

## 2022-07-21 PROCEDURE — 80202 ASSAY OF VANCOMYCIN: CPT | Performed by: PSYCHIATRY & NEUROLOGY

## 2022-07-21 PROCEDURE — 25000003 PHARM REV CODE 250: Performed by: INTERNAL MEDICINE

## 2022-07-21 PROCEDURE — 99024 PR POST-OP FOLLOW-UP VISIT: ICD-10-PCS | Mod: POP,,, | Performed by: NURSE PRACTITIONER

## 2022-07-21 PROCEDURE — 80053 COMPREHEN METABOLIC PANEL: CPT | Performed by: PSYCHIATRY & NEUROLOGY

## 2022-07-21 PROCEDURE — 27000221 HC OXYGEN, UP TO 24 HOURS

## 2022-07-21 PROCEDURE — 63600175 PHARM REV CODE 636 W HCPCS: Performed by: PHYSICIAN ASSISTANT

## 2022-07-21 PROCEDURE — 25000003 PHARM REV CODE 250: Performed by: NURSE PRACTITIONER

## 2022-07-21 PROCEDURE — 94668 MNPJ CHEST WALL SBSQ: CPT

## 2022-07-21 PROCEDURE — 99233 PR SUBSEQUENT HOSPITAL CARE,LEVL III: ICD-10-PCS | Mod: 95,,, | Performed by: PSYCHIATRY & NEUROLOGY

## 2022-07-21 PROCEDURE — 85025 COMPLETE CBC W/AUTO DIFF WBC: CPT | Performed by: INTERNAL MEDICINE

## 2022-07-21 PROCEDURE — 92611 MOTION FLUOROSCOPY/SWALLOW: CPT

## 2022-07-21 PROCEDURE — 99233 SBSQ HOSP IP/OBS HIGH 50: CPT | Mod: 95,,, | Performed by: PSYCHIATRY & NEUROLOGY

## 2022-07-21 PROCEDURE — 25000242 PHARM REV CODE 250 ALT 637 W/ HCPCS: Performed by: NURSE PRACTITIONER

## 2022-07-21 PROCEDURE — 94640 AIRWAY INHALATION TREATMENT: CPT

## 2022-07-21 PROCEDURE — 63600175 PHARM REV CODE 636 W HCPCS: Performed by: INTERNAL MEDICINE

## 2022-07-21 PROCEDURE — 99024 POSTOP FOLLOW-UP VISIT: CPT | Mod: POP,,, | Performed by: NURSE PRACTITIONER

## 2022-07-21 PROCEDURE — 63600175 PHARM REV CODE 636 W HCPCS: Performed by: NURSE PRACTITIONER

## 2022-07-21 PROCEDURE — 25000003 PHARM REV CODE 250: Performed by: PSYCHIATRY & NEUROLOGY

## 2022-07-21 PROCEDURE — 97530 THERAPEUTIC ACTIVITIES: CPT | Mod: CQ

## 2022-07-21 PROCEDURE — 94761 N-INVAS EAR/PLS OXIMETRY MLT: CPT

## 2022-07-21 PROCEDURE — 99900035 HC TECH TIME PER 15 MIN (STAT)

## 2022-07-21 PROCEDURE — 36415 COLL VENOUS BLD VENIPUNCTURE: CPT | Performed by: INTERNAL MEDICINE

## 2022-07-21 PROCEDURE — 36415 COLL VENOUS BLD VENIPUNCTURE: CPT | Performed by: PSYCHIATRY & NEUROLOGY

## 2022-07-21 PROCEDURE — 20000000 HC ICU ROOM

## 2022-07-21 RX ORDER — GUAIFENESIN 100 MG/5ML
400 SOLUTION ORAL EVERY 4 HOURS
Status: DISCONTINUED | OUTPATIENT
Start: 2022-07-21 | End: 2022-07-25 | Stop reason: HOSPADM

## 2022-07-21 RX ORDER — LEVOTHYROXINE SODIUM 50 UG/1
50 TABLET ORAL DAILY
Status: DISCONTINUED | OUTPATIENT
Start: 2022-07-22 | End: 2022-07-25 | Stop reason: HOSPADM

## 2022-07-21 RX ADMIN — LEVOFLOXACIN 500 MG: 5 INJECTION, SOLUTION INTRAVENOUS at 12:07

## 2022-07-21 RX ADMIN — FENTANYL CITRATE 100 MCG: 50 INJECTION INTRAMUSCULAR; INTRAVENOUS at 03:07

## 2022-07-21 RX ADMIN — GUAIFENESIN 400 MG: 200 SOLUTION ORAL at 08:07

## 2022-07-21 RX ADMIN — CEFTRIAXONE SODIUM 2 G: 2 INJECTION, POWDER, FOR SOLUTION INTRAMUSCULAR; INTRAVENOUS at 10:07

## 2022-07-21 RX ADMIN — ACYCLOVIR SODIUM 710 MG: 500 INJECTION, SOLUTION INTRAVENOUS at 09:07

## 2022-07-21 RX ADMIN — ENOXAPARIN SODIUM 40 MG: 40 INJECTION SUBCUTANEOUS at 05:07

## 2022-07-21 RX ADMIN — ACYCLOVIR SODIUM 710 MG: 500 INJECTION, SOLUTION INTRAVENOUS at 01:07

## 2022-07-21 RX ADMIN — ACETYLCYSTEINE 4 ML: 200 SOLUTION ORAL; RESPIRATORY (INHALATION) at 08:07

## 2022-07-21 RX ADMIN — TAMSULOSIN HYDROCHLORIDE 0.4 MG: 0.4 CAPSULE ORAL at 09:07

## 2022-07-21 RX ADMIN — ACYCLOVIR SODIUM 710 MG: 500 INJECTION, SOLUTION INTRAVENOUS at 04:07

## 2022-07-21 RX ADMIN — FENTANYL CITRATE 100 MCG: 50 INJECTION INTRAMUSCULAR; INTRAVENOUS at 11:07

## 2022-07-21 RX ADMIN — FENTANYL CITRATE 100 MCG: 50 INJECTION INTRAMUSCULAR; INTRAVENOUS at 09:07

## 2022-07-21 NOTE — PROGRESS NOTES
Pulmonary & Critical Care Medicine   Progress Note      Presenting History/HPI:  This is a 70 y/o male with history of cervical spinal stenosis, hypothyroidism, admitted 07/12/22 for C3-C7 laminiopasty and laminoforminotomies. On postoperative day 1, he was reported as working with PT, getting out of the bed to the chair.  However, the morning of postop day 2, the patient's wife noted that patient started to have slurred speech.  He had received Norco an hour or 2 prior to that, which he had been receiving q4h since surgery. He also received IV robaxin prior to onset of aphasia.  A code stroke was called. CT brain without contrast revealed no acute abnormal findings.  MRI and MRA brain on 07/15/2022 at 1000hrs was noted significantly limited by motion artifact with no obvious LVO, no acute intracranial abnormalities, and mild chronic microvascular ischemic changes.   For the MRI, the patient did require Ativan administration, and he was noted to have a scopolamine patch in place was was discontinued 07/15/22 at 0850hrs.  He also did reportedly began to develop some left-sided neglect.  He was then reported as having significant desaturation with near agonal respiratory effort.  Flumazenil was given on the floor.  Ventilation with BVM was initiated. Stat ICU consultation was then obtained and the patient was noted obtunded, not arousable to verbal or painful stimuli at time of intensivist arrival to floor hospital room.  He was intubated and transferred to ICU on mechanical ventilatory support. He tolerated extubation on 7/18/22; the patient has been spiking temps.  Sputum culture did come back positive for Enterobacter Cloacae; resistant to cephalosporins.     Interval History:  -the patient was intubated on 07/18 secondary to progressive hypoxia and a decrease in level of consciousness   -patient also underwent bronchoscopy on 07/18 after intubation with no significant findings of copious secretions except for  in the right upper lobe and some scant scattered secretions bilaterally  -no major issues or changes overnight- looks more awake today; tolerated extubation on 7/20/22.   -underwent LP on 7/19/2022- Protein and Wbc elevated on CSF; Cultures negative     Scheduled Medications:    acetylcysteine 200 mg/ml (20%)  4 mL Nebulization TID    acyclovir  10 mg/kg (Ideal) Intravenous Q8H    cefTRIAXone (ROCEPHIN) IVPB  2 g Intravenous Q12H    enoxaparin  40 mg Subcutaneous Daily    famotidine (PF)  20 mg Intravenous Q12H    guaiFENesin 100 mg/5 ml  400 mg Per NG tube Q4H    levoFLOXacin  500 mg Intravenous Q24H    levothyroxine  50 mcg Per NG tube Daily    tamsulosin  0.4 mg Oral Daily    vancomycin (VANCOCIN) IVPB  1,000 mg Intravenous Q12H       PRN Medications:   acetaminophen, aluminum-magnesium hydroxide-simethicone, bisacodyL, fentaNYL, hydrALAZINE, labetaloL, ondansetron, prochlorperazine, senna-docusate 8.6-50 mg, sodium chloride 0.9%, Pharmacy to dose Vancomycin consult **AND** vancomycin - pharmacy to dose    Infusions:     dexmedetomidine (PRECEDEX) infusion Stopped (07/20/22 1614)    NORepinephrine bitartrate-D5W 0 mcg/kg/min (07/16/22 0935)       Fluid Balance:     Intake/Output Summary (Last 24 hours) at 7/21/2022 0904  Last data filed at 7/21/2022 0400  Gross per 24 hour   Intake 1143.04 ml   Output 2525 ml   Net -1381.96 ml       Vital Signs:   Vitals:    07/21/22 0855   BP:    Pulse: 90   Resp: (!) 24   Temp:        Physical Exam  Vitals and nursing note reviewed.   Constitutional:       General: He is not in acute distress.  HENT:      Head: Normocephalic and atraumatic.   Cardiovascular:      Rate and Rhythm: Normal rate and regular rhythm.   Pulmonary:      Effort: Pulmonary effort is normal. No respiratory distress.      Breath sounds: Normal breath sounds. No wheezing, rhonchi or rales.   Abdominal:      General: Bowel sounds are normal. There is no distension.      Palpations: Abdomen is  soft.   Musculoskeletal:      Cervical back: Neck supple.   Skin:     General: Skin is warm and dry.   Neurological:      General: No focal deficit present.      Comments: follow commands with all ext.        Laboratory Studies:   Recent Labs   Lab 07/20/22  1119   PH 7.46*   PCO2 40   PO2 111*   HCO3 28.4   POCSATURATED 99     No results for input(s): WBC, RBC, HGB, HCT, PLT, MCV, MCH, MCHC in the last 24 hours.  No results for input(s): GLUCOSE, NA, K, CL, CO2, BUN, CREATININE, MG in the last 24 hours.    Invalid input(s):  CALCIUM      Microbiology Data:   Microbiology Results (last 7 days)     Procedure Component Value Units Date/Time    Cerebrospinal Fluid Culture [[id number]] Collected: 07/19/22 1130    Order Status: Completed Specimen: CSF (Spinal Fluid) from Cerebrospinal Fluid Updated: 07/21/22 0854     CULTURE, CSF ([facility]) No Growth At 48 Hours     GRAM STAIN No WBCs, No bacteria seen     Blood Culture [[id number]]  (Normal) Collected: 07/15/22 1959    Order Status: Completed Specimen: Blood from Antecubital, Left Updated: 07/20/22 2302     CULTURE, BLOOD ([facility]) No Growth at 5 days    Cryptococcal antigen, CSF [[id number]] Collected: 07/19/22 1130    Order Status: Completed Specimen: CSF (Spinal Fluid) Updated: 07/20/22 1540     CRYPTOCOCCAL ANTIGEN TITER ([facility]) --     CRYPTOCOCCAL ANTIGEN, CSF ([facility]) Negative    Blood Culture [[id number]]  (Normal) Collected: 07/17/22 1013    Order Status: Completed Specimen: Blood Updated: 07/20/22 1202     CULTURE, BLOOD ([facility]) No Growth At 72 Hours    Blood Culture [[id number]]  (Normal) Collected: 07/17/22 1013    Order Status: Completed Specimen: Blood Updated: 07/20/22 1201     CULTURE, BLOOD ([facility]) No Growth At 72 Hours    Respiratory Culture [[id number]]  (Abnormal) Collected: 07/18/22 1501    Order Status: Completed Specimen: Bronchial Alveolar Lavage from BAL, RUL Updated: 07/20/22 1020     Respiratory Culture Rare normal respiratory pam     GRAM STAIN Many WBC seen       "Rare Gram positive cocci      Rare Gram Positive Rods    Loren Ink Prep CSF [[id number]] Collected: 07/19/22 1130    Order Status: Completed Specimen: CSF (Spinal Fluid) Updated: 07/20/22 0802     LOREN INK PREP CSF ([facility]) Negative    Cerebrospinal Fluid Culture [[id number]] Collected: 07/19/22 1130    Order Status: Canceled Specimen: CSF (Spinal Fluid) Updated: 07/20/22 0732    AFB Smear [[id number]] Collected: 07/19/22 1130    Order Status: Completed Specimen: CSF (Spinal Fluid) from Cerebrospinal Fluid Updated: 07/20/22 0712     AFB Smear No AFB seen (Direct smear only)    Mycobacteria and Nocardia Culture [[id number]] Collected: 07/19/22 1130    Order Status: Sent Specimen: CSF (Spinal Fluid) from Cerebrospinal Fluid Updated: 07/19/22 1153    Respiratory Culture [[id number]]  (Abnormal)  (Susceptibility) Collected: 07/16/22 1645    Order Status: Completed Specimen: Sputum from Trachael Aspirate Updated: 07/18/22 0727     Respiratory Culture Many Enterobacter cloacae     Comment: with normal respiratory pam        GRAM STAIN Quality 2+       Many Gram Positive Rods      Many Gram positive cocci      Moderate Gram Negative Rods          Imaging:   IR LUMBAR PUNCTURE DIAGNOSTIC WITH IMAGING  Narrative: EXAMINATION:  IR LUMBAR PUNCTURE DIAGNOSTIC WITH IMAGING    CLINICAL HISTORY:  ; recent cervical surgery, needs an LP, prior lumbar spinal fusion; AMS;    TECHNIQUE:  The procedure and indication(s) were explained to the patient's family member (secondary to patient altered mental status), to include discussion of risks, benefits, and alternatives. Written informed consent was obtained once all questions/concerns had been addressed to satisfaction.    A "time out" was then performed in standard fashion, with all participating members of the procedure team present.  The patient was placed in prone position on the fluoroscopy table. Intermittent fluoroscopy was utilized to determine/chelsea the optimal access site. The lower " "back was prepped and draped in usual sterile fashion. Local anesthesia was achieved at the selected site using approximately 4 mL 1% lidocaine.    COMPARISON:  Pertinent imaging of the head was reviewed prior to start of the procedure (noncontrast head CT dated 17 July 2022).    FINDINGS:  With fluoroscopic assistance, a 20-ga spinal needle was advanced toward the spinal canal. The needle was inserted into the thecal sac at the L4-5 level utilizing right parasagittal interlaminar approach, as confirmed via spontaneous flow of CSF.    Approximately 8.5 mL of initially blood tinged, but subsequently clear yellowish CSF was collected and sent for labs, as ordered by the consulting service.  The needle was then removed and hemostasis achieved with pressure at the skin puncture site.    Opening pressure: 24 cm H2O    Closing pressure: 17 cm H2O    Estimated blood loss: negligible  Impression: 1.  Technically difficult, but successful lumbar puncture.  Initial blood tinged CSF likely secondary to mildly traumatic tap.    2.  Approximately 8.5 mL yellowish CSF obtained and sent for further evaluation.    3.  Opening pressure: 24 cm H2O  //  Closing pressure: 17 cm H2O.    PATIENT STATUS    The patient tolerated the procedure well, with no immediate complications appreciated. The patient was monitored for a short period following procedure completion to ensure no change from baseline condition noted upon arrival to the Radiology Department, with subsequent return to inpatient bed for further care by primary team.    Exposure information    Lowest-rate pulsed fluoroscopy and "last image capture" technique were utilized in order to minimize radiation exposure.    Fluoro time: 1.1 minute    DAP: 12.3 Gy x m^2    Dose: 11.6 mGy    Number of images: 3    Electronically signed by: Cesar Tang  Date:    07/19/2022  Time:    13:15  X-Ray Chest 1 View  Narrative: EXAMINATION:  XR CHEST 1 VIEW    CLINICAL HISTORY:  rule out " pneumonia;Aphasia    TECHNIQUE:  Frontal view(s) of the chest.    COMPARISON:  Radiography 07/18/2022    FINDINGS:  Irregular bilateral opacities have similar appearance.  Stable positioning of the endotracheal tube and visualized enteric tube.  No pleural effusion or pneumothorax.  Unchanged cardiac silhouette.  Impression: Little interval change.    Electronically signed by: Brian Lucas  Date:    07/19/2022  Time:    06:07      Assessment and Plan    Assessment:  1. severe cervical stenosis, post left C3-4, C4-5, C5-6, C6-7 posterior foraminotomies, C3-C7 laminoplasty, and repair of dural tear on 07/12  2. episode decreased level consciousness/altered mental status on 07/14 requiring intubation with significant hypoxemic respiratory failure, extubated on 07/17  3. severe hypoxemic respiratory failure  4. Altered mentation - MRI brain MRA neck, EEG without significant findings of stroke or seizure activity  5. Enterobacter pneumonia    Plan:  1. Continue attempts to wean from the vent. Defer extubation to Dr. Hummel,   2. LP studies pending. Will need to monitor   3. Continue on Levofloxacin (#4) for Enterobacter pneumonia  4. Monitor cultures from bronch   5. continue trickle tube feeds and advance to goal as tolerated   6. SLp plans for MBS       DVT ppx/tx with lovenox  GI ppx with Pepcid  Keep HOB elevated > 30*    ENEDINA Irene  7/21/2022  Pulmonology/Critical Care

## 2022-07-21 NOTE — PLAN OF CARE
Therapy rec inpt rehab stay- went to room to speak with pt and fmly at bedside.  Spouse and son present - discussed rehab and they along with pt are in agreement.  The choice is [facility] as they want to remain within the [facility] system.  Referral sent via [facility] and communication with Lauren flood for [facility].

## 2022-07-21 NOTE — PT/OT/SLP PROGRESS
Physical Therapy Treatment    Patient Name:  Scott Echeverria   MRN:  [id number]    Recommendations:     Discharge Recommendations:  rehabilitation facility   Discharge Equipment Recommendations: none   Barriers to discharge: None    Assessment:     Scott Echeverria is a 71 y.o. male admitted with a medical diagnosis of cervical laminectomy, respiratory decline.   He presents with the following impairments/functional limitations:  weakness, gait instability, impaired endurance, impaired balance, impaired functional mobility, impaired self care skills, decreased safety awareness, impaired coordination .    Pt semi-supine in bed, reporting he is [place] but eager to walk. Pt has poor balance and unable to progress to ambulation.     Rehab Prognosis: Good; patient would benefit from acute skilled PT services to address these deficits and reach maximum level of function.    Recent Surgery: Procedure(s) (LRB):  LAMINOPLASTY (N/A) 9 Days Post-Op    Plan:     During this hospitalization, patient to be seen daily to address the identified rehab impairments via gait training, therapeutic activities, neuromuscular re-education, therapeutic exercises and progress toward the following goals:    · Plan of Care Expires:  08/19/22    Subjective     Chief Complaint: pain  Patient/Family Comments/goals: to walk  Pain/Comfort:  · Pain Rating 1: other (see comments) (Did not rate pain but reporting cervical and L shoulder pain)  · Pain Addressed 1: Distraction, Reposition  · Pain Addressed 2: Nurse notified      Objective:     Communicated with RN prior to session.  Patient found HOB elevated with pulse ox (continuous), telemetry, SCD, blood pressure cuff, oxygen upon PT entry to room.     General Precautions: Standard, fall   Orthopedic Precautions:spinal precautions   Braces: N/A  Respiratory Status: Nasal cannula, flow 1 L/min   Vitals:     HR: 95    BP: 151/79    SPO2: 93%-87%       Functional Mobility:  · Bed Mobility:    · Supine to  sit with max x 2; attempted log rolling x 2 trials however pt requested to stop and rest d/t pain. Total assist supine<->sit using santosh.   · Sit to supine with max assist x 2 via log rolling with improved tolerance  · Transfers:    · Sit<->stand x 3 trials with NABOR knees blocked. Max cues to improve posture and decrease posterior lean.   · Balance:  · Sitting: posterior R lean in sitting position. Performed ant/post weight shifting to improve midline orientation   · Standing: lateral weight shifts with cues to improve knee extension and decrease posterior lean.         Patient left HOB elevated with all lines intact and call button in reach..    GOALS:   Multidisciplinary Problems     Physical Therapy Goals        Problem: Physical Therapy    Goal Priority Disciplines Outcome Goal Variances Interventions   Physical Therapy Goal     PT, PT/OT Ongoing, Progressing     Description: Goals to be met by: 2022     Goals revised 22 2/2 pt change in status    Patient will increase functional independence with mobility by performin. Supine to/from  sit with minimal assistance  2. Sit to stand transfer with minimal assistance.  3. Sit EOB x 10 minutes with stand by assist.  4. Sit to stand with contact guard assist   5. Gait x 150 feet with minimal assistance and use of rolling walker.                    Time Tracking:     PT Received On: 22  PT Start Time: 1447     PT Stop Time: 1519  PT Total Time (min): 32 min     Billable Minutes: Therapeutic Activity 2 units       PT/PTA: PTA     PTA Visit Number: 1     2022

## 2022-07-21 NOTE — PT/OT/SLP EVAL
Speech Language Pathology Department  Clinical Swallow Evaluation    Patient Name:  Scott Echeverria   MRN:  [id number]  Admitting Diagnosis: <principal problem not specified>    Recommendations:     General Recommendations:  Modified Barium Swallow Study  Diet recommendations:  NPO, Liquid Diet Level: NPO   General Precautions: Standard,      History:     Past Medical History:   Diagnosis Date    BPH (benign prostatic hyperplasia)     Cervical pain     Cervical radiculitis     Cervical spinal stenosis     Hypothyroidism, unspecified     Sleep apnea     resolved since surgery       Past Surgical History:   Procedure Laterality Date    APPENDECTOMY  01/25/2022    Dr. Kerry Coats    COLONOSCOPY  2021    HEMORRHOID SURGERY  2002    lower back surgery  1990    RETINAL DETACHMENT SURGERY Right 2015    SINUS SURGERY      TONSILLECTOMY  1956       Prior Intubation HX:  S/p intubation     Home Diet: Regular and thin liquids  Current Method of Nutrition: NPO    Patient complaint: denies     Subjective     Patient awake and alert.    Pain/Comfort: Pain Rating 1: 0/10      Objective:       Consistency Fed By Oral Symptoms Pharyngeal Symptoms   Thin liquids slp none cough   puree slp none none   solids slp none none                               Assessment:     Pt presents with sign/sx of aspiration. REC: MBS to further evaluate swallow function.     Goals:   Multidisciplinary Problems     SLP Goals     Not on file                Plan:     Patient to be seen:      Plan of Care expires:     Plan of Care reviewed with:      SLP Follow-Up:         Time Tracking:     SLP Treatment Date:    7/21/22  Speech Start Time:     Speech Stop Time:        Speech Total Time (min):       Billable minutes:  Swallow and Oral Function Evaluation, 10     07/21/2022

## 2022-07-21 NOTE — PROGRESS NOTES
Pharmacokinetic Assessment Follow Up: IV Vancomycin    Vancomycin serum concentration assessment(s):    The trough level was drawn correctly and can be used to guide therapy at this time. The measurement is below the desired definitive target range of 15 to 20 mcg/mL.    Vancomycin Regimen Plan:    Change regimen to Vancomycin 1250 mg IV every 8 hours with next serum trough concentration measured at 1000 prior to 4th dose on 7/22    Drug levels (last 3 results):  Recent Labs   Lab Result Units 07/21/22  0952   Vancomycin Trough ug/ml 7.7*       Pharmacy will continue to follow and monitor vancomycin.    Please contact pharmacy at extension [phone] for questions regarding this assessment.    Thank you for the consult,   Dev Edmonds Pharm D Candidate  Adrian Lejeune Pharm D        Patient brief summary:  Scott Echeverria is a 71 y.o. male initiated on antimicrobial therapy with IV Vancomycin for treatment of meningitis    Drug Allergies:   Review of patient's allergies indicates:   Allergen Reactions    Iodine        Actual Body Weight:   75.7kg    Renal Function:   Estimated Creatinine Clearance: 114.6 mL/min (A) (based on SCr of 0.6 mg/dL (L)).,     Dialysis Method (if applicable):  N/A    CBC (last 72 hours):  Recent Labs   Lab Result Units 07/19/22  0142   WBC x10(3)/mcL 6.8   Hgb gm/dL 10.0*   Hct % 29.2*   Platelet x10(3)/mcL 202   Mono % % 11.9   Eos % % 0.9   Basophil % % 0.7       Metabolic Panel (last 72 hours):  Recent Labs   Lab Result Units 07/19/22  0142 07/19/22  1130 07/21/22  0952   Sodium Level mmol/L 144  --  141   Potassium Level mmol/L 3.8  --  3.1*   Chloride mmol/L 111*  --  106   Carbon Dioxide mmol/L 25  --  26   Glucose Level mg/dL 107  --  108   Glucose CSF  mg/dL  --  45  --    Blood Urea Nitrogen mg/dL 20.2  --  8.6   Creatinine mg/dL 0.63*  --  0.60*   Albumin Level gm/dL  --   --  2.2*   Bilirubin Total mg/dL  --   --  0.6   Alkaline Phosphatase unit/L  --   --  581*   Aspartate  Aminotransferase unit/L  --   --  265*   Alanine Aminotransferase unit/L  --   --  165*       Vancomycin Administrations:  vancomycin given in the last 96 hours                     vancomycin in dextrose 5 % 1 gram/250 mL IVPB 1,000 mg (mg) 1,000 mg New Bag 07/20/22 2336     1,000 mg New Bag  1111    vancomycin 1.5 g in dextrose 5 % 250 mL IVPB (ready to mix) (mg) 1,500 mg New Bag 07/19/22 2331                    Microbiologic Results:  Microbiology Results (last 7 days)       Procedure Component Value Units Date/Time    Cerebrospinal Fluid Culture [[id number]] Collected: 07/19/22 1130    Order Status: Completed Specimen: CSF (Spinal Fluid) from Cerebrospinal Fluid Updated: 07/21/22 0854     CULTURE, CSF ([facility]) No Growth At 48 Hours     GRAM STAIN No WBCs, No bacteria seen     Blood Culture [[id number]]  (Normal) Collected: 07/15/22 1959    Order Status: Completed Specimen: Blood from Antecubital, Left Updated: 07/20/22 2302     CULTURE, BLOOD ([facility]) No Growth at 5 days    Cryptococcal antigen, CSF [[id number]] Collected: 07/19/22 1130    Order Status: Completed Specimen: CSF (Spinal Fluid) Updated: 07/20/22 1540     CRYPTOCOCCAL ANTIGEN TITER ([facility]) --     CRYPTOCOCCAL ANTIGEN, CSF ([facility]) Negative    Blood Culture [[id number]]  (Normal) Collected: 07/17/22 1013    Order Status: Completed Specimen: Blood Updated: 07/20/22 1202     CULTURE, BLOOD ([facility]) No Growth At 72 Hours    Blood Culture [[id number]]  (Normal) Collected: 07/17/22 1013    Order Status: Completed Specimen: Blood Updated: 07/20/22 1201     CULTURE, BLOOD ([facility]) No Growth At 72 Hours    Respiratory Culture [[id number]]  (Abnormal) Collected: 07/18/22 1501    Order Status: Completed Specimen: Bronchial Alveolar Lavage from BAL, RUL Updated: 07/20/22 1020     Respiratory Culture Rare normal respiratory pam     GRAM STAIN Many WBC seen      Rare Gram positive cocci      Rare Gram Positive Rods    Loren Ink Prep CSF [[id number]] Collected: 07/19/22 1130    Order  Status: Completed Specimen: CSF (Spinal Fluid) Updated: 07/20/22 0802     FRANCES INK PREP CSF ([facility]) Negative    Cerebrospinal Fluid Culture [[id number]] Collected: 07/19/22 1130    Order Status: Canceled Specimen: CSF (Spinal Fluid) Updated: 07/20/22 0732    AFB Smear [[id number]] Collected: 07/19/22 1130    Order Status: Completed Specimen: CSF (Spinal Fluid) from Cerebrospinal Fluid Updated: 07/20/22 0712     AFB Smear No AFB seen (Direct smear only)    Mycobacteria and Nocardia Culture [[id number]] Collected: 07/19/22 1130    Order Status: Sent Specimen: CSF (Spinal Fluid) from Cerebrospinal Fluid Updated: 07/19/22 1153    Respiratory Culture [[id number]]  (Abnormal)  (Susceptibility) Collected: 07/16/22 1645    Order Status: Completed Specimen: Sputum from Trachael Aspirate Updated: 07/18/22 0727     Respiratory Culture Many Enterobacter cloacae     Comment: with normal respiratory pma        GRAM STAIN Quality 2+       Many Gram Positive Rods      Many Gram positive cocci      Moderate Gram Negative Rods

## 2022-07-21 NOTE — PROGRESS NOTES
[facility]  Neurosurgery  Progress Note    Subjective:     Interval History: More interactive today. Does not recall details about yesterday -- did not know his wife came to see him. He states his posterior neck pain is controlled. Continues with some pain in the left arm.     History of Present Illness: Mr cEheverria is a 71-year-old male with past medical history of BPH, cervical spine stenosis, hypothyroidism, presented to [facility] on 7/12 and underwent C3-7 laminoplasty and left C3-7 foraminotomies. On 7/14, RRT was activated due to neuro change.  Per wife, patient had pain medication around 4:30am and then sat in a chair at the bedside.  Wife reports he was at his baseline at that time.  Just before 6am, he attempted to ask his wife a question and she noticed he had slurred speech.  RRT activated ... patient's speech worsened and he became aphasic.  Stroke alert activated ... stat Cth was unremarkable.  No recommendation for IV tPA due to recent surgery.  Stat MRI brain negative for acute infarct.  EEG with diffuse slowing.      Post-Op Info:  Procedure(s) (LRB):  LAMINOPLASTY (N/A)   9 Days Post-Op     C3-7 laminoplasty, left C3-4, C4-5, C5-6, C6-7 foraminotomies on 7/12/2022      Medications:  Continuous Infusions:    Scheduled Meds:   acyclovir  10 mg/kg (Ideal) Intravenous Q8H    enoxaparin  40 mg Subcutaneous Daily    guaiFENesin 100 mg/5 ml  400 mg Per NG tube Q4H    levoFLOXacin  500 mg Intravenous Q24H    levothyroxine  50 mcg Per NG tube Daily    tamsulosin  0.4 mg Oral Daily     PRN Meds:acetaminophen, aluminum-magnesium hydroxide-simethicone, barium sulfate, bisacodyL, fentaNYL, hydrALAZINE, labetaloL, ondansetron, prochlorperazine, senna-docusate 8.6-50 mg, sodium chloride 0.9%, Pharmacy to dose Vancomycin consult **AND** vancomycin - pharmacy to dose     Review of Systems  Objective:     Weight: 75.7 kg (166 lb 14.2 oz)  Body mass index is 24.63 kg/m².  Vital Signs (Most  "Recent):  Temp: 97.7 °F (36.5 °C) (07/21/22 0400)  Pulse: 90 (07/21/22 0855)  Resp: (!) 22 (07/21/22 0936)  BP: (!) 146/72 (07/21/22 0600)  SpO2: 95 % (07/21/22 0855) Vital Signs (24h Range):  Temp:  [97.7 °F (36.5 °C)-100.8 °F (38.2 °C)] 97.7 °F (36.5 °C)  Pulse:  [] 90  Resp:  [20-28] 22  SpO2:  [94 %-100 %] 95 %  BP: (135-164)/(64-87) 146/72                     NG/OG Tube 07/14/22 1600 [place] sump Right nostril (Active)   Placement Check placement verified by aspirate characteristics 07/18/22 0400   Distal Tube Length (cm) 65 07/17/22 2000   Tolerance no signs/symptoms of discomfort 07/18/22 0400   Securement secured to commercial device 07/18/22 0400   Clamp Status/Tolerance clamped 07/18/22 0400   Suction Setting/Drainage Method low;intermittent setting 07/17/22 0800   Insertion Site Appearance no redness, warmth, tenderness, skin breakdown, drainage 07/18/22 0400   Drainage Bile 07/17/22 0800   Flush/Irrigation flushed w/;water;no resistance met 07/17/22 1600   Feeding Type continuous;by pump 07/16/22 1200   Feeding Action feeding held 07/18/22 0400   Current Rate (mL/hr) 30 mL/hr 07/16/22 0800   Goal Rate (mL/hr) 70 mL/hr 07/16/22 0800   Intake (mL) 202 mL 07/16/22 0617   Water Bolus (mL) 100 mL 07/16/22 0617   Tube Output(mL)(Include Discarded Residual) 400 mL 07/16/22 1600   Formula Name Peptamen AF 07/16/22 1200            Urethral Catheter 07/14/22 1600 Double-lumen 16 Fr. (Active)   $ Puri Insertion Bedside Insertion Performed 07/14/22 1600   Site Assessment Clean;Intact 07/18/22 0400   Collection Container Urimeter 07/18/22 0400   Securement Method secured to top of thigh w/ adhesive device 07/18/22 0400   Catheter Care Performed yes 07/18/22 0400   Reason for Continuing Urinary Catheterization Critically ill in ICU and requiring hourly monitoring of intake/output 07/18/22 0400   CAUTI Prevention Bundle Securement Device in place with 1" slack;Intact seal between catheter & drainage " tubing;Drainage bag/urimeter off the floor;Sheeting clip in use;No dependent loops or kinks;Drainage bag/urimeter not overfilled (<2/3 full);Drainage bag/urimeter below bladder 07/17/22 2000   Output (mL) 80 mL 07/18/22 0800       Neurosurgery Physical Exam  He is awake and alert  Follows commands with all extremities.  Lifts left leg off of bed without difficulty. Distal LUE 4-/5. 3+ with biceps/triceps. 2/5 with left deltoid  Incision looks good - staples removed yesterday.    Significant Labs:  Recent Labs   Lab 07/21/22  0952      K 3.1*   CO2 26   BUN 8.6   CREATININE 0.60*   CALCIUM 7.6*     No results for input(s): WBC, HGB, HCT, PLT in the last 48 hours.  No results for input(s): LABPT, INR, APTT in the last 48 hours.  Microbiology Results (last 7 days)     Procedure Component Value Units Date/Time    Cerebrospinal Fluid Culture [[id number]] Collected: 07/19/22 1130    Order Status: Completed Specimen: CSF (Spinal Fluid) from Cerebrospinal Fluid Updated: 07/21/22 0854     CULTURE, CSF ([facility]) No Growth At 48 Hours     GRAM STAIN No WBCs, No bacteria seen     Blood Culture [[id number]]  (Normal) Collected: 07/15/22 1959    Order Status: Completed Specimen: Blood from Antecubital, Left Updated: 07/20/22 2302     CULTURE, BLOOD ([facility]) No Growth at 5 days    Cryptococcal antigen, CSF [[id number]] Collected: 07/19/22 1130    Order Status: Completed Specimen: CSF (Spinal Fluid) Updated: 07/20/22 1540     CRYPTOCOCCAL ANTIGEN TITER ([facility]) --     CRYPTOCOCCAL ANTIGEN, CSF ([facility]) Negative    Blood Culture [[id number]]  (Normal) Collected: 07/17/22 1013    Order Status: Completed Specimen: Blood Updated: 07/20/22 1202     CULTURE, BLOOD ([facility]) No Growth At 72 Hours    Blood Culture [[id number]]  (Normal) Collected: 07/17/22 1013    Order Status: Completed Specimen: Blood Updated: 07/20/22 1201     CULTURE, BLOOD ([facility]) No Growth At 72 Hours    Respiratory Culture [[id number]]  (Abnormal) Collected: 07/18/22 1501    Order  Status: Completed Specimen: Bronchial Alveolar Lavage from BAL, RUL Updated: 07/20/22 1020     Respiratory Culture Rare normal respiratory pam     GRAM STAIN Many WBC seen      Rare Gram positive cocci      Rare Gram Positive Rods    Loren Ink Prep CSF [[id number]] Collected: 07/19/22 1130    Order Status: Completed Specimen: CSF (Spinal Fluid) Updated: 07/20/22 0802     LOREN INK PREP CSF ([facility]) Negative    Cerebrospinal Fluid Culture [[id number]] Collected: 07/19/22 1130    Order Status: Canceled Specimen: CSF (Spinal Fluid) Updated: 07/20/22 0732    AFB Smear [[id number]] Collected: 07/19/22 1130    Order Status: Completed Specimen: CSF (Spinal Fluid) from Cerebrospinal Fluid Updated: 07/20/22 0712     AFB Smear No AFB seen (Direct smear only)    Mycobacteria and Nocardia Culture [[id number]] Collected: 07/19/22 1130    Order Status: Sent Specimen: CSF (Spinal Fluid) from Cerebrospinal Fluid Updated: 07/19/22 1153    Respiratory Culture [[id number]]  (Abnormal)  (Susceptibility) Collected: 07/16/22 1645    Order Status: Completed Specimen: Sputum from Trachael Aspirate Updated: 07/18/22 0727     Respiratory Culture Many Enterobacter cloacae     Comment: with normal respiratory pam        GRAM STAIN Quality 2+       Many Gram Positive Rods      Many Gram positive cocci      Moderate Gram Negative Rods          Assessment/Plan:     Active Diagnoses:    Diagnosis Date Noted POA    Aphasia [R47.01] 07/14/2022 No    Osseous and subluxation stenosis of intervertebral foramina of cervical region [M99.61] 07/06/2022 Yes    Stenosis of cervical spine with myelopathy [M48.02, G99.2] 07/06/2022 Yes      Problems Resolved During this Admission:     PLAN  CSF changes likely s/t spinal fluid leak/repair intraop.   Neurology following   No additional recs from Neurosurgery at this time  PT/OT  SCDs, NADIA Terry-BC  Neurosurgery  [facility]

## 2022-07-21 NOTE — PROCEDURES
Speech Language Pathology Department  Modified Barium Swallow Study    Patient Name:  Scott Echeverria   MRN:  [id number]  Diagnosis: <principal problem not specified>     Recommendations:     General Recommendations:  Dysphagia therapy  Repeat MBS study: 1-3 weeks or as clinically warranted   Diet recommendations:  Minced & Moist Diet - IDDSI Level 5, Liquid Diet Level: Moderately thick liquids - IDDSI Level 3   Swallow Strategies/Precautions: small bites/sips and slow rate  General Precautions: Standard,      History:     A Modified Barium Swallow Study was completed to assess the efficiency of his/her swallow function, rule out aspiration and make recommendations regarding safe dietary consistencies, effective compensatory strategies, and safe eating environment.     Past Medical History:   Diagnosis Date    BPH (benign prostatic hyperplasia)     Cervical pain     Cervical radiculitis     Cervical spinal stenosis     Hypothyroidism, unspecified     Sleep apnea     resolved since surgery       Home Diet: Regular and thin liquids  Current Method of Nutrition: NPO    Patient complaint: denies     Subjective:     Patient awake and alert.    Respiratory Status: NC    Radiology Staff Present: portable MBS    Fluoroscopic Results:     Visualization  · Patient was seen in the lateral view  · Patient was seen in the anterior view    Oral Phase:    Prolonged/disorganized bolus formation   Loss of bolus control    Pharyngeal Phase:    Reduced hyolaryngeal excursion   Reduced airway protection   Laryngeal penetration     Base of tongue residue     Vallecular residue    Consistency Laryngeal Penetration Aspiration Residue Strategy   Thin via straw LP, did not clear none none    Mildly thick via spoon and straw Did not clear none vallecular    Moderately  none none mild Double swallow   puree none none     solids none none                  Assessment:     Pt presents with moderate dysphagia characterized by reduced  bolus control, reduced tongue base retraction, and reduced laryngeal excursion resulting in reduced airway protection with laryngeal penetration of thin and mildly thick liquids which did not clear. No aspiration was visualized, however pt remains at risk. Skilled SLP intervention is warranted at this time.     Goals:   Multidisciplinary Problems     SLP Goals        Problem: SLP    Goal Priority Disciplines Outcome   SLP Goal     SLP    Description: LTG: To tolerate least restrictive diet without signs/sx of aspiration.     ST. Tongue base/ laryngeal excursion exercise  2. Tolerate thermal stimulation x10 with 100% swallow response with less than 2 second delay.                    Plan:     Patient to be seen:  5 x/week   Plan of Care expires:     Plan of Care reviewed with:  patient   SLP Follow-Up:         Time Tracking:     SLP Treatment Date:    22  Speech Start Time:     Speech Stop Time:        Speech Total Time (min):       Billable minutes: Motion Fluoroscopic Evaluation, Video Recording, 2022

## 2022-07-22 LAB
BACTERIA BLD CULT: NORMAL
BACTERIA BLD CULT: NORMAL
EBV DNA CSF QL NAA+PROBE: NEGATIVE
MAYO GENERIC ORDERABLE RESULT: NORMAL
MAYO GENERIC ORDERABLE RESULT: NORMAL
MUSK AB SER-SCNC: 0 NMOL/L (ref 0–0.02)
NMDAR IGG TITR CSF IF: NORMAL {TITER}
SPECIMEN SOURCE: NORMAL
VDRL CSF QL: NEGATIVE
WNV RNA CSF QL NAA+PROBE: NEGATIVE

## 2022-07-22 PROCEDURE — 63600175 PHARM REV CODE 636 W HCPCS: Performed by: NEUROLOGICAL SURGERY

## 2022-07-22 PROCEDURE — 25000003 PHARM REV CODE 250: Performed by: PSYCHIATRY & NEUROLOGY

## 2022-07-22 PROCEDURE — 99233 SBSQ HOSP IP/OBS HIGH 50: CPT | Mod: ,,, | Performed by: INTERNAL MEDICINE

## 2022-07-22 PROCEDURE — 97530 THERAPEUTIC ACTIVITIES: CPT | Mod: CO

## 2022-07-22 PROCEDURE — 25000003 PHARM REV CODE 250: Performed by: NEUROLOGICAL SURGERY

## 2022-07-22 PROCEDURE — 20000000 HC ICU ROOM

## 2022-07-22 PROCEDURE — 92526 ORAL FUNCTION THERAPY: CPT

## 2022-07-22 PROCEDURE — 63600175 PHARM REV CODE 636 W HCPCS: Performed by: NURSE PRACTITIONER

## 2022-07-22 PROCEDURE — 63600175 PHARM REV CODE 636 W HCPCS: Performed by: PHYSICIAN ASSISTANT

## 2022-07-22 PROCEDURE — 63600175 PHARM REV CODE 636 W HCPCS: Performed by: INTERNAL MEDICINE

## 2022-07-22 PROCEDURE — 99233 PR SUBSEQUENT HOSPITAL CARE,LEVL III: ICD-10-PCS | Mod: ,,, | Performed by: INTERNAL MEDICINE

## 2022-07-22 PROCEDURE — 99024 PR POST-OP FOLLOW-UP VISIT: ICD-10-PCS | Mod: POP,,, | Performed by: NURSE PRACTITIONER

## 2022-07-22 PROCEDURE — 27000221 HC OXYGEN, UP TO 24 HOURS

## 2022-07-22 PROCEDURE — 97530 THERAPEUTIC ACTIVITIES: CPT | Mod: CQ

## 2022-07-22 PROCEDURE — 25000003 PHARM REV CODE 250: Performed by: INTERNAL MEDICINE

## 2022-07-22 PROCEDURE — 99024 POSTOP FOLLOW-UP VISIT: CPT | Mod: POP,,, | Performed by: NURSE PRACTITIONER

## 2022-07-22 PROCEDURE — 97116 GAIT TRAINING THERAPY: CPT | Mod: CQ

## 2022-07-22 PROCEDURE — 63600175 PHARM REV CODE 636 W HCPCS: Performed by: PSYCHIATRY & NEUROLOGY

## 2022-07-22 PROCEDURE — 94668 MNPJ CHEST WALL SBSQ: CPT

## 2022-07-22 PROCEDURE — 94761 N-INVAS EAR/PLS OXIMETRY MLT: CPT

## 2022-07-22 PROCEDURE — 25000003 PHARM REV CODE 250: Performed by: NURSE PRACTITIONER

## 2022-07-22 RX ADMIN — ACYCLOVIR SODIUM 710 MG: 500 INJECTION, SOLUTION INTRAVENOUS at 05:07

## 2022-07-22 RX ADMIN — ACYCLOVIR SODIUM 710 MG: 500 INJECTION, SOLUTION INTRAVENOUS at 08:07

## 2022-07-22 RX ADMIN — HYDRALAZINE HYDROCHLORIDE 10 MG: 20 INJECTION INTRAMUSCULAR; INTRAVENOUS at 10:07

## 2022-07-22 RX ADMIN — LABETALOL HYDROCHLORIDE 10 MG: 5 INJECTION INTRAVENOUS at 12:07

## 2022-07-22 RX ADMIN — GUAIFENESIN 400 MG: 200 SOLUTION ORAL at 08:07

## 2022-07-22 RX ADMIN — LEVOFLOXACIN 500 MG: 5 INJECTION, SOLUTION INTRAVENOUS at 10:07

## 2022-07-22 RX ADMIN — GUAIFENESIN 400 MG: 200 SOLUTION ORAL at 11:07

## 2022-07-22 RX ADMIN — LABETALOL HYDROCHLORIDE 10 MG: 5 INJECTION INTRAVENOUS at 02:07

## 2022-07-22 RX ADMIN — ACETAMINOPHEN 650 MG: 650 SOLUTION ORAL at 11:07

## 2022-07-22 RX ADMIN — TAMSULOSIN HYDROCHLORIDE 0.4 MG: 0.4 CAPSULE ORAL at 08:07

## 2022-07-22 RX ADMIN — ENOXAPARIN SODIUM 40 MG: 40 INJECTION SUBCUTANEOUS at 04:07

## 2022-07-22 RX ADMIN — LEVOTHYROXINE SODIUM 50 MCG: 50 TABLET ORAL at 06:07

## 2022-07-22 RX ADMIN — GUAIFENESIN 400 MG: 200 SOLUTION ORAL at 12:07

## 2022-07-22 RX ADMIN — GUAIFENESIN 400 MG: 200 SOLUTION ORAL at 04:07

## 2022-07-22 RX ADMIN — ACYCLOVIR SODIUM 710 MG: 500 INJECTION, SOLUTION INTRAVENOUS at 02:07

## 2022-07-22 RX ADMIN — FENTANYL CITRATE 100 MCG: 50 INJECTION INTRAMUSCULAR; INTRAVENOUS at 08:07

## 2022-07-22 RX ADMIN — FENTANYL CITRATE 100 MCG: 50 INJECTION INTRAMUSCULAR; INTRAVENOUS at 01:07

## 2022-07-22 NOTE — SUBJECTIVE & OBJECTIVE
Review of Systems   Constitutional: Negative.    HENT: Negative.     Eyes: Negative.    Respiratory:  Negative for cough, chest tightness and shortness of breath.    Cardiovascular:  Negative for chest pain and leg swelling.   Gastrointestinal:  Negative for abdominal pain, diarrhea, nausea and vomiting.   Endocrine: Negative.    Genitourinary: Negative.    Musculoskeletal: Negative.    Skin: Negative.    Allergic/Immunologic: Negative.    Neurological: Negative.  Negative for headaches.   Hematological: Negative.    Psychiatric/Behavioral: Negative.     Objective:     Vital Signs (Most Recent):  Temp: 98.2 °F (36.8 °C) (07/22/22 1300)  Pulse: 81 (07/22/22 1500)  Resp: 19 (07/22/22 1500)  BP: (!) 155/88 (07/22/22 1400)  SpO2: (!) 92 % (07/22/22 1500)   Vital Signs (24h Range):  Temp:  [97.7 °F (36.5 °C)-98.8 °F (37.1 °C)] 98.2 °F (36.8 °C)  Pulse:  [] 81  Resp:  [18-28] 19  SpO2:  [90 %-98 %] 92 %  BP: (139-193)/(74-94) 155/88     Weight: 75.7 kg (166 lb 14.2 oz)  Body mass index is 24.63 kg/m².    Intake/Output Summary (Last 24 hours) at 7/22/2022 1525  Last data filed at 7/22/2022 1000  Gross per 24 hour   Intake --   Output 1400 ml   Net -1400 ml      Physical Exam  Eyes:      Pupils: Pupils are equal, round, and reactive to light.   Cardiovascular:      Rate and Rhythm: Normal rate.      Pulses: Normal pulses.      Heart sounds: No murmur heard.  Pulmonary:      Effort: Pulmonary effort is normal.      Breath sounds: Normal breath sounds.   Abdominal:      General: Abdomen is flat. Bowel sounds are normal. There is no distension.      Palpations: Abdomen is soft.      Tenderness: There is no guarding.   Musculoskeletal:         General: Normal range of motion.      Cervical back: Normal range of motion and neck supple.   Skin:     General: Skin is warm.   Neurological:      General: No focal deficit present.      Mental Status: He is alert.   Psychiatric:         Mood and Affect: Mood normal.          Behavior: Behavior normal.       Significant Labs: All pertinent labs within the past 24 hours have been reviewed.  Recent Lab Results       None            Significant Imaging: I have reviewed all pertinent imaging results/findings within the past 24 hours.

## 2022-07-22 NOTE — PT/OT/SLP PROGRESS
Physical Therapy  Treatment    Scott Echeverria   MRN: [id number]   Admitting Diagnosis: <principal problem not specified>       PT Start Time: 1030     PT Stop Time: 1054    PT Total Time (min): 24 min       Billable Minutes:  Gait Training 12 and Therapeutic Activity 12    Treatment Type: Treatment  PT/PTA: PTA     PTA Visit Number: 2       General Precautions: Standard, fall  Orthopedic Precautions: spinal precautions   Braces:    Respiratory Status: Room air    Spiritual, Cultural Beliefs, Confucianism Practices, Values that Affect Care: no    Subjective:  Communicated with NSG prior to session.         Objective:        Functional Mobility:  Bed Mobility:    Supine to/from sit. Mod A to come to sitting EOB. Increased pain coming to upright sitting. NSG notified and to bring meds.    Transfers:   Sit to/from stand. Mod A.     Gait:   Pt ambulated ~5ft to the chair in the room. Slow step to gait pattern with cues for step sequencing. No unsteadiness or LOB. Mod A. Pt quick to fatigue.            AM-PAC 6 CLICK MOBILITY  How much help from another person does this patient currently need?   1 = Unable, Total/Dependent Assistance  2 = A lot, Maximum/Moderate Assistance  3 = A little, Minimum/Contact Guard/Supervision  4 = None, Modified [place]/Independent         AM-PAC Raw Score CMS G-Code Modifier Level of Impairment Assistance   6 % Total / Unable   7 - 9 CM 80 - 100% Maximal Assist   10 - 14 CL 60 - 80% Moderate Assist   15 - 19 CK 40 - 60% Moderate Assist   20 - 22 CJ 20 - 40% Minimal Assist   23 CI 1-20% SBA / CGA   24 CH 0% Independent/ Mod I     Patient left up in chair with all lines intact and call button in reach.      Rehab identified problem list/impairments:      Rehab potential is excellent.    Activity tolerance: Excellent    Discharge recommendations:       Barriers to discharge:      Equipment recommendations:       GOALS:   Multidisciplinary Problems     Physical Therapy Goals         Problem: Physical Therapy    Goal Priority Disciplines Outcome Goal Variances Interventions   Physical Therapy Goal     PT, PT/OT Ongoing, Progressing     Description: Goals to be met by: 2022     Goals revised 22 2/2 pt change in status    Patient will increase functional independence with mobility by performin. Supine to/from  sit with minimal assistance  2. Sit to stand transfer with minimal assistance.  3. Sit EOB x 10 minutes with stand by assist.  4. Sit to stand with contact guard assist   5. Gait x 150 feet with minimal assistance and use of rolling walker.                    PLAN:    Patient to be seen daily  to address the above listed problems via gait training, therapeutic activities, neuromuscular re-education, therapeutic exercises  Plan of Care expires: 22  Plan of Care reviewed with: patient         2022

## 2022-07-22 NOTE — PT/OT/SLP PROGRESS
Occupational Therapy  Treatment    Scott Echeverria   MRN: [id number]   Admitting Diagnosis: <principal problem not specified>    OT Date of Treatment: 07/22/22   OT Start Time: 1405  OT Stop Time: 1429  OT Total Time (min): 24 min     Billable Minutes:  Therapeutic Activity 2  Total Minutes: 24     OT/TK: TK     TK Visit Number: 2    General Precautions: Standard, fall  Orthopedic Precautions: spinal precautions  Braces:      Spiritual, Cultural Beliefs, Orthodox Practices, Values that Affect Care: no    Subjective:  Communicated with RN prior to session.  82HR, 155/88, 97o2    Objective:  Pt. UIC upon entry.  Pt. Requiring Mod A during stand step t/f from BS chair to EOB using RW for UE support with balance, adherence given to spinal pxns.       Functional Mobility:  Bed Mobility:   Supine to sit: Maximum Assistance   Sit to supine: Maximum Assistance   Rolling: Maximum Assistance   Scooting: Maximum Assistance    Transfer Training:   Sit to stand:Moderate Assistance with Rolling Walker .    Patient left with bed in chair position with all lines intact and call button in reach    ASSESSMENT:  Scott Echeverria is a 71 y.o. male with a medical diagnosis of <principal problem not specified> Pt. Tolerated session well overall Mod A for mobility.    Rehab potential is good    Activity tolerance: Good    Discharge recommendations: rehabilitation facility     Equipment recommendations:       GOALS:   Multidisciplinary Problems     Occupational Therapy Goals        Problem: Occupational Therapy    Goal Priority Disciplines Outcome Interventions   Occupational Therapy Goal     OT, PT/OT Ongoing, Progressing    Description: Goals to be met by: 7/27    Patient will increase functional independence with ADLs by performing:    UE Dressing with SBA.  LE Dressing with SBA.  Grooming while seated EOB with SBA.  Toileting from [facility] with SBA for hygiene and clothing management.   Toilet transfer to [facility] with SBA.  Upper extremity  exercise program 10 reps per handout, with assistance as needed                      Plan:  Patient to be seen 5 x/week, daily to address the above listed problems via self-care/home management, therapeutic activities, therapeutic exercises  Plan of Care expires: 08/03/22  Plan of Care reviewed with: patient         07/22/2022

## 2022-07-22 NOTE — PROGRESS NOTES
[facility] - 7th Floor [facility] Medicine  Progress Note    Patient Name: Scott Echeverria  MRN: [id number]  Patient Class: IP- Inpatient   Admission Date: 7/12/2022  Length of Stay: 10 days  Attending Physician: Ron Augustin II, MD  Primary Care Provider: RON AUGUSTIN MD        Subjective:     Principal Problem:<principal problem not specified>        HPI:       Scott is a 71-year-old gentleman who underwent neck surgery 7/12.  He was doing fine postoperatively working with physical therapy again I the bed however on postop day 2. It was noticed that patient was having some slurred speech.  The wife stated that he received a scopolamine patch to help decrease secretions just a day prior prior to this.  He was also getting Norco also received IV Robaxin just before the episode of aphasia.  It proved full workup was had with CT the brain MRI MRA of the brain which were negative for stroke.  He developed significant desaturation that required intubation.  Was treated with broad-spectrum coverage for bacterial fungal viral infections.  LP was done essentially came back negative.  With discontinuing his broad-spectrum coverage.  He was extubated and developed respiratory distress again and was ultimately reintubated.  Again full workup was had which essentially came back negative for his metabolic encephalopathy.  He was successfully extubated and eventually downgraded to the floor yesterday.  He is comfortably sitting in his chair answer questions appropriately talking appropriately completely back to his baseline.  Family is also concerned as what happened but etiology still remains unclear.  He is working with physical therapy occupational and speech therapy.  Will likely need placement.  He was downgraded to the floor in stable condition        Overview/Hospital Course:  No notes on file        Review of Systems   Constitutional: Negative.    HENT: Negative.     Eyes: Negative.     Respiratory:  Negative for cough, chest tightness and shortness of breath.    Cardiovascular:  Negative for chest pain and leg swelling.   Gastrointestinal:  Negative for abdominal pain, diarrhea, nausea and vomiting.   Endocrine: Negative.    Genitourinary: Negative.    Musculoskeletal: Negative.    Skin: Negative.    Allergic/Immunologic: Negative.    Neurological: Negative.  Negative for headaches.   Hematological: Negative.    Psychiatric/Behavioral: Negative.     Objective:     Vital Signs (Most Recent):  Temp: 98.2 °F (36.8 °C) (07/22/22 1300)  Pulse: 81 (07/22/22 1500)  Resp: 19 (07/22/22 1500)  BP: (!) 155/88 (07/22/22 1400)  SpO2: (!) 92 % (07/22/22 1500)   Vital Signs (24h Range):  Temp:  [97.7 °F (36.5 °C)-98.8 °F (37.1 °C)] 98.2 °F (36.8 °C)  Pulse:  [] 81  Resp:  [18-28] 19  SpO2:  [90 %-98 %] 92 %  BP: (139-193)/(74-94) 155/88     Weight: 75.7 kg (166 lb 14.2 oz)  Body mass index is 24.63 kg/m².    Intake/Output Summary (Last 24 hours) at 7/22/2022 1525  Last data filed at 7/22/2022 1000  Gross per 24 hour   Intake --   Output 1400 ml   Net -1400 ml      Physical Exam  Eyes:      Pupils: Pupils are equal, round, and reactive to light.   Cardiovascular:      Rate and Rhythm: Normal rate.      Pulses: Normal pulses.      Heart sounds: No murmur heard.  Pulmonary:      Effort: Pulmonary effort is normal.      Breath sounds: Normal breath sounds.   Abdominal:      General: Abdomen is flat. Bowel sounds are normal. There is no distension.      Palpations: Abdomen is soft.      Tenderness: There is no guarding.   Musculoskeletal:         General: Normal range of motion.      Cervical back: Normal range of motion and neck supple.   Skin:     General: Skin is warm.   Neurological:      General: No focal deficit present.      Mental Status: He is alert.   Psychiatric:         Mood and Affect: Mood normal.         Behavior: Behavior normal.       Significant Labs: All pertinent labs within the past 24  hours have been reviewed.  Recent Lab Results       None            Significant Imaging: I have reviewed all pertinent imaging results/findings within the past 24 hours.      Assessment/Plan:      No notes have been filed under this hospital service.  Service: Hospital Medicine    VTE Risk Mitigation (From admission, onward)         Ordered     enoxaparin injection 40 mg  Daily         07/16/22 1138     IP VTE HIGH RISK PATIENT  Once         07/14/22 1629     Place sequential compression device  Until discontinued         07/14/22 1629                Discharge Planning   MCKINLEY:      Code Status: Full Code   Is the patient medically ready for discharge?:     Reason for patient still in hospital (select all that apply): Treatment  Discharge Plan A: Home        1. Severe cervical stenosis status post surgery doing fine the regard.  2. Metabolic encephalopathy etiology unclear requiring intubation x2, back to his baseline.  3. Respiratory failure which is resolved  4. Enterobacter pneumonia    Plan:  1. Downgraded from ICU to the floor in stable condition.  No longer requiring mechanical ventilation.  2. Broad coverage antibiotics antivirals antifungals have been discontinued.  His CSF fluid is essentially negative  No 3. Continue with Lovenox   4.  Speech therapy occupational therapy and physical therapy on board  5. Will need placement          RON AUGUSTIN MD  Department of Hospital Medicine   [facility] - 7th Floor ICU

## 2022-07-22 NOTE — HPI
Scott is a 71-year-old gentleman who underwent neck surgery 7/12.  He was doing fine postoperatively working with physical therapy again I the bed however on postop day 2. It was noticed that patient was having some slurred speech.  The wife stated that he received a scopolamine patch to help decrease secretions just a day prior prior to this.  He was also getting Norco also received IV Robaxin just before the episode of aphasia.  It proved full workup was had with CT the brain MRI MRA of the brain which were negative for stroke.  He developed significant desaturation that required intubation.  Was treated with broad-spectrum coverage for bacterial fungal viral infections.  LP was done essentially came back negative.  With discontinuing his broad-spectrum coverage.  He was extubated and developed respiratory distress again and was ultimately reintubated.  Again full workup was had which essentially came back negative for his metabolic encephalopathy.  He was successfully extubated and eventually downgraded to the floor yesterday.  He is comfortably sitting in his chair answer questions appropriately talking appropriately completely back to his baseline.  Family is also concerned as what happened but etiology still remains unclear.  He is working with physical therapy occupational and speech therapy.  Will likely need placement.  He was downgraded to the floor in stable condition

## 2022-07-22 NOTE — PROGRESS NOTES
"Pt briefly seen today, chart checked on him.   BP (!) 146/78   Pulse 91   Temp 98.2 °F (36.8 °C)   Resp (!) 21   Ht 5' 9.02" (1.753 m)   Wt 75.7 kg (166 lb 14.2 oz)   SpO2 (!) 94%   BMI 24.63 kg/m²   He is sitting in bed getting ready for MBS study.   Called his name, looked toward me and answerd.  Almost back to normal in my opinion, although I did not perform thorough exam.   - consistent and continuous clinical improvement and no infection so far in the LP, I feel comfortable stopping antibacterials.   - will wait for HSV PCR before stopping the acyclovir.   - probably medication side effect was the etiology of his event.   - I canceled the MRI lumbar spine.  - LP finding of 12 WBC and 177 pr, may be secondary to neck surgery  - will sign off at this time, please call with questions or concerns.     "

## 2022-07-22 NOTE — PT/OT/SLP PROGRESS
Speech Language Pathology Department  Dysphagia Therapy    Patient Name:  Scott Echeverria   MRN:  [id number]  Admitting Diagnosis: <principal problem not specified>    Recommendations:     General Recommendations:  Dysphagia therapy  Diet recommendations:  Minced & Moist Diet - IDDSI Level 5, Liquid Diet Level: Moderately thick liquids - IDDSI Level 3   Aspiration Precautions: small bites/sips and slow rate    Discharge recommendations:      Barriers to Discharge:  Level of Skilled Assistance Needed      Subjective     Patient awake and alert.      Objective:       Pt completed base of tongue and laryngeal x80 with minimal-moderate cues.  Pt tolerated thermal stimulation to the anterior faucial pillars x10 with 100 swallow responses.      Assessment:     Pt presents with dysphagia requiring diet modification to reduce the risk of aspiration.     Goals:   Multidisciplinary Problems     SLP Goals        Problem: SLP    Goal Priority Disciplines Outcome   SLP Goal     SLP    Description: LTG: To tolerate least restrictive diet without signs/sx of aspiration.     ST. Tongue base/ laryngeal excursion exercise  2. Tolerate thermal stimulation x10 with 100% swallow response with less than 2 second delay.                    Plan:     Will continue to follow and tx as appropriate.    Patient to be seen:  5 x/week   Plan of Care expires:     Plan of Care reviewed with:  patient   SLP Follow-Up:          Time Tracking:     SLP Treatment Date:      Speech Start Time:     Speech Stop Time:        Speech Total Time (min):       Billable minutes:  Treatment of Swallow Dysfunction,         2022

## 2022-07-22 NOTE — PROGRESS NOTES
[facility] [address]  Adult Nutrition  Progress Note    SUMMARY       Recommendations    Continue oral diet as tolerated, consistency per SLP.  Encouraged intake as tolerated.  Add chocolate Boost pudding (provides 230 kcal, 7 g protein per serving).    Malnutrition Assessment    Malnutrition in the context of acute illness or injury  Degree of Malnutrition:  Does not meet criteria  Energy Intake:  </= 50% of estimated energy requirement for >/= 5 days  Interpretation of Weight Loss:  does not meet criteria  Body Fat: does not meet criteria  Area of Body Fat Loss:  does not meet criteria and unable to obtain  Muscle Mass Loss:  does not meet criteria  Area of Muscle Mass Loss: does not meet criteria  Fluid Accumulation:  does not meet criteria  Edema:  does not meet criteria   Reduced  Strength:  unable to obtain  A minimum of two characteristics is recommended for diagnosis of either severe or non-severe malnutrition.      Reason for Assessment    Reason For Assessment: identified at risk by screening criteria    Diagnosis:   1. Severe cervical stenosis, post left C3-4, C4-5, C5-6, C6-7 posterior foraminotomies, C3-C7 laminoplasty, and repair of dural tear on 07/12  2. Episode decreased level consciousness/altered mental status on 07/14 requiring intubation with significant hypoxemic respiratory failure, extubated on 07/17  3. Severe hypoxemic respiratory failure  4. Altered mentation - MRI brain MRA neck, EEG without significant findings of stroke or seizure activity  5. Enterobacter pneumonia    Relevant Medical History: BPH, hypothyroidism, sleep apnea    General Information Comments:   7/15/22 Pt remains intubated; discussed with RN and MD would like to start trickle feeds today.  7/18/22 TF started at 10mL/hr, tolerating per RN. Pt is no longer intubated but still requiring EN. Estimated needs updated.   7/22/22 Patient now on dysphagia diet, reports fair intake, agreeable to chocolate  "oral supplement.    Anthropometrics    Temp: 98.6 °F (37 °C)  Height Method: Stated  Height: 5' 9.02" (175.3 cm)  Height (inches): 69.02 in  Weight Method: Standard Scale  Weight: 75.7 kg (166 lb 14.2 oz)  Weight (lb): 166.89 lb  Ideal Body Weight (IBW), Male: 160.12 lb  % Ideal Body Weight, Male (lb): 104.23 %  BMI (Calculated): 24.6  BMI Grade: 18.5-24.9 - normal  Usual Body Weight (UBW), k.7 kg  % Usual Body Weight: 100.21    Lab/Procedures/Meds    Pertinent Labs:  22 K 3.1, Creat 0.6, Ca 7.6, , Pro 5.1, Alb 2.2, , , rest of CMP WNL  Pertinent Medications: none at this time    Estimated/Assessed Needs    Weight Used For Calorie Calculations: 75.7 kg (166 lb 14.2 oz)  Energy Calorie Requirements (kcal): 1810 kcal (MSJ x 1.2 SF)  Energy Need Method: [place]-St Jeor  Protein Requirements: 114 gm  Weight Used For Protein Calculations: 75.7 kg (166 lb 14.2 oz)  Fluid Requirements (mL): 1893  Estimated Fluid Requirement Method: other (see comments) (25mL/kg)    Nutrition Prescription Ordered    Current Diet Order: minced/moist, moderately thickened liquids    Evaluation of Received Nutrient/Fluid Intake    Energy Calories Required: not meeting needs  Protein Required: not meeting needs  % Intake of Estimated Energy Needs: 50 - 75 %  % Meal Intake: 50 - 75 %    Nutrition Plan and Goals    Goal: Provide adequate nutrition to meet estimated needs by follow-up. (goal progressing)  Diagnosis (PES): Inadequate oral intake related to acute respiratory failure as evidenced by <80% needs met. (continues)  Intervention(s): commercial food and collaboration with other providers  Monitoring: Dietitian will monitor food and beverage intake and weight change.  Nutrition Risk: moderate (follow-up in 3-5 days)  "

## 2022-07-22 NOTE — PROGRESS NOTES
[facility]  Neurosurgery  Progress Note    Subjective:     Interval History: Patient doing well. Continues to gradually improve each day. Continues with some neck pain, but reports it is tolerable. Main complaint during rounds is left arm weakness.     History of Present Illness: Mr Echeverria is a 71-year-old male with past medical history of BPH, cervical spine stenosis, hypothyroidism, presented to [facility] on 7/12 and underwent C3-7 laminoplasty and left C3-7 foraminotomies. On 7/14, RRT was activated due to neuro change.  Per wife, patient had pain medication around 4:30am and then sat in a chair at the bedside.  Wife reports he was at his baseline at that time.  Just before 6am, he attempted to ask his wife a question and she noticed he had slurred speech.  RRT activated ... patient's speech worsened and he became aphasic.  Stroke alert activated ... stat Cth was unremarkable.  No recommendation for IV tPA due to recent surgery.  Stat MRI brain negative for acute infarct.  EEG with diffuse slowing.      Post-Op Info:  Procedure(s) (LRB):  LAMINOPLASTY (N/A)   10 Days Post-Op     C3-7 laminoplasty, left C3-4, C4-5, C5-6, C6-7 foraminotomies on 7/12/2022      Medications:  Continuous Infusions:    Scheduled Meds:   acyclovir  10 mg/kg (Ideal) Intravenous Q8H    enoxaparin  40 mg Subcutaneous Daily    guaiFENesin 100 mg/5 ml  400 mg Oral Q4H    levoFLOXacin  500 mg Intravenous Q24H    levothyroxine  50 mcg Oral Daily    tamsulosin  0.4 mg Oral Daily     PRN Meds:acetaminophen, aluminum-magnesium hydroxide-simethicone, barium sulfate, bisacodyL, fentaNYL, hydrALAZINE, labetaloL, ondansetron, prochlorperazine, senna-docusate 8.6-50 mg, sodium chloride 0.9%     Review of Systems  Objective:     Weight: 75.7 kg (166 lb 14.2 oz)  Body mass index is 24.63 kg/m².  Vital Signs (Most Recent):  Temp: 98.6 °F (37 °C) (07/22/22 0800)  Pulse: 86 (07/22/22 0915)  Resp: (!) 26 (07/22/22 0915)  BP: (!) 157/94  (07/22/22 0900)  SpO2: 95 % (07/22/22 0915) Vital Signs (24h Range):  Temp:  [97.7 °F (36.5 °C)-98.8 °F (37.1 °C)] 98.6 °F (37 °C)  Pulse:  [] 86  Resp:  [18-28] 26  SpO2:  [90 %-95 %] 95 %  BP: (139-166)/(74-94) 157/94     Date 07/22/22 0700 - 07/23/22 0659   Shift 6956-4997 0991-6258 7373-3477 24 Hour Total   INTAKE   Shift Total(mL/kg)       OUTPUT   Urine(mL/kg/hr) 500   500   Shift Total(mL/kg) 500(6.6)   500(6.6)   Weight (kg) 75.7 75.7 75.7 75.7                   NG/OG Tube 07/14/22 1600 [place] sump Right nostril (Active)   Placement Check placement verified by aspirate characteristics 07/18/22 0400   Distal Tube Length (cm) 65 07/17/22 2000   Tolerance no signs/symptoms of discomfort 07/18/22 0400   Securement secured to commercial device 07/18/22 0400   Clamp Status/Tolerance clamped 07/18/22 0400   Suction Setting/Drainage Method low;intermittent setting 07/17/22 0800   Insertion Site Appearance no redness, warmth, tenderness, skin breakdown, drainage 07/18/22 0400   Drainage Bile 07/17/22 0800   Flush/Irrigation flushed w/;water;no resistance met 07/17/22 1600   Feeding Type continuous;by pump 07/16/22 1200   Feeding Action feeding held 07/18/22 0400   Current Rate (mL/hr) 30 mL/hr 07/16/22 0800   Goal Rate (mL/hr) 70 mL/hr 07/16/22 0800   Intake (mL) 202 mL 07/16/22 0617   Water Bolus (mL) 100 mL 07/16/22 0617   Tube Output(mL)(Include Discarded Residual) 400 mL 07/16/22 1600   Formula Name Peptamen AF 07/16/22 1200            Urethral Catheter 07/14/22 1600 Double-lumen 16 Fr. (Active)   $ Puri Insertion Bedside Insertion Performed 07/14/22 1600   Site Assessment Clean;Intact 07/18/22 0400   Collection Container Urimeter 07/18/22 0400   Securement Method secured to top of thigh w/ adhesive device 07/18/22 0400   Catheter Care Performed yes 07/18/22 0400   Reason for Continuing Urinary Catheterization Critically ill in ICU and requiring hourly monitoring of intake/output 07/18/22 0400   CAUTI  "Prevention Bundle Securement Device in place with 1" slack;Intact seal between catheter & drainage tubing;Drainage bag/urimeter off the floor;Sheeting clip in use;No dependent loops or kinks;Drainage bag/urimeter not overfilled (<2/3 full);Drainage bag/urimeter below bladder 07/17/22 2000   Output (mL) 80 mL 07/18/22 0800       Neurosurgery Physical Exam  He is awake, alert and oriented  Speech is clear  Follows commands with all extremities.  Lifts left leg off of bed without difficulty. Distal LUE 4+/5. 3- with biceps/triceps. 2/5 with left deltoid.   He does have some non-pitting edema throughout his left arm.   Incision looks good. Edges approximated. No swelling, redness or drainage    Significant Labs:  Recent Labs   Lab 07/21/22  0952      K 3.1*   CO2 26   BUN 8.6   CREATININE 0.60*   CALCIUM 7.6*     Recent Labs   Lab 07/21/22  1243   WBC 8.7   HGB 10.6*   HCT 32.0*        No results for input(s): LABPT, INR, APTT in the last 48 hours.  Microbiology Results (last 7 days)     Procedure Component Value Units Date/Time    Cerebrospinal Fluid Culture [[id number]] Collected: 07/19/22 1130    Order Status: Completed Specimen: CSF (Spinal Fluid) from Cerebrospinal Fluid Updated: 07/22/22 1040     CULTURE, CSF ([facility]) No growth at 4 days     GRAM STAIN No WBCs, No bacteria seen     Blood Culture [[id number]]  (Normal) Collected: 07/17/22 1013    Order Status: Completed Specimen: Blood Updated: 07/21/22 1202     CULTURE, BLOOD ([facility]) No Growth At 96 Hours    Blood Culture [[id number]]  (Normal) Collected: 07/17/22 1013    Order Status: Completed Specimen: Blood Updated: 07/21/22 1202     CULTURE, BLOOD ([facility]) No Growth At 96 Hours    Blood Culture [[id number]]  (Normal) Collected: 07/15/22 1959    Order Status: Completed Specimen: Blood from Antecubital, Left Updated: 07/20/22 2302     CULTURE, BLOOD ([facility]) No Growth at 5 days    Cryptococcal antigen, CSF [[id number]] Collected: 07/19/22 1130    Order Status: " Completed Specimen: CSF (Spinal Fluid) Updated: 07/20/22 1540     CRYPTOCOCCAL ANTIGEN TITER ([facility]) --     CRYPTOCOCCAL ANTIGEN, CSF ([facility]) Negative    Respiratory Culture [[id number]]  (Abnormal) Collected: 07/18/22 1501    Order Status: Completed Specimen: Bronchial Alveolar Lavage from BAL, RUL Updated: 07/20/22 1020     Respiratory Culture Rare normal respiratory pma     GRAM STAIN Many WBC seen      Rare Gram positive cocci      Rare Gram Positive Rods    Loren Ink Prep CSF [[id number]] Collected: 07/19/22 1130    Order Status: Completed Specimen: CSF (Spinal Fluid) Updated: 07/20/22 0802     LOREN INK PREP CSF ([facility]) Negative    Cerebrospinal Fluid Culture [[id number]] Collected: 07/19/22 1130    Order Status: Canceled Specimen: CSF (Spinal Fluid) Updated: 07/20/22 0732    AFB Smear [[id number]] Collected: 07/19/22 1130    Order Status: Completed Specimen: CSF (Spinal Fluid) from Cerebrospinal Fluid Updated: 07/20/22 0712     AFB Smear No AFB seen (Direct smear only)    Mycobacteria and Nocardia Culture [[id number]] Collected: 07/19/22 1130    Order Status: Sent Specimen: CSF (Spinal Fluid) from Cerebrospinal Fluid Updated: 07/19/22 1153    Respiratory Culture [[id number]]  (Abnormal)  (Susceptibility) Collected: 07/16/22 1645    Order Status: Completed Specimen: Sputum from Trachael Aspirate Updated: 07/18/22 0727     Respiratory Culture Many Enterobacter cloacae     Comment: with normal respiratory pam        GRAM STAIN Quality 2+       Many Gram Positive Rods      Many Gram positive cocci      Moderate Gram Negative Rods          Assessment/Plan:     Active Diagnoses:    Diagnosis Date Noted POA    Aphasia [R47.01] 07/14/2022 No    Osseous and subluxation stenosis of intervertebral foramina of cervical region [M99.61] 07/06/2022 Yes    Stenosis of cervical spine with myelopathy [M48.02, G99.2] 07/06/2022 Yes      Problems Resolved During this Admission:     PLAN  CSF changes likely s/t spinal fluid  leak/repair intraop. No evidence thus far of infection.   Neurology has signed off   No additional recs from Neurosurgery at this time  OK to leave incision open to air  PT/OT  SCDs, [place]  He will need placement    NADIA Tan-BC  Neurosurgery  [facility]

## 2022-07-23 PROBLEM — G93.41 ENCEPHALOPATHY, METABOLIC: Status: ACTIVE | Noted: 2022-07-23

## 2022-07-23 LAB
ANION GAP SERPL CALC-SCNC: 12 MEQ/L
BACTERIA CSF CULT: NORMAL
BASOPHILS # BLD AUTO: 0.04 X10(3)/MCL (ref 0–0.2)
BASOPHILS NFR BLD AUTO: 0.3 %
BUN SERPL-MCNC: 7.4 MG/DL (ref 8.4–25.7)
CALCIUM SERPL-MCNC: 8 MG/DL (ref 8.8–10)
CHLORIDE SERPL-SCNC: 103 MMOL/L (ref 98–107)
CO2 SERPL-SCNC: 24 MMOL/L (ref 23–31)
CREAT SERPL-MCNC: 0.58 MG/DL (ref 0.73–1.18)
CREAT/UREA NIT SERPL: 13
EOSINOPHIL # BLD AUTO: 0.04 X10(3)/MCL (ref 0–0.9)
EOSINOPHIL NFR BLD AUTO: 0.3 %
ERYTHROCYTE [DISTWIDTH] IN BLOOD BY AUTOMATED COUNT: 15.2 % (ref 11.5–17)
GLUCOSE SERPL-MCNC: 114 MG/DL (ref 82–115)
GRAM STN SPEC: NORMAL
HCT VFR BLD AUTO: 38.9 % (ref 42–52)
HGB BLD-MCNC: 12.5 GM/DL (ref 14–18)
IMM GRANULOCYTES # BLD AUTO: 0.26 X10(3)/MCL (ref 0–0.04)
IMM GRANULOCYTES NFR BLD AUTO: 2.2 %
LYMPHOCYTES # BLD AUTO: 1.96 X10(3)/MCL (ref 0.6–4.6)
LYMPHOCYTES NFR BLD AUTO: 16.4 %
MCH RBC QN AUTO: 28.5 PG (ref 27–31)
MCHC RBC AUTO-ENTMCNC: 32.1 MG/DL (ref 33–36)
MCV RBC AUTO: 88.6 FL (ref 80–94)
MONOCYTES # BLD AUTO: 0.92 X10(3)/MCL (ref 0.1–1.3)
MONOCYTES NFR BLD AUTO: 7.7 %
NEUTROPHILS # BLD AUTO: 8.7 X10(3)/MCL (ref 2.1–9.2)
NEUTROPHILS NFR BLD AUTO: 73.1 %
NRBC BLD AUTO-RTO: 0 %
PLATELET # BLD AUTO: 524 X10(3)/MCL (ref 130–400)
PMV BLD AUTO: 10.1 FL (ref 7.4–10.4)
POTASSIUM SERPL-SCNC: 3.9 MMOL/L (ref 3.5–5.1)
RBC # BLD AUTO: 4.39 X10(6)/MCL (ref 4.7–6.1)
SODIUM SERPL-SCNC: 139 MMOL/L (ref 136–145)
WBC # SPEC AUTO: 12 X10(3)/MCL (ref 4.5–11.5)

## 2022-07-23 PROCEDURE — 99233 PR SUBSEQUENT HOSPITAL CARE,LEVL III: ICD-10-PCS | Mod: ,,, | Performed by: STUDENT IN AN ORGANIZED HEALTH CARE EDUCATION/TRAINING PROGRAM

## 2022-07-23 PROCEDURE — 20000000 HC ICU ROOM

## 2022-07-23 PROCEDURE — 25000003 PHARM REV CODE 250: Performed by: STUDENT IN AN ORGANIZED HEALTH CARE EDUCATION/TRAINING PROGRAM

## 2022-07-23 PROCEDURE — 97530 THERAPEUTIC ACTIVITIES: CPT | Mod: CO

## 2022-07-23 PROCEDURE — 97535 SELF CARE MNGMENT TRAINING: CPT | Mod: CO

## 2022-07-23 PROCEDURE — 27000221 HC OXYGEN, UP TO 24 HOURS

## 2022-07-23 PROCEDURE — 99233 SBSQ HOSP IP/OBS HIGH 50: CPT | Mod: ,,, | Performed by: STUDENT IN AN ORGANIZED HEALTH CARE EDUCATION/TRAINING PROGRAM

## 2022-07-23 PROCEDURE — 25000003 PHARM REV CODE 250: Performed by: NURSE PRACTITIONER

## 2022-07-23 PROCEDURE — 63600175 PHARM REV CODE 636 W HCPCS: Performed by: PSYCHIATRY & NEUROLOGY

## 2022-07-23 PROCEDURE — 63600175 PHARM REV CODE 636 W HCPCS: Performed by: PHYSICIAN ASSISTANT

## 2022-07-23 PROCEDURE — 80048 BASIC METABOLIC PNL TOTAL CA: CPT | Performed by: STUDENT IN AN ORGANIZED HEALTH CARE EDUCATION/TRAINING PROGRAM

## 2022-07-23 PROCEDURE — 36415 COLL VENOUS BLD VENIPUNCTURE: CPT | Performed by: STUDENT IN AN ORGANIZED HEALTH CARE EDUCATION/TRAINING PROGRAM

## 2022-07-23 PROCEDURE — 25000003 PHARM REV CODE 250: Performed by: PSYCHIATRY & NEUROLOGY

## 2022-07-23 PROCEDURE — 85025 COMPLETE CBC W/AUTO DIFF WBC: CPT | Performed by: STUDENT IN AN ORGANIZED HEALTH CARE EDUCATION/TRAINING PROGRAM

## 2022-07-23 PROCEDURE — 63600175 PHARM REV CODE 636 W HCPCS: Performed by: NURSE PRACTITIONER

## 2022-07-23 PROCEDURE — 63600175 PHARM REV CODE 636 W HCPCS: Performed by: INTERNAL MEDICINE

## 2022-07-23 RX ORDER — OXYCODONE AND ACETAMINOPHEN 10; 325 MG/1; MG/1
1 TABLET ORAL EVERY 4 HOURS PRN
Status: DISCONTINUED | OUTPATIENT
Start: 2022-07-23 | End: 2022-07-25 | Stop reason: HOSPADM

## 2022-07-23 RX ORDER — HYDROCODONE BITARTRATE AND ACETAMINOPHEN 7.5; 325 MG/1; MG/1
1 TABLET ORAL EVERY 6 HOURS PRN
Status: DISCONTINUED | OUTPATIENT
Start: 2022-07-23 | End: 2022-07-25 | Stop reason: HOSPADM

## 2022-07-23 RX ORDER — CYCLOBENZAPRINE HCL 10 MG
10 TABLET ORAL 3 TIMES DAILY PRN
Status: DISCONTINUED | OUTPATIENT
Start: 2022-07-23 | End: 2022-07-25 | Stop reason: HOSPADM

## 2022-07-23 RX ADMIN — ACYCLOVIR SODIUM 710 MG: 500 INJECTION, SOLUTION INTRAVENOUS at 09:07

## 2022-07-23 RX ADMIN — ACYCLOVIR SODIUM 710 MG: 500 INJECTION, SOLUTION INTRAVENOUS at 02:07

## 2022-07-23 RX ADMIN — OXYCODONE AND ACETAMINOPHEN 1 TABLET: 10; 325 TABLET ORAL at 05:07

## 2022-07-23 RX ADMIN — FENTANYL CITRATE 100 MCG: 50 INJECTION INTRAMUSCULAR; INTRAVENOUS at 07:07

## 2022-07-23 RX ADMIN — OXYCODONE AND ACETAMINOPHEN 1 TABLET: 10; 325 TABLET ORAL at 09:07

## 2022-07-23 RX ADMIN — OXYCODONE AND ACETAMINOPHEN 1 TABLET: 10; 325 TABLET ORAL at 12:07

## 2022-07-23 RX ADMIN — ENOXAPARIN SODIUM 40 MG: 40 INJECTION SUBCUTANEOUS at 05:07

## 2022-07-23 RX ADMIN — TAMSULOSIN HYDROCHLORIDE 0.4 MG: 0.4 CAPSULE ORAL at 09:07

## 2022-07-23 RX ADMIN — ACYCLOVIR SODIUM 710 MG: 500 INJECTION, SOLUTION INTRAVENOUS at 05:07

## 2022-07-23 RX ADMIN — LEVOFLOXACIN 500 MG: 5 INJECTION, SOLUTION INTRAVENOUS at 11:07

## 2022-07-23 NOTE — PT/OT/SLP PROGRESS
Occupational Therapy  Treatment    Scott Echeverria   MRN: [id number]   Admitting Diagnosis: <principal problem not specified>    OT Date of Treatment: 07/23/22   OT Start Time: 0950  OT Stop Time: 1022  OT Total Time (min): 32 min     Billable Minutes:  Self Care/Home Management 16 and Therapeutic Activity 16  Total Minutes: 32     OT/TK: TK     TK Visit Number: 3    General Precautions: Standard, fall  Orthopedic Precautions:    Braces:      Spiritual, Cultural Beliefs, Rastafarian Practices, Values that Affect Care: no    Subjective:  Communicated with RN prior to session.         Objective:       Functional Mobility:  Bed Mobility:   Supine to sit: Minimal Assistance   Scooting: Minimal Assistance    Transfer Training:   Sit to stand:Minimal Assistance with Rolling Walker from EOB  Bed <> Chair:  Step Transfer with Minimal Assistance with Rolling Walker from EOB to BS chair    LE Dressing:  Patient don/doffed socks with Moderate Assistance    Patient left up in chair with all lines intact and call button in reach    ASSESSMENT:  Scott Echeverria is a 71 y.o. male with a medical diagnosis of <principal problem not specified>. Pt. With difficulty donning/doffing socks 2/2 previous L-spine sx would benefit from LB dressing equipment.    Rehab potential is excellent    Activity tolerance: Excellent    Discharge recommendations: rehabilitation facility     Equipment recommendations:       GOALS:   Multidisciplinary Problems     Occupational Therapy Goals        Problem: Occupational Therapy    Goal Priority Disciplines Outcome Interventions   Occupational Therapy Goal     OT, PT/OT Ongoing, Progressing    Description: Goals to be met by: 7/27    Patient will increase functional independence with ADLs by performing:    UE Dressing with SBA.  LE Dressing with SBA.  Grooming while seated EOB with SBA.  Toileting from [facility] with SBA for hygiene and clothing management.   Toilet transfer to [facility] with SBA.  Upper extremity  exercise program 10 reps per handout, with assistance as needed                      Plan:  Patient to be seen 5 x/week, daily to address the above listed problems via self-care/home management, therapeutic activities, therapeutic exercises  Plan of Care expires: 08/03/22  Plan of Care reviewed with: patient         07/23/2022

## 2022-07-23 NOTE — PROGRESS NOTES
Patient has remained stable overnight.  The vital signs Were reviewed  Patient's wound intact    He continues to make good progress with left arm strength.  He is tolerating p.o. well without any issues  Speech has seen him    At this stage we will continue to progressive care.  We tried operative back on some oral pain medications see how he does.

## 2022-07-23 NOTE — PT/OT/SLP PROGRESS
Physical Therapy      Patient Name:  Scott Echeverria   MRN:  [id number]    Patient not seen today secondary to sitting [facility] eating lunch. Pt request PT check back after visiting hours. PT will follow up tomorrow.

## 2022-07-23 NOTE — PROGRESS NOTES
[facility] - 7th Floor [facility] Medicine  Progress Note    Patient Name: Scott Echeverria  MRN: [id number]  Patient Class: IP- Inpatient   Admission Date: 7/12/2022  Length of Stay: 11 days  Attending Physician: Ron Augustin II, MD  Primary Care Provider: RON AUGUSTIN MD        Subjective:     Principal Problem:<principal problem not specified>        HPI:       Scott is a 71-year-old gentleman who underwent neck surgery 7/12.  He was doing fine postoperatively working with physical therapy again I the bed however on postop day 2. It was noticed that patient was having some slurred speech.  The wife stated that he received a scopolamine patch to help decrease secretions just a day prior prior to this.  He was also getting Norco also received IV Robaxin just before the episode of aphasia.  It proved full workup was had with CT the brain MRI MRA of the brain which were negative for stroke.  He developed significant desaturation that required intubation.  Was treated with broad-spectrum coverage for bacterial fungal viral infections.  LP was done essentially came back negative.  With discontinuing his broad-spectrum coverage.  He was extubated and developed respiratory distress again and was ultimately reintubated.  Again full workup was had which essentially came back negative for his metabolic encephalopathy.  He was successfully extubated and eventually downgraded to the floor yesterday.  He is comfortably sitting in his chair answer questions appropriately talking appropriately completely back to his baseline.  Family is also concerned as what happened but etiology still remains unclear.  He is working with physical therapy occupational and speech therapy.  Will likely need placement.  He was downgraded to the floor in stable condition        Overview/Hospital Course:  No notes on file    Interval History: Complaining of persistent neck pain this morning, will transition to PO  medications. No other complaints, alert, answers questions appropriately     Review of Systems   Constitutional:  Negative for chills, diaphoresis and fever.   HENT:  Negative for ear pain and rhinorrhea.    Respiratory:  Negative for cough, chest tightness and shortness of breath.    Cardiovascular:  Negative for chest pain, palpitations and leg swelling.   Gastrointestinal:  Negative for abdominal pain, constipation, diarrhea, nausea and vomiting.   Genitourinary:  Negative for difficulty urinating, dysuria and flank pain.   Musculoskeletal:  Positive for neck pain. Negative for back pain and joint swelling.   Neurological:  Negative for dizziness, weakness, light-headedness, numbness and headaches.   Psychiatric/Behavioral:  Negative for confusion.    Objective:     Vital Signs (Most Recent):  Temp: 98.2 °F (36.8 °C) (07/23/22 0000)  Pulse: 86 (07/23/22 0800)  Resp: 18 (07/23/22 1227)  BP: (!) 152/80 (07/23/22 0800)  SpO2: (!) 94 % (07/23/22 0800)   Vital Signs (24h Range):  Temp:  [97.7 °F (36.5 °C)-98.8 °F (37.1 °C)] 98.2 °F (36.8 °C)  Pulse:  [] 86  Resp:  [18-27] 18  SpO2:  [92 %-99 %] 94 %  BP: (144-170)/(75-90) 152/80     Weight: 75.7 kg (166 lb 14.2 oz)  Body mass index is 24.63 kg/m².    Intake/Output Summary (Last 24 hours) at 7/23/2022 1312  Last data filed at 7/23/2022 0800  Gross per 24 hour   Intake 50 ml   Output 650 ml   Net -600 ml      Physical Exam  Constitutional:       Appearance: Normal appearance.   HENT:      Head: Normocephalic and atraumatic.   Cardiovascular:      Rate and Rhythm: Normal rate and regular rhythm.      Pulses: Normal pulses.   Pulmonary:      Effort: Pulmonary effort is normal.      Breath sounds: Normal breath sounds.   Abdominal:      General: Abdomen is flat. Bowel sounds are normal. There is no distension.      Palpations: Abdomen is soft.      Tenderness: There is no abdominal tenderness.   Musculoskeletal:         General: No tenderness. Normal range of motion.       Right lower leg: No edema.      Left lower leg: No edema.   Lymphadenopathy:      Cervical: No cervical adenopathy.   Neurological:      General: No focal deficit present.      Mental Status: He is alert and oriented to person, place, and time.       Significant Labs: All pertinent labs within the past 24 hours have been reviewed.    Significant Imaging: I have reviewed all pertinent imaging results/findings within the past 24 hours.      Assessment/Plan:      Encephalopathy, metabolic  Resolved  Unclear etiology for acute change in mental status  Work up negative for stroke or encephalopathy      Aphasia          Stenosis of cervical spine with myelopathy  Chronic neck pain currently controlled on Fentanyl  Will transition to PO medications now that patient is alert, Percocet 10 mg ordered         VTE Risk Mitigation (From admission, onward)         Ordered     enoxaparin injection 40 mg  Daily         07/16/22 1138     IP VTE HIGH RISK PATIENT  Once         07/14/22 1629     Place sequential compression device  Until discontinued         07/14/22 1629                Discharge Planning   MCKINLEY:      Code Status: Full Code   Is the patient medically ready for discharge?:     Reason for patient still in hospital (select all that apply): Treatment  Discharge Plan A: Home                  Leandra Arias MD  Department of Hospital Medicine   [facility] - 7th Floor ICU

## 2022-07-23 NOTE — ASSESSMENT & PLAN NOTE
Resolved  Unclear etiology for acute change in mental status  Work up negative for stroke or encephalopathy

## 2022-07-23 NOTE — SUBJECTIVE & OBJECTIVE
Interval History: Complaining of persistent neck pain this morning, will transition to PO medications. No other complaints, alert, answers questions appropriately     Review of Systems   Constitutional:  Negative for chills, diaphoresis and fever.   HENT:  Negative for ear pain and rhinorrhea.    Respiratory:  Negative for cough, chest tightness and shortness of breath.    Cardiovascular:  Negative for chest pain, palpitations and leg swelling.   Gastrointestinal:  Negative for abdominal pain, constipation, diarrhea, nausea and vomiting.   Genitourinary:  Negative for difficulty urinating, dysuria and flank pain.   Musculoskeletal:  Positive for neck pain. Negative for back pain and joint swelling.   Neurological:  Negative for dizziness, weakness, light-headedness, numbness and headaches.   Psychiatric/Behavioral:  Negative for confusion.    Objective:     Vital Signs (Most Recent):  Temp: 98.2 °F (36.8 °C) (07/23/22 0000)  Pulse: 86 (07/23/22 0800)  Resp: 18 (07/23/22 1227)  BP: (!) 152/80 (07/23/22 0800)  SpO2: (!) 94 % (07/23/22 0800)   Vital Signs (24h Range):  Temp:  [97.7 °F (36.5 °C)-98.8 °F (37.1 °C)] 98.2 °F (36.8 °C)  Pulse:  [] 86  Resp:  [18-27] 18  SpO2:  [92 %-99 %] 94 %  BP: (144-170)/(75-90) 152/80     Weight: 75.7 kg (166 lb 14.2 oz)  Body mass index is 24.63 kg/m².    Intake/Output Summary (Last 24 hours) at 7/23/2022 1312  Last data filed at 7/23/2022 0800  Gross per 24 hour   Intake 50 ml   Output 650 ml   Net -600 ml      Physical Exam  Constitutional:       Appearance: Normal appearance.   HENT:      Head: Normocephalic and atraumatic.   Cardiovascular:      Rate and Rhythm: Normal rate and regular rhythm.      Pulses: Normal pulses.   Pulmonary:      Effort: Pulmonary effort is normal.      Breath sounds: Normal breath sounds.   Abdominal:      General: Abdomen is flat. Bowel sounds are normal. There is no distension.      Palpations: Abdomen is soft.      Tenderness: There is no  abdominal tenderness.   Musculoskeletal:         General: No tenderness. Normal range of motion.      Right lower leg: No edema.      Left lower leg: No edema.   Lymphadenopathy:      Cervical: No cervical adenopathy.   Neurological:      General: No focal deficit present.      Mental Status: He is alert and oriented to person, place, and time.       Significant Labs: All pertinent labs within the past 24 hours have been reviewed.    Significant Imaging: I have reviewed all pertinent imaging results/findings within the past 24 hours.

## 2022-07-23 NOTE — ASSESSMENT & PLAN NOTE
Chronic neck pain currently controlled on Fentanyl  Will transition to PO medications now that patient is alert, Percocet 10 mg ordered

## 2022-07-24 LAB
ANION GAP SERPL CALC-SCNC: 10 MEQ/L
BASOPHILS # BLD AUTO: 0.04 X10(3)/MCL (ref 0–0.2)
BASOPHILS NFR BLD AUTO: 0.4 %
BUN SERPL-MCNC: 8.4 MG/DL (ref 8.4–25.7)
CALCIUM SERPL-MCNC: 8.4 MG/DL (ref 8.8–10)
CHLORIDE SERPL-SCNC: 103 MMOL/L (ref 98–107)
CO2 SERPL-SCNC: 27 MMOL/L (ref 23–31)
CREAT SERPL-MCNC: 0.62 MG/DL (ref 0.73–1.18)
CREAT/UREA NIT SERPL: 14
EOSINOPHIL # BLD AUTO: 0.15 X10(3)/MCL (ref 0–0.9)
EOSINOPHIL NFR BLD AUTO: 1.3 %
ERYTHROCYTE [DISTWIDTH] IN BLOOD BY AUTOMATED COUNT: 15.3 % (ref 11.5–17)
GLUCOSE SERPL-MCNC: 114 MG/DL (ref 82–115)
HCT VFR BLD AUTO: 35.6 % (ref 42–52)
HGB BLD-MCNC: 11.8 GM/DL (ref 14–18)
IMM GRANULOCYTES # BLD AUTO: 0.19 X10(3)/MCL (ref 0–0.04)
IMM GRANULOCYTES NFR BLD AUTO: 1.7 %
LYMPHOCYTES # BLD AUTO: 2.06 X10(3)/MCL (ref 0.6–4.6)
LYMPHOCYTES NFR BLD AUTO: 18.3 %
MCH RBC QN AUTO: 28.9 PG (ref 27–31)
MCHC RBC AUTO-ENTMCNC: 33.1 MG/DL (ref 33–36)
MCV RBC AUTO: 87.3 FL (ref 80–94)
MONOCYTES # BLD AUTO: 0.81 X10(3)/MCL (ref 0.1–1.3)
MONOCYTES NFR BLD AUTO: 7.2 %
NEUTROPHILS # BLD AUTO: 8 X10(3)/MCL (ref 2.1–9.2)
NEUTROPHILS NFR BLD AUTO: 71.1 %
NRBC BLD AUTO-RTO: 0 %
PLATELET # BLD AUTO: 522 X10(3)/MCL (ref 130–400)
PMV BLD AUTO: 10.1 FL (ref 7.4–10.4)
POTASSIUM SERPL-SCNC: 3.1 MMOL/L (ref 3.5–5.1)
RBC # BLD AUTO: 4.08 X10(6)/MCL (ref 4.7–6.1)
SODIUM SERPL-SCNC: 140 MMOL/L (ref 136–145)
WBC # SPEC AUTO: 11.3 X10(3)/MCL (ref 4.5–11.5)

## 2022-07-24 PROCEDURE — 25000003 PHARM REV CODE 250: Performed by: NURSE PRACTITIONER

## 2022-07-24 PROCEDURE — 97530 THERAPEUTIC ACTIVITIES: CPT | Mod: CQ

## 2022-07-24 PROCEDURE — 63600175 PHARM REV CODE 636 W HCPCS: Performed by: NURSE PRACTITIONER

## 2022-07-24 PROCEDURE — 99900035 HC TECH TIME PER 15 MIN (STAT)

## 2022-07-24 PROCEDURE — 20000000 HC ICU ROOM

## 2022-07-24 PROCEDURE — 63600175 PHARM REV CODE 636 W HCPCS: Performed by: PHYSICIAN ASSISTANT

## 2022-07-24 PROCEDURE — 63600175 PHARM REV CODE 636 W HCPCS: Performed by: PSYCHIATRY & NEUROLOGY

## 2022-07-24 PROCEDURE — 25000003 PHARM REV CODE 250: Performed by: PSYCHIATRY & NEUROLOGY

## 2022-07-24 PROCEDURE — 27000221 HC OXYGEN, UP TO 24 HOURS

## 2022-07-24 PROCEDURE — 25000003 PHARM REV CODE 250: Performed by: INTERNAL MEDICINE

## 2022-07-24 PROCEDURE — 80048 BASIC METABOLIC PNL TOTAL CA: CPT | Performed by: STUDENT IN AN ORGANIZED HEALTH CARE EDUCATION/TRAINING PROGRAM

## 2022-07-24 PROCEDURE — 25000003 PHARM REV CODE 250: Performed by: STUDENT IN AN ORGANIZED HEALTH CARE EDUCATION/TRAINING PROGRAM

## 2022-07-24 PROCEDURE — 97116 GAIT TRAINING THERAPY: CPT | Mod: CQ

## 2022-07-24 PROCEDURE — 99233 SBSQ HOSP IP/OBS HIGH 50: CPT | Mod: ,,, | Performed by: STUDENT IN AN ORGANIZED HEALTH CARE EDUCATION/TRAINING PROGRAM

## 2022-07-24 PROCEDURE — 36415 COLL VENOUS BLD VENIPUNCTURE: CPT | Performed by: STUDENT IN AN ORGANIZED HEALTH CARE EDUCATION/TRAINING PROGRAM

## 2022-07-24 PROCEDURE — 85025 COMPLETE CBC W/AUTO DIFF WBC: CPT | Performed by: STUDENT IN AN ORGANIZED HEALTH CARE EDUCATION/TRAINING PROGRAM

## 2022-07-24 PROCEDURE — 99233 PR SUBSEQUENT HOSPITAL CARE,LEVL III: ICD-10-PCS | Mod: ,,, | Performed by: STUDENT IN AN ORGANIZED HEALTH CARE EDUCATION/TRAINING PROGRAM

## 2022-07-24 PROCEDURE — 94761 N-INVAS EAR/PLS OXIMETRY MLT: CPT

## 2022-07-24 RX ORDER — DOCUSATE SODIUM 100 MG/1
100 CAPSULE, LIQUID FILLED ORAL DAILY
Status: DISCONTINUED | OUTPATIENT
Start: 2022-07-24 | End: 2022-07-24

## 2022-07-24 RX ORDER — POTASSIUM CHLORIDE 20 MEQ/1
40 TABLET, EXTENDED RELEASE ORAL ONCE
Status: DISCONTINUED | OUTPATIENT
Start: 2022-07-24 | End: 2022-07-24

## 2022-07-24 RX ORDER — DOCUSATE SODIUM 50 MG/5ML
100 LIQUID ORAL DAILY
Status: DISCONTINUED | OUTPATIENT
Start: 2022-07-24 | End: 2022-07-25 | Stop reason: HOSPADM

## 2022-07-24 RX ADMIN — DOCUSATE SODIUM LIQUID 100 MG: 100 LIQUID ORAL at 02:07

## 2022-07-24 RX ADMIN — ACYCLOVIR SODIUM 710 MG: 500 INJECTION, SOLUTION INTRAVENOUS at 09:07

## 2022-07-24 RX ADMIN — ACYCLOVIR SODIUM 710 MG: 500 INJECTION, SOLUTION INTRAVENOUS at 02:07

## 2022-07-24 RX ADMIN — TAMSULOSIN HYDROCHLORIDE 0.4 MG: 0.4 CAPSULE ORAL at 09:07

## 2022-07-24 RX ADMIN — POTASSIUM BICARBONATE 20 MEQ: 391 TABLET, EFFERVESCENT ORAL at 02:07

## 2022-07-24 RX ADMIN — OXYCODONE AND ACETAMINOPHEN 1 TABLET: 10; 325 TABLET ORAL at 07:07

## 2022-07-24 RX ADMIN — ENOXAPARIN SODIUM 40 MG: 40 INJECTION SUBCUTANEOUS at 05:07

## 2022-07-24 RX ADMIN — LEVOTHYROXINE SODIUM 50 MCG: 50 TABLET ORAL at 06:07

## 2022-07-24 RX ADMIN — OXYCODONE AND ACETAMINOPHEN 1 TABLET: 10; 325 TABLET ORAL at 05:07

## 2022-07-24 RX ADMIN — ACYCLOVIR SODIUM 710 MG: 500 INJECTION, SOLUTION INTRAVENOUS at 05:07

## 2022-07-24 RX ADMIN — OXYCODONE AND ACETAMINOPHEN 1 TABLET: 10; 325 TABLET ORAL at 09:07

## 2022-07-24 RX ADMIN — OXYCODONE AND ACETAMINOPHEN 1 TABLET: 10; 325 TABLET ORAL at 12:07

## 2022-07-24 RX ADMIN — LEVOFLOXACIN 500 MG: 5 INJECTION, SOLUTION INTRAVENOUS at 10:07

## 2022-07-24 NOTE — PT/OT/SLP PROGRESS
Physical Therapy  Treatment    Scott Echeverria   MRN: [id number]   Admitting Diagnosis: <principal problem not specified>       PT Start Time: 1002     PT Stop Time: 1040    PT Total Time (min): 38 min       Billable Minutes:  Gait Training 23 and Therapeutic Activity 15    Treatment Type: Treatment  PT/PTA: PTA     PTA Visit Number: 3       General Precautions: Standard, fall  Orthopedic Precautions: spinal precautions   Braces:    Respiratory Status: Nasal cannula, flow 2 L/min    Spiritual, Cultural Beliefs, Judaism Practices, Values that Affect Care: no    Subjective:  Communicated with NSG prior to session.           Objective:        Functional Mobility:  Bed Mobility:    Supine to/from sit. Mod A    Transfers: Sit to/from stand Mod A.        Gait:    Pt ambulated ~10ft + 100ft + 100ft. Very slow but steady gait speed. Increased NABOR knee flexion noted in stance phase. No overt LOB. Decreased NABOR foot clearance. Good endurance. Pt returned to room sitting [facility] with lunch prepped. All needs met.          AM-PAC 6 CLICK MOBILITY  How much help from another person does this patient currently need?   1 = Unable, Total/Dependent Assistance  2 = A lot, Maximum/Moderate Assistance  3 = A little, Minimum/Contact Guard/Supervision  4 = None, Modified [place]/Independent         AM-PAC Raw Score CMS G-Code Modifier Level of Impairment Assistance   6 % Total / Unable   7 - 9 CM 80 - 100% Maximal Assist   10 - 14 CL 60 - 80% Moderate Assist   15 - 19 CK 40 - 60% Moderate Assist   20 - 22 CJ 20 - 40% Minimal Assist   23 CI 1-20% SBA / CGA   24 CH 0% Independent/ Mod I     Patient left up in chair with all lines intact and call button in reach.        Rehab identified problem list/impairments:      Rehab potential is good.    Activity tolerance: Good    Discharge recommendations: Discharge Facility/Level of Care Needs: home health PT, rehabilitation facility     Barriers to discharge:      Equipment  recommendations:       GOALS:   Multidisciplinary Problems     Physical Therapy Goals        Problem: Physical Therapy    Goal Priority Disciplines Outcome Goal Variances Interventions   Physical Therapy Goal     PT, PT/OT Ongoing, Progressing     Description: Goals to be met by: 2022     Goals revised 22 2/2 pt change in status    Patient will increase functional independence with mobility by performin. Supine to/from  sit with minimal assistance  2. Sit to stand transfer with minimal assistance.  3. Sit EOB x 10 minutes with stand by assist.  4. Sit to stand with contact guard assist   5. Gait x 150 feet with minimal assistance and use of rolling walker.                    PLAN:    Patient to be seen daily  to address the above listed problems via gait training, therapeutic activities, therapeutic exercises  Plan of Care expires: 22  Plan of Care reviewed with: patient         2022

## 2022-07-24 NOTE — PROGRESS NOTES
[facility] - 7th Floor [facility] Medicine  Progress Note    Patient Name: Scott Echeverria  MRN: [id number]  Patient Class: IP- Inpatient   Admission Date: 7/12/2022  Length of Stay: 12 days  Attending Physician: Ron Augustin II, MD  Primary Care Provider: RON AUGUSTIN MD        Subjective:     Principal Problem:<principal problem not specified>        HPI:       Soctt is a 71-year-old gentleman who underwent neck surgery 7/12.  He was doing fine postoperatively working with physical therapy again I the bed however on postop day 2. It was noticed that patient was having some slurred speech.  The wife stated that he received a scopolamine patch to help decrease secretions just a day prior prior to this.  He was also getting Norco also received IV Robaxin just before the episode of aphasia.  It proved full workup was had with CT the brain MRI MRA of the brain which were negative for stroke.  He developed significant desaturation that required intubation.  Was treated with broad-spectrum coverage for bacterial fungal viral infections.  LP was done essentially came back negative.  With discontinuing his broad-spectrum coverage.  He was extubated and developed respiratory distress again and was ultimately reintubated.  Again full workup was had which essentially came back negative for his metabolic encephalopathy.  He was successfully extubated and eventually downgraded to the floor yesterday.  He is comfortably sitting in his chair answer questions appropriately talking appropriately completely back to his baseline.  Family is also concerned as what happened but etiology still remains unclear.  He is working with physical therapy occupational and speech therapy.  Will likely need placement.  He was downgraded to the floor in stable condition        Overview/Hospital Course:  No notes on file    Interval History: Pain has improved on PO Percocet, complaining of constipation.     Review of  Systems   Constitutional:  Negative for chills, diaphoresis and fever.   HENT:  Negative for ear pain and rhinorrhea.    Respiratory:  Negative for cough, chest tightness and shortness of breath.    Cardiovascular:  Negative for chest pain, palpitations and leg swelling.   Gastrointestinal:  Positive for constipation. Negative for abdominal pain, diarrhea, nausea and vomiting.   Genitourinary:  Negative for difficulty urinating, dysuria and flank pain.   Musculoskeletal:  Positive for neck pain. Negative for back pain and joint swelling.   Neurological:  Negative for dizziness, weakness, light-headedness, numbness and headaches.   Psychiatric/Behavioral:  Negative for confusion.    Objective:     Vital Signs (Most Recent):  Temp: 98.4 °F (36.9 °C) (07/24/22 0730)  Pulse: 96 (07/24/22 1000)  Resp: 18 (07/24/22 1252)  BP: 131/75 (07/24/22 1000)  SpO2: 95 % (07/24/22 1000) Vital Signs (24h Range):  Temp:  [97.9 °F (36.6 °C)-99 °F (37.2 °C)] 98.4 °F (36.9 °C)  Pulse:  [] 96  Resp:  [13-25] 18  SpO2:  [92 %-95 %] 95 %  BP: (128-151)/(74-91) 131/75     Weight: 75.7 kg (166 lb 14.2 oz)  Body mass index is 24.63 kg/m².    Intake/Output Summary (Last 24 hours) at 7/24/2022 1301  Last data filed at 7/24/2022 1000  Gross per 24 hour   Intake 350 ml   Output 1850 ml   Net -1500 ml      Physical Exam  Constitutional:       Appearance: Normal appearance.   HENT:      Head: Normocephalic and atraumatic.   Cardiovascular:      Rate and Rhythm: Normal rate and regular rhythm.      Pulses: Normal pulses.   Pulmonary:      Effort: Pulmonary effort is normal.      Breath sounds: Normal breath sounds.   Abdominal:      General: Abdomen is flat. Bowel sounds are normal. There is no distension.      Palpations: Abdomen is soft.      Tenderness: There is no abdominal tenderness.   Musculoskeletal:         General: No tenderness. Normal range of motion.      Right lower leg: No edema.      Left lower leg: No edema.   Lymphadenopathy:       Cervical: No cervical adenopathy.   Neurological:      General: No focal deficit present.      Mental Status: He is alert and oriented to person, place, and time.       Significant Labs: All pertinent labs within the past 24 hours have been reviewed.    Significant Imaging: I have reviewed all pertinent imaging results/findings within the past 24 hours.      Assessment/Plan:      Encephalopathy, metabolic  Resolved  Unclear etiology for acute change in mental status  Work up negative for stroke or encephalopathy      Aphasia          Stenosis of cervical spine with myelopathy  Chronic neck pain, pain well controlled on PO Percocet, fentanyl discontinued   Continue PT        Constipation: due to opiates. Will start Colace      VTE Risk Mitigation (From admission, onward)         Ordered     enoxaparin injection 40 mg  Daily         07/16/22 1138     IP VTE HIGH RISK PATIENT  Once         07/14/22 1629     Place sequential compression device  Until discontinued         07/14/22 1629                Discharge Planning   MCKINLEY:      Code Status: Full Code   Is the patient medically ready for discharge?:     Reason for patient still in hospital (select all that apply): Treatment  Discharge Plan A: Home                  Leandra Arias MD  Department of Hospital Medicine   [facility] - 7th Floor ICU

## 2022-07-24 NOTE — SUBJECTIVE & OBJECTIVE
Interval History: Pain has improved on PO Percocet, complaining of constipation.     Review of Systems   Constitutional:  Negative for chills, diaphoresis and fever.   HENT:  Negative for ear pain and rhinorrhea.    Respiratory:  Negative for cough, chest tightness and shortness of breath.    Cardiovascular:  Negative for chest pain, palpitations and leg swelling.   Gastrointestinal:  Positive for constipation. Negative for abdominal pain, diarrhea, nausea and vomiting.   Genitourinary:  Negative for difficulty urinating, dysuria and flank pain.   Musculoskeletal:  Positive for neck pain. Negative for back pain and joint swelling.   Neurological:  Negative for dizziness, weakness, light-headedness, numbness and headaches.   Psychiatric/Behavioral:  Negative for confusion.    Objective:     Vital Signs (Most Recent):  Temp: 98.4 °F (36.9 °C) (07/24/22 0730)  Pulse: 96 (07/24/22 1000)  Resp: 18 (07/24/22 1252)  BP: 131/75 (07/24/22 1000)  SpO2: 95 % (07/24/22 1000) Vital Signs (24h Range):  Temp:  [97.9 °F (36.6 °C)-99 °F (37.2 °C)] 98.4 °F (36.9 °C)  Pulse:  [] 96  Resp:  [13-25] 18  SpO2:  [92 %-95 %] 95 %  BP: (128-151)/(74-91) 131/75     Weight: 75.7 kg (166 lb 14.2 oz)  Body mass index is 24.63 kg/m².    Intake/Output Summary (Last 24 hours) at 7/24/2022 1301  Last data filed at 7/24/2022 1000  Gross per 24 hour   Intake 350 ml   Output 1850 ml   Net -1500 ml      Physical Exam  Constitutional:       Appearance: Normal appearance.   HENT:      Head: Normocephalic and atraumatic.   Cardiovascular:      Rate and Rhythm: Normal rate and regular rhythm.      Pulses: Normal pulses.   Pulmonary:      Effort: Pulmonary effort is normal.      Breath sounds: Normal breath sounds.   Abdominal:      General: Abdomen is flat. Bowel sounds are normal. There is no distension.      Palpations: Abdomen is soft.      Tenderness: There is no abdominal tenderness.   Musculoskeletal:         General: No tenderness. Normal  range of motion.      Right lower leg: No edema.      Left lower leg: No edema.   Lymphadenopathy:      Cervical: No cervical adenopathy.   Neurological:      General: No focal deficit present.      Mental Status: He is alert and oriented to person, place, and time.       Significant Labs: All pertinent labs within the past 24 hours have been reviewed.    Significant Imaging: I have reviewed all pertinent imaging results/findings within the past 24 hours.

## 2022-07-25 ENCOUNTER — HOSPITAL ENCOUNTER (INPATIENT)
Facility: HOSPITAL | Age: 72
LOS: 11 days | Discharge: HOME HEALTH | DRG: 552 | End: 2022-08-05
Attending: INTERNAL MEDICINE | Admitting: INTERNAL MEDICINE
Payer: MEDICARE

## 2022-07-25 VITALS
DIASTOLIC BLOOD PRESSURE: 70 MMHG | SYSTOLIC BLOOD PRESSURE: 130 MMHG | HEART RATE: 106 BPM | HEIGHT: 69 IN | BODY MASS INDEX: 24.72 KG/M2 | OXYGEN SATURATION: 95 % | WEIGHT: 166.88 LBS | RESPIRATION RATE: 16 BRPM | TEMPERATURE: 98 F

## 2022-07-25 DIAGNOSIS — M48.02 STENOSIS OF CERVICAL SPINE WITH MYELOPATHY: ICD-10-CM

## 2022-07-25 DIAGNOSIS — G99.2 STENOSIS OF CERVICAL SPINE WITH MYELOPATHY: ICD-10-CM

## 2022-07-25 DIAGNOSIS — R13.10 DYSPHAGIA, UNSPECIFIED TYPE: ICD-10-CM

## 2022-07-25 DIAGNOSIS — M54.12 CERVICAL RADICULITIS: Primary | ICD-10-CM

## 2022-07-25 LAB
ANION GAP SERPL CALC-SCNC: 10 MEQ/L
BASOPHILS # BLD AUTO: 0.06 X10(3)/MCL (ref 0–0.2)
BASOPHILS NFR BLD AUTO: 0.6 %
BUN SERPL-MCNC: 6.4 MG/DL (ref 8.4–25.7)
CALCIUM SERPL-MCNC: 8.6 MG/DL (ref 8.8–10)
CHLORIDE SERPL-SCNC: 101 MMOL/L (ref 98–107)
CO2 SERPL-SCNC: 30 MMOL/L (ref 23–31)
CREAT SERPL-MCNC: 0.6 MG/DL (ref 0.73–1.18)
CREAT/UREA NIT SERPL: 11
EOSINOPHIL # BLD AUTO: 0.15 X10(3)/MCL (ref 0–0.9)
EOSINOPHIL NFR BLD AUTO: 1.6 %
ERYTHROCYTE [DISTWIDTH] IN BLOOD BY AUTOMATED COUNT: 15.6 % (ref 11.5–17)
GLUCOSE SERPL-MCNC: 105 MG/DL (ref 82–115)
HCT VFR BLD AUTO: 36.6 % (ref 42–52)
HGB BLD-MCNC: 11.8 GM/DL (ref 14–18)
IMM GRANULOCYTES # BLD AUTO: 0.13 X10(3)/MCL (ref 0–0.04)
IMM GRANULOCYTES NFR BLD AUTO: 1.4 %
LYMPHOCYTES # BLD AUTO: 2.3 X10(3)/MCL (ref 0.6–4.6)
LYMPHOCYTES NFR BLD AUTO: 24.4 %
MCH RBC QN AUTO: 28.7 PG (ref 27–31)
MCHC RBC AUTO-ENTMCNC: 32.2 MG/DL (ref 33–36)
MCV RBC AUTO: 89.1 FL (ref 80–94)
MONOCYTES # BLD AUTO: 0.72 X10(3)/MCL (ref 0.1–1.3)
MONOCYTES NFR BLD AUTO: 7.6 %
NEUTROPHILS # BLD AUTO: 6.1 X10(3)/MCL (ref 2.1–9.2)
NEUTROPHILS NFR BLD AUTO: 64.4 %
NRBC BLD AUTO-RTO: 0 %
PLATELET # BLD AUTO: 576 X10(3)/MCL (ref 130–400)
PMV BLD AUTO: 9.4 FL (ref 7.4–10.4)
POTASSIUM SERPL-SCNC: 3.2 MMOL/L (ref 3.5–5.1)
RBC # BLD AUTO: 4.11 X10(6)/MCL (ref 4.7–6.1)
SARS-COV-2 RDRP RESP QL NAA+PROBE: NEGATIVE
SODIUM SERPL-SCNC: 141 MMOL/L (ref 136–145)
WBC # SPEC AUTO: 9.4 X10(3)/MCL (ref 4.5–11.5)

## 2022-07-25 PROCEDURE — 63600175 PHARM REV CODE 636 W HCPCS: Performed by: PSYCHIATRY & NEUROLOGY

## 2022-07-25 PROCEDURE — 25000003 PHARM REV CODE 250: Performed by: NURSE PRACTITIONER

## 2022-07-25 PROCEDURE — 99900035 HC TECH TIME PER 15 MIN (STAT)

## 2022-07-25 PROCEDURE — 92526 ORAL FUNCTION THERAPY: CPT

## 2022-07-25 PROCEDURE — 25000003 PHARM REV CODE 250: Performed by: INTERNAL MEDICINE

## 2022-07-25 PROCEDURE — 80048 BASIC METABOLIC PNL TOTAL CA: CPT | Performed by: STUDENT IN AN ORGANIZED HEALTH CARE EDUCATION/TRAINING PROGRAM

## 2022-07-25 PROCEDURE — 94799 UNLISTED PULMONARY SVC/PX: CPT

## 2022-07-25 PROCEDURE — 97110 THERAPEUTIC EXERCISES: CPT | Mod: CQ

## 2022-07-25 PROCEDURE — 27000221 HC OXYGEN, UP TO 24 HOURS

## 2022-07-25 PROCEDURE — 87635 SARS-COV-2 COVID-19 AMP PRB: CPT | Performed by: INTERNAL MEDICINE

## 2022-07-25 PROCEDURE — 63600175 PHARM REV CODE 636 W HCPCS: Performed by: NURSE PRACTITIONER

## 2022-07-25 PROCEDURE — 94668 MNPJ CHEST WALL SBSQ: CPT

## 2022-07-25 PROCEDURE — 25000003 PHARM REV CODE 250: Performed by: PSYCHIATRY & NEUROLOGY

## 2022-07-25 PROCEDURE — 94761 N-INVAS EAR/PLS OXIMETRY MLT: CPT

## 2022-07-25 PROCEDURE — 99238 HOSP IP/OBS DSCHRG MGMT 30/<: CPT | Mod: ,,, | Performed by: INTERNAL MEDICINE

## 2022-07-25 PROCEDURE — 25000003 PHARM REV CODE 250: Performed by: STUDENT IN AN ORGANIZED HEALTH CARE EDUCATION/TRAINING PROGRAM

## 2022-07-25 PROCEDURE — 97535 SELF CARE MNGMENT TRAINING: CPT | Mod: CO

## 2022-07-25 PROCEDURE — 11800000 HC REHAB PRIVATE ROOM

## 2022-07-25 PROCEDURE — 85025 COMPLETE CBC W/AUTO DIFF WBC: CPT | Performed by: STUDENT IN AN ORGANIZED HEALTH CARE EDUCATION/TRAINING PROGRAM

## 2022-07-25 PROCEDURE — 97116 GAIT TRAINING THERAPY: CPT | Mod: CQ

## 2022-07-25 PROCEDURE — 99238 PR HOSPITAL DISCHARGE DAY,<30 MIN: ICD-10-PCS | Mod: ,,, | Performed by: INTERNAL MEDICINE

## 2022-07-25 PROCEDURE — 36415 COLL VENOUS BLD VENIPUNCTURE: CPT | Performed by: STUDENT IN AN ORGANIZED HEALTH CARE EDUCATION/TRAINING PROGRAM

## 2022-07-25 PROCEDURE — 97530 THERAPEUTIC ACTIVITIES: CPT | Mod: CQ

## 2022-07-25 RX ORDER — HYDROXYZINE PAMOATE 50 MG/1
50 CAPSULE ORAL NIGHTLY
Status: DISCONTINUED | OUTPATIENT
Start: 2022-07-25 | End: 2022-08-05 | Stop reason: HOSPADM

## 2022-07-25 RX ORDER — NAPROXEN SODIUM 220 MG/1
81 TABLET, FILM COATED ORAL DAILY
Status: DISCONTINUED | OUTPATIENT
Start: 2022-07-26 | End: 2022-08-05 | Stop reason: HOSPADM

## 2022-07-25 RX ORDER — ONDANSETRON 4 MG/1
4 TABLET, ORALLY DISINTEGRATING ORAL EVERY 6 HOURS PRN
Status: DISCONTINUED | OUTPATIENT
Start: 2022-07-25 | End: 2022-08-05 | Stop reason: HOSPADM

## 2022-07-25 RX ORDER — BENZONATATE 100 MG/1
100 CAPSULE ORAL 3 TIMES DAILY PRN
Status: DISCONTINUED | OUTPATIENT
Start: 2022-07-25 | End: 2022-08-05 | Stop reason: HOSPADM

## 2022-07-25 RX ORDER — ACETAMINOPHEN 325 MG/1
650 TABLET ORAL EVERY 4 HOURS PRN
Status: DISCONTINUED | OUTPATIENT
Start: 2022-07-25 | End: 2022-08-05 | Stop reason: HOSPADM

## 2022-07-25 RX ORDER — BISACODYL 10 MG
10 SUPPOSITORY, RECTAL RECTAL DAILY PRN
Status: DISCONTINUED | OUTPATIENT
Start: 2022-07-25 | End: 2022-08-05 | Stop reason: HOSPADM

## 2022-07-25 RX ORDER — HYDROCODONE BITARTRATE AND ACETAMINOPHEN 5; 325 MG/1; MG/1
1 TABLET ORAL EVERY 4 HOURS PRN
Status: DISCONTINUED | OUTPATIENT
Start: 2022-07-25 | End: 2022-08-05 | Stop reason: HOSPADM

## 2022-07-25 RX ORDER — LABETALOL HCL 20 MG/4 ML
10 SYRINGE (ML) INTRAVENOUS EVERY 4 HOURS PRN
Status: DISCONTINUED | OUTPATIENT
Start: 2022-07-25 | End: 2022-08-05 | Stop reason: HOSPADM

## 2022-07-25 RX ORDER — ALPRAZOLAM 0.25 MG/1
0.25 TABLET ORAL 3 TIMES DAILY PRN
Status: DISCONTINUED | OUTPATIENT
Start: 2022-07-25 | End: 2022-08-05 | Stop reason: HOSPADM

## 2022-07-25 RX ORDER — LEVOTHYROXINE SODIUM 50 UG/1
50 TABLET ORAL DAILY
Status: DISCONTINUED | OUTPATIENT
Start: 2022-07-26 | End: 2022-08-05 | Stop reason: HOSPADM

## 2022-07-25 RX ORDER — NITROGLYCERIN 0.4 MG/1
0.4 TABLET SUBLINGUAL EVERY 5 MIN PRN
Status: DISCONTINUED | OUTPATIENT
Start: 2022-07-25 | End: 2022-08-05 | Stop reason: HOSPADM

## 2022-07-25 RX ORDER — ONDANSETRON 4 MG/1
8 TABLET, ORALLY DISINTEGRATING ORAL EVERY 6 HOURS PRN
Status: DISCONTINUED | OUTPATIENT
Start: 2022-07-25 | End: 2022-08-05 | Stop reason: HOSPADM

## 2022-07-25 RX ORDER — HYDRALAZINE HYDROCHLORIDE 20 MG/ML
10 INJECTION INTRAMUSCULAR; INTRAVENOUS EVERY 4 HOURS PRN
Status: DISCONTINUED | OUTPATIENT
Start: 2022-07-25 | End: 2022-08-05 | Stop reason: HOSPADM

## 2022-07-25 RX ORDER — ENOXAPARIN SODIUM 100 MG/ML
40 INJECTION SUBCUTANEOUS EVERY 24 HOURS
Status: DISCONTINUED | OUTPATIENT
Start: 2022-07-25 | End: 2022-08-05 | Stop reason: HOSPADM

## 2022-07-25 RX ORDER — TAMSULOSIN HYDROCHLORIDE 0.4 MG/1
0.4 CAPSULE ORAL NIGHTLY
Status: DISCONTINUED | OUTPATIENT
Start: 2022-07-25 | End: 2022-08-05 | Stop reason: HOSPADM

## 2022-07-25 RX ORDER — PROMETHAZINE HYDROCHLORIDE 25 MG/1
25 TABLET ORAL EVERY 6 HOURS PRN
Status: DISCONTINUED | OUTPATIENT
Start: 2022-07-25 | End: 2022-08-05 | Stop reason: HOSPADM

## 2022-07-25 RX ORDER — DOCUSATE SODIUM 100 MG/1
100 CAPSULE, LIQUID FILLED ORAL 2 TIMES DAILY
Status: DISCONTINUED | OUTPATIENT
Start: 2022-07-25 | End: 2022-08-05 | Stop reason: HOSPADM

## 2022-07-25 RX ORDER — METOPROLOL TARTRATE 1 MG/ML
10 INJECTION, SOLUTION INTRAVENOUS
Status: DISCONTINUED | OUTPATIENT
Start: 2022-07-25 | End: 2022-08-05 | Stop reason: HOSPADM

## 2022-07-25 RX ADMIN — LEVOTHYROXINE SODIUM 50 MCG: 50 TABLET ORAL at 05:07

## 2022-07-25 RX ADMIN — ACYCLOVIR SODIUM 710 MG: 500 INJECTION, SOLUTION INTRAVENOUS at 05:07

## 2022-07-25 RX ADMIN — HYDROCODONE BITARTRATE AND ACETAMINOPHEN 1 TABLET: 5; 325 TABLET ORAL at 08:07

## 2022-07-25 RX ADMIN — OXYCODONE AND ACETAMINOPHEN 1 TABLET: 10; 325 TABLET ORAL at 03:07

## 2022-07-25 RX ADMIN — DOCUSATE SODIUM LIQUID 100 MG: 100 LIQUID ORAL at 08:07

## 2022-07-25 RX ADMIN — DOCUSATE SODIUM 100 MG: 100 CAPSULE, LIQUID FILLED ORAL at 08:07

## 2022-07-25 RX ADMIN — ENOXAPARIN SODIUM 40 MG: 30 INJECTION SUBCUTANEOUS at 06:07

## 2022-07-25 RX ADMIN — HYDROXYZINE PAMOATE 50 MG: 50 CAPSULE ORAL at 08:07

## 2022-07-25 RX ADMIN — OXYCODONE AND ACETAMINOPHEN 1 TABLET: 10; 325 TABLET ORAL at 05:07

## 2022-07-25 RX ADMIN — ACYCLOVIR SODIUM 710 MG: 500 INJECTION, SOLUTION INTRAVENOUS at 01:07

## 2022-07-25 RX ADMIN — OXYCODONE AND ACETAMINOPHEN 1 TABLET: 10; 325 TABLET ORAL at 10:07

## 2022-07-25 RX ADMIN — GUAIFENESIN 400 MG: 200 SOLUTION ORAL at 08:07

## 2022-07-25 RX ADMIN — TAMSULOSIN HYDROCHLORIDE 0.4 MG: 0.4 CAPSULE ORAL at 08:07

## 2022-07-25 RX ADMIN — GUAIFENESIN 400 MG: 200 SOLUTION ORAL at 01:07

## 2022-07-25 NOTE — PT/OT/SLP PROGRESS
Occupational Therapy  Treatment    Scott Echeverria   MRN: [id number]   Admitting Diagnosis: Stenosis of cervical spine with myelopathy    OT Date of Treatment: 07/25/22   OT Start Time: 1159  OT Stop Time: 1223  OT Total Time (min): 24 min     Billable Minutes:  Self Care/Home Management 24  Total Minutes: 24     OT/TK: TK     TK Visit Number: 4    General Precautions: Standard, fall  Orthopedic Precautions:    Braces:      Spiritual, Cultural Beliefs, Gnosticism Practices, Values that Affect Care: no    Subjective:  Communicated with RN prior to session.         Objective:   Pt. Sitting in chair upon entry.    Transfer Training:   Sit to stand:Minimal Assistance with Rolling Walker from chair  Toilet Transfer:  Pt Step Transfer with Minimal Assistance with Rolling Walker from chair to BR commode    Grooming:  Patient peformed hand washing with Minimal Assistance at standing at sink.    Patient left up in chair with all lines intact and call button in reach    ASSESSMENT:  Scott Echeverria is a 71 y.o. male with a medical diagnosis of Stenosis of cervical spine with myelopathy.    Rehab potential is excellent    Activity tolerance: Excellent    Discharge recommendations: rehabilitation facility     Equipment recommendations:       GOALS:   Multidisciplinary Problems     Occupational Therapy Goals        Problem: Occupational Therapy    Goal Priority Disciplines Outcome Interventions   Occupational Therapy Goal     OT, PT/OT Ongoing, Progressing    Description: Goals to be met by: 7/27    Patient will increase functional independence with ADLs by performing:    UE Dressing with SBA.  LE Dressing with SBA.  Grooming while seated EOB with SBA.  Toileting from [facility] with SBA for hygiene and clothing management.   Toilet transfer to [facility] with SBA.  Upper extremity exercise program 10 reps per handout, with assistance as needed                      Plan:  Patient to be seen 5 x/week, daily to address the above listed problems  via self-care/home management, therapeutic activities, therapeutic exercises  Plan of Care expires: 08/03/22  Plan of Care reviewed with: patient         07/25/2022

## 2022-07-25 NOTE — PT/OT/SLP PROGRESS
Speech Language Pathology Department  Dysphagia Therapy    Patient Name:  Scott Echeverria   MRN:  [id number]  Admitting Diagnosis: <principal problem not specified>    Recommendations:     General Recommendations:  dysphagia therapy   Diet recommendations:  Minced & Moist Diet - IDDSI Level 5, Liquid Diet Level: Moderately thick liquids - IDDSI Level 3   Aspiration Precautions: small bites/sips and slow rate    Discharge recommendations:      Barriers to Discharge:  Level of Skilled Assistance Needed      Subjective     Patient awake and alert.    Objective:     Pt completed base of tongue and laryngeal x80 with minimal-moderate cues.  Pt tolerated thermal stimulation to the anterior faucial pillars x15 with 100 swallow responses.      Assessment:     Pt presents with dysphagia requiring diet modification to reduce the risk of aspiration.     Goals:   Multidisciplinary Problems     SLP Goals        Problem: SLP    Goal Priority Disciplines Outcome   SLP Goal     SLP    Description: LTG: To tolerate least restrictive diet without signs/sx of aspiration.     ST. Tongue base/ laryngeal excursion exercise  2. Tolerate thermal stimulation x10 with 100% swallow response with less than 2 second delay.                    Plan:     Will continue to follow and tx as appropriate.    Patient to be seen:  5 x/week   Plan of Care expires:     Plan of Care reviewed with:  patient   SLP Follow-Up:          Time Tracking:     SLP Treatment Date:      Speech Start Time:     Speech Stop Time:        Speech Total Time (min):       Billable minutes:  Treatment of Swallow Dysfunction, 10       2022

## 2022-07-25 NOTE — CARE UPDATE
[id number] Pt accepted to [facility] rehab today. Dr Reilly will place dc orders after his 1:00 meeting. Pt is scheduled for [facility] to bring him at 3:30 to rehab.

## 2022-07-25 NOTE — CONSULTS
[facility] - Rehab Inpatient Services  Inpatient Rehab Prescreen    PATIENT INFORMATION     Assessment date/time: 7/25/2022 12:06 PM      Name: Scott Echeverria Phone: [phone]   Address:   [address] [place] [id number] SSN:    YOB: 1950 Age: 71 y.o. Gender: male   Race: White   Marital Status:    Advance Directives: Full code     COVERAGE INFORMATION     Patient Medicare #:   [id number]    Primary Insurance Type:  / Medicare Secondary Insurance Type:  / Blue Cross Blue Shield   Policy #:   Policy #:   [id number]   Insurance contact name/number:  Insurance contact name/number:    Authorization #:  Authorization #:    Verified Coverage: Insurance Carrier   Pending Coverage:    Prescription Coverage:  Insurance details/comments:      PHYSICIAN/REFERRAL INFORMATION     Primary Care Physician: RON AUGUSTIN MD Attending Physician: Luis Khan MD   Consulting physician/specialist:  Referring Physician:    Referring facility:  Referring contact name/phone:    Physician details/comments:      CONTACT INFORMATION   Extended Emergency Contact Information  Primary Emergency Contact: Anusha Echeverria  Mobile Phone: [phone]  Relation: Spouse  Preferred language: English    PRIOR LIVING SITUATION         Bedroom location/setup: 1st floor   Bathroom location/setup: 1st floor, walk-in shower with built-in bench, threshold, grab bars, and HHS.   Equipment at home: grab bars, HHS.   Prior device use:  None     PRIOR LEVEL OF FUNCTION     Did a helper need to assist with the following activities prior to the current illness, exacerbation, or injury? No   Self-care:  No,Independent   Indoor mobility:  No,Independent   Stairs: No,Independent   Functional Cognition: No,Independent   Comments: Pt. Independent prior to hospitalization   Caregivers providing assistance:   Pre-hospital home care service:  Name of Agency:    Home/personal responsibilities:    Pre-hospital  vocational category: Retired for age   Pre-hospital vocational effort: Retired for age   Occupation/profession:  Return to work/school plan:    Educational history:    Hobbies/leisure activities:  Resources used prior to admission:    Available resources:  Resource information comments:      REHABILITATION DIAGNOSIS     History of present illness:   This patient is a 71-year-old gentleman who underwent neck surgery 7/12/2022.He had a diagnosis of cervical myelopathy.  He had posterior foraminotomies left C3-7 as well as C3-7 laminoplasty and repair of dural tear 7/12/2022. He was doing fine postoperatively working with physical therapy. However, on postop day 2, It was noticed that patient was having some slurred speech.  The wife stated that he received a scopolamine patch to help decrease secretions just a day prior prior to this.  He was also getting Norco. He also received IV Robaxin just before the episode of aphasia.A full  workup was done with CT the brain MRI MRA of the brain which were negative for stroke.  He developed significant desaturation that required intubation.  He was treated with broad-spectrum coverage for bacterial fungal viral infections.  LP was done essentially came back negative.  With discontinuing his broad-spectrum coverage.  He was extubated and developed respiratory distress again and was ultimately reintubated.  Again full workup was done which essentially came back negative for his metabolic encephalopathy.  He was successfully extubated and eventually downgraded to the floor.  He is comfortably sitting in his chair answer questions appropriately talking appropriately completely back to his baseline.  Family is also concerned as what happened but etiology still remains unclear.  He is working with physical therapy occupational and speech therapy.  Will likely need placement.  He was downgraded to the floor in stable condition. Pt. Requires acute inpatient rehab admission with 24-hour  nursing and active physician oversight to monitor and manage acute medical comorbid conditions, labs, pain, and functional deficits.  Patient/family will also require teaching and integration of improving functional skills into daily living.  He will also require individualized, interdisciplinary approach to his care, receiving PT,OT, ST services 3 hours per day 5 days per week. Required care cannot be provided at lower level of care. Patient is anticipated to require approximately 12-14 days LOS with expected discharge home with spouse with HH or outpatient therapy.        Impairment group (IGC):   Other Non-Traumatic Spinal Cord Dysfunction 04.130 Etiologic diagnosis/description:    Date of onset:  (Not on file) Date of surgery:    Allergies: Iodine   Comorbid condition: Hypothyroidism, BPH   This patient is at risk for the following complications: DVT/PE, pneumonia,   malnutrition, neurological decline, respiratory insufficiency,   worsening activity intolerance, complications from anticoagulation,   Incision care, skin breakdown, inadequate Sleep, and constipation.       Risk for medical/clinical complications:   This patient requires medical management/24-hour nursing of complex   comorbidities (BPH, hypothyroidism ), labs,medications (see medications list), incision care, pain, sleep hygiene, anticoagulation, nutrition, hydration, neurological,   pulmonary, and cardiac status, and preventive healthcare.       SPECIAL REHABILITATION NEEDS     IV: peripheral single lumen 18 gauge left forearm Hearing impaired, Visually impaired, Preferred Language: English and Faith/cultural considerations: Anabaptist        Peripheral IV - Single Lumen 07/17/22 1000 20 G Left;Posterior Forearm (Active)   Site Assessment Clean;Dry;Intact;No redness;No swelling 07/25/22 0723   Extremity Assessment Distal to IV No abnormal discoloration;No redness;No swelling;No warmth 07/25/22 0723   Line Status Flushed;No blood return;Saline  "locked 07/25/22 0723   Dressing Status Clean;Intact;Dry 07/25/22 0723   Dressing Intervention Integrity maintained 07/25/22 0723            Peripheral IV - Single Lumen 07/21/22 1600 18 G Anterior;Left;Proximal Forearm (Active)   Site Assessment Clean;Dry;Intact;No redness;No swelling 07/25/22 0800   Extremity Assessment Distal to IV [place] 07/22/22 0800   Line Status Flushed;No blood return;Saline locked 07/25/22 0800   Dressing Status Clean;Dry;Intact 07/25/22 0800   Dressing Intervention Integrity maintained 07/25/22 0800          PRECAUTIONS     Aspiration precautions: dysphagia, Diet: modified diet and Safety/fall precautions: High fall risk     PAST MEDICAL, SOCIAL, FAMILY HISTORY     Pertinent past medical history:   Past Medical History:   Diagnosis Date    BPH (benign prostatic hyperplasia)     Cervical pain     Cervical radiculitis     Cervical spinal stenosis     Hypothyroidism, unspecified     Sleep apnea     resolved since surgery      Has the patient had two or more falls in the past year or any fall with injury in the past year: No   Has the patient had major surgery during the 100 days prior to admission: yes   Family Medical History:   Family History   Problem Relation Age of Onset    Alzheimer's disease Mother     Parkinsonism Mother     Thyroid disease Mother     Stroke Father     Heart attack Father     Hyperlipidemia Father     Hypertension Sister     Sickle cell trait Sister     Hypertension Brother       Substance use history:   Social History     Substance and Sexual Activity   Alcohol Use Not Currently     Social History     Tobacco Use   Smoking Status Never Smoker   Smokeless Tobacco Never Used     Social History     Substance and Sexual Activity   Drug Use Never      VITALS   BP:  (!) 140/75     Temp: 97.5 °F (36.4 °C)     HR: 95     Resp: 12     SpO2: 95 %     Height: 5' 9.02" (1.753 m)     Weight: 75.7 kg (166 lb 14.2 oz)     BMI: 24.6          MED/LABS/DIAGNOSITICS   No current " facility-administered medications for this encounter.     No current outpatient medications on file.     Facility-Administered Medications Ordered in Other Encounters   Medication Dose Route Frequency Provider Last Rate Last Admin    acyclovir (ZOVIRAX) 710 mg in dextrose 5 % 250 mL IVPB  10 mg/kg (Ideal) Intravenous Q8H Yan Rainey MD   Stopped at 07/25/22 0632    aluminum-magnesium hydroxide-simethicone 200-200-20 mg/5 mL suspension 30 mL  30 mL Oral Q4H PRN NADIA Tan-BC        barium sulfate (VARIBAR PUDDING) oral paste 1 mL  1 mL Oral ONCE PRN Paulo Rao MD        bisacodyL suppository 10 mg  10 mg Rectal Daily PRN NADIA Tan-BC   10 mg at 07/16/22 2028    cyclobenzaprine tablet 10 mg  10 mg Oral TID PRN Grant Carnes MD        docusate 50 mg/5 mL liquid 100 mg  100 mg Oral Daily Leandra Hallman MD   100 mg at 07/25/22 0823    enoxaparin injection 40 mg  40 mg Subcutaneous Daily ATIYA Garcia   40 mg at 07/24/22 1701    guaiFENesin 100 mg/5 ml syrup 400 mg  400 mg Oral Q4H Randal Hummel MD   400 mg at 07/25/22 0823    hydrALAZINE injection 10 mg  10 mg Intravenous Q4H PRN Paulo Rao MD   10 mg at 07/22/22 1006    HYDROcodone-acetaminophen 7.5-325 mg per tablet 1 tablet  1 tablet Oral Q6H PRN Grant Carnes MD        labetaloL injection 10 mg  10 mg Intravenous Q2H PRN Paulo Rao MD   10 mg at 07/22/22 1205    levothyroxine tablet 50 mcg  50 mcg Oral Daily Randal Hummel MD   50 mcg at 07/25/22 0532    ondansetron disintegrating tablet 8 mg  8 mg Oral Q6H PRN NADIA Tan-BC   8 mg at 07/13/22 1354    oxyCODONE-acetaminophen  mg per tablet 1 tablet  1 tablet Oral Q4H PRN Leandra Hallman MD   1 tablet at 07/25/22 1048    prochlorperazine injection Soln 5 mg  5 mg Intravenous Q6H PRN BERENICE TanCNP-BC        sodium chloride 0.9% flush 10 mL  10 mL Intravenous PRN Sergio Jovel MD        tamsulosin 24 hr capsule 0.4  mg  0.4 mg Oral Daily Mary Fong, AGACNP-BC   0.4 mg at 07/24/22 2109      Pertinent lab results:   Lab Results   Component Value Date    WBC 9.4 07/25/2022    HGB 11.8 (L) 07/25/2022    HCT 36.6 (L) 07/25/2022     (H) 07/25/2022     07/25/2022    K 3.2 (L) 07/25/2022    BUN 6.4 (L) 07/25/2022    CO2 30 07/25/2022      Pertinent diagnostic studies:    Additional labs and diagnostic studies required prior to admission:      QI: GG Care Tool     QI: GG Care Tool  Therapy Evaluation   Swallowing/  Nutritional Status   Diet/Feeding/  Swallowing    Eating       Oral Hygiene   Grooming    Toileting Hygiene       Shower/Bathe   Bathing    Upper Body Dressing   Dressing Upper    Lower Body Dressing   Dressing Lower Mod assist   Putting On/Taking Off Footwear       Toilet Transfer   Toileting    Bladder Continence Status   Bladder     Bowel Continence Status   Bowel     Roll Left and Right   Bed Mobility Supine to sit/sit to supine mod assist   Sit to Lying       Lying to Sitting on Side of Bed       Sit to Stand       Chair/Bed-to- Chair   Transfers   Sit to stand/stand to sit- mod assist  Bed to chair transfer min. assist   Car Transfer       Walk 10 Feet   Equipment      Walk 50 Feet with Two Turns   Balance    Walk 150 Feet   Endurance    Walk 10 Feet on Uneven Surfaces   Gait 10 ft + 100 ft + 100 ft   Picking up an Object       Wheel 50 Feet with Two Turns   Wheelchair    Wheel 150 Feet       1 Step (Curb)   Stairs    4 Steps       12 Steps       Expression of Ideas and Wants   Communication    Understanding  Verbal and Non-Verbal Content       Cognitive Patterns   Cognition       Safety Precautions       Lower Extremity      Strength       ROM       Upper Extremity      Strength       ROM      Care Score Value Definitions  6: Independent. [place] provides no assistance with tasks. A device may or may not have been used.  5: Set-up or clean-up assistance. [place] sets up or cleans up, but does not  assist with tasks. [place] may have assisted prior to or following the activity.  4: Supervision or touching assistance. [place] provides verbal cues or touching/steadying or contact guard assistance. Assistance may be provided throughout the activity or intermittently.  3: Partial/moderate assistance. [place] does less than half the effort. [place] lifts, holds, or supports trunk or limbs, but provides less than half the effort.  2: Substantial/maximal assistance. [place] does more than half the effort. [place] lifts or holds trunk or limbs, and provides more than half the effort.  1: Dependent. [place] does all of the effort, or the assistance of two or more helpers is required for the patient to complete the activity.  Care Score Activity Not Attempted Value Definitions  7: Patient refused.  9: Not applicable. Not attempted and the patient did not perform this activity prior to the current illness, exacerbation, or injury.  10: Not attempted due to environmental limitations (e.g., lack of equipment, weather constraints).  88: Not attempted due to medical condition or safety concerns.    DISCHARGE GOALS/ANTICPATED INTERVENTIONS/SERVICES     Expected Level of Improvement for Safe Discharge: anticipate discharge home at or near baseline level of functioning and/or decreased burden of care.      Patient/Family/Caregiver Goals:    Required Treatments/Services: Rehabilitation Nursing, Respiratory Therapy, Dietitian/nutrition, Social  and Case Management   Required Therapy Therapy Type Min/Day Days/Week Duration of Therapy   Physical Therapy 60 5 12-14 days   Occupational Therapy 60 5 12-14 days   Speech and Language Pathology 60 5 12-14 days   Prosthetic/Orthotics      Recreational Therapy      Anticipated Services upon Discharge:  Home Health or outpatient PT/OT   Additional rehabilitation needs:  n/a   Expected Discharge Destination:  Home with    Barriers to Discharge: None   Discharge Support: Patient has a caregiver  available, Discharge plan has been verified with patient's caregiver and Caregiver is in agreement with the discharge plan   Patient/Family/Caregiver Orientation: Patient/family/caregiver oriented and agreeable to inpatient rehabilitation plan   Estimated Length of Stay: 12-14 days   Projected Admission Date: 7/25/2022   Medical Prognosis: good   Physicians Review and Admission Determination:   I have discussed patient with Nurse Liaison. I have reviewed the prescreen. Patient is in need of, appropriate for and should tolerate and benefit from an aggressive IRF stay.

## 2022-07-25 NOTE — PROGRESS NOTES
Vital Signs (Most Recent):  Temp: 98.4 °F (36.9 °C) (07/24/22 0730)  Pulse: 96 (07/24/22 1000)  Resp: 18 (07/24/22 1252)  BP: 131/75 (07/24/22 1000)  SpO2: 95 % (07/24/22 1000) Vital Signs (24h Range):  Temp:  [97.9 °F (36.6 °C)-99 °F (37.2 °C)] 98.4 °F (36.9 °C)  Pulse:  [] 96  Resp:  [13-25] 18  SpO2:  [92 %-95 %] 95 %  BP: (128-151)/(74-91) 131/75        No new issues  Pain controlled w PO meds  Incision - c/d/i  Neuro exam stable, left arm continue to improved    Advance care  PT/OT  Rehab placement

## 2022-07-25 NOTE — PT/OT/SLP PROGRESS
Physical Therapy  Treatment    Scott Echeverria   MRN: [id number]   Admitting Diagnosis: <principal problem not specified>       PT Start Time: 1100     PT Stop Time: 1138    PT Total Time (min): 38 min       Billable Minutes:  Gait Training 14, Therapeutic Activity 12 and Therapeutic Exercise 12    Treatment Type: Treatment  PT/PTA: PTA     PTA Visit Number: 4       General Precautions: Standard, fall  Orthopedic Precautions: spinal precautions   Braces:    Respiratory Status: Room air    Spiritual, Cultural Beliefs, Jainism Practices, Values that Affect Care: no    Subjective:  Communicated with NSG prior to session.           Objective:        Functional Mobility:  Bed Mobility:    Min A. Supine to sit.    Transfers:   Sit to/from stand t/f Min A.    Gait:    Pt ambulated ~120ft x 2 bouts. RW. Slow but steady step through gait.        Therapeutic Activities and Exercises:  NABOR LE therex x 10 reps    AM-PAC 6 CLICK MOBILITY  How much help from another person does this patient currently need?   1 = Unable, Total/Dependent Assistance  2 = A lot, Maximum/Moderate Assistance  3 = A little, Minimum/Contact Guard/Supervision  4 = None, Modified [place]/Independent         AM-PAC Raw Score CMS G-Code Modifier Level of Impairment Assistance   6 % Total / Unable   7 - 9 CM 80 - 100% Maximal Assist   10 - 14 CL 60 - 80% Moderate Assist   15 - 19 CK 40 - 60% Moderate Assist   20 - 22 CJ 20 - 40% Minimal Assist   23 CI 1-20% SBA / CGA   24 CH 0% Independent/ Mod I     Patient left up in chair with all lines intact and call button in reach    Rehab identified problem list/impairments:      Rehab potential is good.    Activity tolerance: Good    Discharge recommendations: Discharge Facility/Level of Care Needs: home health PT, rehabilitation facility     Barriers to discharge:      Equipment recommendations:       GOALS:   Multidisciplinary Problems     Physical Therapy Goals        Problem: Physical Therapy    Goal  Priority Disciplines Outcome Goal Variances Interventions   Physical Therapy Goal     PT, PT/OT Ongoing, Progressing     Description: Goals to be met by: 2022     Goals revised 22 2/2 pt change in status    Patient will increase functional independence with mobility by performin. Supine to/from  sit with minimal assistance  2. Sit to stand transfer with minimal assistance.  3. Sit EOB x 10 minutes with stand by assist.  4. Sit to stand with contact guard assist   5. Gait x 150 feet with minimal assistance and use of rolling walker.                    PLAN:    Patient to be seen daily  to address the above listed problems via gait training, therapeutic activities, therapeutic exercises  Plan of Care expires: 22  Plan of Care reviewed with: patient         2022

## 2022-07-25 NOTE — H&P
[facility] ([facility])  Rehab Admission H&P    Patient Name: Scott Echeverria  MRN: [id number]  Age: 71 y.o. Sex: male  : 1950  Hospital Length of Stay: 0 days  Date of Service: 2022   Chief Complaint: Cervical spondylosis s/p C3-C7 laminiopasty and laminoforminotomies on 2022   Subjective:   History of Present Illness   71-year-old white male presented to [facility] on 2022 for scheduled cervical laminiopasty and laminoforminotomies.  PMH significant for cervical spondylosis, hypothyroidism, VIJI, and BPH.  Tolerated C3-C7 laminiopasty and laminoforminotomies on  without perioperative complications.  On  code fast was initiated due to slurred speech after receiving IV Robaxin.  CT head, MRI, MRA negative.  Patient required Ativan prior to MRI and reportedly has some left-sided neglect.  Naloxone and romazicon received with little response. That afternoon he became hypoxic and was having agonal respirations requiring intubation.  T-max a 100.8° reported on .  Extubated on .  Repeat CT unremarkable.  NIVA negative for DVT.  Sputum culture did come back positive for Enterobacter Cloacae; resistant to cephalosporins.  Rocephin discontinued and Levaquin initiated.  Required re-intubation on  for continued alteration mental status and severe hypoxemic respiratory failure.  EEG with diffuse slowing.  LP with 12 WBC and 650 RBC. Neurology added acyclovir, rocephin, and vancomycin for possible meningitis on .  Extubated on .  Meningitis panel negative-low suspicion meningitis. Suspicion most likely secondary to medication side effect but reason for his decompensation and alteration mental status is still not clear on .  Acyclovir discontinued.  Downgraded to the floor on .  Broad coverage antibiotics/antivirals/antifungals discontinued.  Continue to participate progress in therapy.  Left arm weakness continued to improve.  Tolerated  transfer to [facility] inpatient rehab unit on  without incident.  Sitting comfortably in chair.  Reports good sleep and appetite.  Last BM .  Complains of neck/shoulder pain radiating to the left.  Vital signs at goal with no recorded fevers.  Potassium 3.3.  Iron level 18.  Bicarb 33. Recent imaging reported below.    Past Medical History:  Cervical spondylosis s/p C3-C7 laminiopasty and laminoforminotomies on 2022, VIJI, BPH, Hypothyroidism, Normocytic anemia  Procedure history: C3-C7 laminiopasty and laminoforminotomies on 2022, appendectomy, hemorrhoidectomy, right retinal detachment surgery, sinus surgery, tonsillectomy, Lumbar L4-S1 fusion  Family History:  Mother:  Dementia of + at age 71.  Father:  COPD +nicotine dependence + at age 82  Social History:  (-) TOB  (-) ETOH  (-) Illicit drug use  Completed college.  Retired.  .  Does not routinely exercise.  Prior level of function:  Independent with ADLs, drives, cooks, does chores, does laundry, grocery shops, manages finances, manages his own medications, and does yd work.  Residence:  Lives with wife in [place], [place] in a single-story home with a threshold to enter the residence.  He uses a walk-in shower with a threshold, grab bars, and hand-held shower.  He has a built-in bench.  He does not have an elevated toilet.  No grab bars adjacent.  DME:  Grab bars in shower, hand-held shower  Anticipated discharge destination:  Home with home health    Review of patient's allergies indicates:   Allergen Reactions    Iodine       No current facility-administered medications for this encounter.  No current outpatient medications on file.    Facility-Administered Medications Ordered in Other Encounters:     acyclovir (ZOVIRAX) 710 mg in dextrose 5 % 250 mL IVPB, 10 mg/kg (Ideal), Intravenous, Q8H, Yan Rainey MD, Stopped at 22 0632    aluminum-magnesium hydroxide-simethicone 200-200-20 mg/5 mL suspension 30 mL, 30 mL,  "Oral, Q4H PRN, Mary Fong AGACNP-BC    barium sulfate (VARIBAR PUDDING) oral paste 1 mL, 1 mL, Oral, ONCE PRN, Paulo Rao MD    bisacodyL suppository 10 mg, 10 mg, Rectal, Daily PRN, Mary Fong, AGACNP-BC, 10 mg at 07/16/22 2028    cyclobenzaprine tablet 10 mg, 10 mg, Oral, TID PRN, Grant Carnes MD    docusate 50 mg/5 mL liquid 100 mg, 100 mg, Oral, Daily, Leandra Hallman MD, 100 mg at 07/25/22 0823    enoxaparin injection 40 mg, 40 mg, Subcutaneous, Daily, ATIYA Garcia, 40 mg at 07/24/22 1701    guaiFENesin 100 mg/5 ml syrup 400 mg, 400 mg, Oral, Q4H, Randal Hummel MD, 400 mg at 07/25/22 0823    hydrALAZINE injection 10 mg, 10 mg, Intravenous, Q4H PRN, Paulo Rao MD, 10 mg at 07/22/22 1006    HYDROcodone-acetaminophen 7.5-325 mg per tablet 1 tablet, 1 tablet, Oral, Q6H PRN, Grant Carnes MD    labetaloL injection 10 mg, 10 mg, Intravenous, Q2H PRN, Paulo Rao MD, 10 mg at 07/22/22 1205    levothyroxine tablet 50 mcg, 50 mcg, Oral, Daily, Randal Hummel MD, 50 mcg at 07/25/22 0532    ondansetron disintegrating tablet 8 mg, 8 mg, Oral, Q6H PRN, Mary Fong, AGACNP-BC, 8 mg at 07/13/22 1354    oxyCODONE-acetaminophen  mg per tablet 1 tablet, 1 tablet, Oral, Q4H PRN, Leandra Hallman MD, 1 tablet at 07/25/22 1048    prochlorperazine injection Soln 5 mg, 5 mg, Intravenous, Q6H PRN, Mary Fong AGACNP-BC    sodium chloride 0.9% flush 10 mL, 10 mL, Intravenous, PRN, Sergio Jovel MD    tamsulosin 24 hr capsule 0.4 mg, 0.4 mg, Oral, Daily, Mary Fong, [facility], 0.4 mg at 07/24/22 2109     Review of Systems   Complete 12-point review of symptoms negative except for what's mentioned in HPI     Objective:     /66   Pulse 93   Temp 98 °F (36.7 °C) (Oral)   Resp 14   Ht 5' 9.02" (1.753 m)   Wt 75.2 kg (165 lb 12.6 oz)   SpO2 96%   BMI 24.47 kg/m²       Intake/Output Summary (Last 24 hours) at 7/26/2022 1247  Last data " filed at 7/26/2022 0800  Gross per 24 hour   Intake 615 ml   Output --   Net 615 ml       Physical Exam  Constitutional:       Appearance: Normal appearance.   HENT:      Head: Normocephalic.      Mouth/Throat:      Mouth: Mucous membranes are moist.   Eyes:      Pupils: Pupils are equal, round, and reactive to light.   Neck:      Comments: Posterior neck incision clean and intact-TK, no redness or drainage  Cardiovascular:      Rate and Rhythm: Normal rate and regular rhythm.      Heart sounds: Normal heart sounds.   Pulmonary:      Effort: Pulmonary effort is normal.      Breath sounds: Normal breath sounds.   Abdominal:      General: Bowel sounds are normal.      Palpations: Abdomen is soft.   Musculoskeletal:      Cervical back: Neck supple.      Comments: Generalized weakness and muscle atrophy   Skin:     General: Skin is warm and dry.      Comments: Left forearm PIV infiltration with redness/indurated   Neurological:      General: No focal deficit present.      Mental Status: He is alert and oriented to person, place, and time.   Psychiatric:         Mood and Affect: Mood normal.         Behavior: Behavior normal.         Thought Content: Thought content normal.         Judgment: Judgment normal.     *MD performed and documented physical examination       Lines/Drains/Airways     Drain  Duration           Male External Urinary Catheter 07/20/22 1800 4 days          Peripheral Intravenous Line  Duration                Peripheral IV - Single Lumen 07/17/22 1000 20 G Left;Posterior Forearm 8 days         Peripheral IV - Single Lumen 07/21/22 1600 18 G Anterior;Left;Proximal Forearm 3 days              Labs  Admission on 07/25/2022   Component Date Value Ref Range Status    Sodium Level 07/26/2022 145  136 - 145 mmol/L Final    Potassium Level 07/26/2022 3.3 (A) 3.5 - 5.1 mmol/L Final    Chloride 07/26/2022 100  98 - 107 mmol/L Final    Carbon Dioxide 07/26/2022 33 (A) 23 - 31 mmol/L Final    Glucose Level  07/26/2022 98  82 - 115 mg/dL Final    Blood Urea Nitrogen 07/26/2022 7.3 (A) 8.4 - 25.7 mg/dL Final    Creatinine 07/26/2022 0.68 (A) 0.73 - 1.18 mg/dL Final    Calcium Level Total 07/26/2022 9.2  8.8 - 10.0 mg/dL Final    Protein Total 07/26/2022 6.7  5.8 - 7.6 gm/dL Final    Albumin Level 07/26/2022 2.7 (A) 3.4 - 4.8 gm/dL Final    Globulin 07/26/2022 4.0 (A) 2.4 - 3.5 gm/dL Final    Albumin/Globulin Ratio 07/26/2022 0.7 (A) 1.1 - 2.0 ratio Final    Bilirubin Total 07/26/2022 0.5  <=1.5 mg/dL Final    Alkaline Phosphatase 07/26/2022 265 (A) 40 - 150 unit/L Final    Alanine Aminotransferase 07/26/2022 66 (A) 0 - 55 unit/L Final    Aspartate Aminotransferase 07/26/2022 30  5 - 34 unit/L Final    Estimated GFR-Non  07/26/2022 >60  mls/min/1.73/m2 Final    Magnesium Level 07/26/2022 2.30  1.60 - 2.60 mg/dL Final    Phosphorus Level 07/26/2022 3.2  2.3 - 4.7 mg/dL Final    Prealbumin 07/26/2022 14.9 (A) 16.0 - 42.0 mg/dL Final    Ferritin Level 07/26/2022 202.33  21.81 - 274.66 ng/mL Final    Iron Binding Capacity Unsaturated 07/26/2022 176  69 - 240 ug/dL Final    Iron Level 07/26/2022 18 (A) 65 - 175 ug/dL Final    Transferrin 07/26/2022 180  163 - 344 mg/dL Final    Iron Binding Capacity Total 07/26/2022 194 (A) 250 - 450 ug/dL Final    Iron Saturation 07/26/2022 9 (A) 20 - 50 % Final    WBC 07/26/2022 9.4  4.5 - 11.5 x10(3)/mcL Final    RBC 07/26/2022 4.15 (A) 4.70 - 6.10 x10(6)/mcL Final    Hgb 07/26/2022 12.1 (A) 14.0 - 18.0 gm/dL Final    Hct 07/26/2022 37.1 (A) 42.0 - 52.0 % Final    MCV 07/26/2022 89.4  80.0 - 94.0 fL Final    MCH 07/26/2022 29.2  27.0 - 31.0 pg Final    MCHC 07/26/2022 32.6 (A) 33.0 - 36.0 mg/dL Final    RDW 07/26/2022 15.5  11.5 - 17.0 % Final    Platelet 07/26/2022 619 (A) 130 - 400 x10(3)/mcL Final    MPV 07/26/2022 9.4  7.4 - 10.4 fL Final    Neut % 07/26/2022 63.6  % Final    Lymph % 07/26/2022 27.0  % Final    Mono % 07/26/2022 7.1  %  Final    Eos % 07/26/2022 1.2  % Final    Basophil % 07/26/2022 0.5  % Final    Lymph # 07/26/2022 2.55  0.6 - 4.6 x10(3)/mcL Final    Neut # 07/26/2022 6.0  2.1 - 9.2 x10(3)/mcL Final    Mono # 07/26/2022 0.67  0.1 - 1.3 x10(3)/mcL Final    Eos # 07/26/2022 0.11  0 - 0.9 x10(3)/mcL Final    Baso # 07/26/2022 0.05  0 - 0.2 x10(3)/mcL Final    IG# 07/26/2022 0.06 (A) 0 - 0.04 x10(3)/mcL Final    IG% 07/26/2022 0.6  % Final    NRBC% 07/26/2022 0.0  % Final     Radiology  CT soft tissue neck without contrast on 07/18/2022 at 9:29 p.m., IMPRESSION:Postsurgical change in the cervical spine without fluid collection or abscess. Right upper lobe airspace disease.  Radiology  MRI C-spine without contrast on 07/15/2022 at 8:27 a.m., IMPRESSION: Postoperative changes are seen in the left posterior elements from C3 through C7 vertebrae. There is a postoperative collection in the surgical bed/ posterior paraspinal soft tissues surrounding the spinous process and posterior to the laminae. The collection measures approximately 7.3 cm in length and 2 cm in AP dimension. Similar findings are also seen on the prior examination. Correlate clinically as regards additional evaluation and follow-up. There are displaced fractures involving the laminae of C3 through C7 vertebrae appreciated better on the earlier CT study. There is moderate canal stenosis at C3-C4 and C4-C5 secondary to broad-based disc protrusions and degenerative joint disease. Details and other findings as noted above.  Radiology  MRI brain on 07/16/2022 at 8:30 a.m., IMPRESSION:No abnormal signal is identified on the diffusion images to suggest acute infarct. Details and findings as above. No significant discrepancy with overnight report.     Assessment/Plan:     71 y.o. WM admitted on 7/25/2022    Cervical spondylosis   - s/p C3-C7 laminiopasty and laminoforminotomies on 7/12/2022   - initiate    Robaxin 500 mg b.i.d. (initiated 7/26)     Gabapentin 100 mg at  bedtime (initiated 7/26)    Lidoderm patch daily  - continue    ASA 81 mg daily    Norco 5 mg q.4 hours p.r.n.  - Consult physiatry for rehab and pain management  - PT/OT/RT/ST to eval and treat   - follow-up with Neurosurgery outpatient    Hypothyroidism  - TSH with next labs  - continue   Levothyroxine 50 mcg daily    VIJI  - compliant with CPAP  - continue current POC    BPH  - stable  - continue   Flomax 0.4 mg at bedtime    Normocytic anemia  - asymptomatic  - iron level low  - initiate   Ferrous sulfate 325 mg b.i.d. (initiated 7/26)  - H/H stable   - no evidence of active bleeds  - will closely monitor and transfuse if needed     Constipation  - stable  - continue   Colace 100 mg BID    Hypokalemia  - Potassium 3.3  - Initiate    K-Dur 40 mEq x2 doses today    Metabolic alkalosis  - Bicarb 33  - Continue to monitor closely    Left forearm PIV infiltration  - Discontinue peripheral IV  - initiate    Warm compresses t.i.d. x3 days    MEDICAL PLAN:  - Admit to [facility]  - Medical reconciliation completed and included in the electronic health record  - Draw admit labs including CBC, CMP, and Prealbumin  - Maintain weightbearing status as ordered  - Monitor postoperative surgical incision changing dressing daily  - Teach skin protection and wound prevention techniques  - Initiate fall precaution  - Initiate bowel training program  - Initiate bladder training as indicated  - Pain management  - IS q2 hours while awake    THERAPEUTIC PLAN:  - Physical therapy 60-90 minutes 5 to week to increase functional mobility, maintain weightbearing precautions, increase lower extremity restrengthening, increase overall activity tolerance, teach balance and safety measures as well as fall precautions, make equipment and orthotic recommendations, be involved in family training and discharge planning, monitor pain levels and vital signs during therapy adjusting treatment  accordingly  - Occupational therapy 60-90 minutes 5 times a week to increase functional independence with activities of daily living, maintain weightbearing precautions, teach use of adaptive equipment, increase upper extremity restrengthening, increase overall activity tolerance, teach balance and safety measures as well as fall precautions, make equipment and orthotic recommendations, be involved in family training and discharge planning, monitor pain levels and vital signs during therapy adjusting treatment accordingly  - Speech and language pathology will do an in-depth evaluation of patient's cognition, phonation, and swallowing. They will initiate therapy 30- 60 minutes 5 times a week as indicated  - Recreational therapy will incorporate all patient's functional abilities  into recreational activities that will require visual, spatial, and perceptual abilities; gross and fine motor coordination skills; the cognition to follow multistep commands; dynamic and static balance and reaching abilities. They will also incorporate animal assisted therapy into their weekly program  - Rehabilitation nursing will act as patient family physician liaison. They will integrate patient's functional abilities, after discussions with therapist, into everyday activities with the CNA's,  LPN's and RNs that will include but are not limited to dressing, bathing, feeding, grooming, toileting, transfers, hygiene etc. They will administer medications watching for wanted as well as unwanted effects. They will initiate DVT/VTE prophylaxis as ordered. Will monitor vital signs, lab values, and pain levels, making appropriate physician notification. They will monitor skin integrity including postop incision, making daily dressing changes. They will teach skin protection and wound prevention techniques. They will initiate bowel training They will initiate bladder training as indicated. They will initiate fall precautions  - Rehabilitation  counseling/case management will act as patient and family liaison. They will set up family conferences, family training, aid in discharge planning, and aid in the procurement of assistive devices  - Patient will have 24 hour availability to physiatric care  - Patient will have nutritional services involved, monitoring oral input and visceral protein stores. They will make dietary and supplement recommendations and follow-up as necessary  - Patient will have weekly interdisciplinary team conferences  - Patient will have initial family conference and follow up conferences as indicated  - Patient will have family training prior to discharge     Estimated length of stay - 14-17 days  Anticipated discharge destination - Home with   Medical status - stabilizing postoperatively; will need to monitor pain levels, postoperative skin integrity, watch for evidence of deep vein thrombosis, monitor postoperative H&H, monitor vital signs closely.  Medical prognosis - Good for post-op healing and functional improvement  Previous functional Status:  Independent  Present Functional Status:  Minimum to moderate assistance    VTE Prophylaxis:  Lovenox 40 mg daily  COVID-19 testing:  Negative on 07/25/2022  COVID-19 vaccination status:  Vaccinated (Moderna):  02/10/2021 and 03/10/2021    POA: None  Living will: None  Contacts: Anusha Echeverria (spouse)    CODE STATUS: Full Code  Internal Medicine (attending): Luis Khan MD    OUTPATIENT PROVIDERS  Gary Reilly II, MD  Neurosurgery:  Paulo Rao MD  Neurology: Yan Rainey MD    DISPOSITION: Condition stable. Tolerated transfer.  Recent labs and imaging reviewed.  Rehab admission orders initiated.  Med reconciliation completed.  Consult physiatry for rehab and pain management.  PT/OT/RT/ST to eval and treat.  Potassium 3.3-replace.  Iron level low.  Initiate ferrous sulfate 325 mg b.i.d..  Discontinue left forearm peripheral IV initiate warm compress t.i.d. x3 days.  Vital  signs at goal with no recorded fevers.  Continue aggressive mobilization as tolerated.  Monitor closely.  Notify of acute changes.    PHYSICIAN ATTESTATION: I have been involved in the patient's rehabilitation prescreening process and I have now completed the post admission physician evaluation. Patient is in need of, appropriate for, and should tolerate the above outlined inpatient rehabilitation plan. I believe this is necessary to reach maximal postoperative healing and maximal functional abilities. I do not believe this would be possible in a timely fashion in a less intensive setting      Simón Phillips NP conducted independent physical examination and assisted with medical documentation.    Total time spent on this encounter including chart review and direct MD + NP 1-on-1 patient interaction: 107 minutes   Over 50% of this time was spent in counseling and coordination of care

## 2022-07-26 PROBLEM — N40.0 BPH (BENIGN PROSTATIC HYPERPLASIA): Status: ACTIVE | Noted: 2022-07-26

## 2022-07-26 PROBLEM — R13.10 DYSPHAGIA: Status: ACTIVE | Noted: 2022-07-26

## 2022-07-26 PROBLEM — E03.8 SUBCLINICAL HYPOTHYROIDISM: Status: ACTIVE | Noted: 2022-07-26

## 2022-07-26 PROBLEM — G47.33 OSA (OBSTRUCTIVE SLEEP APNEA): Status: ACTIVE | Noted: 2022-07-26

## 2022-07-26 LAB
ALBUMIN SERPL-MCNC: 2.7 GM/DL (ref 3.4–4.8)
ALBUMIN/GLOB SERPL: 0.7 RATIO (ref 1.1–2)
ALP SERPL-CCNC: 265 UNIT/L (ref 40–150)
ALT SERPL-CCNC: 66 UNIT/L (ref 0–55)
AST SERPL-CCNC: 30 UNIT/L (ref 5–34)
BASOPHILS # BLD AUTO: 0.05 X10(3)/MCL (ref 0–0.2)
BASOPHILS NFR BLD AUTO: 0.5 %
BILIRUBIN DIRECT+TOT PNL SERPL-MCNC: 0.5 MG/DL
BUN SERPL-MCNC: 7.3 MG/DL (ref 8.4–25.7)
CALCIUM SERPL-MCNC: 9.2 MG/DL (ref 8.8–10)
CHLORIDE SERPL-SCNC: 100 MMOL/L (ref 98–107)
CO2 SERPL-SCNC: 33 MMOL/L (ref 23–31)
CREAT SERPL-MCNC: 0.68 MG/DL (ref 0.73–1.18)
EOSINOPHIL # BLD AUTO: 0.11 X10(3)/MCL (ref 0–0.9)
EOSINOPHIL NFR BLD AUTO: 1.2 %
ERYTHROCYTE [DISTWIDTH] IN BLOOD BY AUTOMATED COUNT: 15.5 % (ref 11.5–17)
FERRITIN SERPL-MCNC: 202.33 NG/ML (ref 21.81–274.66)
GLOBULIN SER-MCNC: 4 GM/DL (ref 2.4–3.5)
GLUCOSE SERPL-MCNC: 98 MG/DL (ref 82–115)
HCT VFR BLD AUTO: 37.1 % (ref 42–52)
HGB BLD-MCNC: 12.1 GM/DL (ref 14–18)
IMM GRANULOCYTES # BLD AUTO: 0.06 X10(3)/MCL (ref 0–0.04)
IMM GRANULOCYTES NFR BLD AUTO: 0.6 %
IRON SATN MFR SERPL: 9 % (ref 20–50)
IRON SERPL-MCNC: 18 UG/DL (ref 65–175)
LYMPHOCYTES # BLD AUTO: 2.55 X10(3)/MCL (ref 0.6–4.6)
LYMPHOCYTES NFR BLD AUTO: 27 %
MAGNESIUM SERPL-MCNC: 2.3 MG/DL (ref 1.6–2.6)
MCH RBC QN AUTO: 29.2 PG (ref 27–31)
MCHC RBC AUTO-ENTMCNC: 32.6 MG/DL (ref 33–36)
MCV RBC AUTO: 89.4 FL (ref 80–94)
MONOCYTES # BLD AUTO: 0.67 X10(3)/MCL (ref 0.1–1.3)
MONOCYTES NFR BLD AUTO: 7.1 %
NEUTROPHILS # BLD AUTO: 6 X10(3)/MCL (ref 2.1–9.2)
NEUTROPHILS NFR BLD AUTO: 63.6 %
NRBC BLD AUTO-RTO: 0 %
PHOSPHATE SERPL-MCNC: 3.2 MG/DL (ref 2.3–4.7)
PLATELET # BLD AUTO: 619 X10(3)/MCL (ref 130–400)
PMV BLD AUTO: 9.4 FL (ref 7.4–10.4)
POTASSIUM SERPL-SCNC: 3.3 MMOL/L (ref 3.5–5.1)
PREALB SERPL-MCNC: 14.9 MG/DL (ref 16–42)
PROT SERPL-MCNC: 6.7 GM/DL (ref 5.8–7.6)
RBC # BLD AUTO: 4.15 X10(6)/MCL (ref 4.7–6.1)
SODIUM SERPL-SCNC: 145 MMOL/L (ref 136–145)
TIBC SERPL-MCNC: 176 UG/DL (ref 69–240)
TIBC SERPL-MCNC: 194 UG/DL (ref 250–450)
TRANSFERRIN SERPL-MCNC: 180 MG/DL (ref 163–344)
WBC # SPEC AUTO: 9.4 X10(3)/MCL (ref 4.5–11.5)

## 2022-07-26 PROCEDURE — 85025 COMPLETE CBC W/AUTO DIFF WBC: CPT | Performed by: NURSE PRACTITIONER

## 2022-07-26 PROCEDURE — 80053 COMPREHEN METABOLIC PANEL: CPT | Performed by: NURSE PRACTITIONER

## 2022-07-26 PROCEDURE — 97535 SELF CARE MNGMENT TRAINING: CPT

## 2022-07-26 PROCEDURE — 99223 1ST HOSP IP/OBS HIGH 75: CPT | Mod: ,,, | Performed by: PHYSICAL MEDICINE & REHABILITATION

## 2022-07-26 PROCEDURE — 25000003 PHARM REV CODE 250: Performed by: NURSE PRACTITIONER

## 2022-07-26 PROCEDURE — 99223 PR INITIAL HOSPITAL CARE,LEVL III: ICD-10-PCS | Mod: ,,, | Performed by: PHYSICAL MEDICINE & REHABILITATION

## 2022-07-26 PROCEDURE — 84134 ASSAY OF PREALBUMIN: CPT | Performed by: NURSE PRACTITIONER

## 2022-07-26 PROCEDURE — 97162 PT EVAL MOD COMPLEX 30 MIN: CPT

## 2022-07-26 PROCEDURE — 92610 EVALUATE SWALLOWING FUNCTION: CPT

## 2022-07-26 PROCEDURE — 63600175 PHARM REV CODE 636 W HCPCS: Performed by: NURSE PRACTITIONER

## 2022-07-26 PROCEDURE — 27000221 HC OXYGEN, UP TO 24 HOURS

## 2022-07-26 PROCEDURE — 11800000 HC REHAB PRIVATE ROOM

## 2022-07-26 PROCEDURE — 36415 COLL VENOUS BLD VENIPUNCTURE: CPT | Performed by: NURSE PRACTITIONER

## 2022-07-26 PROCEDURE — 97166 OT EVAL MOD COMPLEX 45 MIN: CPT

## 2022-07-26 PROCEDURE — 94799 UNLISTED PULMONARY SVC/PX: CPT

## 2022-07-26 PROCEDURE — 82728 ASSAY OF FERRITIN: CPT | Performed by: NURSE PRACTITIONER

## 2022-07-26 PROCEDURE — 97530 THERAPEUTIC ACTIVITIES: CPT

## 2022-07-26 PROCEDURE — 83735 ASSAY OF MAGNESIUM: CPT | Performed by: NURSE PRACTITIONER

## 2022-07-26 PROCEDURE — 84100 ASSAY OF PHOSPHORUS: CPT | Performed by: NURSE PRACTITIONER

## 2022-07-26 PROCEDURE — 94761 N-INVAS EAR/PLS OXIMETRY MLT: CPT

## 2022-07-26 PROCEDURE — 83540 ASSAY OF IRON: CPT | Performed by: NURSE PRACTITIONER

## 2022-07-26 RX ORDER — POTASSIUM CHLORIDE 20 MEQ/1
40 TABLET, EXTENDED RELEASE ORAL 2 TIMES DAILY
Status: COMPLETED | OUTPATIENT
Start: 2022-07-26 | End: 2022-07-26

## 2022-07-26 RX ORDER — LIDOCAINE 50 MG/G
1 PATCH TOPICAL ONCE
Status: COMPLETED | OUTPATIENT
Start: 2022-07-26 | End: 2022-07-27

## 2022-07-26 RX ORDER — LIDOCAINE 50 MG/G
1 PATCH TOPICAL
Status: DISCONTINUED | OUTPATIENT
Start: 2022-07-27 | End: 2022-08-05 | Stop reason: HOSPADM

## 2022-07-26 RX ORDER — METHOCARBAMOL 500 MG/1
500 TABLET, FILM COATED ORAL 3 TIMES DAILY
Status: DISCONTINUED | OUTPATIENT
Start: 2022-07-26 | End: 2022-07-26

## 2022-07-26 RX ORDER — GABAPENTIN 100 MG/1
100 CAPSULE ORAL NIGHTLY
Status: DISCONTINUED | OUTPATIENT
Start: 2022-07-26 | End: 2022-07-28

## 2022-07-26 RX ORDER — LANOLIN ALCOHOL/MO/W.PET/CERES
1 CREAM (GRAM) TOPICAL 2 TIMES DAILY
Status: DISCONTINUED | OUTPATIENT
Start: 2022-07-26 | End: 2022-08-05 | Stop reason: HOSPADM

## 2022-07-26 RX ORDER — METHOCARBAMOL 500 MG/1
500 TABLET, FILM COATED ORAL 2 TIMES DAILY
Status: DISCONTINUED | OUTPATIENT
Start: 2022-07-26 | End: 2022-07-28

## 2022-07-26 RX ADMIN — LIDOCAINE 5% 1 PATCH: 700 PATCH TOPICAL at 12:07

## 2022-07-26 RX ADMIN — LEVOTHYROXINE SODIUM 50 MCG: 0.05 TABLET ORAL at 08:07

## 2022-07-26 RX ADMIN — GABAPENTIN 100 MG: 100 CAPSULE ORAL at 09:07

## 2022-07-26 RX ADMIN — FERROUS SULFATE TAB 325 MG (65 MG ELEMENTAL FE) 1 EACH: 325 (65 FE) TAB at 08:07

## 2022-07-26 RX ADMIN — HYDROXYZINE PAMOATE 50 MG: 50 CAPSULE ORAL at 10:07

## 2022-07-26 RX ADMIN — HYDROCODONE BITARTRATE AND ACETAMINOPHEN 1 TABLET: 5; 325 TABLET ORAL at 08:07

## 2022-07-26 RX ADMIN — FERROUS SULFATE TAB 325 MG (65 MG ELEMENTAL FE) 1 EACH: 325 (65 FE) TAB at 09:07

## 2022-07-26 RX ADMIN — HYDROCODONE BITARTRATE AND ACETAMINOPHEN 1 TABLET: 5; 325 TABLET ORAL at 01:07

## 2022-07-26 RX ADMIN — TAMSULOSIN HYDROCHLORIDE 0.4 MG: 0.4 CAPSULE ORAL at 09:07

## 2022-07-26 RX ADMIN — ACETAMINOPHEN 650 MG: 325 TABLET, FILM COATED ORAL at 06:07

## 2022-07-26 RX ADMIN — POTASSIUM CHLORIDE 40 MEQ: 20 TABLET, EXTENDED RELEASE ORAL at 08:07

## 2022-07-26 RX ADMIN — ASPIRIN 81 MG CHEWABLE TABLET 81 MG: 81 TABLET CHEWABLE at 08:07

## 2022-07-26 RX ADMIN — ENOXAPARIN SODIUM 40 MG: 30 INJECTION SUBCUTANEOUS at 06:07

## 2022-07-26 RX ADMIN — METHOCARBAMOL 500 MG: 500 TABLET ORAL at 10:07

## 2022-07-26 RX ADMIN — METHOCARBAMOL 500 MG: 500 TABLET ORAL at 12:07

## 2022-07-26 RX ADMIN — HYDROCODONE BITARTRATE AND ACETAMINOPHEN 1 TABLET: 5; 325 TABLET ORAL at 02:07

## 2022-07-26 RX ADMIN — POTASSIUM CHLORIDE 40 MEQ: 20 TABLET, EXTENDED RELEASE ORAL at 09:07

## 2022-07-26 RX ADMIN — DOCUSATE SODIUM 100 MG: 100 CAPSULE, LIQUID FILLED ORAL at 09:07

## 2022-07-26 RX ADMIN — DOCUSATE SODIUM 100 MG: 100 CAPSULE, LIQUID FILLED ORAL at 08:07

## 2022-07-26 NOTE — PT/OT/SLP PROGRESS
Physical Therapy Inpatient Rehab Treatment    Patient Name:  Scott Echeverria   MRN:  [id number]    Recommendations:     Discharge Recommendations:  other (see comments)   Discharge Equipment Recommendations: other (see comments) (Pending progress)   Barriers to discharge: None    Assessment:     Scott Echeverria is a 71 y.o. male admitted with a medical diagnosis of Stenosis of cervical spine with myelopathy.  He presents with the following impairments/functional limitations:  weakness, impaired endurance, impaired functional mobility, gait instability, decreased upper extremity function, decreased lower extremity function, decreased safety awareness.    Rehab Diagnosis:     Recent Surgery: * No surgery found *      General Precautions: Standard, fall, honey thick     Orthopedic Precautions:spinal precautions     Braces: N/A    Rehab Prognosis: Good; patient would benefit from acute skilled PT services to address these deficits and reach maximum level of function.      History:     Past Medical History:   Diagnosis Date    BPH (benign prostatic hyperplasia)     Cervical pain     Cervical radiculitis     Cervical spinal stenosis     Hypothyroidism, unspecified     Sleep apnea     resolved since surgery       Past Surgical History:   Procedure Laterality Date    APPENDECTOMY  01/25/2022    Dr. Kerry Coats    COLONOSCOPY  2021    HEMORRHOID SURGERY  2002    LAMINOPLASTY N/A 7/12/2022    Procedure: LAMINOPLASTY;  Surgeon: Paulo Rao MD;  Location: [facility];  Service: Neurosurgery;  Laterality: N/A;  left C3-4, C4-5, C5-6, C6-7 foraminotomies, C3-7 laminoplasty //  TIVA SET UP    lower back surgery  1990    RETINAL DETACHMENT SURGERY Right 2015    SINUS SURGERY      TONSILLECTOMY  1956       Subjective     Chief Complaint: Pt with some c/o neck pain.    Respiratory Status: Room air    Patients cultural, spiritual, Denominational conflicts given the current situation: yes      Objective:     Communicated with NSG  prior to session.  Patient found up in chair with peripheral IV  upon PT entry to room.    Pt is Oriented x3 and tired, Alert and Cooperative.       Current   Status  Discharge   Goal   Functional Area: Care Score:    Roll Left and Right 4 Set-up/clean-up   Sit to Lying 4 Set-up/clean-up   Lying to Sitting on Side of Bed 3 Supervision or touching assistance   Sit to Stand 4 Set-up/clean-up   Chair/Bed-to-Chair Transfer 4 Set-up/clean-up   Car Transfer 3 Supervision or touching assistance   Walk 10 Feet 4 Set-up/clean-up   Walk 50 Feet with Two Turns 3 Set-up/clean-up   Walk 150 Feet 3 Set-up/clean-up   Walk 10 Feet Uneven Surface 88 Set-up/clean-up   1 Step (Curb) 88 Supervision or touching assistance   4 Steps 88 Supervision or touching assistance   12 Steps 88 Supervision or touching assistance   Picking Up Object 4 Set-up/clean-up   Wheel 50 Feet with Two Turns 9     Wheel 150 Feet 9         Therapeutic Activities and Exercises:  Ambulation (forward, sidestepping) inside // bar for balance.    Activity Tolerance    Patient left up in chair with call button in reach.    Education provided: roles and goals of PT/PTA    Expected compliance:    GOALS:   Multidisciplinary Problems     Physical Therapy Goals        Problem: Physical Therapy    Goal Priority Disciplines Outcome Goal Variances Interventions   Physical Therapy Goal     PT, PT/OT Ongoing, Progressing     Description: Short Term goals      Bed Mobility   Roll Right and Left Setup or Clean-up Assistance .  Lying to sitting Setup or Clean-up Assistance .  Sitting to lying Setup or Clean-up Assistance .    Transfers    Pt will be able to perform Stand step chair/bed to chair transfer With RW Setup or Clean-up Assistance .  Pt will be able to perform Sit to stand with RW Setup or Clean-up Assistance .  Pt will be able to perform Car transfer with RW Setup or Clean-up Assistance .    Ambulation    Pt will ambulate 200 Feet with RW Setup or Clean-up Assistance  .  Pt will be able to ascend/descend 12 stairs using B Rails Supervision or Touching Assistance .  Pt will be able to ascend/descend 4 inch curb using RW Supervision or Touching Assistance .  Pt will be able to ambulate uneven surfaces/ramp 15 feet with RW Supervision or Touching Assistance .      Timeframe: By Discharge                     Plan:     During this hospitalization, patient to be seen 5 x/week to address the identified rehab impairments via gait training, therapeutic activities, therapeutic exercises, neuromuscular re-education, wheelchair management/training and progress toward the following goals:     Plan of Care Expires:  08/01/22    Additional Information:         Time Tracking:     PT Received On: 07/26/22  Time In 1300     Time Out 1330  Total Time 30 min  Therapy Time PT Individual: 30  Missed Time:    Time Missed due to:      Billable Minutes: Therapeutic Activity 30 07/26/2022

## 2022-07-26 NOTE — CONSULTS
"  [facility] - Rehab Inpatient Services  Adult Nutrition  Consult Note    SUMMARY     Recommendations    1. Continue current diet with texture/consistency mods per SLP recs: Minced and Moist, Moderately Thick Liquids     2. Added Boost Pudding with meals to provide additional nourishment (230 kcal, 9 g pro/svg)     3. Continue to replenish K+ as medically feasible    Goals: Improve po intake >/=75% EEN/EPN throughout course of stay  Nutrition Goal Status: new    Assessment and Plan    : Pt reports fair appetite. Tells me ate "a little more" than 50%of  lunch. Added Boost pudding his am d/t was receiving at [facility] prior to admit. Std ate Boost Pudding at lunch, and would like to continue. Per EMR, documented eating ~75% of breakfast. Denies any N/V/D. Std LBM today with assistance of stool softeners. Std has intermittent constipation at home. Wt stable over past x ~11 days.       Reason for Assessment    Reason For Assessment: consult (Rehab)  Diagnosis:  (Cervical myelopathy s/p left C3-7 posterior foraminotomies, C3-7 laminoplasty, and repair of Dural tear (2022))  Relevant Medical History:  BPH (benign prostatic hyperplasia)     Cervical pain     Cervical radiculitis     Cervical spinal stenosis     Hypothyroidism, unspecified     Sleep apnea      resolved since surgery    Nutrition Risk Screen    Nutrition Risk Screen: dysphagia or difficulty swallowing    Anthropometrics    Temp: 98 °F (36.7 °C)  Height: 5' 9.02" (175.3 cm)  Height (inches): 69.02 in  Weight Method: Standard Scale  Weight: 75.2 kg (165 lb 12.6 oz)  Weight (lb): 165.79 lb  Ideal Body Weight (IBW), Male: 160.12 lb  % Ideal Body Weight, Male (lb): 103.54 %  BMI (Calculated): 24.5  BMI Grade: 18.5-24.9 - normal  Usual Body Weight (UBW), k.7 kg  % Usual Body Weight: 99.55  % Weight Change From Usual Weight: -0.66 %  () 75.2 kg   (7/15) 75.7 kg     Lab/Procedures/Meds    Pertinent Labs Comments: DENNIS " 14.9(L), Alk Phos 265(H), ALT 66(H), BUN 7.3(L), Crea .68(L), K+ 3.3(L), Iron 18(L), TIBC 194(L), Iron Sat 9(L), GFR >60  Pertinent Medications Comments: colace, ferrous sulfate, KCl, levothyroxine      Estimated/Assessed Needs    Weight Used For Calorie Calculations: 75.2 kg (165 lb 12.6 oz)  Energy Calorie Requirements (kcal): 1954  Energy Need Method: Kaufman-St Jeor (x 1.3 SF)  Protein Requirements: 105.5 (1.4 g/kg)  Weight Used For Protein Calculations: 75.2 kg (165 lb 12.6 oz)  RDA Method (mL): 1954         Evaluation of Received Nutrient/Fluid Intake    % Intake of Estimated Energy Needs: 50 - 75 %  % Meal Intake: 50 - 75 %    Nutrition Risk    Level of Risk/Frequency of Follow-up: low       Monitor and Evaluation    Food and Nutrient Intake: energy intake, food and beverage intake  Anthropometric Measurements: weight, weight change  Biochemical Data, Medical Tests and Procedures:  (Nutrition related labs)  Nutrition-Focused Physical Findings: overall appearance       Nutrition Follow-Up    RD Follow-up?: Yes

## 2022-07-26 NOTE — PT/OT/SLP EVAL
"Occupational Therapy Inpatient Rehab Evaluation    Name: Scott Echeverria  MRN: [id number]    Recommendations:     Discharge Recommendations:   (pending progress)   Discharge Equipment Recommendations: bedside commode (pending progress)     Assessment:  Scott Echeverria is a 71 y.o. male admitted with a medical diagnosis of Stenosis of cervical spine with myelopathy.  He presents with the following impairments/functional limitations:  weakness, impaired endurance, impaired self care skills, impaired functional mobility, decreased upper extremity function, pain, decreased ROM, other (comment) (cervical spinal pxns) .      General Precautions: Standard, fall, hearing impaired, honey thick, other (see comments) (spinal cervical pxns)     Braces: N/A    Rehab Prognosis: Good; patient would benefit from IRF OT services to address these deficits and reach maximum level of function.      History:     Past Medical History:   Diagnosis Date    BPH (benign prostatic hyperplasia)     Cervical pain     Cervical radiculitis     Cervical spinal stenosis     Hypothyroidism, unspecified     Sleep apnea     resolved since surgery       Past Surgical History:   Procedure Laterality Date    APPENDECTOMY  01/25/2022    Dr. Kerry Coats    COLONOSCOPY  2021    HEMORRHOID SURGERY  2002    LAMINOPLASTY N/A 7/12/2022    Procedure: LAMINOPLASTY;  Surgeon: Paulo Rao MD;  Location: [facility];  Service: Neurosurgery;  Laterality: N/A;  left C3-4, C4-5, C5-6, C6-7 foraminotomies, C3-7 laminoplasty //  TIVA SET UP    lower back surgery  1990    RETINAL DETACHMENT SURGERY Right 2015    SINUS SURGERY      TONSILLECTOMY  1956       Subjective     Orientation: Oriented x4    Chief Complaint: decreased functional independence and feeling weak from inacitivty from being in the hospital for so long    Patient/Family Comments/goals: "I want to get back to doing the things I was doing before." Pt was responsible for all lawn care responsibilities and he " helps his wife with household chores. Pt also enjoys smoking meat and he wants to get back to being active around the house.      Pain Pain Rating 1: 4/10  Location 1: neck  Pain Addressed 1: Pre-medicate for activity, Reposition       Respiratory Status: Room air    Patients cultural, spiritual, Catholic conflicts given the current situation:  no       Living Environment   Living Environment  Lives With: spouse  Living Arrangements: house  Home Accessibility: other (see comments) (small threshold to enter house)  Number of Stairs, Main Entrance: one  Stair Railings, Main Entrance: none  Home Layout: Able to live on 1st floor  Transportation Anticipated: car, drives self  Living Environment Comment: WIS, grab bars, hand held shower head, built in shower chair  Equipment Currently Used at Home: grab bar  Shower Setup: Walk-in shower    Prior Level of Function  BADL: Independent    IADL: Independent    Equipment used at home: grab bar.  DME owned: none.      Upon discharge, patient will have assistance from his wife.    Objective:      Patient found up in chair with peripheral IV  upon OT entry to room.      ADLs   Current Status   Functional Area: Care Score:   Oral Hygiene 5   Sitting in BS recliner in front of sink.   Toileting Hygiene 3   Pt requires Min A to maintain an erect position when standing min vc to adhere to pxns.   Shower/Bathe Self 3   Pt required assistance when standing and to thoroughly wipe his buttocks. Pt requires min vc to adhere to pxns. Pt also required assistance with drying his feet and the back of his legs due to decreased UE ROM.   Upper Body Dressing 3   Pt was edu on sam tech (limited ROM in LUE). Pt performed @ TTB and pt needed assistance with threading LUE into hospital robe.   Lower Body Dressing 3   Pt pulled down his pull ups in front of TTB and doffed the pull ups @ TTB. Pt donned the pull ups @ BS recliner. Pt requires assistance with maintaining an erect position when  standing to perform functional tasks.   Putting On/Taking Off Footwear 3   Pt don/doff socks @ BS recliner using figure 4 position. Pt needed min vc to adhere to pxns.   Toilet Transfer 3   Pt required partial-Mod A due to decreased LE strength when performing sit to stands. Pt was educated on pushing from the surface and using his UE to sit controlled when performing a toilet t/f.     Pt ambulated BS recliner <> bathroom. Pt performed tub/shower t/f via TTB with RW and Partial-Mod A for safety and physical assistance to push himself up from the TTB.    Limiting Factors for ADLs: motor, limited ROM, balance, weakness and coordination      Exams     ROM:          RUE: WFL  LUE:  Pt can only flex his shoulders to 70 degrees, elbow flex/ext: WFL, wrist flex/edt WFL    Hand Dominance: Right    Strength  Overall Strength:          RUE: WFL  LUE: shoulder flexors: 3-/5, elbow flex/ext: WFL, wrist flex/ext: WFL     Strength:   WFL    Pinch Strength:   WFL    Sensation  Left:          -       WNL  Right:          -       WNL    Coordination:      -       Intact      Visual/Perceptual  Pt wears glasses, but pt had retinal detachment in his R eye 2 years ago, but his vision is WFL. Pt's saccades, visual tracking, and convergence skills are WFL.    Cognition:   WFL    Balance    Sitting  Sitting Surface: TTB  Static: No UE Support, Normal      Posture/Deviations  Pt has postural compensations due to a posterior hip lean causing him to have increased rounded shoulders and cervical flexion.    LifeStyle Change and Education     Patient left up in chair with all lines intact, call button in reach and chair alarm on.    Education provided: Roles and goals of OT, ADLs, transfer training, safety precautions, fall prevention and home safety    Encounter Problems       Encounter Problems (Active)       Occupational Therapy       Occupational Therapy Goal       Start: 07/26/22       ADLs:  Pt to perform grooming tasks with indep  while standing with RW   Pt to perform UB dressing with set up assist   Pt to perform LB dressing with set up assist   Pt to perform putting on/off footwear task with set up assist   Pt to perform toileting with supervision using RW    Functional Transfers:  Pt to perform toilet transfers with CGA and RW   Pt to perform a tub transfer with CGA and RW  Pt to perform a walk-in shower transfer with CGA and RW    IADLs:  Pt to perform IADL tasks while standing for 8 min with RW and SBA     Balance, Strengthening, Endurance, Balance:  Pt to consistently demonstrate adherence to cervical spinal precautions during all ADL's as instructed by OT.  Pt to demonstrate dynamic standing balance for 10 min as required to perform ADL's from standing level.  Pt to demonstrate consistent adherence to breathing control and energy conservation techniques as educated by OT.                 Plan     During this hospitalization, patient to be seen 5 x/week to address the identified rehab impairments via self-care/home management, community/work re-entry, therapeutic activities, therapeutic exercises, neuromuscular re-education and progress toward the following goals:    Plan of Care Expires:  08/01/22    Time Tracking     OT Received On: 07/26/22  Time In 1000     Time Out 1100  Total Time 60 min  Therapy Time: OT Individual: 60    Billable Minutes: Evaluation 30 Self care/ Home management 30    07/26/2022

## 2022-07-26 NOTE — PROGRESS NOTES
07/26/22 1400   Rec Therapy Time Calculation   Rec Start Time 1400   Rec Stop Time 1430   Rec Total Time (min) 30 min   General   Admit Date 07/25/22   Number of Children 2   Occupation Retired: Computer Tech for [employer]   Precautions   General Precautions fall;honey thick   Assessment   Transfers requires assistance from  other person  (Recliner to w/c transfer was min)   Attendance   Activity   (Washer toss)   Time   Treatment time 2 units

## 2022-07-26 NOTE — PLAN OF CARE
Problem: Occupational Therapy  Goal: Occupational Therapy Goal  Description:     ADLs:  Pt to perform grooming tasks with indep while standing with RW   Pt to perform UB dressing with set up assist   Pt to perform LB dressing with set up assist   Pt to perform putting on/off footwear task with set up assist   Pt to perform toileting with supervision using RW    Functional Transfers:  Pt to perform toilet transfers with CGA and RW   Pt to perform a tub transfer with CGA and RW  Pt to perform a walk-in shower transfer with CGA and RW    IADLs:  Pt to perform IADL tasks while standing for 8 min with RW and SBA     Balance, Strengthening, Endurance, Balance:  Pt to consistently demonstrate adherence to cervical spinal precautions during all ADL's as instructed by OT.  Pt to demonstrate dynamic standing balance for 10 min as required to perform ADL's from standing level.  Pt to demonstrate consistent adherence to breathing control and energy conservation techniques as educated by OT.   Outcome: Ongoing, Progressing

## 2022-07-26 NOTE — PLAN OF CARE
Problem: Rehabilitation (IRF) Plan of Care  Goal: Plan of Care Review  Outcome: Ongoing, Progressing  Goal: Patient-Specific Goal (Individualized)  Outcome: Ongoing, Progressing  Goal: Absence of New-Onset Illness or Injury  Outcome: Ongoing, Progressing  Goal: Optimal Comfort and Wellbeing  Outcome: Ongoing, Progressing  Goal: Readiness for Transition of Care  Outcome: Ongoing, Progressing     Problem: Impaired Wound Healing  Goal: Optimal Wound Healing  Outcome: Ongoing, Progressing     Problem: Skin Injury Risk Increased  Goal: Skin Health and Integrity  Outcome: Ongoing, Progressing     Problem: Fall Injury Risk  Goal: Absence of Fall and Fall-Related Injury  Outcome: Ongoing, Progressing

## 2022-07-26 NOTE — PT/OT/SLP EVAL
"Speech Language Pathology Evaluation  Bedside Swallow    Patient Name:  Scott Echeverria   MRN:  [id number]  Admitting Diagnosis: Stenosis of cervical spine with myelopathy    Recommendations:                 General Recommendations:  Dysphagia therapy  Diet recommendations:  Minced & Moist Diet - IDDSI Level 5, Moderately thick liquids - IDDSI Level 3   Aspiration Precautions: 1 bite/sip at a time, Small bites/sips and Standard aspiration precautions   General Precautions: Standard,    Communication strategies:  none    History:     Past Medical History:   Diagnosis Date    BPH (benign prostatic hyperplasia)     Cervical pain     Cervical radiculitis     Cervical spinal stenosis     Hypothyroidism, unspecified     Sleep apnea     resolved since surgery       Past Surgical History:   Procedure Laterality Date    APPENDECTOMY  01/25/2022    Dr. Kerry Coats    COLONOSCOPY  2021    HEMORRHOID SURGERY  2002    LAMINOPLASTY N/A 7/12/2022    Procedure: LAMINOPLASTY;  Surgeon: Paulo Rao MD;  Location: [facility];  Service: Neurosurgery;  Laterality: N/A;  left C3-4, C4-5, C5-6, C6-7 foraminotomies, C3-7 laminoplasty //  TIVA SET UP    lower back surgery  1990    RETINAL DETACHMENT SURGERY Right 2015    SINUS SURGERY      TONSILLECTOMY  1956       Modified Barium Swallow: 7/21/22      Subjective     Pt awake, alert, and cooperative throughout session.  Patient goals: "I want coffee"     Pain/Comfort:  Pain/Comfort  Pain Rating 1: 0/10    Objective:   Reason for Referral: A bedside swallow evaluation was performed to assess the efficiency of the patient's swallow function, rule out aspiration and make recommendations regarding safe dietary consistencies, and effective compensatory strategies.    Oral Musculature Evaluation  Oral Musculature: WFL  Dentition: present and adequate  Secretion Management: adequate  Mucosal Quality: good  Mandibular Strength and Mobility: WFL  Oral Labial Strength and Mobility: " WFL    Bedside Swallow Eval:      Consistency Fed by Oral Symptoms Pharyngeal Symptoms   Moderately thick liquids by cup Self none none   Moderately thick liquids by straw Self none none   Puree Self none none   Soft & Bite-sized Self Oral residue; cleared with cued double swallow none       Assessment:     Scott Echeverria is a 71 y.o. male with an SLP diagnosis of Dysphagia. Diet modifications required to decrease risk of aspiration. SLP intervention is warranted at this time.    Goals:   Multidisciplinary Problems     SLP Goals        Problem: SLP    Goal Priority Disciplines Outcome   SLP Goal     SLP    Description: LT. Pt will tolerate least restrictive diet with no s/sx of aspiration.    ST. Pt will complete tongue base and laryngeal elevation exercises with 100% accuracy and minimal cues.  2. Pt will tolerate half meal of soft & bite sized diet with no s/sx of aspiration.                   Plan:     · Patient to be seen:  5 x/week   · Plan of Care expires:  22  · Plan of Care reviewed with:  patient   · SLP Follow-Up:  Yes       Discharge recommendations:  home health speech therapy     Time Tracking:   SLP Treatment Date:  22    Speech Start Time: 930    Speech Stop Time:       Speech Total Time (min): 30 minutes    Billable Minutes:  Eval Swallow and Oral Function 30 minutes    2022

## 2022-07-26 NOTE — PROGRESS NOTES
"   07/26/22 1400   Rec Therapy Time Calculation   Date of Treatment 07/26/22   Rec Start Time 1400   Rec Stop Time 1430   Rec Total Time (min) 30 min   Time   Treatment time 2 units   Precautions   General Precautions fall;honey thick   Vital Signs   Temp 98.2 °F (36.8 °C)   Pulse 106   SpO2 (!) 94 %   /67   OTHER   Treatment Diagnosis Cervical myelopathy s/p c3-7 posterior forminotomies c3-7 laminoplasty and repair, BPH,   Rehab identified problem list/impairments weakness;impaired endurance;impaired functional mobility;gait instability;impaired balance;decreased coordination;decreased upper extremity function;decreased lower extremity function;decreased safety awareness;impaired fine motor;impaired coordination   Values/Beliefs/Spiritual Care   Spiritual, Cultural Beliefs, Orthodox Practices, Values that Affect Care no   Prior Living/ADLs   Lives With spouse   Name(s) of Who Lives With Patient Wife   Living Arrangements house   Patient responsibilites: Community mobility;;Financial management;Health and wellness;Laundry;Leisure/play/hobbies;Meal preparation;Retired;Shopping;Social participation;Yard Work   Previous Hand Domiance Right   Current Hand Dominance Right  (L sided weakness)   Overall Level of Functioning   Activity Tolerance Min A   Right UE Coodination/Dexterity Mod Indep   Left UE Coordination/Dexterity Min A   Problem Solving/Sequencing Skills Mod Indep   Memory Recall Mod Indep   Attention Span Standby Assist   Patient Goals   Patient Goal 1 "Get back to where I was before and be my old self."   Recreational Therapy Short Term Goals   Short Term Goal 1 Will increase sit to stand to supervision   Short Term Goal 1 Progression Initiated   Short Term Goal 2 Will improve dynamic standing balance/reaching to supervision   Short Term Goal 2 Progression Initiated   Recreational Therapy Long Term Goals   Long Term Goal 1 Will increase standing tolerance to 5,10 minutes   Long Term Goal 1 " Progression Initiated   Long Term Goal 2 Will improve dynamic standing balance/reaching to setup   Long Term Goal 2 Progression Initiated   Plan   Patient to be seen Daily   Planned Duration 2 weeks   Treatments Planned Balance training;Community/work reintegrat;Coordination;Energy conservation training;Safety education   Treatment plan/goals estblished with Patient/Caregiver Yes

## 2022-07-26 NOTE — PT/OT/SLP EVAL
Recreational Therapy Evaluation      Date of Treatment: 07/26/22  Start Time: 1400  Stop Time: 1430  Total Time: 30 min  Missed Time:     Assessment      Scott Echeverria is a 71 y.o. male admitted with a medical diagnosis of Stenosis of cervical spine with myelopathy.  He presents with the following impairments/functional limitations:  weakness, impaired endurance, impaired functional mobility, gait instability, impaired balance, decreased coordination, decreased upper extremity function, decreased lower extremity function, decreased safety awareness, impaired fine motor, impaired coordination     Rehab Diagnosis: Cervical myelopathy s/p L C3-7, posterior formaminotomies, C3-7, laminoplasty, BPH, hypothyroidism    Recent Surgery:       General Precautions: Standard, fall, honey thick     Orthopedic Precautions:spinal precautions     Braces: N/A    Rehab Prognosis: Good; patient would benefit from acute skilled Recreational Therapy services to address these deficits and reach maximum level of function.      Impairments: Balance deficits, Coordination deficits, Endurance deficits, Mobility deficits and Safety awareness deficits  Rehab Potential: Good  Treatment Recommendations: Continue with Skill TR Service to facilitate functional independence and address impairments/limitations   Treatment Diagnosis: Cervical myelopathy s/p c3-7 posterior forminotomies c3-7 laminoplasty and repair, BPH,  Orientation: Oriented x4  Affect/Behavior: Cooperative  Safety/Judgement: intact   Basic Command Following: intact  Spiritual Cultural: no        History     Past Medical History:   Diagnosis Date    BPH (benign prostatic hyperplasia)     Cervical pain     Cervical radiculitis     Cervical spinal stenosis     Hypothyroidism, unspecified     Sleep apnea     resolved since surgery       Past Surgical History:   Procedure Laterality Date    APPENDECTOMY  01/25/2022    Dr. Kerry Coats    COLONOSCOPY  2021    HEMORRHOID SURGERY   "2002    LAMINOPLASTY N/A 7/12/2022    Procedure: LAMINOPLASTY;  Surgeon: Paulo Rao MD;  Location: [facility];  Service: Neurosurgery;  Laterality: N/A;  left C3-4, C4-5, C5-6, C6-7 foraminotomies, C3-7 laminoplasty //  TIVA SET UP    lower back surgery  1990    RETINAL DETACHMENT SURGERY Right 2015    SINUS SURGERY      TONSILLECTOMY  1956       Home Environment     Living Situation  Lives With: spouse  Name(s) of Who Lives With Patient: Wife  Lives in: house  Patients Responsibilities: Community mobility, , Financial management, Health and wellness, Laundry, Leisure/play/hobbies, Meal preparation, Retired, Shopping, Social participation, Yard Work    Instrumental Activities of Daily Living     Previous Hand Dominance: Right Current Hand Dominance: Right (L sided weakness)     Other iADL Information: L UE weakness       Cognitive Skills Building       Cognitive Observation Activity Assist Position Equipment Response Comment             Comment:        Dynamic Activities      Activity Assist Position Equipment Response Comment   Activity 1 Washer toss contact guard assistance Standing Rolling walker and Metal Washers good             Comment: Sit to stand was contact guard as was dynamic standing balance/reaching. Standing tolerance was 5 minutes. LUE coordination was min. Problem solving skills and sequencing skills were setup.        Fine Motor Activities      Activity Assist Position Equipment Response Comment            Comment:          Goals     Patient Goals  Patient Goal 1: "Get back to where I was before and be my old self."    Short Term Goals    Goal  Goal Status   Will increase sit to stand to supervision Initiated   Will improve dynamic standing balance/reaching to supervision Initiated                 Long Term Goals    Goal Goal Status   Will increase standing tolerance to 5,10 minutes Initiated   Will improve dynamic standing balance/reaching to setup Initiated                     Plan   "     Patient to be seen: Daily  Duration: 2 weeks  Treatments planned: Balance training, Community/work reintegrat, Coordination, Energy conservation training, Safety education  Treatment plan/goals established with Patient/Caregiver: Yes

## 2022-07-26 NOTE — PLAN OF CARE
Scott Echeverria- age 71 *dysphagia, left arm weakness     Diagnoses: Cervical myelopathy s/p left C3-7 posterior foraminotomies, C3-7 laminoplasty, and repair of Dural tear 7/12/2022. Pt. Has a history of BPH and hypothyroidism. *See chart for full and complete medical history*     Hx mental health/substance abuse: Denies history of mental health or substance abuse issues.     Insurance: Medicare/Blue Cross Blue Shield     Education//Work Hx: Pt. Completed high school and college. He has no  history. He is retired.       Pt. Is  to Anusha Echeverria ([phone]). Anusha is retired. She drives, cooks, cleans, does laundry, grocery shops, and does yard work. Anusha states that she along with the patient work together on all these things. She can physically assist pt. She plans to stay with patient 24/7 after discharge from rehab.       Children: (2) Friends/Family: 1) Anusha Echeverria, spouse ([phone])     Power of  (no)/Living Will (no)      Prior Level of Fx: Independent with ADLS, drives, cooks, does chores, does laundry, grocery shops, manages finances, manages his own meds, and does yard work. He does not have a medic alert.     Residence: Pt. Lives with wife in [place] in a single-story home with a threshold to enter the residence. He uses a walk-in shower with a threshold, grab bars, HHS, and a built-in bench. He does not have an elevated toilet. No grab bars adjacent.      DME: Grab bars and HHS. He will use [facility] for DME.     HH/OP tx: No history of HH. He will use [facility] for HH.     Pharmacy: [facility] in [place]/ (has prescription drug coverage)      MD(s): PCP: Dr. Gary Reilly ([phone])     FOC obtained for [facility] for DME and [facility] for HH.

## 2022-07-26 NOTE — CONSULTS
I have evaluated the patient on initial IRF admit. I have been involved in rehab prescreen. I have reviewed records.I have reviewed admit orders.I have discussed case with IM team.  I find the patient appropriate for, in need of and should tolerate an aggressive IRF program with good potential for functional improvement.  I am in agreement with initial Plan of Care  I have been involved in the creation of this initial PM&R consult with Keyla Carty MD     Chief Complaint  Cervical myelopathy; respiratory distress    Reason for Consultation  Physiatry    History of Present Illness  Admit MD: Luis Khan MD  Consult Physiatry: David Carty MD  HPI: 70yo WM with PMH of cervical spondylosis, hypothyroidism, VIJI, and BPH underwent neck surgery on 7/12/2022. He had a diagnosis of cervical myelopathy and underwent posterior foraminotomies on left C3-7 as well as C3-7 laminoplasty and repair of dural tear. He was doing fine postoperatively and working with physical therapy. However, on postop day 2, it was noticed that the patient was having some slurred speech.  The wife stated that he received a scopolamine patch to help decrease secretions just a day prior prior to this.  He was also getting Norco for pain. He also received IV Robaxin just before the episode of aphasia. A full workup was done with CT Head, MRI brain, and MRA of the brain which were negative for stroke.  He developed significant desaturation that required intubation.  He was treated with broad-spectrum coverage for potential bacterial fungal viral infections.  LP was done and essentially came back negative, discontinuing his broad-spectrum coverage.  He was extubated and developed respiratory distress again and was ultimately reintubated.  Again full workup was done which essentially came back negative for his metabolic encephalopathy.  He was successfully extubated and eventually downgraded to the floor.  He is comfortably sitting in  his chair answer questions appropriately talking appropriately completely back to his baseline.  Family is also concerned as what happened but etiology still remains unclear.  He is working with physical therapy, occupational, and speech therapy.  Functional status includes bed mobility, LE dressing,  and transfers requiring moderate assistance. Amb w/RW for 100ft. Patient was evaluated, accepted, and admitted to inpatient rehab to improve functional status. Transferred to [facility] on 7/25 without incident.     7/26: Seen in patient room, seated in WC with breakfast tray in front of him. Reports good appetite, but only having scrambled eggs and grits which he does not eat. Asking for thickened tea, because he is thirsty and had finished his other drinks on tray. Accepts available Apple Juice. Reports good sleep and bowels moving this morning. States that he is having difficulty managing the urinal in bed, and urinates before being able to get into a position. Discussed condom catheter at night until he can regain some function. Having neck pain as well as radiating pain down L>R UE. Awaiting pain medication from nursing. States that he is really stiff and his muscles are tight. He wants to get back to his previous function of yard work and gardening. Therapy evaluating. VSSAF.      Review of Systems  Barriers to Discharge:  Social:Pt. Completed high school and college. He has no  history. He is retired.  Wife is retired. She drives, cooks, cleans, does laundry, grocery shops, and does yard work. Wife states that she along with the patient work together on all these things. She can physically assist pt. She plans to stay with patient 24/7 after discharge from rehab.  Children: (2).     Medical: Cervical spondylosis s/p C3-C7 laminiopasty and laminoforminotomies on 7/12/2022, VIJI, BPH, Hypothyroidism, Normocytic anemia     Functional:   Prior Level of Fx: Independent with ADLS, drives, cooks, does chores, does  laundry, grocery shops, manages finances, manages his own meds, and does yard work. He does not have a medic alert.   Residence: Pt. Lives with wife in [place] in a single-story home with a threshold to enter the residence. He uses a walk-in shower with a threshold, grab bars, HHS, and a built-in bench. He does not have an elevated toilet. No grab bars adjacent.    DME: Grab bars and HHS.    Psychiatric: Hx mental health/substance abuse: Denies history of mental health or substance abuse issues.    Depression/Anxiety: no complaints     Pain: neck radiating down L>R UE; stiff/tight  gabapentin capsule 100 mg qHS  LIDOcaine 5 % patch 1 patch q24hr, 0600, neck  methocarbamoL tablet 500 mg BID   acetaminophen tablet 650 mg q4hr PRN mild pain  HYDROcodone-acetaminophen 5-325 mg 1 tablet q4hr PRN mod pain  Bowels/Bladder: last BM 7/26 per patient     Appetite: good     Sleep: good          Physical Exam  General: well-developed, well-nourished, in no acute distress  Eye: EOMI, clear conjunctiva, eyelids normal  HENT: normocephalic, oropharynx and nasal mucosal surfaces moist  Neck: decreased ROM, supple  Respiratory: equal chest rise, no SOB, no audible wheeze, 2L O2 via NC  Cardiovascular: regular rate and rhythm, no edema  Gastrointestinal: soft, non-tender, non-distended   Musculoskeletal: decreased ROM/strength to LUE, decreased coordination to BUE  Integumentary: no rashes or skin lesions present, posterior cervical [id number]/d/i  Neurologic: cranial nerves intact, decreased ROM/strength to LUE, decreased coordination to BUE, cervical radiculopathy  *MD performed and documented physical examination     Labs:   Latest Reference Range & Units 07/26/22 05:25 07/26/22 05:26   WBC 4.5 - 11.5 x10(3)/mcL 9.4    RBC 4.70 - 6.10 x10(6)/mcL 4.15 (L)    Hemoglobin 14.0 - 18.0 gm/dL 12.1 (L)    Hematocrit 42.0 - 52.0 % 37.1 (L)    MCV 80.0 - 94.0 fL 89.4    MCH 27.0 - 31.0 pg 29.2    MCHC 33.0 - 36.0 mg/dL 32.6 (L)    RDW  11.5 - 17.0 % 15.5    Platelets 130 - 400 x10(3)/mcL 619 (H)    MPV 7.4 - 10.4 fL 9.4    Neut % % 63.6    LYMPH % % 27.0    Mono % % 7.1    Eosinophil % % 1.2    Basophil % % 0.5    Immature Granulocytes % 0.6    Neut # 2.1 - 9.2 x10(3)/mcL 6.0    Lymph # 0.6 - 4.6 x10(3)/mcL 2.55    Mono # 0.1 - 1.3 x10(3)/mcL 0.67    Eos # 0 - 0.9 x10(3)/mcL 0.11    Baso # 0 - 0.2 x10(3)/mcL 0.05    Immature Grans (Abs) 0 - 0.04 x10(3)/mcL 0.06 (H)    nRBC % 0.0    Iron 65 - 175 ug/dL 18 (L)    TIBC 250 - 450 ug/dL 194 (L)    Iron Binding Capacity Unsaturated 69 - 240 ug/dL 176    Transferrin 163 - 344 mg/dL 180    Ferritin 21.81 - 274.66 ng/mL 202.33    Iron Saturation 20 - 50 % 9 (L)    Sodium 136 - 145 mmol/L 145    Potassium 3.5 - 5.1 mmol/L 3.3 (L)    Chloride 98 - 107 mmol/L 100    CO2 23 - 31 mmol/L 33 (H)    BUN 8.4 - 25.7 mg/dL 7.3 (L)    Creatinine 0.73 - 1.18 mg/dL 0.68 (L)    eGFR if non African American mls/min/1.73/m2 >60    Glucose 82 - 115 mg/dL 98    Calcium 8.8 - 10.0 mg/dL 9.2    Phosphorus 2.3 - 4.7 mg/dL 3.2    Magnesium 1.60 - 2.60 mg/dL 2.30    Alkaline Phosphatase 40 - 150 unit/L 265 (H)    PROTEIN TOTAL 5.8 - 7.6 gm/dL 6.7    Albumin 3.4 - 4.8 gm/dL 2.7 (L)    Albumin/Globulin Ratio 1.1 - 2.0 ratio 0.7 (L)    Prealbumin 16.0 - 42.0 mg/dL  14.9 (L)   BILIRUBIN TOTAL <=1.5 mg/dL 0.5    AST 5 - 34 unit/L 30    ALT 0 - 55 unit/L 66 (H)    Globulin, Total 2.4 - 3.5 gm/dL 4.0 (H)    (L): Data is abnormally low  (H): Data is abnormally high    Diagnostics:  CT CERVICAL SPINE WITHOUT CONTRAST  Impression:  1. No acute fracture identified.  2. Multilevel degenerative changes of the cervical spine.  Date:                                            06/14/2022  Time:                                           13:07    XR CHEST PA AND LATERAL  Impression:  No acute cardiopulmonary process identified.  Date:                                            07/08/2022  Time:                                            09:49    MRI BRAIN WITHOUT CONTRAST  Impression:  1. Evaluation limited by motion artifact.  2. No acute intracranial abnormality.  3. Mild chronic microvascular ischemic changes.  Date:                                            07/14/2022  Time:                                           09:05    MRA BRAIN WITHOUT CONTRAST  Impression:  Evaluation significantly limited by motion artifact.  No proximal large vessel occlusion.  Date:                                            07/14/2022  Time:                                           09:08    CT CERVICAL SPINE WITHOUT CONTRAST  Impression:  Multiple spinal fracture seen from C3-C7 involving posterior arches at those levels with postsurgical changes seen in the cervical spine mainly on the left side at C 3 through C7.  Fluid/edema seen in the surgical bed throughout cervical spine  Punctate radiopacities seen throughout the surgical bed assumed to be antibiotic beads.  Date:                                            07/15/2022  Time:                                           18:25    MRI of the cervical spine without contrast   Impression:  1. Postoperative changes are seen in the left posterior elements from C3 through C7 vertebrae. There is a postoperative collection in the surgical bed/ posterior paraspinal soft tissues surrounding the spinous process and posterior to the laminae. The collection measures approximately 7.3 cm in length and 2 cm in AP dimension. Similar findings are also seen on the prior examination. Correlate clinically as regards additional evaluation and follow-up.  2. There are displaced fractures involving the laminae of C3 through C7 vertebrae appreciated better on the earlier CT study.  3. There is moderate canal stenosis at C3-C4 and C4-C5 secondary to broad-based disc protrusions and degenerative joint disease.  4. Details and other findings as noted above.  No significant discrepancy with overnight report.  Date:                                             07/16/2022  Time:                                           08:27    Assessment/Plan  Cervicalgia   Cervical radiculitis   Stenosis of cervical spine with myelopathy   Osseous and subluxation stenosis of intervertebral foramina of cervical region   Aphasia  Dysphagia   Encephalopathy, metabolic   Subclinical hypothyroidism   VIJI (obstructive sleep apnea)   BPH (benign prostatic hyperplasia)         Wounds: posterior cervical [id number]/d/i  S/p C3-C7 laminiopasty and laminoforminotomies on 7/12 (Jalen)  Precautions: spinal  Bracing: Soft collar with activity/prn for comfort  Swallowing: Minced & Moist with Moderately thickened liquids  Function: Tolerating therapy. Continue PT/OT  VTE Prophylaxis:   enoxaparin injection 40 mg SubQ q24hr  Code Status: FULL CODE   Discharge: Pt. Lives with wife in [place] in a single-story home with a threshold to enter the residence. Pt. Completed high school and college. He has no  history. He is retired.  Wife is retired. She drives, cooks, cleans, does laundry, grocery shops, and does yard work. Wife states that she along with the patient work together on all these things. She can physically assist pt. She plans to stay with patient 24/7 after discharge from rehab.  Children: (2). Date pending.               Julianne Sims NP, conducted additional independent physical examination and assisted with medical documentation.

## 2022-07-27 LAB
ALBUMIN SERPL-MCNC: 2.5 GM/DL (ref 3.4–4.8)
ALBUMIN/GLOB SERPL: 0.7 RATIO (ref 1.1–2)
ALP SERPL-CCNC: 207 UNIT/L (ref 40–150)
ALT SERPL-CCNC: 60 UNIT/L (ref 0–55)
AST SERPL-CCNC: 37 UNIT/L (ref 5–34)
BILIRUBIN DIRECT+TOT PNL SERPL-MCNC: 0.4 MG/DL
BUN SERPL-MCNC: 11.9 MG/DL (ref 8.4–25.7)
CALCIUM SERPL-MCNC: 8.8 MG/DL (ref 8.8–10)
CHLORIDE SERPL-SCNC: 102 MMOL/L (ref 98–107)
CO2 SERPL-SCNC: 33 MMOL/L (ref 23–31)
CREAT SERPL-MCNC: 0.67 MG/DL (ref 0.73–1.18)
GLOBULIN SER-MCNC: 3.7 GM/DL (ref 2.4–3.5)
GLUCOSE SERPL-MCNC: 107 MG/DL (ref 82–115)
POTASSIUM SERPL-SCNC: 4.3 MMOL/L (ref 3.5–5.1)
PROT SERPL-MCNC: 6.2 GM/DL (ref 5.8–7.6)
SODIUM SERPL-SCNC: 143 MMOL/L (ref 136–145)

## 2022-07-27 PROCEDURE — 11800000 HC REHAB PRIVATE ROOM

## 2022-07-27 PROCEDURE — 92526 ORAL FUNCTION THERAPY: CPT

## 2022-07-27 PROCEDURE — 36415 COLL VENOUS BLD VENIPUNCTURE: CPT | Performed by: NURSE PRACTITIONER

## 2022-07-27 PROCEDURE — 97530 THERAPEUTIC ACTIVITIES: CPT | Mod: CQ

## 2022-07-27 PROCEDURE — 97124 MASSAGE THERAPY: CPT

## 2022-07-27 PROCEDURE — 25000003 PHARM REV CODE 250: Performed by: NURSE PRACTITIONER

## 2022-07-27 PROCEDURE — 94799 UNLISTED PULMONARY SVC/PX: CPT

## 2022-07-27 PROCEDURE — 63600175 PHARM REV CODE 636 W HCPCS: Performed by: NURSE PRACTITIONER

## 2022-07-27 PROCEDURE — 80053 COMPREHEN METABOLIC PANEL: CPT | Performed by: NURSE PRACTITIONER

## 2022-07-27 PROCEDURE — 97110 THERAPEUTIC EXERCISES: CPT

## 2022-07-27 PROCEDURE — 27000221 HC OXYGEN, UP TO 24 HOURS

## 2022-07-27 PROCEDURE — 94761 N-INVAS EAR/PLS OXIMETRY MLT: CPT

## 2022-07-27 PROCEDURE — 97530 THERAPEUTIC ACTIVITIES: CPT

## 2022-07-27 PROCEDURE — 97116 GAIT TRAINING THERAPY: CPT | Mod: CQ

## 2022-07-27 RX ADMIN — ASPIRIN 81 MG CHEWABLE TABLET 81 MG: 81 TABLET CHEWABLE at 08:07

## 2022-07-27 RX ADMIN — FERROUS SULFATE TAB 325 MG (65 MG ELEMENTAL FE) 1 EACH: 325 (65 FE) TAB at 08:07

## 2022-07-27 RX ADMIN — ENOXAPARIN SODIUM 40 MG: 30 INJECTION SUBCUTANEOUS at 05:07

## 2022-07-27 RX ADMIN — DOCUSATE SODIUM 100 MG: 100 CAPSULE, LIQUID FILLED ORAL at 08:07

## 2022-07-27 RX ADMIN — HYDROCODONE BITARTRATE AND ACETAMINOPHEN 1 TABLET: 5; 325 TABLET ORAL at 08:07

## 2022-07-27 RX ADMIN — GABAPENTIN 100 MG: 100 CAPSULE ORAL at 08:07

## 2022-07-27 RX ADMIN — METHOCARBAMOL 500 MG: 500 TABLET ORAL at 11:07

## 2022-07-27 RX ADMIN — HYDROCODONE BITARTRATE AND ACETAMINOPHEN 1 TABLET: 5; 325 TABLET ORAL at 03:07

## 2022-07-27 RX ADMIN — HYDROXYZINE PAMOATE 50 MG: 50 CAPSULE ORAL at 08:07

## 2022-07-27 RX ADMIN — LIDOCAINE 5% 1 PATCH: 700 PATCH TOPICAL at 06:07

## 2022-07-27 RX ADMIN — ACETAMINOPHEN 650 MG: 325 TABLET, FILM COATED ORAL at 06:07

## 2022-07-27 RX ADMIN — METHOCARBAMOL 500 MG: 500 TABLET ORAL at 10:07

## 2022-07-27 RX ADMIN — LEVOTHYROXINE SODIUM 50 MCG: 0.05 TABLET ORAL at 08:07

## 2022-07-27 RX ADMIN — TAMSULOSIN HYDROCHLORIDE 0.4 MG: 0.4 CAPSULE ORAL at 08:07

## 2022-07-27 RX ADMIN — HYDROCODONE BITARTRATE AND ACETAMINOPHEN 1 TABLET: 5; 325 TABLET ORAL at 02:07

## 2022-07-27 NOTE — PT/OT/SLP PROGRESS
Occupational Therapy Inpatient Rehab Treatment    Name: Scott Echeverria  MRN: [id number]    Assessment:  Scott Echeverria is a 71 y.o. male admitted with a medical diagnosis of Stenosis of cervical spine with myelopathy.  He presents with the following impairments/functional limitations:  weakness, impaired endurance, impaired sensation, impaired self care skills, impaired functional mobility, gait instability, impaired balance, decreased coordination, decreased upper extremity function, decreased lower extremity function, decreased safety awareness, pain, decreased ROM, impaired coordination, impaired fine motor, edema.    Rehab Diagnosis:     Recent Surgery: * No surgery found *      General Precautions: Standard, honey thick     Orthopedic Precautions:spinal precautions     Braces: N/A    Rehab Prognosis: Good; patient would benefit from acute skilled OT services to address these deficits and reach maximum level of function.        History:     Past Medical History:   Diagnosis Date    BPH (benign prostatic hyperplasia)     Cervical pain     Cervical radiculitis     Cervical spinal stenosis     Hypothyroidism, unspecified     Sleep apnea     resolved since surgery       Past Surgical History:   Procedure Laterality Date    APPENDECTOMY  01/25/2022    Dr. Kerry Coats    COLONOSCOPY  2021    HEMORRHOID SURGERY  2002    LAMINOPLASTY N/A 7/12/2022    Procedure: LAMINOPLASTY;  Surgeon: Paulo Rao MD;  Location: [facility];  Service: Neurosurgery;  Laterality: N/A;  left C3-4, C4-5, C5-6, C6-7 foraminotomies, C3-7 laminoplasty //  TIVA SET UP    lower back surgery  1990    RETINAL DETACHMENT SURGERY Right 2015    SINUS SURGERY      TONSILLECTOMY  1956       Subjective     Orientation: Oriented x4    Respiratory Status: Room air    Patients cultural, spiritual, Yazdanism conflicts given the current situation:         Objective:     Communicated with nursing prior to session.  Patient found up in chair upon OT  entry to room.    Mobility   Patient completed:  Sit to Stand Transfer with minimum assistance with rolling walker  Stand to Sit Transfer with minimum assistance with rolling walker    Limiting Factors for ADLs: motor, sensory, endurance, limited ROM, balance, weakness, coordination and safety awareness     Therapeutic Activities  Co-treat with Carmela, recreational therapist for [employer] activity. Pt able to tolerate standing during tossing of the ball. Pt needed verbal and tactile facilitation to correct posture and activate quads to straighten legs. Pt unaware of posture at that time - decreased proprioception.    Therapeutic Exercise  Pt performed BUE AROM there ex in all planes with LUE needing AAROM for shoulder movements times 10 reps, one set.    Additional Treatments: Pt practiced doffing and donning socks in figure 4 position without AE.  Also, introduced use of reacher for retrieving items from floor with RW.    Patient left up in chair with call button in reach.     Education provided: Roles and goals of OT, ADLs, transfer training, body mechanics, assistive device, safety precautions, fall prevention, equipment recommendations and home safety    Encounter Problems     Encounter Problems (Active)     Occupational Therapy     Occupational Therapy Goal     Start: 07/26/22   Expected End: 08/02/22       ADLs:  Pt to perform grooming tasks with indep while standing with RW   Pt to perform UB dressing with set up assist   Pt to perform LB dressing with set up assist   Pt to perform putting on/off footwear task with set up assist   Pt to perform toileting with supervision using RW    Functional Transfers:  Pt to perform toilet transfers with CGA and RW   Pt to perform a tub transfer with CGA and RW  Pt to perform a walk-in shower transfer with CGA and RW    IADLs:  Pt to perform IADL tasks while standing for 8 min with RW and SBA     Balance, Strengthening, Endurance, Balance:  Pt to consistently demonstrate  adherence to cervical spinal precautions during all ADL's as instructed by OT.  Pt to demonstrate dynamic standing balance for 10 min as required to perform ADL's from standing level.  Pt to demonstrate consistent adherence to breathing control and energy conservation techniques as educated by OT.               Time Tracking     OT Received On: 07/27/22  Time In 1000     Time Out 1100  Total Time 60 min  Therapy Time: OT Individual: 60  Missed Time:    Missed Time Reason:      Billable Minutes: Therapeutic Activity 30 and Therapeutic Exercise 30    07/27/2022

## 2022-07-27 NOTE — PT/OT/SLP PROGRESS
Speech Language Pathology Treatment          Scott Echeverria   MRN: [id number]     Diet recommendations: Solid Diet Level: Minced & Moist Diet - IDDSI Level 5  Liquid Diet Level: Moderately thick liquids - IDDSI Level 3     Therapy Minutes  SLP Treatment Date: 22  Speech Start Time: 08  Speech Stop Time: 0900  Speech Total (min): 30 min  SLP Individual: 30    Billable Minutes:  Treatment Swallowing Dysfunction 30 minutes    General Precautions: Standard    Subjective:  Pt awake, alert, and cooperative throughout session.    Objective:   Pain/Comfort  Pain Rating 1: 0/10    Pt completed tongue base retraction exercises x70 and laryngeal elevation exercises x90. Pt consumed ~4 oz moderately thick liquids with no s/sx of aspiration.    Assessment:  Scott Echeverria is a 71 y.o. male with a SLP diagnosis of Dysphagia. SLP intervention continues to be warranted at this time.    Discharge recommendations: Discharge Facility/Level of Care Needs: home health speech therapy     Goals:   Multidisciplinary Problems     SLP Goals        Problem: SLP    Goal Priority Disciplines Outcome   SLP Goal     SLP    Description: LT. Pt will tolerate least restrictive diet with no s/sx of aspiration.    ST. Pt will complete tongue base and laryngeal elevation exercises with 100% accuracy and minimal cues.  2. Pt will tolerate half meal of soft & bite sized diet with no s/sx of aspiration.                    Plan:   Patient to be seen Therapy Frequency: 5 x/week   Planned Interventions: Dysphagia Therapy  Plan of Care Expires: 22  Plan of Care reviewed with: patient  SLP Follow-up?: Yes  SLP - Next Visit Date: 22

## 2022-07-27 NOTE — PROGRESS NOTES
07/27/22 1000   Rec Therapy Time Calculation   Date of Treatment 07/27/22   Rec Start Time 1000   Rec Stop Time 1030   Rec Total Time (min) 30 min   Time   Treatment time 2 units   Precautions   General Precautions honey thick   Orthopedic Precautions  spinal precautions   Braces N/A   Pain/Comfort   Pain Rating 1 6/10   Location - Side 1 Left   Location 1 neck  (and shoulder)   Pain Addressed 1 Pre-medicate for activity;Nurse notified   Pain Rating Post-Intervention 1 4/10   OTHER   Rehab identified problem list/impairments weakness;impaired endurance;impaired sensation;impaired self care skills;impaired functional mobility;gait instability;impaired balance;decreased coordination;decreased upper extremity function;decreased lower extremity function;decreased safety awareness;pain;decreased ROM;impaired coordination;impaired fine motor;edema

## 2022-07-27 NOTE — PT/OT/SLP PROGRESS
"Occupational Therapy Inpatient Rehab Treatment    Name: Scott Echeverria  MRN: [id number]    Assessment:  Scott Echeverria is a 71 y.o. male admitted with a medical diagnosis of Stenosis of cervical spine with myelopathy.  He presents with the following impairments/functional limitations:  weakness, impaired endurance, impaired self care skills, impaired functional mobility, gait instability, impaired balance, decreased coordination, decreased upper extremity function, decreased lower extremity function, decreased safety awareness, pain, decreased ROM, impaired coordination, edema, orthopedic precautions  .    Rehab Diagnosis:     Recent Surgery: * No surgery found *      General Precautions: Standard, honey thick     Orthopedic Precautions:spinal precautions     Braces: N/A    Rehab Prognosis: Good; patient would benefit from acute skilled OT services to address these deficits and reach maximum level of function.        History:     Past Medical History:   Diagnosis Date    BPH (benign prostatic hyperplasia)     Cervical pain     Cervical radiculitis     Cervical spinal stenosis     Hypothyroidism, unspecified     Sleep apnea     resolved since surgery       Past Surgical History:   Procedure Laterality Date    APPENDECTOMY  01/25/2022    Dr. Kerry Coats    COLONOSCOPY  2021    HEMORRHOID SURGERY  2002    LAMINOPLASTY N/A 7/12/2022    Procedure: LAMINOPLASTY;  Surgeon: Paulo Rao MD;  Location: [facility];  Service: Neurosurgery;  Laterality: N/A;  left C3-4, C4-5, C5-6, C6-7 foraminotomies, C3-7 laminoplasty //  TIVA SET UP    lower back surgery  1990    RETINAL DETACHMENT SURGERY Right 2015    SINUS SURGERY      TONSILLECTOMY  1956       Subjective     Orientation: Oriented x4    Chief Complaint: pain in L shoulder/arm    Patient/Family Comments/goals: "to get back to doing what I was doing before"    Respiratory Status: Room air    Patients cultural, spiritual, Roman Catholic conflicts given the current " situation:         Objective:     Communicated with nursing prior to session.  Patient found up in chair upon OT entry to room.    Pt rated pain at a 8/10 on Left shoulder/arm.    Additional Treatments: Pt had increased edema at medial side of elbow from infiltrated IV site from yesterday with was bothering pt.  OT performed manual edema mobilization to same area and applied hot pack to area to assist with edema.    Patient left up in chair with SLP, Siobhan present.     Education provided: Roles and goals of OT, ADLs, safety precautions and edema management.     Goals    None         Time Tracking     OT Received On: 07/27/22  Time In 0800     Time Out 0830  Total Time 30 min  Therapy Time: OT Individual: 30  Missed Time:    Missed Time Reason:      Billable Minutes: Therapeutic Activity 15, Manual 15    07/27/2022

## 2022-07-27 NOTE — PT/OT/SLP PROGRESS
Physical Therapy Inpatient Rehab Treatment    Patient Name:  Scott Echeverria   MRN:  [id number]    Recommendations:     Discharge Recommendations:  other (see comments)   Discharge Equipment Recommendations: other (see comments) (Pending progress)   Barriers to discharge: None    Assessment:     Scott Echeverria is a 71 y.o. male admitted with a medical diagnosis of Stenosis of cervical spine with myelopathy.  He presents with the following impairments/functional limitations:  weakness, impaired endurance, impaired functional mobility, gait instability, impaired balance, decreased lower extremity function, pain, decreased ROM, orthopedic precautions.    Rehab Diagnosis:     Recent Surgery: * No surgery found *      General Precautions: Standard, honey thick     Orthopedic Precautions:spinal precautions     Braces: N/A    Rehab Prognosis: Good; patient would benefit from acute skilled PT services to address these deficits and reach maximum level of function.      History:     Past Medical History:   Diagnosis Date    BPH (benign prostatic hyperplasia)     Cervical pain     Cervical radiculitis     Cervical spinal stenosis     Hypothyroidism, unspecified     Sleep apnea     resolved since surgery       Past Surgical History:   Procedure Laterality Date    APPENDECTOMY  01/25/2022    Dr. Kerry Coats    COLONOSCOPY  2021    HEMORRHOID SURGERY  2002    LAMINOPLASTY N/A 7/12/2022    Procedure: LAMINOPLASTY;  Surgeon: Paulo Rao MD;  Location: [facility];  Service: Neurosurgery;  Laterality: N/A;  left C3-4, C4-5, C5-6, C6-7 foraminotomies, C3-7 laminoplasty //  TIVA SET UP    lower back surgery  1990    RETINAL DETACHMENT SURGERY Right 2015    SINUS SURGERY      TONSILLECTOMY  1956       Subjective     Chief Complaint: Pt reports increased c/o pain in neck and shoulders.    Respiratory Status: Room air    Patients cultural, spiritual, Yazdanism conflicts given the current situation: yes      Objective:      Communicated with NSG prior to session.  Patient found up in chair with peripheral IV  upon PT entry to room.    Pt is Oriented x3 and Alert, Cooperative and Motivated.    Functional Mobility:   Transfers:     Sit to Stand:  stand by assistance with rolling walker  Gait: Pt ambulates 350ft with RW with Contact Guard Assistance.   Impairments contributing to gait deviations include impaired balance, pain, decreased ROM, decreased strength and decreased endurance.    Dynamic Sitting/Standing  Patient performed dynamic standing on level surface and foam mat using rolling walker with minimum assistance and minimal verbal cues during minimal excursions.  Cone tapping performed as well as static standing on airex foam pad.  Pt exhibited some difficulties with LLE SLS  Causing some minor LOB, however, pt able to self-correct.    Activity Tolerance    Patient left up in chair with call button in reach.    Education provided: roles and goals of PT/PTA, transfer training, gait training and balance training    Expected compliance:    GOALS:   Multidisciplinary Problems     Physical Therapy Goals        Problem: Physical Therapy    Goal Priority Disciplines Outcome Goal Variances Interventions   Physical Therapy Goal     PT, PT/OT Ongoing, Progressing     Description: Short Term goals      Bed Mobility   Roll Right and Left Setup or Clean-up Assistance .  Lying to sitting Setup or Clean-up Assistance .  Sitting to lying Setup or Clean-up Assistance .    Transfers    Pt will be able to perform Stand step chair/bed to chair transfer With RW Setup or Clean-up Assistance .  Pt will be able to perform Sit to stand with RW Setup or Clean-up Assistance .  Pt will be able to perform Car transfer with RW Setup or Clean-up Assistance .    Ambulation    Pt will ambulate 200 Feet with RW Setup or Clean-up Assistance .  Pt will be able to ascend/descend 12 stairs using B Rails Supervision or Touching Assistance .  Pt will be able to  ascend/descend 4 inch curb using RW Supervision or Touching Assistance .  Pt will be able to ambulate uneven surfaces/ramp 15 feet with RW Supervision or Touching Assistance .      Timeframe: By Discharge                     Plan:     During this hospitalization, patient to be seen 5 x/week to address the identified rehab impairments via gait training, therapeutic activities, therapeutic exercises, neuromuscular re-education, wheelchair management/training and progress toward the following goals:     Plan of Care Expires:  08/01/22    Additional Information:   Pt required extended rest breaks throughout tx session b/t activities d/t fatigue.      Time Tracking:     PT Received On: 07/27/22  Time In 1100     Time Out 1200  Total Time 60 min  Therapy Time    Missed Time:    Time Missed due to:      Billable Minutes: Gait Training 15 and Therapeutic Activity 45    07/27/2022

## 2022-07-28 LAB
ALBUMIN SERPL-MCNC: 2.6 GM/DL (ref 3.4–4.8)
ALBUMIN/GLOB SERPL: 0.7 RATIO (ref 1.1–2)
ALP SERPL-CCNC: 186 UNIT/L (ref 40–150)
ALT SERPL-CCNC: 51 UNIT/L (ref 0–55)
AST SERPL-CCNC: 28 UNIT/L (ref 5–34)
BILIRUBIN DIRECT+TOT PNL SERPL-MCNC: 0.3 MG/DL
BUN SERPL-MCNC: 10.3 MG/DL (ref 8.4–25.7)
CALCIUM SERPL-MCNC: 9.1 MG/DL (ref 8.8–10)
CHLORIDE SERPL-SCNC: 102 MMOL/L (ref 98–107)
CO2 SERPL-SCNC: 30 MMOL/L (ref 23–31)
CREAT SERPL-MCNC: 0.62 MG/DL (ref 0.73–1.18)
GLOBULIN SER-MCNC: 3.6 GM/DL (ref 2.4–3.5)
GLUCOSE SERPL-MCNC: 102 MG/DL (ref 82–115)
POTASSIUM SERPL-SCNC: 4.5 MMOL/L (ref 3.5–5.1)
PROT SERPL-MCNC: 6.2 GM/DL (ref 5.8–7.6)
SODIUM SERPL-SCNC: 142 MMOL/L (ref 136–145)

## 2022-07-28 PROCEDURE — 99233 SBSQ HOSP IP/OBS HIGH 50: CPT | Mod: ,,, | Performed by: NURSE PRACTITIONER

## 2022-07-28 PROCEDURE — 97140 MANUAL THERAPY 1/> REGIONS: CPT

## 2022-07-28 PROCEDURE — 80053 COMPREHEN METABOLIC PANEL: CPT | Performed by: NURSE PRACTITIONER

## 2022-07-28 PROCEDURE — 36415 COLL VENOUS BLD VENIPUNCTURE: CPT | Performed by: NURSE PRACTITIONER

## 2022-07-28 PROCEDURE — 97535 SELF CARE MNGMENT TRAINING: CPT

## 2022-07-28 PROCEDURE — 92526 ORAL FUNCTION THERAPY: CPT

## 2022-07-28 PROCEDURE — 11800000 HC REHAB PRIVATE ROOM

## 2022-07-28 PROCEDURE — 99900035 HC TECH TIME PER 15 MIN (STAT)

## 2022-07-28 PROCEDURE — 63600175 PHARM REV CODE 636 W HCPCS: Performed by: NURSE PRACTITIONER

## 2022-07-28 PROCEDURE — 94761 N-INVAS EAR/PLS OXIMETRY MLT: CPT

## 2022-07-28 PROCEDURE — 94799 UNLISTED PULMONARY SVC/PX: CPT

## 2022-07-28 PROCEDURE — 25000003 PHARM REV CODE 250: Performed by: NURSE PRACTITIONER

## 2022-07-28 PROCEDURE — 99233 PR SUBSEQUENT HOSPITAL CARE,LEVL III: ICD-10-PCS | Mod: ,,, | Performed by: NURSE PRACTITIONER

## 2022-07-28 PROCEDURE — 97124 MASSAGE THERAPY: CPT

## 2022-07-28 PROCEDURE — 97530 THERAPEUTIC ACTIVITIES: CPT

## 2022-07-28 PROCEDURE — 97110 THERAPEUTIC EXERCISES: CPT

## 2022-07-28 RX ORDER — METHYL SALICYLATE
LIQUID (ML) TOPICAL
Status: DISCONTINUED | OUTPATIENT
Start: 2022-07-28 | End: 2022-07-28

## 2022-07-28 RX ORDER — METHOCARBAMOL 750 MG/1
750 TABLET, FILM COATED ORAL 3 TIMES DAILY
Status: DISCONTINUED | OUTPATIENT
Start: 2022-07-28 | End: 2022-07-28

## 2022-07-28 RX ORDER — GABAPENTIN 100 MG/1
100 CAPSULE ORAL 3 TIMES DAILY
Status: DISCONTINUED | OUTPATIENT
Start: 2022-07-28 | End: 2022-08-05 | Stop reason: HOSPADM

## 2022-07-28 RX ORDER — METHOCARBAMOL 750 MG/1
750 TABLET, FILM COATED ORAL 3 TIMES DAILY
Status: DISCONTINUED | OUTPATIENT
Start: 2022-07-28 | End: 2022-08-05 | Stop reason: HOSPADM

## 2022-07-28 RX ADMIN — LIDOCAINE 5% 1 PATCH: 700 PATCH TOPICAL at 05:07

## 2022-07-28 RX ADMIN — METHOCARBAMOL 750 MG: 750 TABLET ORAL at 09:07

## 2022-07-28 RX ADMIN — ACETAMINOPHEN 650 MG: 325 TABLET, FILM COATED ORAL at 07:07

## 2022-07-28 RX ADMIN — ASPIRIN 81 MG CHEWABLE TABLET 81 MG: 81 TABLET CHEWABLE at 08:07

## 2022-07-28 RX ADMIN — DOCUSATE SODIUM 100 MG: 100 CAPSULE, LIQUID FILLED ORAL at 09:07

## 2022-07-28 RX ADMIN — LEVOTHYROXINE SODIUM 50 MCG: 0.05 TABLET ORAL at 08:07

## 2022-07-28 RX ADMIN — METHOCARBAMOL 750 MG: 750 TABLET ORAL at 02:07

## 2022-07-28 RX ADMIN — ACETAMINOPHEN 650 MG: 325 TABLET, FILM COATED ORAL at 08:07

## 2022-07-28 RX ADMIN — GABAPENTIN 100 MG: 100 CAPSULE ORAL at 02:07

## 2022-07-28 RX ADMIN — HYDROCODONE BITARTRATE AND ACETAMINOPHEN 1 TABLET: 5; 325 TABLET ORAL at 02:07

## 2022-07-28 RX ADMIN — HYDROCODONE BITARTRATE AND ACETAMINOPHEN 1 TABLET: 5; 325 TABLET ORAL at 11:07

## 2022-07-28 RX ADMIN — FERROUS SULFATE TAB 325 MG (65 MG ELEMENTAL FE) 1 EACH: 325 (65 FE) TAB at 08:07

## 2022-07-28 RX ADMIN — DOCUSATE SODIUM 100 MG: 100 CAPSULE, LIQUID FILLED ORAL at 08:07

## 2022-07-28 RX ADMIN — GABAPENTIN 100 MG: 100 CAPSULE ORAL at 09:07

## 2022-07-28 RX ADMIN — FERROUS SULFATE TAB 325 MG (65 MG ELEMENTAL FE) 1 EACH: 325 (65 FE) TAB at 09:07

## 2022-07-28 RX ADMIN — TAMSULOSIN HYDROCHLORIDE 0.4 MG: 0.4 CAPSULE ORAL at 09:07

## 2022-07-28 RX ADMIN — HYDROCODONE BITARTRATE AND ACETAMINOPHEN 1 TABLET: 5; 325 TABLET ORAL at 05:07

## 2022-07-28 RX ADMIN — HYDROXYZINE PAMOATE 50 MG: 50 CAPSULE ORAL at 09:07

## 2022-07-28 RX ADMIN — ENOXAPARIN SODIUM 40 MG: 30 INJECTION SUBCUTANEOUS at 05:07

## 2022-07-28 RX ADMIN — METHOCARBAMOL 750 MG: 750 TABLET ORAL at 08:07

## 2022-07-28 NOTE — PROGRESS NOTES
Subjective:  HPI: 70yo WM with PMH of cervical spondylosis, hypothyroidism, VIJI, and BPH underwent neck surgery on 7/12/2022. He had a diagnosis of cervical myelopathy and underwent posterior foraminotomies on left C3-7 as well as C3-7 laminoplasty and repair of dural tear. He was doing fine postoperatively and working with physical therapy. However, on postop day 2, it was noticed that the patient was having some slurred speech.  The wife stated that he received a scopolamine patch to help decrease secretions just a day prior prior to this.  He was also getting Norco for pain. He also received IV Robaxin just before the episode of aphasia. A full workup was done with CT Head, MRI brain, and MRA of the brain which were negative for stroke.  He developed significant desaturation that required intubation.  He was treated with broad-spectrum coverage for potential bacterial fungal viral infections.  LP was done and essentially came back negative, discontinuing his broad-spectrum coverage.  He was extubated and developed respiratory distress again and was ultimately reintubated.  Again full workup was done which essentially came back negative for his metabolic encephalopathy.  He was successfully extubated and eventually downgraded to the floor.  He is comfortably sitting in his chair answer questions appropriately talking appropriately completely back to his baseline.  Family is also concerned as what happened but etiology still remains unclear.  He is working with physical therapy, occupational, and speech therapy.  Functional status includes bed mobility, LE dressing,  and transfers requiring moderate assistance. Amb w/RW for 100ft. Patient was evaluated, accepted, and admitted to inpatient rehab to improve functional status. Transferred to [facility] on 7/25 without incident.     7/28: Seen with PT, seated in WC with LE weighted exercises. States that he is hurting today in his neck and down his arm. Reports 6-7/10 pain  currently. Nursing administering Robaxin and Tylenol. Relays patient became sedated when receiving Robaxin and Norco together yesterday, therefore they are  muscle relaxer and pain medication by a couple hours. Last Norco given around 0300am. Participating in therapy. VSSAF with noted tachycardia (113) likely related to increased pain. Monitor.     Review of Systems  Depression/Anxiety: no complaints     Pain: neck radiating down L>R UE; stiff/tight  gabapentin capsule 100 mg qHS  LIDOcaine 5 % patch 1 patch q24hr, 0600, neck  methocarbamoL tablet 750 mg TID   acetaminophen tablet 650 mg q4hr PRN mild pain  HYDROcodone-acetaminophen 5-325 mg 1 tablet q4hr PRN mod pain  Bowels/Bladder: last BM 7/27     Appetite: good     Sleep: good          Physical Exam  General: well-developed, well-nourished, in no acute distress  Neck: decreased ROM, supple  Respiratory: equal chest rise, no SOB, no audible wheeze, 2L O2 via NC  Cardiovascular: regular rate and rhythm, no edema  Gastrointestinal: soft, non-tender, non-distended   Musculoskeletal: decreased ROM/strength to LUE, decreased coordination to BUE  Integumentary: no rashes or skin lesions present, posterior cervical [id number]/i  Neurologic: cranial nerves intact, decreased ROM/strength to LUE, decreased coordination to BUE, cervical radiculopathy      Labs:   Latest Reference Range & Units 07/28/22 05:19   Sodium 136 - 145 mmol/L 142   Potassium 3.5 - 5.1 mmol/L 4.5   Chloride 98 - 107 mmol/L 102   CO2 23 - 31 mmol/L 30   BUN 8.4 - 25.7 mg/dL 10.3   Creatinine 0.73 - 1.18 mg/dL 0.62 (L)   eGFR if non African American mls/min/1.73/m2 >60   Glucose 82 - 115 mg/dL 102   Calcium 8.8 - 10.0 mg/dL 9.1   Alkaline Phosphatase 40 - 150 unit/L 186 (H)   PROTEIN TOTAL 5.8 - 7.6 gm/dL 6.2   Albumin 3.4 - 4.8 gm/dL 2.6 (L)   Albumin/Globulin Ratio 1.1 - 2.0 ratio 0.7 (L)   BILIRUBIN TOTAL <=1.5 mg/dL 0.3   AST 5 - 34 unit/L 28   ALT 0 - 55 unit/L 51   Globulin,  Total 2.4 - 3.5 gm/dL 3.6 (H)   (L): Data is abnormally low  (H): Data is abnormally high      Assessment/Plan  Cervicalgia   Cervical radiculitis   Stenosis of cervical spine with myelopathy   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 Continue PT/OT  VTE Prophylaxis:   enoxaparin injection 40 mg SubQ q24hr  Code Status: FULL CODE   Discharge: Pt. Lives with wife in [place] in a single-story home with a threshold to enter the residence. Pt. Completed high school and college. He has no  history. He is retired.  Wife is retired. She drives, cooks, cleans, does laundry, grocery shops, and does yard work. Wife states that she along with the patient work together on all these things. She can physically assist pt. She plans to stay with patient 24/7 after discharge from rehab.  Children: (2). Date pending.

## 2022-07-28 NOTE — PT/OT/SLP PROGRESS
Speech Language Pathology Treatment          Scott Echeverria   MRN: [id number]     Diet recommendations: Solid Diet Level: Minced & Moist Diet - IDDSI Level 5  Liquid Diet Level: Moderately thick liquids - IDDSI Level 3      Billable Minutes:  Treatment Swallowing Dysfunction 30 minutes    General Precautions: Standard    Subjective:  Pt awake, alert, and appropriate throughout session.    Objective:   Pain/Comfort  Pain Rating 1: 6/10  Location 1:  (neck, shoulder, arm)  Pain Addressed 1: Other (see comments) (pt reports nursing has already administered medication)    Pt completed tongue base retraction exercises x70 and laryngeal elevation exercises x90. Rest breaks required throughout session, but pt did tolerate exercises well.     Assessment:  Scott Echeverria is a 71 y.o. male with a SLP diagnosis of Dysphagia. SLP intervention continues to be warranted at this time.    Discharge recommendations: Discharge Facility/Level of Care Needs: home health speech therapy     Goals:   Multidisciplinary Problems     SLP Goals        Problem: SLP    Goal Priority Disciplines Outcome   SLP Goal     SLP    Description: LT. Pt will tolerate least restrictive diet with no s/sx of aspiration.    ST. Pt will complete tongue base and laryngeal elevation exercises with 100% accuracy and minimal cues.  2. Pt will tolerate half meal of soft & bite sized diet with no s/sx of aspiration.                    Plan:   Patient to be seen Therapy Frequency: 5 x/week   Planned Interventions: Dysphagia Therapy  Plan of Care Expires: 22  Plan of Care reviewed with: patient  SLP Follow-up?: Yes  SLP - Next Visit Date: 22

## 2022-07-28 NOTE — PLAN OF CARE
Problem: Impaired Wound Healing  Goal: Optimal Wound Healing  Outcome: Ongoing, Progressing  Intervention: Promote Wound Healing  Flowsheets (Taken 7/28/2022 1241)  Oral Nutrition Promotion: rest periods promoted  Activity Management: Up in chair - L3  Pain Management Interventions: medication offered     Problem: Skin Injury Risk Increased  Goal: Skin Health and Integrity  Outcome: Ongoing, Progressing     Problem: Fall Injury Risk  Goal: Absence of Fall and Fall-Related Injury  Outcome: Ongoing, Progressing  Intervention: Promote Injury-Free Environment  Flowsheets (Taken 7/28/2022 1241)  Safety Promotion/Fall Prevention:   assistive device/personal item within reach   in recliner, wheels locked   side rails raised x 2   instructed to call staff for mobility

## 2022-07-28 NOTE — PLAN OF CARE
Problem: Occupational Therapy  Goal: Occupational Therapy Goal  Description: ADLs:  Pt to perform grooming tasks with indep while standing with RW   Pt to perform UB dressing with set up assist   Pt to perform LB dressing with set up assist   Pt to perform putting on/off footwear task with set up assist   Pt to perform toileting with supervision using RW    Functional Transfers:  Pt to perform toilet transfers with CGA and RW   Pt to perform a tub transfer with CGA and RW  Pt to perform a walk-in shower transfer with CGA and RW    IADLs:  Pt to perform IADL tasks while standing for 8 min with RW and SBA     Balance, Strengthening, Endurance, Balance:  Pt to consistently demonstrate adherence to cervical spinal precautions during all ADL's as instructed by OT.  Pt to demonstrate dynamic standing balance for 10 min as required to perform ADL's from standing level.  Pt to demonstrate consistent adherence to breathing control and energy conservation techniques as educated by OT.   7/28/2022 1218 by Julianne Marshall OT  Outcome: Ongoing, Progressing

## 2022-07-28 NOTE — PT/OT/SLP PROGRESS
Recreational Therapy Treatment    Date of Treatment: 7/28/2022  Start Time: 1000  Stop Time: 1030  Total Time: 30 minutes  Missed Time:    General Precautions: honey thick  Ortho Precautions: spinal precautions  Braces: N/A    Vitals   Vitals at Rest  BP    HR    O2 Sat    Pain 6 LUE     Vitals With Activity  BP    HR    O2 Sat    Pain 6 LUE       Treatment     Cognitive Skills Building   Cognitive Observation Activity Assist Position Equipment Response Comment             Comment:          Dynamic Activities   Activity Assist Position Equipment Response Comment   Activity 1 Golf supervision Standing Rolling walker and Golf club, golf balls fair             Comment: Sit to stand was supervision as was dynamic standing balance/reaching. Standing tolerance was 4 minutes. RUE coordination was setup. Did not use LUE.  Focused on LUE pain         Fine Motor Activities   Activity Assist Position Equipment Response Comment            Comment:        Additional Info: Pt c/o 6/10 pain in LUE.  Action taken: Rest and repositioning    Goals     Short Term Goals    Goal  Goal Status   Will increase sit to stand to supervision Progressing   Will improve dynamic standing balance/reaching to supervision Progressing                 Long Term Goals    Goal Goal Status   Will increase standing tolerance to 5,10 minutes Progressing   Will improve dynamic standing balance/reaching to setup Progressing

## 2022-07-28 NOTE — PROGRESS NOTES
[facility] ([facility])  Rehab Progress Note    Patient Name: Scott Echeverria  MRN: [id number]  Age: 71 y.o. Sex: male  : 1950  Hospital Length of Stay: 3 days  Date of Service: 2022   Chief Complaint: Cervical spondylosis s/p C3-C7 laminiopasty and laminoforminotomies on 2022     Subjective:     Basic Information  Admit Information: 71-year-old white male presented to [facility] on 2022 for scheduled cervical laminiopasty and laminoforminotomies.  PMH significant for cervical spondylosis, hypothyroidism, VIJI, and BPH.  Tolerated C3-C7 laminiopasty and laminoforminotomies on  without perioperative complications.  On  code fast was initiated due to slurred speech after receiving IV Robaxin.  CT head, MRI, MRA negative.  Patient required Ativan prior to MRI and reportedly has some left-sided neglect.  Naloxone and romazicon received with little response. That afternoon he became hypoxic and was having agonal respirations requiring intubation.  T-max a 100.8° reported on .  Extubated on .  Repeat CT unremarkable.  NIVA negative for DVT.  Sputum culture did come back positive for Enterobacter Cloacae; resistant to cephalosporins.  Rocephin discontinued and Levaquin initiated.  Required re-intubation on  for continued alteration mental status and severe hypoxemic respiratory failure.  EEG with diffuse slowing.  LP with 12 WBC and 650 RBC. Neurology added acyclovir, rocephin, and vancomycin for possible meningitis on .  Extubated on .  Meningitis panel negative-low suspicion meningitis. Suspicion most likely secondary to medication side effect but reason for his decompensation and alteration mental status is still not clear on .  Acyclovir discontinued.  Downgraded to the floor on .  Broad coverage antibiotics/antivirals/antifungals discontinued.  Continue to participate progress in therapy.  Left arm weakness continued to improve.   Tolerated transfer to [facility] inpatient rehab unit on 07/25 without incident.  Today's Information: No acute events overnight.  Sitting up in chair.  Reports good sleep and appetite.  Last BM 7/28.  Limited by left shoulder pain and neck pain.  Vital signs at goal.  BMP unremarkable.  No imaging today.  Review of patient's allergies indicates:   Allergen Reactions    Iodine         Current Facility-Administered Medications:     acetaminophen tablet 650 mg, 650 mg, Oral, Q4H PRN,  A Jacqueline, FNP, 650 mg at 07/27/22 1806    ALPRAZolam tablet 0.25 mg, 0.25 mg, Oral, TID PRN,  A Jacqueline, FNP    aspirin chewable tablet 81 mg, 81 mg, Oral, Daily,  A Jacqueline, FNP, 81 mg at 07/27/22 0806    benzonatate capsule 100 mg, 100 mg, Oral, TID PRN,  A Jacqueline, FNP    bisacodyL suppository 10 mg, 10 mg, Rectal, Daily PRN,  A Jacqueline, FNP    docusate sodium capsule 100 mg, 100 mg, Oral, BID,  A Jacqueline, FNP, 100 mg at 07/27/22 2014    enoxaparin injection 40 mg, 40 mg, Subcutaneous, Daily,  A Jacqueline, FNP, 40 mg at 07/27/22 1724    ferrous sulfate tablet 1 each, 1 tablet, Oral, BID,  A Jacqueline, FNP, 1 each at 07/27/22 2014    gabapentin capsule 100 mg, 100 mg, Oral, TID,  A Jacqueline, FNP    hydrALAZINE injection 10 mg, 10 mg, Intravenous, Q4H PRN,  A Jacqueline, FNP    HYDROcodone-acetaminophen 5-325 mg per tablet 1 tablet, 1 tablet, Oral, Q4H PRN,  A Jacqueline, FNP, 1 tablet at 07/28/22 0252    hydrOXYzine pamoate capsule 50 mg, 50 mg, Oral, QHS,  A Jacqueline, FNP, 50 mg at 07/27/22 2014    labetalol 20 mg/4 mL (5 mg/mL) IV syring, 10 mg, Intravenous, Q4H PRN,  A Jacqueline, FNP    levothyroxine tablet 50 mcg, 50 mcg, Oral, Daily, LALO Ellison, 50 mcg at 07/27/22 0806    LIDOcaine 5 % patch 1 patch, 1 patch, Transdermal, Q24H, LALO Sawyer, 1 patch at 07/28/22 0523    methocarbamoL tablet 750  "mg, 750 mg, Oral, TID, Timothy HESS Jacqueline, FNP    methyl salicylate liquid, , Topical (Top), PRN,  A Jacqueline, FNP    metoprolol injection 10 mg, 10 mg, Intravenous, Q2H PRN,  A Jacqueline, FNP    nitroGLYCERIN SL tablet 0.4 mg, 0.4 mg, Sublingual, Q5 Min PRN,  A Jacqueline, FNP    ondansetron disintegrating tablet 4 mg, 4 mg, Oral, Q6H PRN,  A Jacqueline, FNP    ondansetron disintegrating tablet 8 mg, 8 mg, Oral, Q6H PRN,  A Jacqueline, FNP    promethazine tablet 25 mg, 25 mg, Oral, Q6H PRN,  A Jacqueline, FNP    tamsulosin 24 hr capsule 0.4 mg, 0.4 mg, Oral, QHS,  A Jacqueline, FNP, 0.4 mg at 07/27/22 2014     Review of Systems   Complete 12-point review of symptoms negative except for what's mentioned in HPI     Objective:     /71   Pulse 105   Temp 98.3 °F (36.8 °C) (Oral)   Resp 20   Ht 5' 9.02" (1.753 m)   Wt 75.2 kg (165 lb 12.6 oz)   SpO2 95%   BMI 24.47 kg/m²        Intake/Output Summary (Last 24 hours) at 7/28/2022 0832  Last data filed at 7/27/2022 2300  Gross per 24 hour   Intake 720 ml   Output 720 ml   Net 0 ml       Physical Exam  Constitutional:       Appearance: Normal appearance.   HENT:      Head: Normocephalic.      Mouth/Throat:      Mouth: Mucous membranes are moist.   Eyes:      Pupils: Pupils are equal, round, and reactive to light.   Neck:      Comments: Posterior neck incision clean and intact-TK, no redness or drainage  Cardiovascular:      Rate and Rhythm: Normal rate and regular rhythm.      Heart sounds: Normal heart sounds.   Pulmonary:      Effort: Pulmonary effort is normal.      Breath sounds: Normal breath sounds.   Abdominal:      General: Bowel sounds are normal.      Palpations: Abdomen is soft.   Musculoskeletal:      Cervical back: Neck supple.      Comments: Generalized weakness and muscle atrophy   Skin:     General: Skin is warm and dry.      Comments: Left forearm PIV infiltration with redness/indurated  "   Neurological:      General: No focal deficit present.      Mental Status: He is alert and oriented to person, place, and time.   Psychiatric:         Mood and Affect: Mood normal.         Behavior: Behavior normal.         Thought Content: Thought content normal.         Judgment: Judgment normal.     *MD performed and documented physical examination       Lines/Drains/Airways     None               Labs  Recent Results (from the past 24 hour(s))   Comprehensive metabolic panel    Collection Time: 07/28/22  5:19 AM   Result Value Ref Range    Sodium Level 142 136 - 145 mmol/L    Potassium Level 4.5 3.5 - 5.1 mmol/L    Chloride 102 98 - 107 mmol/L    Carbon Dioxide 30 23 - 31 mmol/L    Glucose Level 102 82 - 115 mg/dL    Blood Urea Nitrogen 10.3 8.4 - 25.7 mg/dL    Creatinine 0.62 (L) 0.73 - 1.18 mg/dL    Calcium Level Total 9.1 8.8 - 10.0 mg/dL    Protein Total 6.2 5.8 - 7.6 gm/dL    Albumin Level 2.6 (L) 3.4 - 4.8 gm/dL    Globulin 3.6 (H) 2.4 - 3.5 gm/dL    Albumin/Globulin Ratio 0.7 (L) 1.1 - 2.0 ratio    Bilirubin Total 0.3 <=1.5 mg/dL    Alkaline Phosphatase 186 (H) 40 - 150 unit/L    Alanine Aminotransferase 51 0 - 55 unit/L    Aspartate Aminotransferase 28 5 - 34 unit/L    Estimated GFR-Non  >60 mls/min/1.73/m2       Radiology  CT soft tissue neck without contrast on 07/18/2022 at 9:29 p.m., IMPRESSION:Postsurgical change in the cervical spine without fluid collection or abscess. Right upper lobe airspace disease.  Radiology  MRI C-spine without contrast on 07/15/2022 at 8:27 a.m., IMPRESSION: Postoperative changes are seen in the left posterior elements from C3 through C7 vertebrae. There is a postoperative collection in the surgical bed/ posterior paraspinal soft tissues surrounding the spinous process and posterior to the laminae. The collection measures approximately 7.3 cm in length and 2 cm in AP dimension. Similar findings are also seen on the prior examination. Correlate clinically  as regards additional evaluation and follow-up. There are displaced fractures involving the laminae of C3 through C7 vertebrae appreciated better on the earlier CT study. There is moderate canal stenosis at C3-C4 and C4-C5 secondary to broad-based disc protrusions and degenerative joint disease. Details and other findings as noted above.  Radiology  MRI brain on 07/16/2022 at 8:30 a.m., IMPRESSION:No abnormal signal is identified on the diffusion images to suggest acute infarct. Details and findings as above. No significant discrepancy with overnight report.    Assessment/Plan:     71 y.o. WM admitted on 7/25/2022    Cervical spondylosis   - s/p C3-C7 laminiopasty and laminoforminotomies on 7/12/2022   - initiate   Bengay t.i.d. p.r.n. (initiated 7/28)  - continue                Robaxin 750 mg t.i.d. (increased 7/28)                Gabapentin 100 mg t.i.d. (increased 7/28)                Lidoderm patch daily                ASA 81 mg daily                Norco 5 mg q.4 hours p.r.n.  - defer to physiatry for rehab and pain management  - PT/OT/RT/PT following  - follow-up with Neurosurgery outpatient    Oropharyngeal dysphagia  - current  - continue   Minced and moist diet with moderately thickened liquids  - ST following     Hypothyroidism  - TSH with next labs  - continue                Levothyroxine 50 mcg daily     VIJI  - compliant with CPAP  - continue current POC     BPH  - stable  - continue                Flomax 0.4 mg at bedtime     Normocytic anemia  - asymptomatic  - iron level low  - continue                Ferrous sulfate 325 mg b.i.d. (initiated 7/26)  - H/H stable   - no evidence of active bleeds  - will closely monitor and transfuse if needed      Constipation  - stable  - continue           Colace 100 mg BID     Left forearm PIV infiltration  - Discontinue peripheral IV  - continue                Warm compresses t.i.d. x3 days     VTE Prophylaxis:  Lovenox 40 mg daily  COVID-19 testing:  Negative on  07/25/2022  COVID-19 vaccination status:  Vaccinated (Moderna):  02/10/2021 and 03/10/2021     POA: None  Living will: None  Contacts: Anusha Echeverria (spouse)     CODE STATUS: Full Code  Internal Medicine (attending): Luis Khan MD   Physiatry (consulting):  David Carty MD    OUTPATIENT PROVIDERS  Gary Reilly II, MD  Neurosurgery:  Paulo Rao MD  Neurology: Yan Rainey MD     DISPOSITION: Condition stable.  Sleep hygiene, bowel maintenance, and appetite at goal.  Still complains of neck pain and left shoulder pain. Vital signs at goal with no recorded fevers.  BMP unremarkable.  Metabolic alkalosis resolved.  Increased Robaxin 750 mg t.i.d., gabapentin 100 mg t.i.d. and initiated Bengay to left shoulder t.i.d. p.r.n..  Discussed with physiatry.  Continue aggressive mobilization as tolerated.  Monitor closely.  Notify of acute changes.     Simón Phillips NP conducted independent physical examination and assisted with medical documentation.     Total time spent on this encounter including chart review and direct MD + NP 1-on-1 patient interaction: 41 minutes   Over 50% of this time was spent in counseling and coordination of care

## 2022-07-28 NOTE — PLAN OF CARE
Problem: Rehabilitation (IRF) Plan of Care  Goal: Plan of Care Review  Outcome: Ongoing, Progressing  Goal: Patient-Specific Goal (Individualized)  Outcome: Ongoing, Progressing  Goal: Absence of New-Onset Illness or Injury  Outcome: Ongoing, Progressing  Goal: Optimal Comfort and Wellbeing  Outcome: Ongoing, Progressing     Problem: Skin Injury Risk Increased  Goal: Skin Health and Integrity  Outcome: Ongoing, Progressing     Problem: Fall Injury Risk  Goal: Absence of Fall and Fall-Related Injury  Outcome: Ongoing, Progressing

## 2022-07-28 NOTE — PT/OT/SLP PROGRESS
Physical Therapy Inpatient Rehab Treatment    Patient Name:  Scott Echeverria   MRN:  [id number]    Recommendations:     Discharge Recommendations:  other (see comments)   Discharge Equipment Recommendations: other (see comments) (Pending progress)   Barriers to discharge: None    Assessment:     Scott Echeverria is a 71 y.o. male admitted with a medical diagnosis of Stenosis of cervical spine with myelopathy.  He presents with the following impairments/functional limitations:  weakness, impaired endurance, impaired sensation, impaired self care skills, impaired functional mobility, gait instability, impaired balance, decreased coordination, decreased upper extremity function, decreased lower extremity function, pain, impaired coordination.    Rehab Diagnosis:     Recent Surgery: * No surgery found *      General Precautions: Standard, honey thick     Orthopedic Precautions:spinal precautions     Braces: N/A    Rehab Prognosis: Good; patient would benefit from acute skilled PT services to address these deficits and reach maximum level of function.      History:     Past Medical History:   Diagnosis Date    BPH (benign prostatic hyperplasia)     Cervical pain     Cervical radiculitis     Cervical spinal stenosis     Hypothyroidism, unspecified     Sleep apnea     resolved since surgery       Past Surgical History:   Procedure Laterality Date    APPENDECTOMY  01/25/2022    Dr. Kerry Coats    COLONOSCOPY  2021    HEMORRHOID SURGERY  2002    LAMINOPLASTY N/A 7/12/2022    Procedure: LAMINOPLASTY;  Surgeon: Paulo Rao MD;  Location: [facility];  Service: Neurosurgery;  Laterality: N/A;  left C3-4, C4-5, C5-6, C6-7 foraminotomies, C3-7 laminoplasty //  TIVA SET UP    lower back surgery  1990    RETINAL DETACHMENT SURGERY Right 2015    SINUS SURGERY      TONSILLECTOMY  1956       Subjective     Chief Complaint: Neck and shoulder pain   Patient/Family Comments/goals: N/A    Vitals   Vitals at Rest  BP    HR    O2  Sat 100%   Pain 6/10 pain          Respiratory Status: Room air    Patients cultural, spiritual, Scientologist conflicts given the current situation: yes      Objective:     Communicated with RN prior to session.  Patient found up in chair with peripheral IV  upon PT entry to room.    Pt is Oriented x3 and Alert.    Functional Mobility:   · Transfers:     · Sit to Stand:  contact guard assistance with rolling walker performed x 3 trials. Pt with increased pain during standing and reports stiffness         Therapeutic Activities and Exercises:    Seated TherX: 15 x 3 2# BLE; increased time to complete and rest breaks throughout    Knee extension    Knee flexion with TheraBand   Hip flexion    Ankle pumps    Hip adduction with ball    Hip abduction with Theraband      Activity Tolerance     Patient left up in chair with all lines intact and call button in reach.    Education provided: roles and goals of PT/PTA, transfer training and strengthening exercises    Expected compliance:    GOALS:   Multidisciplinary Problems     Physical Therapy Goals        Problem: Physical Therapy    Goal Priority Disciplines Outcome Goal Variances Interventions   Physical Therapy Goal     PT, PT/OT Ongoing, Progressing     Description: Short Term goals      Bed Mobility   Roll Right and Left Setup or Clean-up Assistance .  Lying to sitting Setup or Clean-up Assistance .  Sitting to lying Setup or Clean-up Assistance .    Transfers    Pt will be able to perform Stand step chair/bed to chair transfer With RW Setup or Clean-up Assistance .  Pt will be able to perform Sit to stand with RW Setup or Clean-up Assistance .  Pt will be able to perform Car transfer with RW Setup or Clean-up Assistance .    Ambulation    Pt will ambulate 200 Feet with RW Setup or Clean-up Assistance .  Pt will be able to ascend/descend 12 stairs using B Rails Supervision or Touching Assistance .  Pt will be able to ascend/descend 4 inch curb using RW Supervision or  Touching Assistance .  Pt will be able to ambulate uneven surfaces/ramp 15 feet with RW Supervision or Touching Assistance .      Timeframe: By Discharge                     Plan:     During this hospitalization, patient to be seen 5 x/week to address the identified rehab impairments via gait training, therapeutic activities, therapeutic exercises, neuromuscular re-education, wheelchair management/training and progress toward the following goals:     Plan of Care Expires:  08/01/22    Additional Information:         Time Tracking:     PT Received On: 07/28/22  Time In 0830     Time Out 0930  Total Time 60 min  Therapy Time PT Individual: 60  Missed Time:    Time Missed due to:      Billable Minutes: Therapeutic Activity 15 and Therapeutic Exercise 45    07/28/2022

## 2022-07-28 NOTE — NURSING
Discharge Reassessment     Assessment Type: Discharge Planning Reassessment     Did the patients condition or plan change since previous assessment? Yes     Discharge plan discussed with: patient/spouse Anusha Echeverria ([phone])     Communicated MCKINLEY with patient/caregiver: Date not available/Unable to determine     Discharge Plan A: Home with Home Health     Discharge Plan B: Skilled Nursing Facilty     DME Needed Upon Discharge: TBD     Discharge Barriers Identified: None    Why the patient remains in the hospital: Requires continued medical care     Post-Acute Status: Home Health     Hospital Resources/Appts/Education Provided: Patient educated on weekly Team Conference.     Patient choice form signed by patient/caregiver: FOC obtained for [facility] for DME and [facility] for home health.     Discharge Delays: None known at this time

## 2022-07-28 NOTE — NURSING
Met with pt and introduced myself and the role of the . Explained to patient that I am currently covering for my coworker Barbi who will return on Monday.     Discussed with patient that the goal of rehab is to assist him in reaching his maximal functional ability to return home. Explained that sometimes patients require continued therapies in the home in which the  assists with setting up with a home health prior to discharge.     Explained to patient that a weekly meeting called Team Conference is held amongst our physician's, therapist and nursing staff. Explained that the purpose of this meeting is to discuss his progress on the rehab unit and discharge plans. Explained that after this meeting is held the  will provide him as well as his wife with updates. Pt verbalized understanding.    Informed patient that I would also contact his wife to discuss the above.    [id number]-Spoke with patient's wife Anusha ([phone]). Discussed the above information with Anusha. No further questions or concerns at this time.

## 2022-07-28 NOTE — PT/OT/SLP PROGRESS
"Occupational Therapy Inpatient Rehab Treatment    Name: Scott Echeverria  MRN: [id number]    Assessment:  Scott Echeverria is a 71 y.o. male admitted with a medical diagnosis of Stenosis of cervical spine with myelopathy.  He presents with the following impairments/functional limitations:  weakness, impaired endurance, impaired sensation, impaired self care skills, impaired functional mobility, gait instability, impaired balance, decreased coordination, decreased upper extremity function, decreased safety awareness, decreased lower extremity function, pain, decreased ROM, impaired coordination, impaired fine motor, edema  .    Rehab Diagnosis:     Recent Surgery: * No surgery found *      General Precautions: Standard, honey thick     Orthopedic Precautions:spinal precautions     Braces: N/A    Rehab Prognosis: Fair; patient would benefit from acute skilled OT services to address these deficits and reach maximum level of function.        History:     Past Medical History:   Diagnosis Date    BPH (benign prostatic hyperplasia)     Cervical pain     Cervical radiculitis     Cervical spinal stenosis     Hypothyroidism, unspecified     Sleep apnea     resolved since surgery       Past Surgical History:   Procedure Laterality Date    APPENDECTOMY  01/25/2022    Dr. Kerry Coats    COLONOSCOPY  2021    HEMORRHOID SURGERY  2002    LAMINOPLASTY N/A 7/12/2022    Procedure: LAMINOPLASTY;  Surgeon: Paulo Rao MD;  Location: [facility];  Service: Neurosurgery;  Laterality: N/A;  left C3-4, C4-5, C5-6, C6-7 foraminotomies, C3-7 laminoplasty //  TIVA SET UP    lower back surgery  1990    RETINAL DETACHMENT SURGERY Right 2015    SINUS SURGERY      TONSILLECTOMY  1956       Subjective     Orientation: Oriented x4    Chief Complaint: "I'm in so much pain and have a headache and I'm not myself"    Patient/Family Comments/goals: "to return home with family"    Vitals   Vitals at Rest  Pain 8/10 Neck     Vitals With " Activity  Pain 8/10 neck     Respiratory Status: Room air    Patients cultural, spiritual, Voodoo conflicts given the current situation:         Objective:     Communicated with nursing prior to session.  Patient found up in chair with Other (comments) (completely covered sitting in W/C)  upon OT entry to room.    Mobility   Patient completed:  Sit to Stand Transfer with minimum assistance with rolling walker  Stand to Sit Transfer with contact guard assistance with rolling walker  Toilet Transfer Step Transfer technique with contact guard assistance with  rolling walker    Functional Mobility  Limited to and from bathroom. Pt not feeling well this session.    ADLs   Care Score/CAROLYN Descriptors Equipment   Toileting Hygiene  Min A Sitting without back support and Standing with device Bedside commode over toilet   Toilet Transfer  Min A  Drop arm commode over toilet, RW     Limiting Factors for ADLs: motor, sensory, endurance, limited ROM, balance, weakness, coordination, safety awareness and PAIN     Additional Treatments: OT performed manual edema mobilization on L forearm and massage to bilateral lateral sides of neck and shoulders due to tense pain.    Patient left bedside recliner with legs elevated. with call button in reach.     Education provided: Roles and goals of OT, ADLs, transfer training and fall prevention    GOALS:  Problem: Occupational Therapy  Goal: Occupational Therapy Goal  Description: ADLs:  Pt to perform grooming tasks with indep while standing with RW   Pt to perform UB dressing with set up assist   Pt to perform LB dressing with set up assist   Pt to perform putting on/off footwear task with set up assist   Pt to perform toileting with supervision using RW    Functional Transfers:  Pt to perform toilet transfers with CGA and RW   Pt to perform a tub transfer with CGA and RW  Pt to perform a walk-in shower transfer with CGA and RW    IADLs:  Pt to perform IADL tasks while standing for 8  min with RW and SBA     Balance, Strengthening, Endurance, Balance:  Pt to consistently demonstrate adherence to cervical spinal precautions during all ADL's as instructed by OT.  Pt to demonstrate dynamic standing balance for 10 min as required to perform ADL's from standing level.  Pt to demonstrate consistent adherence to breathing control and energy conservation techniques as educated by OT.   7/28/2022 1218 by Julianne Marshall OT  Outcome: Ongoing, Progressing      Time Tracking     OT Received On: 07/28/22  Time In 1030     Time Out 1120  Total Time 50 min  Therapy Time: OT Individual: 50  Missed Time: 40 Minutes  Missed Time Reason: Pain    Billable Minutes: Self care 15 and Sohail 20 and massage 15    07/28/2022

## 2022-07-29 LAB
ALBUMIN SERPL-MCNC: 2.8 GM/DL (ref 3.4–4.8)
ALBUMIN/GLOB SERPL: 0.7 RATIO (ref 1.1–2)
ALP SERPL-CCNC: 178 UNIT/L (ref 40–150)
ALT SERPL-CCNC: 42 UNIT/L (ref 0–55)
AST SERPL-CCNC: 23 UNIT/L (ref 5–34)
BILIRUBIN DIRECT+TOT PNL SERPL-MCNC: 0.3 MG/DL
BUN SERPL-MCNC: 11.7 MG/DL (ref 8.4–25.7)
CALCIUM SERPL-MCNC: 9.3 MG/DL (ref 8.8–10)
CHLORIDE SERPL-SCNC: 102 MMOL/L (ref 98–107)
CO2 SERPL-SCNC: 27 MMOL/L (ref 23–31)
CREAT SERPL-MCNC: 0.66 MG/DL (ref 0.73–1.18)
GLOBULIN SER-MCNC: 3.8 GM/DL (ref 2.4–3.5)
GLUCOSE SERPL-MCNC: 107 MG/DL (ref 82–115)
MAYO GENERIC ORDERABLE RESULT: NORMAL
POTASSIUM SERPL-SCNC: 4.6 MMOL/L (ref 3.5–5.1)
PROT SERPL-MCNC: 6.6 GM/DL (ref 5.8–7.6)
SODIUM SERPL-SCNC: 142 MMOL/L (ref 136–145)

## 2022-07-29 PROCEDURE — 25000003 PHARM REV CODE 250: Performed by: NURSE PRACTITIONER

## 2022-07-29 PROCEDURE — 97535 SELF CARE MNGMENT TRAINING: CPT

## 2022-07-29 PROCEDURE — 97110 THERAPEUTIC EXERCISES: CPT

## 2022-07-29 PROCEDURE — 94761 N-INVAS EAR/PLS OXIMETRY MLT: CPT

## 2022-07-29 PROCEDURE — 99900035 HC TECH TIME PER 15 MIN (STAT)

## 2022-07-29 PROCEDURE — 36415 COLL VENOUS BLD VENIPUNCTURE: CPT | Performed by: NURSE PRACTITIONER

## 2022-07-29 PROCEDURE — 94799 UNLISTED PULMONARY SVC/PX: CPT

## 2022-07-29 PROCEDURE — 80053 COMPREHEN METABOLIC PANEL: CPT | Performed by: NURSE PRACTITIONER

## 2022-07-29 PROCEDURE — 11800000 HC REHAB PRIVATE ROOM

## 2022-07-29 PROCEDURE — 97530 THERAPEUTIC ACTIVITIES: CPT

## 2022-07-29 PROCEDURE — 63600175 PHARM REV CODE 636 W HCPCS: Performed by: NURSE PRACTITIONER

## 2022-07-29 PROCEDURE — 92526 ORAL FUNCTION THERAPY: CPT

## 2022-07-29 PROCEDURE — 97116 GAIT TRAINING THERAPY: CPT

## 2022-07-29 RX ORDER — SODIUM CHLORIDE 9 MG/ML
INJECTION, SOLUTION INTRAVENOUS CONTINUOUS
Status: ACTIVE | OUTPATIENT
Start: 2022-07-29 | End: 2022-07-30

## 2022-07-29 RX ORDER — METOPROLOL TARTRATE 25 MG/1
25 TABLET, FILM COATED ORAL 2 TIMES DAILY
Status: DISCONTINUED | OUTPATIENT
Start: 2022-07-29 | End: 2022-08-01

## 2022-07-29 RX ADMIN — FERROUS SULFATE TAB 325 MG (65 MG ELEMENTAL FE) 1 EACH: 325 (65 FE) TAB at 08:07

## 2022-07-29 RX ADMIN — METHOCARBAMOL 750 MG: 750 TABLET ORAL at 05:07

## 2022-07-29 RX ADMIN — METOPROLOL TARTRATE 25 MG: 25 TABLET, FILM COATED ORAL at 01:07

## 2022-07-29 RX ADMIN — HYDROCODONE BITARTRATE AND ACETAMINOPHEN 1 TABLET: 5; 325 TABLET ORAL at 04:07

## 2022-07-29 RX ADMIN — METHOCARBAMOL 750 MG: 750 TABLET ORAL at 01:07

## 2022-07-29 RX ADMIN — ASPIRIN 81 MG CHEWABLE TABLET 81 MG: 81 TABLET CHEWABLE at 08:07

## 2022-07-29 RX ADMIN — HYDROCODONE BITARTRATE AND ACETAMINOPHEN 1 TABLET: 5; 325 TABLET ORAL at 09:07

## 2022-07-29 RX ADMIN — HYDROCODONE BITARTRATE AND ACETAMINOPHEN 1 TABLET: 5; 325 TABLET ORAL at 08:07

## 2022-07-29 RX ADMIN — TAMSULOSIN HYDROCHLORIDE 0.4 MG: 0.4 CAPSULE ORAL at 08:07

## 2022-07-29 RX ADMIN — GABAPENTIN 100 MG: 100 CAPSULE ORAL at 05:07

## 2022-07-29 RX ADMIN — LIDOCAINE 5% 1 PATCH: 700 PATCH TOPICAL at 05:07

## 2022-07-29 RX ADMIN — ENOXAPARIN SODIUM 40 MG: 30 INJECTION SUBCUTANEOUS at 04:07

## 2022-07-29 RX ADMIN — GABAPENTIN 100 MG: 100 CAPSULE ORAL at 01:07

## 2022-07-29 RX ADMIN — METOPROLOL TARTRATE 25 MG: 25 TABLET, FILM COATED ORAL at 08:07

## 2022-07-29 RX ADMIN — DOCUSATE SODIUM 100 MG: 100 CAPSULE, LIQUID FILLED ORAL at 08:07

## 2022-07-29 RX ADMIN — LEVOTHYROXINE SODIUM 50 MCG: 0.05 TABLET ORAL at 08:07

## 2022-07-29 RX ADMIN — HYDROXYZINE PAMOATE 50 MG: 50 CAPSULE ORAL at 08:07

## 2022-07-29 RX ADMIN — SODIUM CHLORIDE: 9 INJECTION, SOLUTION INTRAVENOUS at 02:07

## 2022-07-29 RX ADMIN — HYDROCODONE BITARTRATE AND ACETAMINOPHEN 1 TABLET: 5; 325 TABLET ORAL at 01:07

## 2022-07-29 RX ADMIN — GABAPENTIN 100 MG: 100 CAPSULE ORAL at 10:07

## 2022-07-29 RX ADMIN — METHOCARBAMOL 750 MG: 750 TABLET ORAL at 10:07

## 2022-07-29 NOTE — PT/OT/SLP PROGRESS
Speech Language Pathology Treatment          Scott Echeverria   MRN: [id number]     Diet recommendations: Solid Diet Level: Minced & Moist Diet - IDDSI Level 5  Liquid Diet Level: Moderately thick liquids - IDDSI Level 3     Billable Minutes:  Treatment Swallowing Dysfunction 30 minutes    General Precautions: Standard    Subjective:  Pt awake, alert, and cooperative throughout session.    Objective:   Pain/Comfort  Pain Rating 1: 0/10    Pt completed tongue base retraction exercises x70 and laryngeal elevation x90 with minimal cues.     Trials of soft and bite sized diet were tolerated well. Pt with small bites with minimal cues. No oral residue noted, however prolonged mastication present. Pt reports he likes to chew fruit longer than other types of foods. Test tray to be completed next week.    Assessment:  Scott Echeverria is a 71 y.o. male with a SLP diagnosis of Dysphagia. SLP intervention continues to be warranted at this time.    Discharge recommendations: Discharge Facility/Level of Care Needs: home health speech therapy     Goals:   Multidisciplinary Problems     SLP Goals        Problem: SLP    Goal Priority Disciplines Outcome   SLP Goal     SLP    Description: LT. Pt will tolerate least restrictive diet with no s/sx of aspiration.    ST. Pt will complete tongue base and laryngeal elevation exercises with 100% accuracy and minimal cues.  2. Pt will tolerate half meal of soft & bite sized diet with no s/sx of aspiration.                    Plan:   Patient to be seen Therapy Frequency: 5 x/week   Planned Interventions: Dysphagia Therapy  Plan of Care Expires: 22  Plan of Care reviewed with: patient  SLP Follow-up?: Yes  SLP - Next Visit Date: 22

## 2022-07-29 NOTE — PT/OT/SLP PROGRESS
Recreational Therapy Treatment    Date of Treatment: 7/29/2022  Start Time: 1000  Stop Time: 1030  Total Time: 30 minutes  Missed Time:     General Precautions: fall  Ortho Precautions: spinal precautions, Full weight bearing  Braces: Cervical collar (For comfort)    Vitals   Vitals at Rest  BP    HR    O2 Sat    Pain      Vitals With Activity  BP    HR    O2 Sat    Pain        Treatment     Cognitive Skills Building   Cognitive Observation Activity Assist Position Equipment Response Comment             Comment:          Dynamic Activities   Activity Assist Position Equipment Response Comment   Activity 1 Bowling contact guard assistance Standing Rolling walker and Rubber bowling balls fair             Comment:  Sit to stand was contact guard as was dynamic standing balance/reaching. Standing tolerance was 4 minutes. RUE coordintiton was I. Sequencing skills were setup. Distracted and talkative         Fine Motor Activities   Activity Assist Position Equipment Response Comment            Comment:        Additional Info: Pt c/o 4/10 neck and L shoulder pain. Action taken: Rest and repositioning    Goals     Short Term Goals    Goal  Goal Status   Will increase sit to stand to supervision Progressing   Will improve dynamic standing balance/reaching to supervision Progressing                 Long Term Goals    Goal Goal Status   Will increase standing tolerance to 5 minutes Progressing    Will improve dynamic standing balance/reaching to setup Progressing

## 2022-07-29 NOTE — PT/OT/SLP PROGRESS
"Occupational Therapy Inpatient Rehab Treatment    Name: Scott Echeverria  MRN: [id number]    Assessment:  Scott Echeverria is a 71 y.o. male admitted with a medical diagnosis of Stenosis of cervical spine with myelopathy.  He presents with the following impairments/functional limitations:   decreased activity tolerance and increased pain. .        General Precautions: Standard, fall     Orthopedic Precautions:spinal precautions, Full weight bearing     Braces: Cervical collar (For comfort)    Rehab Prognosis: Good; patient would benefit from acute skilled OT services to address these deficits and reach maximum level of function.        History:     Past Medical History:   Diagnosis Date    BPH (benign prostatic hyperplasia)     Cervical pain     Cervical radiculitis     Cervical spinal stenosis     Hypothyroidism, unspecified     Sleep apnea     resolved since surgery       Past Surgical History:   Procedure Laterality Date    APPENDECTOMY  01/25/2022    Dr. Kerry Coats    COLONOSCOPY  2021    HEMORRHOID SURGERY  2002    LAMINOPLASTY N/A 7/12/2022    Procedure: LAMINOPLASTY;  Surgeon: Paulo Rao MD;  Location: [facility];  Service: Neurosurgery;  Laterality: N/A;  left C3-4, C4-5, C5-6, C6-7 foraminotomies, C3-7 laminoplasty //  TIVA SET UP    lower back surgery  1990    RETINAL DETACHMENT SURGERY Right 2015    SINUS SURGERY      TONSILLECTOMY  1956       Subjective     Orientation: Oriented x4    Chief Complaint: pain to neck and L shoulder; headache        Vitals   Vitals at Rest  /71      O2 Sat 94% (room air)    Pain 5/10     Vitals With Activity  BP    HR    O2 Sat    Pain      Respiratory Status: Room air    Patients cultural, spiritual, Sabianism conflicts given the current situation:  Pt. Stated he "prays every night to get better"        Objective:     Communicated with RN  prior to session.  Patient found supine with    upon OT entry to room.    Mobility   Patient " completed:  Rolling/Turning to Right with contact guard assistance  Supine to Sit with minimum assistance  Sit to Stand Transfer with minimum assistance with rolling walker  Toilet Transfer Step Transfer technique with minimum assistance with  rolling walker  Tub Transfer Stand Pivot technique with minimum assistance with rolling walker    Functional Mobility   Current Status   Functional Area: Care Score:   Eating 5   Oral Hygiene 5   Toileting Hygiene 3   Shower/Bathe Self 2   Upper Body Dressing 3   Lower Body Dressing 2   Putting On/Taking Off Footwear 5   Toilet Transfer 3       ADLs   Care Score/CAROLYN Descriptors Equipment   Eating    .   Grooming Supervision or Set-up Assistance Standing with device .   Oral Hygiene 5 Standing with device .   Shower, Bathe Self 2 Standing with device and Unsupported short sit Grab bars, Hand held shower and Tub bench   Upper Body Dressing 3 Unsupported short sit .   Lower Body Dressing 2 Standing and Unsupported short sit .   Toileting Hygiene 3 Sitting without back support and Minimal cues Bedside commode   Toilet Transfer 3  Walker   Tub Transfer Minimal Assitance  Transfer tub bench, Grab bars and Rolling walker   Shower Transfer Activity did not occur  .   Putting On, Taking Off Footwear 5 Unsupported short sit and figure 4 Reacher     Limiting Factors for ADLs: pain         Therapeutic Activities B UE AROM /reaching/pinching <90* via clothespin tree and graded clips          LifeStyle Change and Education:             Patient left up in chair with call button in reach.     Education provided: ADLs, transfer training, body mechanics, assistive device and fall prevention     Goals    None         Time Tracking     OT Received On: 07/29/22  Time In 0730     Time Out 0900  Total Time 90 min  Therapy Time: OT Individual: 90  Missed Time:    Missed Time Reason:      Billable Minutes: Therapeutic Activity 15 ADL 75    07/29/2022

## 2022-07-29 NOTE — PLAN OF CARE
Problem: Rehabilitation (IRF) Plan of Care  Goal: Plan of Care Review  Outcome: Ongoing, Progressing  Goal: Patient-Specific Goal (Individualized)  Outcome: Ongoing, Progressing  Goal: Absence of New-Onset Illness or Injury  Outcome: Ongoing, Progressing  Goal: Optimal Comfort and Wellbeing  Outcome: Ongoing, Progressing  Goal: Readiness for Transition of Care  Outcome: Ongoing, Progressing     Problem: Skin Injury Risk Increased  Goal: Skin Health and Integrity  Outcome: Ongoing, Progressing     Problem: Fall Injury Risk  Goal: Absence of Fall and Fall-Related Injury  Outcome: Ongoing, Progressing

## 2022-07-29 NOTE — PROGRESS NOTES
[facility] ([facility])  Rehab Progress Note    Patient Name: Scott Echeverria  MRN: [id number]  Age: 71 y.o. Sex: male  : 1950  Hospital Length of Stay: 4 days  Date of Service: 2022   Chief Complaint: Cervical spondylosis s/p C3-C7 laminiopasty and laminoforminotomies on 2022     Subjective:     Basic Information  Admit Information: 71-year-old white male presented to [facility] on 2022 for scheduled cervical laminiopasty and laminoforminotomies.  PMH significant for cervical spondylosis, hypothyroidism, VIJI, and BPH.  Tolerated C3-C7 laminiopasty and laminoforminotomies on  without perioperative complications.  On  code fast was initiated due to slurred speech after receiving IV Robaxin.  CT head, MRI, MRA negative.  Patient required Ativan prior to MRI and reportedly has some left-sided neglect.  Naloxone and romazicon received with little response. That afternoon he became hypoxic and was having agonal respirations requiring intubation.  T-max a 100.8° reported on .  Extubated on .  Repeat CT unremarkable.  NIVA negative for DVT.  Sputum culture did come back positive for Enterobacter Cloacae; resistant to cephalosporins.  Rocephin discontinued and Levaquin initiated.  Required re-intubation on  for continued alteration mental status and severe hypoxemic respiratory failure.  EEG with diffuse slowing.  LP with 12 WBC and 650 RBC. Neurology added acyclovir, rocephin, and vancomycin for possible meningitis on .  Extubated on .  Meningitis panel negative-low suspicion meningitis. Suspicion most likely secondary to medication side effect but reason for his decompensation and alteration mental status is still not clear on .  Acyclovir discontinued.  Downgraded to the floor on .  Broad coverage antibiotics/antivirals/antifungals discontinued.  Continue to participate progress in therapy.  Left arm weakness continued to improve.   Tolerated transfer to [facility] inpatient rehab unit on 07/25 without incident.  Today's Information: No acute events overnight.  Sitting up in chair.  Reports good sleep and appetite.  Last BM 7/28.  Still limited by left shoulder pain and neck pain.  Vital signs at goal.  Mild tachycardia.  BMP unremarkable.  No imaging today.  Review of patient's allergies indicates:   Allergen Reactions    Iodine         Current Facility-Administered Medications:     acetaminophen tablet 650 mg, 650 mg, Oral, Q4H PRN,  A Jacqueline, FNP, 650 mg at 07/28/22 1949    ALPRAZolam tablet 0.25 mg, 0.25 mg, Oral, TID PRN,  A Jacqueline, FNP    aspirin chewable tablet 81 mg, 81 mg, Oral, Daily,  A Jacqueline, FNP, 81 mg at 07/29/22 0837    benzonatate capsule 100 mg, 100 mg, Oral, TID PRN,  A Jacqueline, FNP    bisacodyL suppository 10 mg, 10 mg, Rectal, Daily PRN,  A Jacqueline, FNP    docusate sodium capsule 100 mg, 100 mg, Oral, BID,  A Jacqueline, FNP, 100 mg at 07/29/22 0837    enoxaparin injection 40 mg, 40 mg, Subcutaneous, Daily,  A Jacqueline, FNP, 40 mg at 07/28/22 1729    ferrous sulfate tablet 1 each, 1 tablet, Oral, BID,  A Jacqueline, FNP, 1 each at 07/29/22 0837    gabapentin capsule 100 mg, 100 mg, Oral, TID,  A Jacqueline, FNP, 100 mg at 07/29/22 0543    hydrALAZINE injection 10 mg, 10 mg, Intravenous, Q4H PRN,  A Jacqueline, FNP    HYDROcodone-acetaminophen 5-325 mg per tablet 1 tablet, 1 tablet, Oral, Q4H PRN,  A Jacqueline, FNP, 1 tablet at 07/29/22 0837    hydrOXYzine pamoate capsule 50 mg, 50 mg, Oral, QHS,  A Jacqueline, FNP, 50 mg at 07/28/22 2129    labetalol 20 mg/4 mL (5 mg/mL) IV syring, 10 mg, Intravenous, Q4H PRN, LALO Ellison    levothyroxine tablet 50 mcg, 50 mcg, Oral, Daily, LALO Ellison, 50 mcg at 07/29/22 0836    LIDOcaine 5 % patch 1 patch, 1 patch, Transdermal, Q24H, Julianne Sims, LALO, 1  "patch at 07/29/22 0543    methocarbamoL tablet 750 mg, 750 mg, Oral, TID, Julianne Montes De Ocate, FNP, 750 mg at 07/29/22 0543    metoprolol injection 10 mg, 10 mg, Intravenous, Q2H PRN,  A Jacqueline, FNP    metoprolol tartrate (LOPRESSOR) tablet 25 mg, 25 mg, Oral, BID,  A Jacqueline, FNP    nitroGLYCERIN SL tablet 0.4 mg, 0.4 mg, Sublingual, Q5 Min PRN,  A Jacqueline, FNP    ondansetron disintegrating tablet 4 mg, 4 mg, Oral, Q6H PRN,  A Jacqueline, FNP    ondansetron disintegrating tablet 8 mg, 8 mg, Oral, Q6H PRN,  A Jacqueline, FNP    promethazine tablet 25 mg, 25 mg, Oral, Q6H PRN,  A Jacqueline, FNP    tamsulosin 24 hr capsule 0.4 mg, 0.4 mg, Oral, QHS,  A Jacqueline, FNP, 0.4 mg at 07/28/22 2129     Review of Systems   Complete 12-point review of symptoms negative except for what's mentioned in HPI     Objective:     /78   Pulse (!) 124   Temp 97.7 °F (36.5 °C) (Oral)   Resp 18   Ht 5' 9.02" (1.753 m)   Wt 75.2 kg (165 lb 12.6 oz)   SpO2 (!) 93%   BMI 24.47 kg/m²        Intake/Output Summary (Last 24 hours) at 7/29/2022 1331  Last data filed at 7/28/2022 1700  Gross per 24 hour   Intake 240 ml   Output 400 ml   Net -160 ml       Physical Exam  Constitutional:       Appearance: Normal appearance.   HENT:      Head: Normocephalic.      Mouth/Throat:      Mouth: Mucous membranes are moist.   Eyes:      Pupils: Pupils are equal, round, and reactive to light.   Neck:      Comments: Posterior neck incision clean and intact-TK, no redness or drainage  Cardiovascular:      Rate and Rhythm: Regular rhythm. Tachycardia present.      Heart sounds: Normal heart sounds.   Pulmonary:      Effort: Pulmonary effort is normal.      Breath sounds: Normal breath sounds.   Abdominal:      General: Bowel sounds are normal.      Palpations: Abdomen is soft.   Musculoskeletal:      Cervical back: Neck supple.      Comments: Generalized weakness and muscle atrophy   Skin:   "   General: Skin is warm and dry.      Comments: Left forearm PIV infiltration with redness/indurated    Neurological:      General: No focal deficit present.      Mental Status: He is alert and oriented to person, place, and time.   Psychiatric:         Mood and Affect: Mood normal.         Behavior: Behavior normal.         Thought Content: Thought content normal.         Judgment: Judgment normal.     *MD performed and documented physical examination       Lines/Drains/Airways     None               Labs  Recent Results (from the past 24 hour(s))   Comprehensive metabolic panel    Collection Time: 07/29/22  5:28 AM   Result Value Ref Range    Sodium Level 142 136 - 145 mmol/L    Potassium Level 4.6 3.5 - 5.1 mmol/L    Chloride 102 98 - 107 mmol/L    Carbon Dioxide 27 23 - 31 mmol/L    Glucose Level 107 82 - 115 mg/dL    Blood Urea Nitrogen 11.7 8.4 - 25.7 mg/dL    Creatinine 0.66 (L) 0.73 - 1.18 mg/dL    Calcium Level Total 9.3 8.8 - 10.0 mg/dL    Protein Total 6.6 5.8 - 7.6 gm/dL    Albumin Level 2.8 (L) 3.4 - 4.8 gm/dL    Globulin 3.8 (H) 2.4 - 3.5 gm/dL    Albumin/Globulin Ratio 0.7 (L) 1.1 - 2.0 ratio    Bilirubin Total 0.3 <=1.5 mg/dL    Alkaline Phosphatase 178 (H) 40 - 150 unit/L    Alanine Aminotransferase 42 0 - 55 unit/L    Aspartate Aminotransferase 23 5 - 34 unit/L    Estimated GFR-Non  >60 mls/min/1.73/m2       Radiology  CT soft tissue neck without contrast on 07/18/2022 at 9:29 p.m., IMPRESSION:Postsurgical change in the cervical spine without fluid collection or abscess. Right upper lobe airspace disease.  Radiology  MRI C-spine without contrast on 07/15/2022 at 8:27 a.m., IMPRESSION: Postoperative changes are seen in the left posterior elements from C3 through C7 vertebrae. There is a postoperative collection in the surgical bed/ posterior paraspinal soft tissues surrounding the spinous process and posterior to the laminae. The collection measures approximately 7.3 cm in length and  2 cm in AP dimension. Similar findings are also seen on the prior examination. Correlate clinically as regards additional evaluation and follow-up. There are displaced fractures involving the laminae of C3 through C7 vertebrae appreciated better on the earlier CT study. There is moderate canal stenosis at C3-C4 and C4-C5 secondary to broad-based disc protrusions and degenerative joint disease. Details and other findings as noted above.  Radiology  MRI brain on 07/16/2022 at 8:30 a.m., IMPRESSION:No abnormal signal is identified on the diffusion images to suggest acute infarct. Details and findings as above. No significant discrepancy with overnight report.    Assessment/Plan:     71 y.o. WM admitted on 7/25/2022    Cervical spondylosis   - s/p C3-C7 laminiopasty and laminoforminotomies on 7/12/2022   - continue     Bengay t.i.d. p.r.n. (initiated 7/28)                Robaxin 750 mg t.i.d. (increased 7/28)                Gabapentin 100 mg t.i.d. (increased 7/28)                Lidoderm patch daily                ASA 81 mg daily                Norco 5 mg q.4 hours p.r.n.  - defer to physiatry for rehab and pain management  - PT/OT/RT/PT following  - follow-up with Neurosurgery outpatient    Oropharyngeal dysphagia  - current  - continue   Minced and moist diet with moderately thickened liquids  - ST following     Hypothyroidism  - TSH within normal limits  - continue                Levothyroxine 50 mcg daily     VJII  - compliant with CPAP  - continue current POC     BPH  - stable  - continue                Flomax 0.4 mg at bedtime     Normocytic anemia  - asymptomatic  - iron level low  - continue                Ferrous sulfate 325 mg b.i.d. (initiated 7/26)  - H/H stable   - no evidence of active bleeds  - will closely monitor and transfuse if needed      Constipation  - stable  - continue           Colace 100 mg BID     Left forearm PIV infiltration  - Discontinue peripheral IV  - continue                Warm  compresses t.i.d. x3 days    Tachycardia  - Heart rate 100-115  - initiate   Metoprolol 25 mg b.i.d. (initiated 7/29)    NS 75 mL/HR x1 L     VTE Prophylaxis:  Lovenox 40 mg daily  COVID-19 testing:  Negative on 07/25/2022  COVID-19 vaccination status:  Vaccinated (Moderna):  02/10/2021 and 03/10/2021     POA: None  Living will: None  Contacts: Anusha Echeverria (spouse)     CODE STATUS: Full Code  Internal Medicine (attending): Luis Khan MD   Physiatry (consulting):  David Carty MD    OUTPATIENT PROVIDERS  Gary Reilly II, MD  Neurosurgery:  Paulo Rao MD  Neurology: Yan Rainey MD     DISPOSITION: Condition stable.  Sleep hygiene, bowel maintenance, and appetite at goal.  Still complains of neck pain and left shoulder pain.  Heart rate elevated.  Vital signs at goal with no recorded fevers.  BMP unremarkable.  Continue current POC.  Initiate metoprolol 25 mg b.i.d and initiate normal saline 75 mL/HR x1 L.  Aggressively mobilize as tolerated.  Monitor closely.  Notify of acute changes.     Simón Phillips NP conducted independent physical examination and assisted with medical documentation.     Total time spent on this encounter including chart review and direct MD + NP 1-on-1 patient interaction: 41 minutes   Over 50% of this time was spent in counseling and coordination of care

## 2022-07-29 NOTE — PT/OT/SLP PROGRESS
Physical Therapy Inpatient Rehab Treatment    Patient Name:  Scott Echeverria   MRN:  [id number]    Recommendations:     Discharge Recommendations:  other (see comments)   Discharge Equipment Recommendations: other (see comments) (Pending progress)   Barriers to discharge: None    Assessment:     Scott Echeverria is a 71 y.o. male admitted with a medical diagnosis of Stenosis of cervical spine with myelopathy.  He presents with the following impairments/functional limitations:  weakness, impaired endurance, impaired sensation, gait instability, impaired functional mobility, impaired balance, decreased coordination, decreased lower extremity function, decreased safety awareness, pain  .    Rehab Diagnosis:     Recent Surgery: * No surgery found *      General Precautions: Standard, honey thick     Orthopedic Precautions:spinal precautions     Braces: Cervical collar (For comfort)    Rehab Prognosis: Good; patient would benefit from acute skilled PT services to address these deficits and reach maximum level of function.      History:     Past Medical History:   Diagnosis Date    BPH (benign prostatic hyperplasia)     Cervical pain     Cervical radiculitis     Cervical spinal stenosis     Hypothyroidism, unspecified     Sleep apnea     resolved since surgery       Past Surgical History:   Procedure Laterality Date    APPENDECTOMY  01/25/2022    Dr. Kerry Coats    COLONOSCOPY  2021    HEMORRHOID SURGERY  2002    LAMINOPLASTY N/A 7/12/2022    Procedure: LAMINOPLASTY;  Surgeon: Paulo Rao MD;  Location: [facility];  Service: Neurosurgery;  Laterality: N/A;  left C3-4, C4-5, C5-6, C6-7 foraminotomies, C3-7 laminoplasty //  TIVA SET UP    lower back surgery  1990    RETINAL DETACHMENT SURGERY Right 2015    SINUS SURGERY      TONSILLECTOMY  1956       Subjective       Vitals   HR at rest: 129  Notified RN of PTs findings.     Respiratory Status: Room air    Patients cultural, spiritual, Anabaptism conflicts given  the current situation: yes      Objective:     Communicated with RN prior to session.  Patient found up in chair with peripheral IV  upon PT entry to room.    Pt is Oriented x3 and Alert.    Functional Mobility:   · Transfers:     · Sit to Stand:  contact guard assistance with rolling walker  · Bed to Chair: contact guard assistance with  rolling walker  using  Step Transfer  · Gait: Pt ambulates 160 with RW with Contact Guard Assistance.   · Impairments contributing to gait deviations include impaired balance, impaired coordination, pain, decreased ROM, decreased sensation and decreased strength.     Current   Status  Discharge   Goal   Functional Area: Care Score:    Roll Left and Right   Set-up/clean-up   Sit to Lying   Set-up/clean-up   Lying to Sitting on Side of Bed   Supervision or touching assistance   Sit to Stand 4 Set-up/clean-up   Chair/Bed-to-Chair Transfer 4 Set-up/clean-up   Car Transfer   Supervision or touching assistance   Walk 10 Feet 4 Set-up/clean-up   Walk 50 Feet with Two Turns 4 Set-up/clean-up   Walk 150 Feet 4 Set-up/clean-up   Walk 10 Feet Uneven Surface   Set-up/clean-up   1 Step (Curb)   Supervision or touching assistance   4 Steps   Supervision or touching assistance   12 Steps   Supervision or touching assistance   Picking Up Object   Set-up/clean-up   Wheel 50 Feet with Two Turns       Wheel 150 Feet           Therapeutic Activities and Exercises:  Seated therX 15 x 3 Knee ext, hip flexion     Dynamic standing balance with recreational therapy bowling. Rest breaks throughout 2/2 fatigue.     Activity Tolerance good     Patient left up in chair with all lines intact, call button in reach and RN notified.    Education provided: roles and goals of PT/PTA, transfer training, balance training, safety awareness, body mechanics and strengthening exercises    Expected compliance:    GOALS:   Multidisciplinary Problems     Physical Therapy Goals        Problem: Physical Therapy    Goal  Priority Disciplines Outcome Goal Variances Interventions   Physical Therapy Goal     PT, PT/OT Ongoing, Progressing     Description: Short Term goals      Bed Mobility   Roll Right and Left Setup or Clean-up Assistance .  Lying to sitting Setup or Clean-up Assistance .  Sitting to lying Setup or Clean-up Assistance .    Transfers    Pt will be able to perform Stand step chair/bed to chair transfer With RW Setup or Clean-up Assistance .  Pt will be able to perform Sit to stand with RW Setup or Clean-up Assistance .  Pt will be able to perform Car transfer with RW Setup or Clean-up Assistance .    Ambulation    Pt will ambulate 200 Feet with RW Setup or Clean-up Assistance .  Pt will be able to ascend/descend 12 stairs using B Rails Supervision or Touching Assistance .  Pt will be able to ascend/descend 4 inch curb using RW Supervision or Touching Assistance .  Pt will be able to ambulate uneven surfaces/ramp 15 feet with RW Supervision or Touching Assistance .      Timeframe: By Discharge                     Plan:     During this hospitalization, patient to be seen 5 x/week to address the identified rehab impairments via gait training, therapeutic activities, therapeutic exercises, neuromuscular re-education, wheelchair management/training and progress toward the following goals:     Plan of Care Expires:  08/01/22    Additional Information:         Time Tracking:     PT Received On: 07/29/22  Time In 0930     Time Out 1030  Total Time 60 min  Therapy Time PT Individual: 60  Missed Time:    Time Missed due to:      Billable Minutes: Gait Training 15, Therapeutic Activity 30 and Therapeutic Exercise 15    07/29/2022

## 2022-07-29 NOTE — PLAN OF CARE
Problem: Fall Injury Risk  Goal: Absence of Fall and Fall-Related Injury  Outcome: Ongoing, Progressing  Intervention: Promote Injury-Free Environment  Flowsheets (Taken 7/29/2022 6947)  Safety Promotion/Fall Prevention:   assistive device/personal item within reach   nonskid shoes/socks when out of bed   instructed to call staff for mobility   side rails raised x 2   Fall Risk reviewed with patient/family   gait belt with ambulation

## 2022-07-30 LAB
ALBUMIN SERPL-MCNC: 2.9 GM/DL (ref 3.4–4.8)
ALBUMIN/GLOB SERPL: 0.8 RATIO (ref 1.1–2)
ALP SERPL-CCNC: 175 UNIT/L (ref 40–150)
ALT SERPL-CCNC: 36 UNIT/L (ref 0–55)
AST SERPL-CCNC: 22 UNIT/L (ref 5–34)
BILIRUBIN DIRECT+TOT PNL SERPL-MCNC: 0.4 MG/DL
BUN SERPL-MCNC: 13.5 MG/DL (ref 8.4–25.7)
CALCIUM SERPL-MCNC: 9.3 MG/DL (ref 8.8–10)
CHLORIDE SERPL-SCNC: 104 MMOL/L (ref 98–107)
CO2 SERPL-SCNC: 27 MMOL/L (ref 23–31)
CREAT SERPL-MCNC: 0.6 MG/DL (ref 0.73–1.18)
GLOBULIN SER-MCNC: 3.7 GM/DL (ref 2.4–3.5)
GLUCOSE SERPL-MCNC: 97 MG/DL (ref 82–115)
POTASSIUM SERPL-SCNC: 4.5 MMOL/L (ref 3.5–5.1)
PROT SERPL-MCNC: 6.6 GM/DL (ref 5.8–7.6)
SODIUM SERPL-SCNC: 142 MMOL/L (ref 136–145)

## 2022-07-30 PROCEDURE — 94761 N-INVAS EAR/PLS OXIMETRY MLT: CPT

## 2022-07-30 PROCEDURE — 11800000 HC REHAB PRIVATE ROOM

## 2022-07-30 PROCEDURE — 80053 COMPREHEN METABOLIC PANEL: CPT | Performed by: NURSE PRACTITIONER

## 2022-07-30 PROCEDURE — 25000003 PHARM REV CODE 250: Performed by: NURSE PRACTITIONER

## 2022-07-30 PROCEDURE — 97530 THERAPEUTIC ACTIVITIES: CPT | Mod: CQ

## 2022-07-30 PROCEDURE — 97110 THERAPEUTIC EXERCISES: CPT | Mod: CQ

## 2022-07-30 PROCEDURE — 92526 ORAL FUNCTION THERAPY: CPT

## 2022-07-30 PROCEDURE — 36415 COLL VENOUS BLD VENIPUNCTURE: CPT | Performed by: NURSE PRACTITIONER

## 2022-07-30 PROCEDURE — 97116 GAIT TRAINING THERAPY: CPT | Mod: CQ

## 2022-07-30 PROCEDURE — 94799 UNLISTED PULMONARY SVC/PX: CPT

## 2022-07-30 PROCEDURE — 97110 THERAPEUTIC EXERCISES: CPT

## 2022-07-30 PROCEDURE — 97535 SELF CARE MNGMENT TRAINING: CPT

## 2022-07-30 PROCEDURE — 99900035 HC TECH TIME PER 15 MIN (STAT)

## 2022-07-30 PROCEDURE — 63600175 PHARM REV CODE 636 W HCPCS: Performed by: NURSE PRACTITIONER

## 2022-07-30 PROCEDURE — 97530 THERAPEUTIC ACTIVITIES: CPT

## 2022-07-30 RX ADMIN — METHOCARBAMOL 750 MG: 750 TABLET ORAL at 05:07

## 2022-07-30 RX ADMIN — DOCUSATE SODIUM 100 MG: 100 CAPSULE, LIQUID FILLED ORAL at 08:07

## 2022-07-30 RX ADMIN — HYDROCODONE BITARTRATE AND ACETAMINOPHEN 1 TABLET: 5; 325 TABLET ORAL at 01:07

## 2022-07-30 RX ADMIN — METOPROLOL TARTRATE 25 MG: 25 TABLET, FILM COATED ORAL at 08:07

## 2022-07-30 RX ADMIN — ACETAMINOPHEN 650 MG: 325 TABLET, FILM COATED ORAL at 01:07

## 2022-07-30 RX ADMIN — HYDROCODONE BITARTRATE AND ACETAMINOPHEN 1 TABLET: 5; 325 TABLET ORAL at 08:07

## 2022-07-30 RX ADMIN — METHOCARBAMOL 750 MG: 750 TABLET ORAL at 01:07

## 2022-07-30 RX ADMIN — LEVOTHYROXINE SODIUM 50 MCG: 0.05 TABLET ORAL at 08:07

## 2022-07-30 RX ADMIN — FERROUS SULFATE TAB 325 MG (65 MG ELEMENTAL FE) 1 EACH: 325 (65 FE) TAB at 08:07

## 2022-07-30 RX ADMIN — SODIUM CHLORIDE: 9 INJECTION, SOLUTION INTRAVENOUS at 04:07

## 2022-07-30 RX ADMIN — GABAPENTIN 100 MG: 100 CAPSULE ORAL at 01:07

## 2022-07-30 RX ADMIN — LIDOCAINE 5% 1 PATCH: 700 PATCH TOPICAL at 05:07

## 2022-07-30 RX ADMIN — ASPIRIN 81 MG CHEWABLE TABLET 81 MG: 81 TABLET CHEWABLE at 08:07

## 2022-07-30 RX ADMIN — METHOCARBAMOL 750 MG: 750 TABLET ORAL at 09:07

## 2022-07-30 RX ADMIN — HYDROXYZINE PAMOATE 50 MG: 50 CAPSULE ORAL at 08:07

## 2022-07-30 RX ADMIN — HYDROCODONE BITARTRATE AND ACETAMINOPHEN 1 TABLET: 5; 325 TABLET ORAL at 06:07

## 2022-07-30 RX ADMIN — GABAPENTIN 100 MG: 100 CAPSULE ORAL at 05:07

## 2022-07-30 RX ADMIN — ENOXAPARIN SODIUM 40 MG: 30 INJECTION SUBCUTANEOUS at 04:07

## 2022-07-30 RX ADMIN — GABAPENTIN 100 MG: 100 CAPSULE ORAL at 09:07

## 2022-07-30 RX ADMIN — TAMSULOSIN HYDROCHLORIDE 0.4 MG: 0.4 CAPSULE ORAL at 08:07

## 2022-07-30 NOTE — PT/OT/SLP PROGRESS
Occupational Therapy Inpatient Rehab Treatment    Name: Scott Echeverria  MRN: [id number]    Assessment:  Scott Echeverria is a 71 y.o. male admitted with a medical diagnosis of Stenosis of cervical spine with myelopathy s/p C3-C7 laminioplasty & laminoforminotomies (7/12/2022).  He presents with the following impairments/functional limitations:  weakness, impaired endurance, impaired self care skills, impaired functional mobility, impaired balance, decreased coordination, decreased upper extremity function, pain, decreased ROM, orthopedic precautions affecting overall functional performance & independence.    Rehab Diagnosis: cervical spondylosis s/p C3-C7 laminioplasty & laminoforminotomies     Recent Surgery: * No surgery found *      General Precautions: Standard, fall     Orthopedic Precautions:spinal precautions - cervical precautions    Braces: Cervical collar (soft cervical collar)    Cervical soft collar donned & cervical precautions were maintained throughout OT treatment session.     Rehab Prognosis: Good; patient would benefit from acute skilled OT services to address these deficits and reach maximum level of function.        History:     Past Medical History:   Diagnosis Date    BPH (benign prostatic hyperplasia)     Cervical pain     Cervical radiculitis     Cervical spinal stenosis     Hypothyroidism, unspecified     Sleep apnea     resolved since surgery       Past Surgical History:   Procedure Laterality Date    APPENDECTOMY  01/25/2022    Dr. Kerry Coats    COLONOSCOPY  2021    HEMORRHOID SURGERY  2002    LAMINOPLASTY N/A 7/12/2022    Procedure: LAMINOPLASTY;  Surgeon: Paulo Rao MD;  Location: [facility];  Service: Neurosurgery;  Laterality: N/A;  left C3-4, C4-5, C5-6, C6-7 foraminotomies, C3-7 laminoplasty //  TIVA SET UP    lower back surgery  1990    RETINAL DETACHMENT SURGERY Right 2015    SINUS SURGERY      TONSILLECTOMY  1956       Subjective     Orientation: Oriented  x4    Patient with command following & safety awareness intact.     Chief Complaint: Patient with neck & shoulder pain on L-side.       Vitals   Vitals at Rest  /76      O2 Sat    Pain      Vitals With Activity  BP    HR    O2 Sat    Pain      Respiratory Status: Room air    Patients cultural, spiritual, Yarsani conflicts given the current situation:         Objective:     Communicated with Nsg prior to session.  Patient found up in chair with cervical collar upon OT entry to room.    Mobility   Patient completed:  Sit to Stand Transfer with contact guard assistance with rolling walker  Stand to Sit Transfer with contact guard assistance with rolling walker  Toilet Transfer Step Transfer technique with contact guard assistance with  rolling walker and BSC over toilet     Functional Mobility  Patient performed functional ambulation with RW to<>from bathroom with CGA.     ADLs   Care Score/CAROLYN Descriptors Equipment   Eating      Grooming Contact Guard Assistance - Patient performed grooming to wash hands standing at sink with RW.  Standing with device N/A   Oral Hygiene      Shower, Bathe Self      Upper Body Dressing      Lower Body Dressing 4 - Patient performed LE dressing to doff/don shorts with use of RW, reacher, & figure-4 technique within cervical precautions with Supervision/CGA required.  Compensatory techniques, Standing and Unsupported short sit Reacher, Walker and figure-4 technique    Toileting Hygiene 4 - Patient performed toileting (-void) with CGA required for dynamic standing balance with sit<>stand with RW & clothing management.  Sitting without back support and Standing with device    Toilet Transfer 4  RW & BSC over toilet    Tub Transfer      Shower Transfer      Putting On, Taking Off Footwear 4 - Patient doff/don B  socks & donned shoes with supervision within cervical precautions.  Unsupported short sit Reacher and figure-4 technique      Limiting Factors for ADLs: motor,  endurance, limited ROM, balance, weakness and coordination       Therapeutic Activities    Patient performed dynamic standing balance & functional standing tolerance ax with RW to participate in Connect-4 tabletop game with incorporation of B UE reach in anterior & lateral planes within cervical precautions as well as B UE GMC/FMC/pinch/release. Patient tolerated 2 sets x 5 min standing tolerance with 1 seated rest break needed between sets due to fatigue. CGA required for dynamic standing balance with RW.     Patient performed dynamic sitting balance ax in unsupported short sit position in w/c with focus on item retrieval from floor level with reacher to practice reacher skills with picking up items from floor while maintaining cervical precautions. Patient performed with supervision.     Therapeutic Exercise    Patient performed R UE AROM for shoulder flex to 90*/ext & elbow flex/ext for 2 sets x 10 reps. Patient performed L UE AAROM for shoulder flex to 90*/ext for 2 sets x 10 reps & L UE AROM for elbow flex/ext for 2 sets x 10 reps. Rest breaks required due to fatigue, but patient tolerated well & reported decreased pain. ROM exercises performed to improve B UE ROM & strength needed for increased independence with ADL tasks & functional t/f's.        LifeStyle Change and Education:             Patient left up in chair with all needs in reach, cervical soft collar donned, & Handoff to PT for session.     Education provided: Roles and goals of OT, ADLs, transfer training, body mechanics, assistive device, safety precautions, post-op precautions, equipment recommendations and home safety     Goals    None         Time Tracking     OT Received On: 07/30/22  Time In 0900     Time Out 1000  Total Time 60 min  Therapy Time: OT Individual: 60  Missed Time:    Missed Time Reason:      Billable Minutes: Therapeutic Activity 20 and Therapeutic Exercise 10           ADL/Self-care 30  07/30/2022

## 2022-07-30 NOTE — PROGRESS NOTES
[facility] ([facility])  Rehab Progress Note    Patient Name: Scott Echeverria  MRN: [id number]  Age: 71 y.o. Sex: male  : 1950  Hospital Length of Stay: 5 days  Date of Service: 2022   Chief Complaint: Cervical spondylosis s/p C3-C7 laminiopasty and laminoforminotomies on 2022     Subjective:     Basic Information  Admit Information: 71-year-old white male presented to [facility] on 2022 for scheduled cervical laminiopasty and laminoforminotomies.  PMH significant for cervical spondylosis, hypothyroidism, VIJI, and BPH.  Tolerated C3-C7 laminiopasty and laminoforminotomies on  without perioperative complications.  On  code fast was initiated due to slurred speech after receiving IV Robaxin.  CT head, MRI, MRA negative.  Patient required Ativan prior to MRI and reportedly has some left-sided neglect.  Naloxone and romazicon received with little response. That afternoon he became hypoxic and was having agonal respirations requiring intubation.  T-max a 100.8° reported on .  Extubated on .  Repeat CT unremarkable.  NIVA negative for DVT.  Sputum culture did come back positive for Enterobacter Cloacae; resistant to cephalosporins.  Rocephin discontinued and Levaquin initiated.  Required re-intubation on  for continued alteration mental status and severe hypoxemic respiratory failure.  EEG with diffuse slowing.  LP with 12 WBC and 650 RBC. Neurology added acyclovir, rocephin, and vancomycin for possible meningitis on .  Extubated on .  Meningitis panel negative-low suspicion meningitis. Suspicion most likely secondary to medication side effect but reason for his decompensation and alteration mental status is still not clear on .  Acyclovir discontinued.  Downgraded to the floor on .  Broad coverage antibiotics/antivirals/antifungals discontinued.  Continue to participate progress in therapy.  Left arm weakness continued to improve.   Tolerated transfer to [facility] inpatient rehab unit on 07/25 without incident.  Today's Information: No acute events overnight.  Sitting up in chair.  Reports good sleep and appetite.  Last BM 7/29.  Appears much more comfortable this morning.  Tolerating soft collar.  Vital signs at goal with no recorded fevers.  Tachycardia much improved.  BMP unremarkable.  No imaging today.  Review of patient's allergies indicates:   Allergen Reactions    Iodine         Current Facility-Administered Medications:     acetaminophen tablet 650 mg, 650 mg, Oral, Q4H PRN,  A Jacqueline, FNP, 650 mg at 07/28/22 1949    ALPRAZolam tablet 0.25 mg, 0.25 mg, Oral, TID PRN,  A Jacqueline, FNP    aspirin chewable tablet 81 mg, 81 mg, Oral, Daily,  A Jacqueline, FNP, 81 mg at 07/29/22 0837    benzonatate capsule 100 mg, 100 mg, Oral, TID PRN,  A Jacqueline, FNP    bisacodyL suppository 10 mg, 10 mg, Rectal, Daily PRN,  A Jacqueline, FNP    docusate sodium capsule 100 mg, 100 mg, Oral, BID,  A Jacqueline, FNP, 100 mg at 07/29/22 2020    enoxaparin injection 40 mg, 40 mg, Subcutaneous, Daily,  A Jacqueline, FNP, 40 mg at 07/29/22 1610    ferrous sulfate tablet 1 each, 1 tablet, Oral, BID,  A Jacqueline, FNP, 1 each at 07/29/22 2020    gabapentin capsule 100 mg, 100 mg, Oral, TID,  A Jacqueline, FNP, 100 mg at 07/30/22 0528    hydrALAZINE injection 10 mg, 10 mg, Intravenous, Q4H PRN,  A Jacqueline, FNP    HYDROcodone-acetaminophen 5-325 mg per tablet 1 tablet, 1 tablet, Oral, Q4H PRN,  A Jacqueline, FNP, 1 tablet at 07/30/22 0643    hydrOXYzine pamoate capsule 50 mg, 50 mg, Oral, QHS,  A Jacqueline, FNP, 50 mg at 07/29/22 2020    labetalol 20 mg/4 mL (5 mg/mL) IV syring, 10 mg, Intravenous, Q4H PRN, LALO Ellison    levothyroxine tablet 50 mcg, 50 mcg, Oral, Daily, LALO Ellison, 50 mcg at 07/29/22 0836    LIDOcaine 5 % patch 1 patch, 1  "patch, Transdermal, Q24H, Julianne Montes De Ocate, FNP, 1 patch at 07/30/22 0528    methocarbamoL tablet 750 mg, 750 mg, Oral, TID, Julianne Montes De Ocate, FNP, 750 mg at 07/30/22 0528    metoprolol injection 10 mg, 10 mg, Intravenous, Q2H PRN,  A Jacqueline, FNP    metoprolol tartrate (LOPRESSOR) tablet 25 mg, 25 mg, Oral, BID,  A Jacqueline, FNP, 25 mg at 07/29/22 2020    nitroGLYCERIN SL tablet 0.4 mg, 0.4 mg, Sublingual, Q5 Min PRN,  A Jacqueline, FNP    ondansetron disintegrating tablet 4 mg, 4 mg, Oral, Q6H PRN,  A Jacqueline, FNP    ondansetron disintegrating tablet 8 mg, 8 mg, Oral, Q6H PRN,  A Jacqueline, FNP    promethazine tablet 25 mg, 25 mg, Oral, Q6H PRN,  A Jacqueline, FNP    tamsulosin 24 hr capsule 0.4 mg, 0.4 mg, Oral, QHS,  A Jacqueline, FNP, 0.4 mg at 07/29/22 2020     Review of Systems   Complete 12-point review of symptoms negative except for what's mentioned in HPI     Objective:     /71   Pulse 100   Temp 97.9 °F (36.6 °C) (Oral)   Resp 18   Ht 5' 9.02" (1.753 m)   Wt 73.1 kg (161 lb 2.5 oz)   SpO2 (!) 94%   BMI 23.79 kg/m²        Intake/Output Summary (Last 24 hours) at 7/30/2022 0817  Last data filed at 7/29/2022 2000  Gross per 24 hour   Intake 1108.75 ml   Output --   Net 1108.75 ml       Physical Exam  Constitutional:       Appearance: Normal appearance.   HENT:      Head: Normocephalic.      Mouth/Throat:      Mouth: Mucous membranes are moist.   Eyes:      Pupils: Pupils are equal, round, and reactive to light.   Neck:      Comments: Posterior neck incision clean and intact-TK, no redness or drainage  Cardiovascular:      Rate and Rhythm: Regular rhythm. Tachycardia present.      Heart sounds: Normal heart sounds.   Pulmonary:      Effort: Pulmonary effort is normal.      Breath sounds: Normal breath sounds.   Abdominal:      General: Bowel sounds are normal.      Palpations: Abdomen is soft.   Musculoskeletal:      Cervical back: Neck " supple.      Comments: Generalized weakness and muscle atrophy   Skin:     General: Skin is warm and dry.      Comments: Left forearm PIV infiltration with redness/indurated    Neurological:      General: No focal deficit present.      Mental Status: He is alert and oriented to person, place, and time.   Psychiatric:         Mood and Affect: Mood normal.         Behavior: Behavior normal.         Thought Content: Thought content normal.         Judgment: Judgment normal.       Lines/Drains/Airways     Peripheral Intravenous Line  Duration                Peripheral IV - Single Lumen 07/29/22 1159 20 G Anterior;Distal;Left Upper Arm <1 day              Labs  Recent Results (from the past 24 hour(s))   Comprehensive metabolic panel    Collection Time: 07/30/22  5:54 AM   Result Value Ref Range    Sodium Level 142 136 - 145 mmol/L    Potassium Level 4.5 3.5 - 5.1 mmol/L    Chloride 104 98 - 107 mmol/L    Carbon Dioxide 27 23 - 31 mmol/L    Glucose Level 97 82 - 115 mg/dL    Blood Urea Nitrogen 13.5 8.4 - 25.7 mg/dL    Creatinine 0.60 (L) 0.73 - 1.18 mg/dL    Calcium Level Total 9.3 8.8 - 10.0 mg/dL    Protein Total 6.6 5.8 - 7.6 gm/dL    Albumin Level 2.9 (L) 3.4 - 4.8 gm/dL    Globulin 3.7 (H) 2.4 - 3.5 gm/dL    Albumin/Globulin Ratio 0.8 (L) 1.1 - 2.0 ratio    Bilirubin Total 0.4 <=1.5 mg/dL    Alkaline Phosphatase 175 (H) 40 - 150 unit/L    Alanine Aminotransferase 36 0 - 55 unit/L    Aspartate Aminotransferase 22 5 - 34 unit/L    Estimated GFR-Non  >60 mls/min/1.73/m2       Radiology  CT soft tissue neck without contrast on 07/18/2022 at 9:29 p.m., IMPRESSION:Postsurgical change in the cervical spine without fluid collection or abscess. Right upper lobe airspace disease.  Radiology  MRI C-spine without contrast on 07/15/2022 at 8:27 a.m., IMPRESSION: Postoperative changes are seen in the left posterior elements from C3 through C7 vertebrae. There is a postoperative collection in the surgical bed/  posterior paraspinal soft tissues surrounding the spinous process and posterior to the laminae. The collection measures approximately 7.3 cm in length and 2 cm in AP dimension. Similar findings are also seen on the prior examination. Correlate clinically as regards additional evaluation and follow-up. There are displaced fractures involving the laminae of C3 through C7 vertebrae appreciated better on the earlier CT study. There is moderate canal stenosis at C3-C4 and C4-C5 secondary to broad-based disc protrusions and degenerative joint disease. Details and other findings as noted above.  Radiology  MRI brain on 07/16/2022 at 8:30 a.m., IMPRESSION:No abnormal signal is identified on the diffusion images to suggest acute infarct. Details and findings as above. No significant discrepancy with overnight report.    Assessment/Plan:     71 y.o. WM admitted on 7/25/2022    Cervical spondylosis   - s/p C3-C7 laminiopasty and laminoforminotomies on 7/12/2022   - continue     Bengay t.i.d. p.r.n. (initiated 7/28)                Robaxin 750 mg t.i.d. (increased 7/28)                Gabapentin 100 mg t.i.d. (increased 7/28)                Lidoderm patch daily                ASA 81 mg daily                Norco 5 mg q.4 hours p.r.n.  - defer to physiatry for rehab and pain management  - PT/OT/RT/PT following  - follow-up with Neurosurgery outpatient    Oropharyngeal dysphagia  - current  - continue   Minced and moist diet with moderately thickened liquids  - ST following     Hypothyroidism  - TSH within normal limits  - continue                Levothyroxine 50 mcg daily     VIJI  - compliant with CPAP  - continue current POC     BPH  - stable  - continue                Flomax 0.4 mg at bedtime     Normocytic anemia  - asymptomatic  - iron level low  - continue                Ferrous sulfate 325 mg b.i.d. (initiated 7/26)  - H/H stable   - no evidence of active bleeds  - will closely monitor and transfuse if needed       Constipation  - stable  - continue           Colace 100 mg BID     Left forearm PIV infiltration  - Discontinue peripheral IV  - continue                Warm compresses t.i.d. x3 days    Tachycardia  - Heart rate 100-115  - initiate   Metoprolol 25 mg b.i.d. (initiated 7/29)    NS 75 mL/HR x1 L     VTE Prophylaxis:  Lovenox 40 mg daily  COVID-19 testing:  Negative on 07/25/2022  COVID-19 vaccination status:  Vaccinated (Moderna):  02/10/2021 and 03/10/2021     POA: None  Living will: None  Contacts: Anusha Echeverria (spouse)     CODE STATUS: Full Code  Internal Medicine (attending): Luis Khan MD   Physiatry (consulting):  David Carty MD    OUTPATIENT PROVIDERS  Gary Reilly II, MD  Neurosurgery:  Paulo Rao MD  Neurology: Yan Rainey MD     DISPOSITION: Condition stable.  Sleep hygiene, bowel maintenance, and appetite at goal.  Improvement of neck and left shoulder pain.  Appears more comfortable with cervical soft collar.  Vital signs at goal with no recorded fevers.  BMP unremarkable.  Continue current POC.  Monitor closely.  Notify of acute changes.     Total time spent on this encounter including chart review and direct 1-on-1 patient interaction: 39 minutes   Over 50% of this time was spent in counseling and coordination of care

## 2022-07-30 NOTE — PLAN OF CARE
Problem: Rehabilitation ([facility]) Plan of Care  Goal: Plan of Care Review  Outcome: Ongoing, Not Progressing  Goal: Patient-Specific Goal (Individualized)  Outcome: Ongoing, Not Progressing  Goal: Absence of New-Onset Illness or Injury  Outcome: Ongoing, Not Progressing  Goal: Optimal Comfort and Wellbeing  Outcome: Ongoing, Not Progressing  Goal: Readiness for Transition of Care  Outcome: Ongoing, Not Progressing     Problem: Impaired Wound Healing  Goal: Optimal Wound Healing  Outcome: Ongoing, Not Progressing     Problem: Skin Injury Risk Increased  Goal: Skin Health and Integrity  Outcome: Ongoing, Not Progressing     Problem: Fall Injury Risk  Goal: Absence of Fall and Fall-Related Injury  Outcome: Ongoing, Not Progressing

## 2022-07-30 NOTE — PT/OT/SLP PROGRESS
Physical Therapy Inpatient Rehab Treatment    Patient Name:  Scott Echeverria   MRN:  [id number]    Recommendations:     Discharge Recommendations:  other (see comments)   Discharge Equipment Recommendations: other (see comments) (Pending progress)   Barriers to discharge:      Assessment:     Scott Echeverria is a 71 y.o. male admitted with a medical diagnosis of Stenosis of cervical spine with myelopathy.  He presents with the following impairments/functional limitations:     .    Rehab Diagnosis:     Recent Surgery: * No surgery found *      General Precautions: Standard, honey thick     Orthopedic Precautions:spinal precautions     Braces: Cervical collar (For comfort)    Rehab Prognosis: Good; patient would benefit from acute skilled PT services to address these deficits and reach maximum level of function.      History:     Past Medical History:   Diagnosis Date    BPH (benign prostatic hyperplasia)     Cervical pain     Cervical radiculitis     Cervical spinal stenosis     Hypothyroidism, unspecified     Sleep apnea     resolved since surgery       Past Surgical History:   Procedure Laterality Date    APPENDECTOMY  01/25/2022    Dr. Kerry Coats    COLONOSCOPY  2021    HEMORRHOID SURGERY  2002    LAMINOPLASTY N/A 7/12/2022    Procedure: LAMINOPLASTY;  Surgeon: Paulo Rao MD;  Location: [facility];  Service: Neurosurgery;  Laterality: N/A;  left C3-4, C4-5, C5-6, C6-7 foraminotomies, C3-7 laminoplasty //  TIVA SET UP    lower back surgery  1990    RETINAL DETACHMENT SURGERY Right 2015    SINUS SURGERY      TONSILLECTOMY  1956       Subjective     Chief Complaint: Pt with c/o increased fatigue today and of moderate neck pain L>R.  Patient/Family Comments/goals:          Respiratory Status: Room air    Patients cultural, spiritual, Scientology conflicts given the current situation: yes      Objective:     Communicated with NSG and OT prior to session.  Patient found up in chair with cervical collar  upon  PT entry to room.    Pt is Oriented x3 and Alert and Cooperative.    Functional Mobility:   · Transfers:     · Sit to Stand:  supervision with rolling walker  · Bed to Chair: supervision with  rolling walker  using  Step Transfer  · Chair to mat: supervision with  rolling walker  using  Step Transfer  · Gait: Pt ambulates 375'+290'+150' with prolongued seated rest breaks between trials. with RW with Standby Assistance.      Current   Status  Discharge   Goal   Functional Area: Care Score:    Roll Left and Right   Set-up/clean-up   Sit to Lying   Set-up/clean-up   Lying to Sitting on Side of Bed   Supervision or touching assistance   Sit to Stand 5 Set-up/clean-up   Chair/Bed-to-Chair Transfer 4 Set-up/clean-up   Car Transfer   Supervision or touching assistance   Walk 10 Feet 4 Set-up/clean-up   Walk 50 Feet with Two Turns 4 Set-up/clean-up   Walk 150 Feet 4 Set-up/clean-up   Walk 10 Feet Uneven Surface   Set-up/clean-up   1 Step (Curb)   Supervision or touching assistance   4 Steps   Supervision or touching assistance   12 Steps   Supervision or touching assistance   Picking Up Object   Set-up/clean-up   Wheel 50 Feet with Two Turns       Wheel 150 Feet           Therapeutic Activities and Exercises:  LE Seated HEP - 2# BLE, [id number]      PF/DF      Hip flexion      LAQ      ADD ball squeezes      HSC with RTB    Side stepping in // with SBA x4 trials  Cone taps performed in // with 2 HHA and SBA. [id number]    Activity Tolerance    Patient left up in chair with call button in reach.    Education provided: gait training, balance training and strengthening exercises    Expected compliance:    GOALS:   Multidisciplinary Problems     Physical Therapy Goals        Problem: Physical Therapy    Goal Priority Disciplines Outcome Goal Variances Interventions   Physical Therapy Goal     PT, PT/OT Ongoing, Progressing     Description: Short Term goals      Bed Mobility   Roll Right and Left Setup or Clean-up Assistance  .  Lying to sitting Setup or Clean-up Assistance .  Sitting to lying Setup or Clean-up Assistance .    Transfers    Pt will be able to perform Stand step chair/bed to chair transfer With RW Setup or Clean-up Assistance .  Pt will be able to perform Sit to stand with RW Setup or Clean-up Assistance .  Pt will be able to perform Car transfer with RW Setup or Clean-up Assistance .    Ambulation    Pt will ambulate 200 Feet with RW Setup or Clean-up Assistance .  Pt will be able to ascend/descend 12 stairs using B Rails Supervision or Touching Assistance .  Pt will be able to ascend/descend 4 inch curb using RW Supervision or Touching Assistance .  Pt will be able to ambulate uneven surfaces/ramp 15 feet with RW Supervision or Touching Assistance .      Timeframe: By Discharge                     Plan:     During this hospitalization, patient to be seen 5 x/week to address the identified rehab impairments via gait training, therapeutic activities, therapeutic exercises, neuromuscular re-education, wheelchair management/training and progress toward the following goals:     Plan of Care Expires:  08/01/22    Additional Information:         Time Tracking:     PT Received On:    Time In 1000     Time Out 1130  Total Time 90 min  Therapy Time PT Individual: 90  Missed Time:    Time Missed due to:      Billable Minutes: Gait Training 30, Therapeutic Activity 15 and Therapeutic Exercise 45    07/30/2022

## 2022-07-30 NOTE — PT/OT/SLP PROGRESS
Speech Language Pathology Treatment          Scott Echeverria   MRN: [id number]     Diet recommendations: Solid Diet Level: Minced & Moist Diet - IDDSI Level 5  Liquid Diet Level: Moderately thick liquids - IDDSI Level 3 1 bite/sip at a time, Alternating bites/sips and Remain upright 30 minutes post meal         Billable Minutes:  Treatment Swallowing Dysfunction 30 minutes            Subjective: Alert, oriented, and motivated for tx    Objective:   Patient completed base of tongue retraction exercises x 80 with min cues and visual modeling. He completed laryngeal strengthening exercises x 65. Good tolerance for treatment.        Assessment:  Scott Echeverria is a 71 y.o. male with a SLP diagnosis of Dysphagia. He present with moderate - severe oropharyngeal deficits placing him at risk for aspiration. Further SLP intervention warranted at this time.     Discharge recommendations: Discharge Facility/Level of Care Needs: home health speech therapy     Goals:   Multidisciplinary Problems     SLP Goals        Problem: SLP    Goal Priority Disciplines Outcome   SLP Goal     SLP    Description: LT. Pt will tolerate least restrictive diet with no s/sx of aspiration.    ST. Pt will complete tongue base and laryngeal elevation exercises with 100% accuracy and minimal cues.  2. Pt will tolerate half meal of soft & bite sized diet with no s/sx of aspiration.                    Plan:   Patient to be seen Therapy Frequency: 5 x/week   Planned Interventions: Dysphagia Therapy  Plan of Care Expires: 22  Plan of Care reviewed with: patient  SLP Follow-up?: Yes  SLP - Next Visit Date: 22

## 2022-07-31 LAB
ALBUMIN SERPL-MCNC: 3 GM/DL (ref 3.4–4.8)
ALBUMIN/GLOB SERPL: 0.8 RATIO (ref 1.1–2)
ALP SERPL-CCNC: 170 UNIT/L (ref 40–150)
ALT SERPL-CCNC: 33 UNIT/L (ref 0–55)
AST SERPL-CCNC: 23 UNIT/L (ref 5–34)
BILIRUBIN DIRECT+TOT PNL SERPL-MCNC: 0.3 MG/DL
BUN SERPL-MCNC: 13.7 MG/DL (ref 8.4–25.7)
CALCIUM SERPL-MCNC: 9.3 MG/DL (ref 8.8–10)
CHLORIDE SERPL-SCNC: 102 MMOL/L (ref 98–107)
CO2 SERPL-SCNC: 28 MMOL/L (ref 23–31)
CREAT SERPL-MCNC: 0.73 MG/DL (ref 0.73–1.18)
GLOBULIN SER-MCNC: 3.9 GM/DL (ref 2.4–3.5)
GLUCOSE SERPL-MCNC: 98 MG/DL (ref 82–115)
POTASSIUM SERPL-SCNC: 4.6 MMOL/L (ref 3.5–5.1)
PROT SERPL-MCNC: 6.9 GM/DL (ref 5.8–7.6)
SODIUM SERPL-SCNC: 142 MMOL/L (ref 136–145)

## 2022-07-31 PROCEDURE — 63600175 PHARM REV CODE 636 W HCPCS: Performed by: NURSE PRACTITIONER

## 2022-07-31 PROCEDURE — 94761 N-INVAS EAR/PLS OXIMETRY MLT: CPT

## 2022-07-31 PROCEDURE — 11800000 HC REHAB PRIVATE ROOM

## 2022-07-31 PROCEDURE — 94799 UNLISTED PULMONARY SVC/PX: CPT

## 2022-07-31 PROCEDURE — 36415 COLL VENOUS BLD VENIPUNCTURE: CPT | Performed by: NURSE PRACTITIONER

## 2022-07-31 PROCEDURE — 25000003 PHARM REV CODE 250: Performed by: NURSE PRACTITIONER

## 2022-07-31 PROCEDURE — 80053 COMPREHEN METABOLIC PANEL: CPT | Performed by: NURSE PRACTITIONER

## 2022-07-31 RX ADMIN — METHOCARBAMOL 750 MG: 750 TABLET ORAL at 05:07

## 2022-07-31 RX ADMIN — METOPROLOL TARTRATE 25 MG: 25 TABLET, FILM COATED ORAL at 08:07

## 2022-07-31 RX ADMIN — FERROUS SULFATE TAB 325 MG (65 MG ELEMENTAL FE) 1 EACH: 325 (65 FE) TAB at 08:07

## 2022-07-31 RX ADMIN — LEVOTHYROXINE SODIUM 50 MCG: 0.05 TABLET ORAL at 08:07

## 2022-07-31 RX ADMIN — GABAPENTIN 100 MG: 100 CAPSULE ORAL at 05:07

## 2022-07-31 RX ADMIN — DOCUSATE SODIUM 100 MG: 100 CAPSULE, LIQUID FILLED ORAL at 08:07

## 2022-07-31 RX ADMIN — METHOCARBAMOL 750 MG: 750 TABLET ORAL at 02:07

## 2022-07-31 RX ADMIN — LIDOCAINE 5% 1 PATCH: 700 PATCH TOPICAL at 05:07

## 2022-07-31 RX ADMIN — GABAPENTIN 100 MG: 100 CAPSULE ORAL at 08:07

## 2022-07-31 RX ADMIN — ASPIRIN 81 MG CHEWABLE TABLET 81 MG: 81 TABLET CHEWABLE at 08:07

## 2022-07-31 RX ADMIN — HYDROXYZINE PAMOATE 50 MG: 50 CAPSULE ORAL at 08:07

## 2022-07-31 RX ADMIN — HYDROCODONE BITARTRATE AND ACETAMINOPHEN 1 TABLET: 5; 325 TABLET ORAL at 06:07

## 2022-07-31 RX ADMIN — HYDROCODONE BITARTRATE AND ACETAMINOPHEN 1 TABLET: 5; 325 TABLET ORAL at 11:07

## 2022-07-31 RX ADMIN — GABAPENTIN 100 MG: 100 CAPSULE ORAL at 02:07

## 2022-07-31 RX ADMIN — TAMSULOSIN HYDROCHLORIDE 0.4 MG: 0.4 CAPSULE ORAL at 08:07

## 2022-07-31 RX ADMIN — METHOCARBAMOL 750 MG: 750 TABLET ORAL at 08:07

## 2022-07-31 RX ADMIN — ENOXAPARIN SODIUM 40 MG: 30 INJECTION SUBCUTANEOUS at 05:07

## 2022-07-31 RX ADMIN — HYDROCODONE BITARTRATE AND ACETAMINOPHEN 1 TABLET: 5; 325 TABLET ORAL at 01:07

## 2022-08-01 LAB
ALBUMIN SERPL-MCNC: 3.1 GM/DL (ref 3.4–4.8)
ALBUMIN/GLOB SERPL: 0.8 RATIO (ref 1.1–2)
ALP SERPL-CCNC: 167 UNIT/L (ref 40–150)
ALT SERPL-CCNC: 30 UNIT/L (ref 0–55)
AST SERPL-CCNC: 20 UNIT/L (ref 5–34)
BASOPHILS # BLD AUTO: 0.07 X10(3)/MCL (ref 0–0.2)
BASOPHILS NFR BLD AUTO: 1 %
BILIRUBIN DIRECT+TOT PNL SERPL-MCNC: 0.3 MG/DL
BUN SERPL-MCNC: 14.4 MG/DL (ref 8.4–25.7)
CALCIUM SERPL-MCNC: 9.4 MG/DL (ref 8.8–10)
CHLORIDE SERPL-SCNC: 101 MMOL/L (ref 98–107)
CO2 SERPL-SCNC: 28 MMOL/L (ref 23–31)
CREAT SERPL-MCNC: 0.67 MG/DL (ref 0.73–1.18)
EOSINOPHIL # BLD AUTO: 0.1 X10(3)/MCL (ref 0–0.9)
EOSINOPHIL NFR BLD AUTO: 1.5 %
ERYTHROCYTE [DISTWIDTH] IN BLOOD BY AUTOMATED COUNT: 15.8 % (ref 11.5–17)
GLOBULIN SER-MCNC: 3.8 GM/DL (ref 2.4–3.5)
GLUCOSE SERPL-MCNC: 101 MG/DL (ref 82–115)
HCT VFR BLD AUTO: 39 % (ref 42–52)
HGB BLD-MCNC: 12.6 GM/DL (ref 14–18)
IMM GRANULOCYTES # BLD AUTO: 0.02 X10(3)/MCL (ref 0–0.04)
IMM GRANULOCYTES NFR BLD AUTO: 0.3 %
LYMPHOCYTES # BLD AUTO: 2.6 X10(3)/MCL (ref 0.6–4.6)
LYMPHOCYTES NFR BLD AUTO: 38.7 %
MAGNESIUM SERPL-MCNC: 2.2 MG/DL (ref 1.6–2.6)
MCH RBC QN AUTO: 28.9 PG (ref 27–31)
MCHC RBC AUTO-ENTMCNC: 32.3 MG/DL (ref 33–36)
MCV RBC AUTO: 89.4 FL (ref 80–94)
MONOCYTES # BLD AUTO: 0.7 X10(3)/MCL (ref 0.1–1.3)
MONOCYTES NFR BLD AUTO: 10.4 %
NEUTROPHILS # BLD AUTO: 3.2 X10(3)/MCL (ref 2.1–9.2)
NEUTROPHILS NFR BLD AUTO: 48.1 %
NRBC BLD AUTO-RTO: 0 %
PHOSPHATE SERPL-MCNC: 3.5 MG/DL (ref 2.3–4.7)
PLATELET # BLD AUTO: 611 X10(3)/MCL (ref 130–400)
PMV BLD AUTO: 10.1 FL (ref 7.4–10.4)
POTASSIUM SERPL-SCNC: 4.4 MMOL/L (ref 3.5–5.1)
PREALB SERPL-MCNC: 30.2 MG/DL (ref 16–42)
PROT SERPL-MCNC: 6.9 GM/DL (ref 5.8–7.6)
RBC # BLD AUTO: 4.36 X10(6)/MCL (ref 4.7–6.1)
SODIUM SERPL-SCNC: 141 MMOL/L (ref 136–145)
WBC # SPEC AUTO: 6.7 X10(3)/MCL (ref 4.5–11.5)

## 2022-08-01 PROCEDURE — 25000003 PHARM REV CODE 250: Performed by: NURSE PRACTITIONER

## 2022-08-01 PROCEDURE — 97535 SELF CARE MNGMENT TRAINING: CPT

## 2022-08-01 PROCEDURE — 84100 ASSAY OF PHOSPHORUS: CPT | Performed by: NURSE PRACTITIONER

## 2022-08-01 PROCEDURE — 85025 COMPLETE CBC W/AUTO DIFF WBC: CPT | Performed by: NURSE PRACTITIONER

## 2022-08-01 PROCEDURE — 36415 COLL VENOUS BLD VENIPUNCTURE: CPT | Performed by: NURSE PRACTITIONER

## 2022-08-01 PROCEDURE — 94799 UNLISTED PULMONARY SVC/PX: CPT

## 2022-08-01 PROCEDURE — 97530 THERAPEUTIC ACTIVITIES: CPT | Mod: CQ

## 2022-08-01 PROCEDURE — 83735 ASSAY OF MAGNESIUM: CPT | Performed by: NURSE PRACTITIONER

## 2022-08-01 PROCEDURE — 97110 THERAPEUTIC EXERCISES: CPT

## 2022-08-01 PROCEDURE — 11800000 HC REHAB PRIVATE ROOM

## 2022-08-01 PROCEDURE — 63600175 PHARM REV CODE 636 W HCPCS: Performed by: NURSE PRACTITIONER

## 2022-08-01 PROCEDURE — 94761 N-INVAS EAR/PLS OXIMETRY MLT: CPT

## 2022-08-01 PROCEDURE — 97168 OT RE-EVAL EST PLAN CARE: CPT

## 2022-08-01 PROCEDURE — 97164 PT RE-EVAL EST PLAN CARE: CPT

## 2022-08-01 PROCEDURE — 92526 ORAL FUNCTION THERAPY: CPT

## 2022-08-01 PROCEDURE — 84134 ASSAY OF PREALBUMIN: CPT | Performed by: NURSE PRACTITIONER

## 2022-08-01 PROCEDURE — 80053 COMPREHEN METABOLIC PANEL: CPT | Performed by: NURSE PRACTITIONER

## 2022-08-01 PROCEDURE — 97116 GAIT TRAINING THERAPY: CPT | Mod: CQ

## 2022-08-01 RX ORDER — METOPROLOL TARTRATE 50 MG/1
50 TABLET ORAL 2 TIMES DAILY
Status: DISCONTINUED | OUTPATIENT
Start: 2022-08-01 | End: 2022-08-05 | Stop reason: HOSPADM

## 2022-08-01 RX ADMIN — METOPROLOL TARTRATE 50 MG: 50 TABLET, FILM COATED ORAL at 08:08

## 2022-08-01 RX ADMIN — METOPROLOL TARTRATE 25 MG: 25 TABLET, FILM COATED ORAL at 09:08

## 2022-08-01 RX ADMIN — FERROUS SULFATE TAB 325 MG (65 MG ELEMENTAL FE) 1 EACH: 325 (65 FE) TAB at 08:08

## 2022-08-01 RX ADMIN — METHOCARBAMOL 750 MG: 750 TABLET ORAL at 02:08

## 2022-08-01 RX ADMIN — DOCUSATE SODIUM 100 MG: 100 CAPSULE, LIQUID FILLED ORAL at 09:08

## 2022-08-01 RX ADMIN — GABAPENTIN 100 MG: 100 CAPSULE ORAL at 05:08

## 2022-08-01 RX ADMIN — ASPIRIN 81 MG CHEWABLE TABLET 81 MG: 81 TABLET CHEWABLE at 09:08

## 2022-08-01 RX ADMIN — DOCUSATE SODIUM 100 MG: 100 CAPSULE, LIQUID FILLED ORAL at 08:08

## 2022-08-01 RX ADMIN — METHOCARBAMOL 750 MG: 750 TABLET ORAL at 08:08

## 2022-08-01 RX ADMIN — HYDROXYZINE PAMOATE 50 MG: 50 CAPSULE ORAL at 08:08

## 2022-08-01 RX ADMIN — HYDROCODONE BITARTRATE AND ACETAMINOPHEN 1 TABLET: 5; 325 TABLET ORAL at 05:08

## 2022-08-01 RX ADMIN — HYDROCODONE BITARTRATE AND ACETAMINOPHEN 1 TABLET: 5; 325 TABLET ORAL at 09:08

## 2022-08-01 RX ADMIN — HYDROCODONE BITARTRATE AND ACETAMINOPHEN 1 TABLET: 5; 325 TABLET ORAL at 01:08

## 2022-08-01 RX ADMIN — METHOCARBAMOL 750 MG: 750 TABLET ORAL at 05:08

## 2022-08-01 RX ADMIN — FERROUS SULFATE TAB 325 MG (65 MG ELEMENTAL FE) 1 EACH: 325 (65 FE) TAB at 09:08

## 2022-08-01 RX ADMIN — GABAPENTIN 100 MG: 100 CAPSULE ORAL at 02:08

## 2022-08-01 RX ADMIN — TAMSULOSIN HYDROCHLORIDE 0.4 MG: 0.4 CAPSULE ORAL at 08:08

## 2022-08-01 RX ADMIN — GABAPENTIN 100 MG: 100 CAPSULE ORAL at 08:08

## 2022-08-01 RX ADMIN — ENOXAPARIN SODIUM 40 MG: 30 INJECTION SUBCUTANEOUS at 05:08

## 2022-08-01 RX ADMIN — LEVOTHYROXINE SODIUM 50 MCG: 0.05 TABLET ORAL at 09:08

## 2022-08-01 RX ADMIN — LIDOCAINE 5% 1 PATCH: 700 PATCH TOPICAL at 05:08

## 2022-08-01 NOTE — PROGRESS NOTES
[facility] ([facility])  Rehab Progress Note  MD Telemedicine Visit    Patient Name: Scott Echeverria  MRN: [id number]  Age: 71 y.o. Sex: male  : 1950  Hospital Length of Stay: 7 days  Date of Service: 2022   Chief Complaint: Cervical spondylosis s/p C3-C7 laminiopasty and laminoforminotomies on 2022     Subjective:     Basic Information  Admit Information: 71-year-old white male presented to [facility] on 2022 for scheduled cervical laminiopasty and laminoforminotomies.  PMH significant for cervical spondylosis, hypothyroidism, VIJI, and BPH.  Tolerated C3-C7 laminiopasty and laminoforminotomies on  without perioperative complications.  On  code fast was initiated due to slurred speech after receiving IV Robaxin.  CT head, MRI, MRA negative.  Patient required Ativan prior to MRI and reportedly has some left-sided neglect.  Naloxone and romazicon received with little response. That afternoon he became hypoxic and was having agonal respirations requiring intubation.  T-max a 100.8° reported on .  Extubated on .  Repeat CT unremarkable.  NIVA negative for DVT.  Sputum culture did come back positive for Enterobacter Cloacae; resistant to cephalosporins.  Rocephin discontinued and Levaquin initiated.  Required re-intubation on  for continued alteration mental status and severe hypoxemic respiratory failure.  EEG with diffuse slowing.  LP with 12 WBC and 650 RBC. Neurology added acyclovir, rocephin, and vancomycin for possible meningitis on .  Extubated on .  Meningitis panel negative-low suspicion meningitis. Suspicion most likely secondary to medication side effect but reason for his decompensation and alteration mental status is still not clear on .  Acyclovir discontinued.  Downgraded to the floor on .  Broad coverage antibiotics/antivirals/antifungals discontinued.  Continue to participate progress in therapy.  Left arm weakness  continued to improve.  Tolerated transfer to [facility] rehab unit on 07/25 without incident.  Today's Information: No acute events overnight.  Sitting up in chair.  Reports good sleep and appetite.  Last BM 7/31.  Vital signs at goal with no recorded fevers.  Mild tachycardia.  Lab work unremarkable.  Albumin and pre-albumin trending up.  No imaging today.  Review of patient's allergies indicates:   Allergen Reactions    Iodine         Current Facility-Administered Medications:     acetaminophen tablet 650 mg, 650 mg, Oral, Q4H PRN,  A Jacqueline, FNP, 650 mg at 07/30/22 1341    ALPRAZolam tablet 0.25 mg, 0.25 mg, Oral, TID PRN,  A Jacqueline, FNP    aspirin chewable tablet 81 mg, 81 mg, Oral, Daily,  A Jacqueline, FNP, 81 mg at 07/31/22 0820    benzonatate capsule 100 mg, 100 mg, Oral, TID PRN,  A Jacqueline, FNP    bisacodyL suppository 10 mg, 10 mg, Rectal, Daily PRN,  A Jacqueline, FNP    docusate sodium capsule 100 mg, 100 mg, Oral, BID,  A Jacqueline, FNP, 100 mg at 07/31/22 2007    enoxaparin injection 40 mg, 40 mg, Subcutaneous, Daily,  A Jacqueline, FNP, 40 mg at 07/31/22 1736    ferrous sulfate tablet 1 each, 1 tablet, Oral, BID,  A Jacqueline, FNP, 1 each at 07/31/22 2007    gabapentin capsule 100 mg, 100 mg, Oral, TID,  A Jacqueline, FNP, 100 mg at 08/01/22 0529    hydrALAZINE injection 10 mg, 10 mg, Intravenous, Q4H PRN,  A Jacqueline, FNP    HYDROcodone-acetaminophen 5-325 mg per tablet 1 tablet, 1 tablet, Oral, Q4H PRN,  A Jacqueline, FNP, 1 tablet at 07/31/22 2331    hydrOXYzine pamoate capsule 50 mg, 50 mg, Oral, QHS,  A Jacqueline, FNP, 50 mg at 07/31/22 2007    labetalol 20 mg/4 mL (5 mg/mL) IV syring, 10 mg, Intravenous, Q4H PRN, LALO Ellison    levothyroxine tablet 50 mcg, 50 mcg, Oral, Daily, LALO Ellison, 50 mcg at 07/31/22 0820    LIDOcaine 5 % patch 1 patch, 1 patch,  "Transdermal, Q24H, Julianne Montes De Ocate, FNP, 1 patch at 08/01/22 0529    methocarbamoL tablet 750 mg, 750 mg, Oral, TID, Julianne HESS Bethany, FNP, 750 mg at 08/01/22 0529    metoprolol injection 10 mg, 10 mg, Intravenous, Q2H PRN,  A Jacqueline, FNP    metoprolol tartrate (LOPRESSOR) tablet 25 mg, 25 mg, Oral, BID,  A Jacqueline, FNP, 25 mg at 07/31/22 2008    nitroGLYCERIN SL tablet 0.4 mg, 0.4 mg, Sublingual, Q5 Min PRN,  A Jacqueline, FNP    ondansetron disintegrating tablet 4 mg, 4 mg, Oral, Q6H PRN,  A Jacqueline, FNP    ondansetron disintegrating tablet 8 mg, 8 mg, Oral, Q6H PRN,  A Jacqueline, FNP    promethazine tablet 25 mg, 25 mg, Oral, Q6H PRN,  A Jacqueline, FNP    tamsulosin 24 hr capsule 0.4 mg, 0.4 mg, Oral, QHS,  A Jacqueline, FNP, 0.4 mg at 07/31/22 2008     Review of Systems   Complete 12-point review of symptoms negative except for what's mentioned in HPI     Objective:     /73   Pulse 105   Temp 98.4 °F (36.9 °C)   Resp 18   Ht 5' 9.02" (1.753 m)   Wt 73.1 kg (161 lb 2.5 oz)   SpO2 (!) 92%   BMI 23.79 kg/m²        Intake/Output Summary (Last 24 hours) at 8/1/2022 0838  Last data filed at 7/31/2022 1700  Gross per 24 hour   Intake 480 ml   Output --   Net 480 ml       Physical Exam  Constitutional:       Appearance: Normal appearance.   HENT:      Head: Normocephalic.      Mouth/Throat:      Mouth: Mucous membranes are moist.   Eyes:      Pupils: Pupils are equal, round, and reactive to light.   Neck:      Comments: Posterior neck incision clean and intact-TK, no redness or drainage  Cardiovascular:      Rate and Rhythm: Regular rhythm. Tachycardia present.      Heart sounds: Normal heart sounds.   Pulmonary:      Effort: Pulmonary effort is normal.      Breath sounds: Normal breath sounds.   Abdominal:      General: Bowel sounds are normal.      Palpations: Abdomen is soft.   Musculoskeletal:      Cervical back: Neck supple.      Comments: " Generalized weakness and muscle atrophy   Skin:     General: Skin is warm and dry.      Comments: Left forearm PIV infiltration with redness/indurated    Neurological:      General: No focal deficit present.      Mental Status: He is alert and oriented to person, place, and time.   Psychiatric:         Mood and Affect: Mood normal.         Behavior: Behavior normal.         Thought Content: Thought content normal.         Judgment: Judgment normal.         Lines/Drains/Airways     Peripheral Intravenous Line  Duration                Peripheral IV - Single Lumen 07/29/22 1159 20 G Anterior;Distal;Left Upper Arm 2 days              Labs  Recent Results (from the past 24 hour(s))   CBC with Differential    Collection Time: 08/01/22  5:22 AM   Result Value Ref Range    WBC 6.7 4.5 - 11.5 x10(3)/mcL    RBC 4.36 (L) 4.70 - 6.10 x10(6)/mcL    Hgb 12.6 (L) 14.0 - 18.0 gm/dL    Hct 39.0 (L) 42.0 - 52.0 %    MCV 89.4 80.0 - 94.0 fL    MCH 28.9 27.0 - 31.0 pg    MCHC 32.3 (L) 33.0 - 36.0 mg/dL    RDW 15.8 11.5 - 17.0 %    Platelet 611 (H) 130 - 400 x10(3)/mcL    MPV 10.1 7.4 - 10.4 fL    Neut % 48.1 %    Lymph % 38.7 %    Mono % 10.4 %    Eos % 1.5 %    Basophil % 1.0 %    Lymph # 2.60 0.6 - 4.6 x10(3)/mcL    Neut # 3.2 2.1 - 9.2 x10(3)/mcL    Mono # 0.70 0.1 - 1.3 x10(3)/mcL    Eos # 0.10 0 - 0.9 x10(3)/mcL    Baso # 0.07 0 - 0.2 x10(3)/mcL    IG# 0.02 0 - 0.04 x10(3)/mcL    IG% 0.3 %    NRBC% 0.0 %   Prealbumin    Collection Time: 08/01/22  5:23 AM   Result Value Ref Range    Prealbumin 30.2 16.0 - 42.0 mg/dL   Comprehensive Metabolic Panel    Collection Time: 08/01/22  5:30 AM   Result Value Ref Range    Sodium Level 141 136 - 145 mmol/L    Potassium Level 4.4 3.5 - 5.1 mmol/L    Chloride 101 98 - 107 mmol/L    Carbon Dioxide 28 23 - 31 mmol/L    Glucose Level 101 82 - 115 mg/dL    Blood Urea Nitrogen 14.4 8.4 - 25.7 mg/dL    Creatinine 0.67 (L) 0.73 - 1.18 mg/dL    Calcium Level Total 9.4 8.8 - 10.0 mg/dL    Protein Total  6.9 5.8 - 7.6 gm/dL    Albumin Level 3.1 (L) 3.4 - 4.8 gm/dL    Globulin 3.8 (H) 2.4 - 3.5 gm/dL    Albumin/Globulin Ratio 0.8 (L) 1.1 - 2.0 ratio    Bilirubin Total 0.3 <=1.5 mg/dL    Alkaline Phosphatase 167 (H) 40 - 150 unit/L    Alanine Aminotransferase 30 0 - 55 unit/L    Aspartate Aminotransferase 20 5 - 34 unit/L    Estimated GFR-Non  >60 mls/min/1.73/m2   Magnesium    Collection Time: 08/01/22  5:30 AM   Result Value Ref Range    Magnesium Level 2.20 1.60 - 2.60 mg/dL   Phosphorus    Collection Time: 08/01/22  5:30 AM   Result Value Ref Range    Phosphorus Level 3.5 2.3 - 4.7 mg/dL       Radiology  CT soft tissue neck without contrast on 07/18/2022 at 9:29 p.m., IMPRESSION:Postsurgical change in the cervical spine without fluid collection or abscess. Right upper lobe airspace disease.  Radiology  MRI C-spine without contrast on 07/15/2022 at 8:27 a.m., IMPRESSION: Postoperative changes are seen in the left posterior elements from C3 through C7 vertebrae. There is a postoperative collection in the surgical bed/ posterior paraspinal soft tissues surrounding the spinous process and posterior to the laminae. The collection measures approximately 7.3 cm in length and 2 cm in AP dimension. Similar findings are also seen on the prior examination. Correlate clinically as regards additional evaluation and follow-up. There are displaced fractures involving the laminae of C3 through C7 vertebrae appreciated better on the earlier CT study. There is moderate canal stenosis at C3-C4 and C4-C5 secondary to broad-based disc protrusions and degenerative joint disease. Details and other findings as noted above.  Radiology  MRI brain on 07/16/2022 at 8:30 a.m., IMPRESSION:No abnormal signal is identified on the diffusion images to suggest acute infarct. Details and findings as above. No significant discrepancy with overnight report.    Assessment/Plan:     71 y.o. WM admitted on 7/25/2022    Cervical  spondylosis   - s/p C3-C7 laminiopasty and laminoforminotomies on 7/12/2022   - continue     Bengay t.i.d. p.r.n. (initiated 7/28)                Robaxin 750 mg t.i.d. (increased 7/28)                Gabapentin 100 mg t.i.d. (increased 7/28)                Lidoderm patch daily                ASA 81 mg daily                Norco 5 mg q.4 hours p.r.n.  - defer to physiatry for rehab and pain management  - PT/OT/RT/PT following  - follow-up with Neurosurgery outpatient    Oropharyngeal dysphagia  - current  - continue   Minced and moist diet with moderately thickened liquids  - ST following     Hypothyroidism  - TSH within normal limits  - continue                Levothyroxine 50 mcg daily     VIJI  - compliant with CPAP  - continue current POC     BPH  - stable  - continue                Flomax 0.4 mg at bedtime     Normocytic anemia  - asymptomatic  - iron level low  - continue                Ferrous sulfate 325 mg b.i.d. (initiated 7/26)  - H/H stable   - no evidence of active bleeds  - will closely monitor and transfuse if needed      Constipation  - stable  - continue           Colace 100 mg BID     Left forearm PIV infiltration  - Discontinue peripheral IV  - continue                Warm compresses t.i.d. x3 days    Tachycardia  - improved  - continue   Metoprolol 50 mg b.i.d. (increased 8/1)      VTE Prophylaxis:  Lovenox 40 mg daily  COVID-19 testing:  Negative on 07/25/2022  COVID-19 vaccination status:  Vaccinated (Moderna):  02/10/2021 and 03/10/2021     POA: None  Living will: None  Contacts: Anusha Echeverria (spouse)     CODE STATUS: Full Code  Internal Medicine (attending): Luis Khan MD   Physiatry (consulting):  David Carty MD    OUTPATIENT PROVIDERS  Gary Reilly II, MD  Neurosurgery:  Paulo Rao MD  Neurology: Yan Rainey MD     DISPOSITION: Condition stable.  Sleep hygiene, bowel maintenance, and appetite at goal.  BP stable.  Heart rate still slightly tachycardic.  Lab work  unremarkable.  Albumin and pre-albumin trending up.  Increase metoprolol tartrate 50 mg b.i.d..  Monitor closely.  Notify of acute changes.  Staffing completed today.    Staffing 8/1/2022: Continent of bowel and bladder. Good appetite. RT: overall contact guard. PT: walking 200 feet with rolling walker, overall contact guard. Needs time. OT: overall standby assist to contact guard assist. Projected discharge 8/5.    *Verbal consent obtained for live A/V Telehealth Visit     Originating Site: [facility]  Place of service of originating site: Inpatient Rehab Facility  Distant Site (MD Location):     [facility]                                                          [id number] [place] [id number]  Synchronous Communication Encounter Clock Time: 6 minutes       Simón Phillips NP conducted independent physical examination and assisted with medical documentation.    Total time spent on this encounter including chart review and direct MD + NP 1-on-1 patient interaction: 44 minutes   Over 50% of this time was spent in counseling and coordination of care

## 2022-08-01 NOTE — PT/OT/SLP PROGRESS
Physical Therapy Inpatient Rehab Treatment    Patient Name:  Scott Echeverria   MRN:  [id number]    Recommendations:     Discharge Recommendations:  other (see comments)   Discharge Equipment Recommendations: other (see comments) (Pending progress)   Barriers to discharge: None    Assessment:     Scott Echeverria is a 71 y.o. male admitted with a medical diagnosis of Stenosis of cervical spine with myelopathy.  He presents with the following impairments/functional limitations:  weakness, impaired endurance, impaired sensation, gait instability, impaired functional mobility, impaired balance, decreased coordination, decreased lower extremity function, decreased safety awareness, pain.    Rehab Diagnosis:     Recent Surgery: * No surgery found *      General Precautions: Standard, honey thick     Orthopedic Precautions:spinal precautions     Braces: Cervical collar    Rehab Prognosis: Good; patient would benefit from acute skilled PT services to address these deficits and reach maximum level of function.      History:     Past Medical History:   Diagnosis Date    BPH (benign prostatic hyperplasia)     Cervical pain     Cervical radiculitis     Cervical spinal stenosis     Hypothyroidism, unspecified     Sleep apnea     resolved since surgery       Past Surgical History:   Procedure Laterality Date    APPENDECTOMY  01/25/2022    Dr. Kerry Coats    COLONOSCOPY  2021    HEMORRHOID SURGERY  2002    LAMINOPLASTY N/A 7/12/2022    Procedure: LAMINOPLASTY;  Surgeon: Paulo Rao MD;  Location: [facility];  Service: Neurosurgery;  Laterality: N/A;  left C3-4, C4-5, C5-6, C6-7 foraminotomies, C3-7 laminoplasty //  TIVA SET UP    lower back surgery  1990    RETINAL DETACHMENT SURGERY Right 2015    SINUS SURGERY      TONSILLECTOMY  1956       Subjective     Respiratory Status: Room air    Patients cultural, spiritual, Mandaen conflicts given the current situation: yes       4/10 Pain L shoulder, Notified RN        Objective:     Communicated with RN prior to session.  Patient found up in chair with peripheral IV  upon PT entry to room.    Pt is Oriented x3 and Alert.    Functional Mobility:   · Transfers:     · Sit to Stand:  set up  with rolling walker  · Bed to Chair: set up  with  rolling walker  using  Step Transfer  · Gait: Pt ambulates 200 x 2  with RW with Contact Guard Assistance.   · Impairments contributing to gait deviations include impaired balance, impaired coordination and pain.     Current   Status  Discharge   Goal   Functional Area: Care Score:    Roll Left and Right 6 Set-up/clean-up   Sit to Lying 5 Set-up/clean-up   Lying to Sitting on Side of Bed 5 Supervision or touching assistance   Sit to Stand 5 Set-up/clean-up   Chair/Bed-to-Chair Transfer 4 Set-up/clean-up   Car Transfer   Supervision or touching assistance   Walk 10 Feet 4 Set-up/clean-up   Walk 50 Feet with Two Turns 4 Set-up/clean-up   Walk 150 Feet 4 Set-up/clean-up   Walk 10 Feet Uneven Surface 4 Set-up/clean-up   1 Step (Curb) 4 Supervision or touching assistance   4 Steps 4 Supervision or touching assistance   12 Steps 4 Supervision or touching assistance   Picking Up Object   Set-up/clean-up   Wheel 50 Feet with Two Turns       Wheel 150 Feet           Therapeutic Activities and Exercises:    Standing with RW heel toe stepping 10 x 2 BLE       Donned biofreeze to neck avoiding incision.     Activity Tolerance Good     Patient left up in chair with all lines intact and call button in reach.    Education provided: roles and goals of PT/PTA, transfer training, bed mob and gait training    Expected compliance:    GOALS:   Multidisciplinary Problems     Physical Therapy Goals        Problem: Physical Therapy    Goal Priority Disciplines Outcome Goal Variances Interventions   Physical Therapy Goal     PT, PT/OT Ongoing, Progressing     Description: Short Term goals      Bed Mobility   Roll Right and Left Setup or Clean-up Assistance .  Lying  to sitting Setup or Clean-up Assistance .  Sitting to lying Setup or Clean-up Assistance .    Transfers    Pt will be able to perform Stand step chair/bed to chair transfer With RW Setup or Clean-up Assistance .  Pt will be able to perform Sit to stand with RW Setup or Clean-up Assistance .  Pt will be able to perform Car transfer with RW Setup or Clean-up Assistance .    Ambulation    Pt will ambulate 200 Feet with RW Setup or Clean-up Assistance .  Pt will be able to ascend/descend 12 stairs using B Rails Supervision or Touching Assistance .  Pt will be able to ascend/descend 4 inch curb using RW Supervision or Touching Assistance .  Pt will be able to ambulate uneven surfaces/ramp 15 feet with RW Supervision or Touching Assistance .      Timeframe: By Discharge                     Plan:     During this hospitalization, patient to be seen 5 x/week to address the identified rehab impairments via gait training, therapeutic activities, therapeutic exercises, neuromuscular re-education, wheelchair management/training and progress toward the following goals:     Plan of Care Expires:  08/01/22    Additional Information:         Time Tracking:     PT Received On: 08/01/22  Time In 1300     Time Out 1330  Total Time 30 min  Therapy Time PT Individual: 30  Missed Time:    Time Missed due to:      Billable Minutes: Gait Training 20 and Therapeutic Activity 10    08/01/2022

## 2022-08-01 NOTE — PROGRESS NOTES
08/01/22 1400   Rec Therapy Time Calculation   Date of Treatment 08/01/22   Rec Start Time 1400   Rec Stop Time 1430   Rec Total Time (min) 30 min   Time   Treatment time 2 units   Pain/Comfort   Pain Rating 1 4/10   Location - Side 1 Left   Location - Orientation 1 upper   Location 1 neck   Pain Addressed 1 Nurse notified   Plan   Patient to be seen Daily   Planned Duration 1 week   Treatments Planned Coordination;Energy conservation training;Fine motor   Treatment plan/goals estblished with Patient/Caregiver Yes

## 2022-08-01 NOTE — PT/OT/SLP EVAL
Physical Therapy Rehab Re-Evaluation    Patient Name:  Scott Echeverria   MRN:  [id number]    Recommendations:     Discharge Recommendations:  other (see comments)   Discharge Equipment Recommendations: other (see comments) (Pending progress)   Barriers to discharge: None    Assessment:     Scott Echeverria is a 71 y.o. male admitted with a medical diagnosis of Stenosis of cervical spine with myelopathy.  He presents with the following impairments/functional limitations:  weakness, impaired endurance, impaired sensation, gait instability, impaired functional mobility, impaired balance, decreased coordination, decreased lower extremity function, decreased safety awareness, pain .    Rehab Diagnosis:     Recent Surgery: * No surgery found *      General Precautions: Standard, fall     Orthopedic Precautions: spinal precautions     Braces: Cervical collar    Rehab Prognosis: Good; patient would benefit from acute skilled PT services to address these deficits and reach maximum level of function.      History:     Past Medical History:   Diagnosis Date    BPH (benign prostatic hyperplasia)     Cervical pain     Cervical radiculitis     Cervical spinal stenosis     Hypothyroidism, unspecified     Sleep apnea     resolved since surgery       Past Surgical History:   Procedure Laterality Date    APPENDECTOMY  01/25/2022    Dr. Kerry Coats    COLONOSCOPY  2021    HEMORRHOID SURGERY  2002    LAMINOPLASTY N/A 7/12/2022    Procedure: LAMINOPLASTY;  Surgeon: Paulo Rao MD;  Location: [facility];  Service: Neurosurgery;  Laterality: N/A;  left C3-4, C4-5, C5-6, C6-7 foraminotomies, C3-7 laminoplasty //  TIVA SET UP    lower back surgery  1990    RETINAL DETACHMENT SURGERY Right 2015    SINUS SURGERY      TONSILLECTOMY  1956       Subjective       Vitals   Vitals at Rest    Pain Pain Rating 1: 4/10  Location - Side 1: Left  Location 1: shoulder  Pain Addressed 1: Pre-medicate for activity, Reposition  Pain Rating  Post-Intervention 1: 7/10     Respiratory Status: Room air    Patients cultural, spiritual, Congregational conflicts given the current situation: yes       Living Environment  Lives With: spouse  Name(s) of Who Lives With Patient: Wife  Living Arrangements: house  Home Accessibility: other (see comments) (small threshold to enter house)  Number of Stairs, Main Entrance: one  Stair Railings, Main Entrance: none  Home Layout: Able to live on 1st floor  Transportation Anticipated: car, drives self  Living Environment Comment: WIS, grab bars, hand held shower head, built in shower chair  Equipment Currently Used at Home: grab bar    Prior Level of Function       Equipment used at home: grab bar.  DME owned (not currently used): rolling walker.      Upon discharge, patient will have assistance from wife.    Objective:     Communicated with RN prior to session.  Patient found up in chair with peripheral IV  upon PT entry to room.    Exams  Cognitive Exam:  Patient is oriented to Person, Place and Time  Sensation:          -       WNL  RLE Strength:          -       WFL  LLE Strength:          -       WFL  Tone:          -       WFL  Coordination:          -       Deficits, Left Lower Extremity       [place] WFL   Toe Taps Impaired   Heel to [place] Impaired       Functional Mobility  Bed Mobility:      Rolling Left:  independence   Rolling Right: modified independence   Supine to Sit: set up with bed rail    Sit to Supine: set up with bed rail   Transfers:      Sit to Stand:  set up  with rolling walker   Bed to Chair: stand by assistance with  rolling walker  using  Step Transfer  Gait: Pt ambulates 600 with RW with Standby Assistance.   Impairments contributing to gait deviations include impaired balance, impaired coordination, impaired motor control and pain.  4 curb with rolling walker with Contact Guard Assistance      GGs   Current   Status   Functional Area: Care Score:   Roll Left and Right 6   Sit to Lying 5    Lying to Sitting on Side of Bed 5   Sit to Stand 5   Chair/Bed-to-Chair Transfer 4   Car Transfer 3   Walk 10 Feet 4   Walk 50 Feet with Two Turns 4   Walk 150 Feet 4   Walk 10 Feet Uneven Surface 4   1 Step (Curb) 4   4 Steps 4   12 Steps 4   Picking Up Object 4   Wheel 50 Feet with Two Turns 9   Wheel 150 Feet 9      Therapeutic Activities and Exercises:  HEP Provided. 15 x. Pt with good understanding of HEP     Activity Tolerance Good    Patient left up in chair with all lines intact and call button in reach.    Education Provided: roles and goals of PT/PTA, transfer training, bed mob, gait training, stair training, balance training, safety awareness and strengthening exercises    Expected compliance: High compliance    GOALS:   Multidisciplinary Problems     Physical Therapy Goals        Problem: Physical Therapy    Goal Priority Disciplines Outcome Goal Variances Interventions   Physical Therapy Goal     PT, PT/OT Ongoing, Progressing     Description: Short Term goals      Bed Mobility   Roll Right and Left Setup or Clean-up Assistance .  Lying to sitting Setup or Clean-up Assistance .  Sitting to lying Setup or Clean-up Assistance .    Transfers    Pt will be able to perform Stand step chair/bed to chair transfer With RW Setup or Clean-up Assistance .  Pt will be able to perform Sit to stand with RW Setup or Clean-up Assistance .  Pt will be able to perform Car transfer with RW Setup or Clean-up Assistance .    Ambulation    Pt will ambulate 200 Feet with RW Setup or Clean-up Assistance .  Pt will be able to ascend/descend 12 stairs using B Rails Supervision or Touching Assistance .  Pt will be able to ascend/descend 4 inch curb using RW Supervision or Touching Assistance .  Pt will be able to ambulate uneven surfaces/ramp 15 feet with RW Supervision or Touching Assistance .      Timeframe: By Discharge                     Plan:     During this hospitalization, patient to be seen 5 x/week to address the  identified rehab impairments via gait training, therapeutic activities, therapeutic exercises, neuromuscular re-education, wheelchair management/training and progress toward the following goals:     Plan of Care Expires:  08/01/22    Time Tracking:     PT Received On: 08/01/22  Time In 1030     Time Out 1130  Total Time 60 min  Therapy Time PT Received On: 08/01/22  PT Start Time: 1030  PT Stop Time: 1130  PT Total Time (min): 60 min  PT Individual: 60  Missed Time:    Time Missed due to:      Billable Minutes: Re-eval 30 and Therapeutic Exercise 30    08/01/2022

## 2022-08-01 NOTE — PLAN OF CARE
Problem: Rehabilitation ([facility]) Plan of Care  Goal: Absence of New-Onset Illness or Injury  Outcome: Ongoing, Progressing     Problem: Impaired Wound Healing  Goal: Optimal Wound Healing  Outcome: Ongoing, Progressing     Problem: Skin Injury Risk Increased  Goal: Skin Health and Integrity  Outcome: Ongoing, Progressing     Problem: Fall Injury Risk  Goal: Absence of Fall and Fall-Related Injury  Outcome: Ongoing, Progressing

## 2022-08-01 NOTE — PT/OT/SLP RE-EVAL
Occupational Therapy Inpatient Rehab Re-Evaluation    Name: Scott Echeverria  MRN: [id number]    Recommendations:     Discharge Recommendations:  home health OT   Discharge Equipment Recommendations:     Barriers to discharge: None    Assessment:  Scott Echeverria is a 71 y.o. male admitted with a medical diagnosis of Stenosis of cervical spine with myelopathy.  He presents with the following impairments/functional limitations:  weakness, impaired endurance, impaired self care skills, impaired sensation, impaired functional mobility, gait instability, impaired balance, decreased coordination, decreased upper extremity function, decreased safety awareness, decreased lower extremity function, abnormal tone, decreased ROM, impaired coordination, edema, impaired muscle length, orthopedic precautions  .    Rehab Diagnosis:     Recent Surgery: * No surgery found *      General Precautions: Standard, fall     Orthopedic Precautions:spinal precautions   chair  Braces: Cervical collar    Rehab Prognosis: Good; patient would benefit from acute skilled OT services to address these deficits and reach maximum level of function.      History:     Past Medical History:   Diagnosis Date    BPH (benign prostatic hyperplasia)     Cervical pain     Cervical radiculitis     Cervical spinal stenosis     Hypothyroidism, unspecified     Sleep apnea     resolved since surgery       Past Surgical History:   Procedure Laterality Date    APPENDECTOMY  01/25/2022    Dr. Kerry Coats    COLONOSCOPY  2021    HEMORRHOID SURGERY  2002    LAMINOPLASTY N/A 7/12/2022    Procedure: LAMINOPLASTY;  Surgeon: Paulo Rao MD;  Location: [facility];  Service: Neurosurgery;  Laterality: N/A;  left C3-4, C4-5, C5-6, C6-7 foraminotomies, C3-7 laminoplasty //  TIVA SET UP    lower back surgery  1990    RETINAL DETACHMENT SURGERY Right 2015    SINUS SURGERY      TONSILLECTOMY  1956       Subjective     Orientation: Oriented x4    Chief Complaint: Pain  "and stiffness in neck    Patient/Family Comments/goals: "to return home with spouse"    Vitals  Vitals at Rest  BP     HR     O2 Sat     Pain Pain Rating 1: 8/10  Location - Side 1: Left  Location - Orientation 1: midline  Location 1: neck  Pain Addressed 1: Nurse notified (nursing provided pain meds during session)  Pain Rating Post-Intervention 1: 9/10     Vitals With Activity  BP     HR     O2 Sat     Pain Pain Rating 1: 4/10  Location - Side 1: Bilateral  Location - Orientation 1: midline  Location 1: neck  Pain Addressed 1: Nurse notified, Pre-medicate for activity  Pain Rating Post-Intervention 1: 3/10     Respiratory Status: Room air    Patients cultural, spiritual, Amish conflicts given the current situation:         Living Environment   Living Environment  Lives With: spouse  Name(s) of Who Lives With Patient: Wife  Living Arrangements: house  Home Accessibility: stairs to enter home  Number of Stairs, Main Entrance: one  Stair Railings, Main Entrance: none  Home Layout: Able to live on 1st floor  Transportation Anticipated: car, drives self  Living Environment Comment: WIS, grab bars, hand held shower head, built in shower chair  Equipment Currently Used at Home: grab bar  Shower Setup: Tub/Shower combo    Prior Level of Function  BADL: Independent    IADL: Independent    Equipment used at home: grab bar.  DME owned (not currently used): none.      Upon discharge, patient will have assistance from spouse.    Objective:     Communicated with nursing prior to session.  Patient found HOB elevated with peripheral IV  upon OT entry to room.    Mobility   Patient completed:  Supine to Sit with supervision  Sit to Stand Transfer with contact guard assistance with rolling walker  Stand to Sit Transfer with stand by assistance with rolling walker  Toilet Transfer Step Transfer technique with contact guard assistance with  rolling walker  Tub Transfer Step Transfer technique with stand by assistance with " rolling walker      Functional Mobility   In room mobility for ADL's with RW with CGA/SBA with no posterior leaning today.     ADLs     Care Score/CAROLYN Descriptors Equipment   Eating 5     Oral Hygiene 5 Supported short sit and Wheelchair N/A   Shower, Bathe Self 4 Supported short sit Bath seat, Hand held shower and Tub bench   Upper Body Dressing 4 Unsupported short sit N/A   Lower Body Dressing 4 Unsupported short sit and figure 4 sitting position none   Toileting Hygiene 4 Sitting without back support Bedside commode over toilet   Toilet Transfer 4  Elevated toilet seat, BSC over toilet   Tub Transfer Contact Guard Assistance  TTB, RW   Putting On, Taking Off Footwear 5 Figure 4 sitting position none     Limiting Factors for ADLs: motor, endurance, limited ROM, balance, weakness, coordination and safety awareness    Exams     ROM:          -       WFL, except LUE limited to 3/4 range    Hand Dominance: Right    ROM Hand  Left Hand: WFL  Right Hand: WFL    Strength  Overall Strength:          -       WFL     Strength:   WFL      Pinch Strength:   WFL    Sensation  Left:          -       WNL  Right:          -       WNL    Coordination:      -       Intact    Tone  Left: WNL  Right: WNL    Visual/Perceptual  Intact    Cognition:   WFL    Balance    Sitting  Sitting Surface: TTB  Static: No UE Support, Good  Dynamic: No UE extremity support, Fair (+)    Standing  Static: One UE Extremity, Fair (+)  Dynamic: One UE Extremity, Fair (-)    Righting Reaction:   WNL    Posture/Deviations  Stiff neck    LifeStyle Change and Education     Patient left up in chair with PT present.    Education provided: Roles and goals of OT, ADLs, transfer training, bed mobility, assistive device, modified goals, safety precautions, fall prevention and home safety    ADLs:  Pt to perform grooming tasks with indep while standing with RW   Pt to perform UB dressing with set up assist   Pt to perform LB dressing with set up assist   Pt to  perform putting on/off footwear task with set up assist   Pt to perform toileting with supervision using RW    Functional Transfers:  Pt to perform toilet transfers with CGA and RW   Pt to perform a tub transfer with CGA and RW  Pt to perform a walk-in shower transfer with CGA and RW    IADLs:  Pt to perform IADL tasks while standing for 8 min with RW and SBA     Balance, Strengthening, Endurance, Balance:  Pt to consistently demonstrate adherence to cervical spinal precautions during all ADL's as instructed by OT.  Pt to demonstrate dynamic standing balance for 10 min as required to perform ADL's from standing level.  Pt to demonstrate consistent adherence to breathing control and energy conservation techniques as educated by OT.   Progressing, Ongoing 8/1/2022    Plan     During this hospitalization, patient to be seen 5 x/week to address the identified rehab impairments via self-care/home management, therapeutic activities, therapeutic exercises, sensory integration and progress toward the following goals:    Plan of Care Expires:  08/08/22    Time Tracking     OT Received On: 08/01/22  Time In 0730     Time Out 0830  Total Time 60 min  Therapy Time: OT Individual: 60  Missed Time:    Missed Time Reason:      Billable Minutes: Re-eval 15, Self care 45    08/01/2022

## 2022-08-01 NOTE — PT/OT/SLP PROGRESS
Physical Therapy Inpatient Rehab Treatment    Patient Name:  Scott Echeverria   MRN:  [id number]    Recommendations:     Discharge Recommendations:  other (see comments)   Discharge Equipment Recommendations: other (see comments) (Pending progress)   Barriers to discharge: None    Assessment:     Scott Echeverria is a 71 y.o. male admitted with a medical diagnosis of Stenosis of cervical spine with myelopathy.  He presents with the following impairments/functional limitations:  weakness, impaired endurance, impaired functional mobility, gait instability, impaired balance, pain, decreased lower extremity function .    Rehab Diagnosis:     Recent Surgery: * No surgery found *      General Precautions: Standard, honey thick     Orthopedic Precautions:spinal precautions     Braces: Cervical collar    Rehab Prognosis: Good; patient would benefit from acute skilled PT services to address these deficits and reach maximum level of function.      History:     Past Medical History:   Diagnosis Date    BPH (benign prostatic hyperplasia)     Cervical pain     Cervical radiculitis     Cervical spinal stenosis     Hypothyroidism, unspecified     Sleep apnea     resolved since surgery       Past Surgical History:   Procedure Laterality Date    APPENDECTOMY  01/25/2022    Dr. Kerry Coats    COLONOSCOPY  2021    HEMORRHOID SURGERY  2002    LAMINOPLASTY N/A 7/12/2022    Procedure: LAMINOPLASTY;  Surgeon: Paulo Rao MD;  Location: [facility];  Service: Neurosurgery;  Laterality: N/A;  left C3-4, C4-5, C5-6, C6-7 foraminotomies, C3-7 laminoplasty //  TIVA SET UP    lower back surgery  1990    RETINAL DETACHMENT SURGERY Right 2015    SINUS SURGERY      TONSILLECTOMY  1956       Subjective     Chief Complaint: none  Patient/Family Comments/goals: decrease pain and improve weakness     Respiratory Status: Room air    Patients cultural, spiritual, Muslim conflicts given the current situation: yes      Objective:      Communicated with nursing  prior to session.  Patient found up in chair with Other (comments) (in wc)  upon PT entry to room.    Pt is Oriented x3 and Alert.soft collar on per pts request     Functional Mobility:   · Transfers:     · Sit to Stand:  supervision with rolling walker  · Gait: Pt ambulates 350ft then 300ft  with RW with Supervision.   · 12 stairs with B rails  with Supervision  ·   with bilateral handrails with Supervision  · Ambulate 10 feet on uneven surfaces/ramps with rolling walker and ramp  with Supervision     Current   Status  Discharge   Goal   Functional Area: Care Score:    Roll Left and Right   Set-up/clean-up   Sit to Lying   Set-up/clean-up   Lying to Sitting on Side of Bed   Supervision or touching assistance   Sit to Stand 5 Set-up/clean-up   Chair/Bed-to-Chair Transfer   Set-up/clean-up   Car Transfer   Supervision or touching assistance   Walk 10 Feet   Set-up/clean-up   Walk 50 Feet with Two Turns   Set-up/clean-up   Walk 150 Feet   Set-up/clean-up   Walk 10 Feet Uneven Surface 4 Set-up/clean-up   1 Step (Curb) 4 Supervision or touching assistance   4 Steps 4 Supervision or touching assistance   12 Steps 4 Supervision or touching assistance   Picking Up Object   Set-up/clean-up   Wheel 50 Feet with Two Turns       Wheel 150 Feet           Activity Tolerance    Patient left up in chair with call button in reach and BLE elevated .    Education provided: gait training and stair training    Expected compliance:    GOALS:   Multidisciplinary Problems     Physical Therapy Goals        Problem: Physical Therapy    Goal Priority Disciplines Outcome Goal Variances Interventions   Physical Therapy Goal     PT, PT/OT Ongoing, Progressing     Description: Short Term goals      Bed Mobility   Roll Right and Left Setup or Clean-up Assistance .  Lying to sitting Setup or Clean-up Assistance .  Sitting to lying Setup or Clean-up Assistance .    Transfers    Pt will be able to perform Stand step  chair/bed to chair transfer With RW Setup or Clean-up Assistance .  Pt will be able to perform Sit to stand with RW Setup or Clean-up Assistance .  Pt will be able to perform Car transfer with RW Setup or Clean-up Assistance .    Ambulation    Pt will ambulate 200 Feet with RW Setup or Clean-up Assistance .  Pt will be able to ascend/descend 12 stairs using B Rails Supervision or Touching Assistance .  Pt will be able to ascend/descend 4 inch curb using RW Supervision or Touching Assistance .  Pt will be able to ambulate uneven surfaces/ramp 15 feet with RW Supervision or Touching Assistance .      Timeframe: By Discharge                     Plan:     During this hospitalization, patient to be seen 5 x/week to address the identified rehab impairments via gait training, therapeutic activities, therapeutic exercises, neuromuscular re-education, wheelchair management/training and progress toward the following goals:     Plan of Care Expires:  08/01/22    Additional Information:         Time Tracking:     PT Received On: 08/01/22  Time In 0830     Time Out 0930  Total Time 60 min  Therapy Time PT Individual: 60  Missed Time:    Time Missed due to:      Billable Minutes: Gait Training 30min and Therapeutic Activity 30min    08/01/2022

## 2022-08-01 NOTE — PLAN OF CARE
Met with pt then called wife, Anusha [phone]), to discuss updates from team conference and plans for discharge on Fri, 8/5/22.      Scheduled family training for Thurs, 8/4, at 1300.  Wife will bring their grandson with her since she babysits him during the day during the Summer months.      I will order the RW and HH PT/OT/RN services.

## 2022-08-01 NOTE — PLAN OF CARE
Orders placed for RW from [facility] and for HH PT/OT/ST/RN from [facility] via [facility].

## 2022-08-01 NOTE — PT/OT/SLP PROGRESS
Speech Language Pathology Treatment          Scott Echeverria   MRN: [id number]     Diet recommendations: Solid Diet Level: Minced & Moist Diet - IDDSI Level 5  Liquid Diet Level: Moderately thick liquids - IDDSI Level 3     Therapy Minutes  SLP Treatment Date: 22  Speech Start Time: 1000  Speech Stop Time: 1030  Speech Total (min): 30 min  SLP Individual: 30    Billable Minutes:  Treatment Swallowing Dysfunction 30 minutes    General Precautions: Standard,            Subjective:  Pt awake, alert, and cooperative throughout session.    Objective:   Pain/Comfort  Pain Rating Post-Intervention 1: 0/10    Pt completed tongue base retraction exercises x70 and laryngeal elevation exercises x90. Pt tolerated exercises well with occasional rest break.     Assessment:  Scott Echeverria is a 71 y.o. male with a SLP diagnosis of Dysphagia. SLP intervention continues to be warranted at this time. SLP to continue POC.    Discharge recommendations: Discharge Facility/Level of Care Needs: home health speech therapy     Goals:   Multidisciplinary Problems     SLP Goals        Problem: SLP    Goal Priority Disciplines Outcome   SLP Goal     SLP    Description: LT. Pt will tolerate least restrictive diet with no s/sx of aspiration.    ST. Pt will complete tongue base and laryngeal elevation exercises with 100% accuracy and minimal cues.  2. Pt will tolerate half meal of soft & bite sized diet with no s/sx of aspiration.                    Plan:   Patient to be seen Therapy Frequency: 5 x/week   Planned Interventions: Dysphagia Therapy  Plan of Care Expires: 22  Plan of Care reviewed with: patient  SLP Follow-up?: Yes  SLP - Next Visit Date: 22

## 2022-08-01 NOTE — PROGRESS NOTES
[facility] ([facility])  Rehab Progress Note    Patient Name: Scott Echeverria  MRN: [id number]  Age: 71 y.o. Sex: male  : 1950  Hospital Length of Stay: 6 days  Date of Service: 2022   Chief Complaint: Cervical spondylosis s/p C3-C7 laminiopasty and laminoforminotomies on 2022     Subjective:     Basic Information  Admit Information: 71-year-old white male presented to [facility] on 2022 for scheduled cervical laminiopasty and laminoforminotomies.  PMH significant for cervical spondylosis, hypothyroidism, VIJI, and BPH.  Tolerated C3-C7 laminiopasty and laminoforminotomies on  without perioperative complications.  On  code fast was initiated due to slurred speech after receiving IV Robaxin.  CT head, MRI, MRA negative.  Patient required Ativan prior to MRI and reportedly has some left-sided neglect.  Naloxone and romazicon received with little response. That afternoon he became hypoxic and was having agonal respirations requiring intubation.  T-max a 100.8° reported on .  Extubated on .  Repeat CT unremarkable.  NIVA negative for DVT.  Sputum culture did come back positive for Enterobacter Cloacae; resistant to cephalosporins.  Rocephin discontinued and Levaquin initiated.  Required re-intubation on  for continued alteration mental status and severe hypoxemic respiratory failure.  EEG with diffuse slowing.  LP with 12 WBC and 650 RBC. Neurology added acyclovir, rocephin, and vancomycin for possible meningitis on .  Extubated on .  Meningitis panel negative-low suspicion meningitis. Suspicion most likely secondary to medication side effect but reason for his decompensation and alteration mental status is still not clear on .  Acyclovir discontinued.  Downgraded to the floor on .  Broad coverage antibiotics/antivirals/antifungals discontinued.  Continue to participate progress in therapy.  Left arm weakness continued to improve.   Tolerated transfer to [facility] inpatient rehab unit on 07/25 without incident.  Today's Information: No acute events overnight.  Sitting up in chair.  Reports good sleep and appetite.  BM at goal.  Vital signs at goal with no recorded fevers. CMP unremarkable.  No imaging today.  Review of patient's allergies indicates:   Allergen Reactions    Iodine         Current Facility-Administered Medications:     acetaminophen tablet 650 mg, 650 mg, Oral, Q4H PRN,  A Jacqueline, FNP, 650 mg at 07/30/22 1341    ALPRAZolam tablet 0.25 mg, 0.25 mg, Oral, TID PRN,  A Jacqueline, FNP    aspirin chewable tablet 81 mg, 81 mg, Oral, Daily,  A Jacqueline, FNP, 81 mg at 07/31/22 0820    benzonatate capsule 100 mg, 100 mg, Oral, TID PRN,  A Jacqueline, FNP    bisacodyL suppository 10 mg, 10 mg, Rectal, Daily PRN,  A Jacqueline, FNP    docusate sodium capsule 100 mg, 100 mg, Oral, BID,  A Jacqueline, FNP, 100 mg at 07/31/22 2007    enoxaparin injection 40 mg, 40 mg, Subcutaneous, Daily,  A Jacqueline, FNP, 40 mg at 07/31/22 1736    ferrous sulfate tablet 1 each, 1 tablet, Oral, BID,  A Jacqueline, FNP, 1 each at 07/31/22 2007    gabapentin capsule 100 mg, 100 mg, Oral, TID,  A Jacqueline, FNP, 100 mg at 07/31/22 2009    hydrALAZINE injection 10 mg, 10 mg, Intravenous, Q4H PRN,  A Jacqueline, FNP    HYDROcodone-acetaminophen 5-325 mg per tablet 1 tablet, 1 tablet, Oral, Q4H PRN,  A Jacqueline, FNP, 1 tablet at 07/31/22 1808    hydrOXYzine pamoate capsule 50 mg, 50 mg, Oral, QHS,  A Jacqueline, FNP, 50 mg at 07/31/22 2007    labetalol 20 mg/4 mL (5 mg/mL) IV syring, 10 mg, Intravenous, Q4H PRN,  A Jacqueline, FNP    levothyroxine tablet 50 mcg, 50 mcg, Oral, Daily, LALO Ellison, 50 mcg at 07/31/22 0820    LIDOcaine 5 % patch 1 patch, 1 patch, Transdermal, Q24H, LALO Sawyer, 1 patch at 07/31/22 0529    methocarbamoL tablet 750  "mg, 750 mg, Oral, TID, Julianne Sims, FNP, 750 mg at 07/31/22 2010    metoprolol injection 10 mg, 10 mg, Intravenous, Q2H PRN,  A Jacqueline, FNP    metoprolol tartrate (LOPRESSOR) tablet 25 mg, 25 mg, Oral, BID,  A Jacqueline, FNP, 25 mg at 07/31/22 2008    nitroGLYCERIN SL tablet 0.4 mg, 0.4 mg, Sublingual, Q5 Min PRN, Timothy HESS Jacqueline, FNP    ondansetron disintegrating tablet 4 mg, 4 mg, Oral, Q6H PRN,  A Jacqueline, FNP    ondansetron disintegrating tablet 8 mg, 8 mg, Oral, Q6H PRN,  A Jacqueline, FNP    promethazine tablet 25 mg, 25 mg, Oral, Q6H PRN,  JIMENA Jacqueline, FNP    tamsulosin 24 hr capsule 0.4 mg, 0.4 mg, Oral, QHS, Timothy HESS Jacqueline, FNP, 0.4 mg at 07/31/22 2008     Review of Systems   Complete 12-point review of symptoms negative except for what's mentioned in HPI     Objective:     /72   Pulse 106   Temp 97.9 °F (36.6 °C) (Oral)   Resp 20   Ht 5' 9.02" (1.753 m)   Wt 73.1 kg (161 lb 2.5 oz)   SpO2 95%   BMI 23.79 kg/m²        Intake/Output Summary (Last 24 hours) at 7/31/2022 2125  Last data filed at 7/31/2022 1700  Gross per 24 hour   Intake 720 ml   Output --   Net 720 ml       Physical Exam  Constitutional:       Appearance: Normal appearance.   HENT:      Head: Normocephalic.      Mouth/Throat:      Mouth: Mucous membranes are moist.   Eyes:      Pupils: Pupils are equal, round, and reactive to light.   Neck:      Comments: Posterior neck incision clean and intact-TK, no redness or drainage  Cardiovascular:      Rate and Rhythm: Regular rhythm. Tachycardia present.      Heart sounds: Normal heart sounds.   Pulmonary:      Effort: Pulmonary effort is normal.      Breath sounds: Normal breath sounds.   Abdominal:      General: Bowel sounds are normal.      Palpations: Abdomen is soft.   Musculoskeletal:      Cervical back: Neck supple.      Comments: Generalized weakness and muscle atrophy   Skin:     General: Skin is warm and dry.      " Comments: Left forearm PIV infiltration with redness/indurated    Neurological:      General: No focal deficit present.      Mental Status: He is alert and oriented to person, place, and time.   Psychiatric:         Mood and Affect: Mood normal.         Behavior: Behavior normal.         Thought Content: Thought content normal.         Judgment: Judgment normal.       Lines/Drains/Airways     Peripheral Intravenous Line  Duration                Peripheral IV - Single Lumen 07/29/22 1159 20 G Anterior;Distal;Left Upper Arm 2 days              Labs  Recent Results (from the past 24 hour(s))   Comprehensive metabolic panel    Collection Time: 07/31/22  5:31 AM   Result Value Ref Range    Sodium Level 142 136 - 145 mmol/L    Potassium Level 4.6 3.5 - 5.1 mmol/L    Chloride 102 98 - 107 mmol/L    Carbon Dioxide 28 23 - 31 mmol/L    Glucose Level 98 82 - 115 mg/dL    Blood Urea Nitrogen 13.7 8.4 - 25.7 mg/dL    Creatinine 0.73 0.73 - 1.18 mg/dL    Calcium Level Total 9.3 8.8 - 10.0 mg/dL    Protein Total 6.9 5.8 - 7.6 gm/dL    Albumin Level 3.0 (L) 3.4 - 4.8 gm/dL    Globulin 3.9 (H) 2.4 - 3.5 gm/dL    Albumin/Globulin Ratio 0.8 (L) 1.1 - 2.0 ratio    Bilirubin Total 0.3 <=1.5 mg/dL    Alkaline Phosphatase 170 (H) 40 - 150 unit/L    Alanine Aminotransferase 33 0 - 55 unit/L    Aspartate Aminotransferase 23 5 - 34 unit/L    Estimated GFR-Non  >60 mls/min/1.73/m2       Radiology  CT soft tissue neck without contrast on 07/18/2022 at 9:29 p.m., IMPRESSION:Postsurgical change in the cervical spine without fluid collection or abscess. Right upper lobe airspace disease.  Radiology  MRI C-spine without contrast on 07/15/2022 at 8:27 a.m., IMPRESSION: Postoperative changes are seen in the left posterior elements from C3 through C7 vertebrae. There is a postoperative collection in the surgical bed/ posterior paraspinal soft tissues surrounding the spinous process and posterior to the laminae. The collection  measures approximately 7.3 cm in length and 2 cm in AP dimension. Similar findings are also seen on the prior examination. Correlate clinically as regards additional evaluation and follow-up. There are displaced fractures involving the laminae of C3 through C7 vertebrae appreciated better on the earlier CT study. There is moderate canal stenosis at C3-C4 and C4-C5 secondary to broad-based disc protrusions and degenerative joint disease. Details and other findings as noted above.  Radiology  MRI brain on 07/16/2022 at 8:30 a.m., IMPRESSION:No abnormal signal is identified on the diffusion images to suggest acute infarct. Details and findings as above. No significant discrepancy with overnight report.    Assessment/Plan:     71 y.o. WM admitted on 7/25/2022    Cervical spondylosis   - s/p C3-C7 laminiopasty and laminoforminotomies on 7/12/2022   - continue     Bengay t.i.d. p.r.n. (initiated 7/28)                Robaxin 750 mg t.i.d. (increased 7/28)                Gabapentin 100 mg t.i.d. (increased 7/28)                Lidoderm patch daily                ASA 81 mg daily                Norco 5 mg q.4 hours p.r.n.  - defer to physiatry for rehab and pain management  - PT/OT/RT/PT following  - follow-up with Neurosurgery outpatient    Oropharyngeal dysphagia  - current  - continue   Minced and moist diet with moderately thickened liquids  - ST following     Hypothyroidism  - TSH within normal limits  - continue                Levothyroxine 50 mcg daily     VIJI  - compliant with CPAP  - continue current POC     BPH  - stable  - continue                Flomax 0.4 mg at bedtime     Normocytic anemia  - asymptomatic  - iron level low  - continue                Ferrous sulfate 325 mg b.i.d. (initiated 7/26)  - H/H stable   - no evidence of active bleeds  - will closely monitor and transfuse if needed      Constipation  - stable  - continue           Colace 100 mg BID     Left forearm PIV infiltration  - Discontinue  peripheral IV  - continue                Warm compresses t.i.d. x3 days    Tachycardia  - Heart rate 100-115  - initiate   Metoprolol 25 mg b.i.d. (initiated 7/29)    NS 75 mL/HR x1 L     VTE Prophylaxis:  Lovenox 40 mg daily  COVID-19 testing:  Negative on 07/25/2022  COVID-19 vaccination status:  Vaccinated (Moderna):  02/10/2021 and 03/10/2021     POA: None  Living will: None  Contacts: Anusha Echeverria (spouse)     CODE STATUS: Full Code  Internal Medicine (attending): Luis Khan MD   Physiatry (consulting):  David Carty MD    OUTPATIENT PROVIDERS  Gary Reilly II, MD  Neurosurgery:  Paulo Rao MD  Neurology: Yan Rainey MD     DISPOSITION: Condition stable.  Sleep hygiene, bowel maintenance, and appetite at goal.  Vital signs at goal with no recorded fevers.  CMP unremarkable.  Continue current POC.  Monitor closely.  Notify of acute changes. Repeat labs in am     Total time spent on this encounter including chart review and direct 1-on-1 patient interaction: 37 minutes   Over 50% of this time was spent in counseling and coordination of care

## 2022-08-01 NOTE — PT/OT/SLP RE-EVAL
Recreational Therapy Re-Evaluation      Date of Treatment: 8/1/2022  Start Time: 1400   Stop Time:  1430  Total Time: 30 min  Missed Time:     Assessment      Scott Echeverria is a 71 y.o. male admitted with a medical diagnosis of Stenosis of cervical spine with myelopathy.  He presents with the following impairments/functional limitations:  weakness, impaired endurance, impaired functional mobility, gait instability, impaired balance, decreased coordination, decreased upper extremity function, decreased lower extremity function, decreased safety awareness, impaired fine motor, impaired coordination .    Rehab Diagnosis:     Recent Surgery:    General Precautions: Standard, fall     Orthopedic Precautions:spinal precautions     Braces: Cervical collar    Rehab Prognosis: Good; patient would benefit from acute skilled Recreational Therapy services to address these deficits and reach maximum level of function.      Impairments: Coordination deficits, Endurance deficits, Safety awareness deficits and Strength deficits  Rehab Potential: Good  Treatment Recommendations: Continue with current plan of care   Treatment Diagnosis: Cervical myelopathy s/p c3-7 posterior forminotomies c3-7 laminoplasty and repair, BPH,  Orientation: Oriented x4  Affect/Behavior: Appropriate, Cooperative and Distracted  Safety/Judgement: intact   Basic Command Following: intact  Spiritual Cultural: no        History     Past Medical History:   Diagnosis Date    BPH (benign prostatic hyperplasia)     Cervical pain     Cervical radiculitis     Cervical spinal stenosis     Hypothyroidism, unspecified     Sleep apnea     resolved since surgery       Past Surgical History:   Procedure Laterality Date    APPENDECTOMY  01/25/2022    Dr. Kerry Coats    COLONOSCOPY  2021    HEMORRHOID SURGERY  2002    LAMINOPLASTY N/A 7/12/2022    Procedure: LAMINOPLASTY;  Surgeon: Paulo Rao MD;  Location: [facility];  Service: Neurosurgery;  Laterality: N/A;   hSiv Luu  : 2004  Primary: [facility]*  Secondary:  Therapy Center at [address]  [phone]   [phone]          OUTPATIENT PHYSICAL THERAPY:Discontinuation Summary21   ICD-10: Treatment Diagnosis: Pain in left knee (M25.562), Muscle weakness, generalized (M62.81)  Precautions/Allergies:   Patient has no allergy information on record. TREATMENT PLAN:  Effective Dates: 3/17/2021 TO 2021 (60 days). Frequency/Duration: 2 times a week for 60 Day(s) MEDICAL/REFERRING DIAGNOSIS:  Pain of left patellofemoral joint [M25.562]   DATE OF ONSET: 2021  REFERRING PHYSICIAN: Berta Dooley,*  MD Orders: Evaluate and Treat  Return MD Appointment: TBD     INITIAL ASSESSMENT:  Mr. Fredy Santos presents with left knee patellofemoral pain which began without known cause in 2021. His chief complaint is pain but also presents with decreased knee strength, flexibility and hip/trunk stability which limits his ability to perform ADL tasks such as walking and squatting without pain. He will benefit from skilled therapy to improve his pain, strength and mobility to return to prior level of function. PROBLEM LIST (Impacting functional limitations):  1. Decreased Strength  2. Decreased ADL/Functional Activities  3. Decreased Ambulation Ability/Technique  4. Increased Pain  5. Decreased Activity Tolerance  6. Decreased Flexibility/Joint Mobility  7. Decreased Banco with Home Exercise Program INTERVENTIONS PLANNED: (Treatment may consist of any combination of the following)  1. Balance Exercise  2. Gait Training  3. Home Exercise Program (HEP)  4. Manual Therapy  5. Neuromuscular Re-education/Strengthening  6. Range of Motion (ROM)  7. Therapeutic Activites  8.  Therapeutic Exercise/Strengthening     GOALS: (Goals have been discussed and agreed upon with patient.)  Discharge Goals: Time Frame: 60 "left C3-4, C4-5, C5-6, C6-7 foraminotomies, C3-7 laminoplasty //  TIVA SET UP    lower back surgery  1990    RETINAL DETACHMENT SURGERY Right 2015    SINUS SURGERY      TONSILLECTOMY  1956       Home Environment     Living Situation  Lives With: spouse  Name(s) of Who Lives With Patient: Wife  Lives in: house  Patients Responsibilities: Community mobility, , Financial management, Health and wellness, Laundry, Leisure/play/hobbies, Meal preparation, Retired, Shopping, Social participation, Yard Work    Instrumental Activities of Daily Living     Previous Hand Dominance: Right Current Hand Dominance: Right (L sided weakness)     Other iADL Information:        Cognitive Skills Building       Cognitive Observation Activity Assist Position Equipment Response Comment             Comment:        Dynamic Activities      Activity Assist Position Equipment Response Comment   Activity 1 Bean bag toos supervision Standing Rolling walker good             Comment: Sit to stand was supervision as was dynamic standing balance/reaching. Standing tolerance was 5 minutes. LUE coordination improved to setup. He remains I with problem solving skills and sequencing skills. Decreased focus on pain. Recliner to w/c and w/e to recliner transfer was supervision. c/o 4/10 pain in neck and LUE. Reported to Nursing     Fine Motor Activities      Activity Assist Position Equipment Response Comment            Comment:          Goals     Patient Goals  Patient Goal 1: "Get back to where I was before and be my old self."    Short Term Goals    Goal  Goal Status   Will increase sit to stand to supervision Met   Will improve dynamic standing balance/reaching to supervision Met                 Long Term Goals    Goal Goal Status   Will increase standing tolerance to 5,10 minutes Progressing   Will improve dynamic standing balance/reaching to setup Progressing                     Plan       Patient to be seen: Daily  Duration: 4 " days  1. Patient will be independent with home exercise program without assistance from therapist. NOT MET  2. Patient will report LEFS score to 55/80 or more to demonstrate improved functional capacity. NOT MET  3. Patient will demonstrate pain free knee flexion to resume sitting at school without difficulty. NOT MET  4. Patient will perform walking and squatting tasks without pain to demonstrate improved functional mobility. NOT MET         OUTCOME MEASURE:   Tool Used: Lower Extremity Functional Scale (LEFS)  Score:  Initial: 32/80 (3/17/21) Most Recent: X/80 (Date: -- )   Interpretation of Score: 20 questions each scored on a 5 point scale with 0 representing \"extreme difficulty or unable to perform\" and 4 representing \"no difficulty\". The lower the score, the greater the functional disability. 80/80 represents no disability. Minimal detectable change is 9 points. MEDICAL NECESSITY:   · Patient is expected to demonstrate progress in strength and range of motion to increase independence with ADL tasks. REASON FOR SERVICES/OTHER COMMENTS:  · Patient continues to require present interventions due to patient's inability to squat or walk without pain. Shiv Luu has been seen in physical therapy on 3/17/21 1 visits. Treatment has been discontinued at this time due to patient failing to return for additional treatment. The below goals were met prior to discontinuation. Some goals were not met due to self discharge.    Thank you for this referral. days  Treatments planned: Balance training, Community/work reintegrat, Coordination, Energy conservation training, Safety education  Treatment plan/goals established with Patient/Caregiver: Yes

## 2022-08-02 PROCEDURE — 97124 MASSAGE THERAPY: CPT

## 2022-08-02 PROCEDURE — 99900035 HC TECH TIME PER 15 MIN (STAT)

## 2022-08-02 PROCEDURE — 94761 N-INVAS EAR/PLS OXIMETRY MLT: CPT

## 2022-08-02 PROCEDURE — 11800000 HC REHAB PRIVATE ROOM

## 2022-08-02 PROCEDURE — 97110 THERAPEUTIC EXERCISES: CPT

## 2022-08-02 PROCEDURE — 97530 THERAPEUTIC ACTIVITIES: CPT

## 2022-08-02 PROCEDURE — 25000003 PHARM REV CODE 250: Performed by: NURSE PRACTITIONER

## 2022-08-02 PROCEDURE — 63600175 PHARM REV CODE 636 W HCPCS: Performed by: NURSE PRACTITIONER

## 2022-08-02 PROCEDURE — 94799 UNLISTED PULMONARY SVC/PX: CPT

## 2022-08-02 PROCEDURE — 92610 EVALUATE SWALLOWING FUNCTION: CPT

## 2022-08-02 PROCEDURE — 97116 GAIT TRAINING THERAPY: CPT

## 2022-08-02 RX ADMIN — GABAPENTIN 100 MG: 100 CAPSULE ORAL at 09:08

## 2022-08-02 RX ADMIN — ASPIRIN 81 MG CHEWABLE TABLET 81 MG: 81 TABLET CHEWABLE at 09:08

## 2022-08-02 RX ADMIN — METHOCARBAMOL 750 MG: 750 TABLET ORAL at 05:08

## 2022-08-02 RX ADMIN — GABAPENTIN 100 MG: 100 CAPSULE ORAL at 02:08

## 2022-08-02 RX ADMIN — TAMSULOSIN HYDROCHLORIDE 0.4 MG: 0.4 CAPSULE ORAL at 08:08

## 2022-08-02 RX ADMIN — FERROUS SULFATE TAB 325 MG (65 MG ELEMENTAL FE) 1 EACH: 325 (65 FE) TAB at 08:08

## 2022-08-02 RX ADMIN — METOPROLOL TARTRATE 50 MG: 50 TABLET, FILM COATED ORAL at 08:08

## 2022-08-02 RX ADMIN — GABAPENTIN 100 MG: 100 CAPSULE ORAL at 05:08

## 2022-08-02 RX ADMIN — DOCUSATE SODIUM 100 MG: 100 CAPSULE, LIQUID FILLED ORAL at 09:08

## 2022-08-02 RX ADMIN — LEVOTHYROXINE SODIUM 50 MCG: 0.05 TABLET ORAL at 09:08

## 2022-08-02 RX ADMIN — DOCUSATE SODIUM 100 MG: 100 CAPSULE, LIQUID FILLED ORAL at 08:08

## 2022-08-02 RX ADMIN — ENOXAPARIN SODIUM 40 MG: 30 INJECTION SUBCUTANEOUS at 05:08

## 2022-08-02 RX ADMIN — METHOCARBAMOL 750 MG: 750 TABLET ORAL at 09:08

## 2022-08-02 RX ADMIN — METHOCARBAMOL 750 MG: 750 TABLET ORAL at 02:08

## 2022-08-02 RX ADMIN — METOPROLOL TARTRATE 50 MG: 50 TABLET, FILM COATED ORAL at 09:08

## 2022-08-02 RX ADMIN — LIDOCAINE 5% 1 PATCH: 700 PATCH TOPICAL at 05:08

## 2022-08-02 RX ADMIN — HYDROCODONE BITARTRATE AND ACETAMINOPHEN 1 TABLET: 5; 325 TABLET ORAL at 09:08

## 2022-08-02 RX ADMIN — HYDROCODONE BITARTRATE AND ACETAMINOPHEN 1 TABLET: 5; 325 TABLET ORAL at 08:08

## 2022-08-02 RX ADMIN — HYDROXYZINE PAMOATE 50 MG: 50 CAPSULE ORAL at 08:08

## 2022-08-02 RX ADMIN — FERROUS SULFATE TAB 325 MG (65 MG ELEMENTAL FE) 1 EACH: 325 (65 FE) TAB at 09:08

## 2022-08-02 NOTE — PROGRESS NOTES
08/02/22 1000   Rec Therapy Time Calculation   Date of Treatment 08/02/22   Rec Start Time 1000   Rec Stop Time 1030   Rec Total Time (min) 30 min   Time   Treatment time 2 units   OTHER   Rehab identified problem list/impairments impaired functional mobility;decreased coordination;decreased upper extremity function;decreased lower extremity function;impaired fine motor   Values/Beliefs/Spiritual Care   Spiritual, Cultural Beliefs, Mormonism Practices, Values that Affect Care no   Overall Level of Functioning   Activity Tolerance Independent   Right UE Coodination/Dexterity Independent   Left UE Coordination/Dexterity Standby Assist   Problem Solving/Sequencing Skills Independent   Memory Recall Independent   Attention Span Independent   Recreational Therapy Short Term Goals   Short Term Goal 1 Progression Met   Short Term Goal 2 Progression Met   Recreational Therapy Long Term Goals   Long Term Goal 1 Progression Progressing   Long Term Goal 2 Progression Progressing   Plan   Patient to be seen Daily   Planned Duration Other (Comment)  (4 days)   Treatments Planned Energy conservation training   Treatment plan/goals estblished with Patient/Caregiver Yes

## 2022-08-02 NOTE — PLAN OF CARE
Problem: SLP  Goal: SLP Goal  Description: LT. Pt will tolerate least restrictive diet with no s/sx of aspiration.    ST. Pt will complete tongue base and laryngeal elevation exercises with 100% accuracy and minimal cues.--PROGRESSING, CONTINUE  2. Pt will tolerate half meal of soft & bite sized diet with no s/sx of aspiration.--GOAL MET  3. Pt will tolerate half meal of regular diet consistency with no s/sx of aspiration.--INITIAL   Outcome: Ongoing, Progressing

## 2022-08-02 NOTE — PLAN OF CARE
Nazanin responded that the RW will be covered and delivered to pt's room on Friday, 8/5, as requested.

## 2022-08-02 NOTE — PT/OT/SLP RE-EVAL
"Speech Language Pathology Evaluation  Bedside Swallow    Patient Name:  Scott Echeverria   MRN:  [id number]  Admitting Diagnosis: Stenosis of cervical spine with myelopathy    Recommendations:                 General Recommendations:  Dysphagia therapy  Diet recommendations:  Soft & Bite Sized Diet - IDDSI Level 6, Moderately thick liquids - IDDSI Level 3   Aspiration Precautions: 1 bite/sip at a time, Alternating bites/sips and Small bites/sips   General Precautions: Standard,    Communication strategies:  none    History:     Past Medical History:   Diagnosis Date    BPH (benign prostatic hyperplasia)     Cervical pain     Cervical radiculitis     Cervical spinal stenosis     Hypothyroidism, unspecified     Sleep apnea     resolved since surgery       Past Surgical History:   Procedure Laterality Date    APPENDECTOMY  01/25/2022    Dr. Kerry Coats    COLONOSCOPY  2021    HEMORRHOID SURGERY  2002    LAMINOPLASTY N/A 7/12/2022    Procedure: LAMINOPLASTY;  Surgeon: Paulo Rao MD;  Location: [facility];  Service: Neurosurgery;  Laterality: N/A;  left C3-4, C4-5, C5-6, C6-7 foraminotomies, C3-7 laminoplasty //  TIVA SET UP    lower back surgery  1990    RETINAL DETACHMENT SURGERY Right 2015    SINUS SURGERY      TONSILLECTOMY  1956       Modified Barium Swallow: 7/21/2022    Prior diet: regular diet and thin liquids at baseline      Subjective     Pt awake, alert, and appropriate throughout session.  Patient goals: "to have regular coffee"     Pain/Comfort:  Pain/Comfort  Pain Rating 1: 0/10    Objective:   Reason for Referral: A bedside swallow evaluation was performed to assess the efficiency of the patient's swallow function, rule out aspiration and make recommendations regarding safe dietary consistencies, and effective compensatory strategies.    Oral Musculature Evaluation  Oral Musculature: WFL  Dentition: present and adequate  Secretion Management: adequate  Mucosal Quality: good  Mandibular Strength " and Mobility: WFL  Oral Labial Strength and Mobility: WF    Bedside Swallow Eval:      Consistency Fed by Oral Symptoms Pharyngeal Symptoms   Moderately thick liquids via cup Self None None   Puree Self None None   Soft & Bite Sized Self Oral residue; cleared with double swallow/liquid rinse None       Assessment:     Scott Echeverria is a 71 y.o. male with an SLP diagnosis of Dysphagia.  Diet upgrade is recommended at this time as pt able to tolerate >50% of test tray. SLP intervention continues to be warranted at this time. SLP to continue POC.    Goals:   Multidisciplinary Problems     SLP Goals        Problem: SLP    Goal Priority Disciplines Outcome   SLP Goal     SLP    Description: LT. Pt will tolerate least restrictive diet with no s/sx of aspiration.    ST. Pt will complete tongue base and laryngeal elevation exercises with 100% accuracy and minimal cues.--PROGRESSING, CONTINUE  2. Pt will tolerate half meal of soft & bite sized diet with no s/sx of aspiration.--GOAL MET  3. Pt will tolerate half meal of regular diet consistency with no s/sx of aspiration.--INITIAL                    Plan:     · Patient to be seen:  5 x/week   · Plan of Care expires:  22  · Plan of Care reviewed with:  patient   · SLP Follow-Up:  Yes       Discharge recommendations:  home health speech therapy   Barriers to Discharge:  None    Time Tracking:   Therapy Minutes  SLP Treatment Date: 22  Speech Start Time: 1130  Speech Stop Time: 1200  Speech Total (min): 30 min  SLP Individual: 30    Billable Minutes:  Re-eval 30 minutes    2022

## 2022-08-02 NOTE — PT/OT/SLP PROGRESS
Occupational Therapy Inpatient Rehab Treatment    Name: Scott Echeverria  MRN: [id number]    Assessment:  Scott Echeevrria is a 71 y.o. male admitted with a medical diagnosis of Stenosis of cervical spine with myelopathy.  He presents with the following impairments/functional limitations:  weakness, impaired endurance, impaired self care skills, impaired functional mobility, impaired balance, gait instability, decreased coordination, decreased upper extremity function, pain, decreased safety awareness, decreased ROM, impaired coordination, impaired fine motor  .    Rehab Diagnosis:     Recent Surgery: * No surgery found *      General Precautions: Standard, fall     Orthopedic Precautions:spinal precautions     Braces:  (soft cervical collar)    Rehab Prognosis: Good; patient would benefit from acute skilled OT services to address these deficits and reach maximum level of function.        History:     Past Medical History:   Diagnosis Date    BPH (benign prostatic hyperplasia)     Cervical pain     Cervical radiculitis     Cervical spinal stenosis     Hypothyroidism, unspecified     Sleep apnea     resolved since surgery       Past Surgical History:   Procedure Laterality Date    APPENDECTOMY  01/25/2022    Dr. Kerry Coats    COLONOSCOPY  2021    HEMORRHOID SURGERY  2002    LAMINOPLASTY N/A 7/12/2022    Procedure: LAMINOPLASTY;  Surgeon: Paulo Rao MD;  Location: [facility];  Service: Neurosurgery;  Laterality: N/A;  left C3-4, C4-5, C5-6, C6-7 foraminotomies, C3-7 laminoplasty //  TIVA SET UP    lower back surgery  1990    RETINAL DETACHMENT SURGERY Right 2015    SINUS SURGERY      TONSILLECTOMY  1956       Subjective     Orientation: Oriented x4    Chief Complaint: shoulder/arm pain/stiffness    Patient/Family Comments/goals: 'to go home with wife'    Respiratory Status: Room air    Patients cultural, spiritual, Orthodoxy conflicts given the current situation:         Objective:     Communicated with  nursing prior to session.  Patient found up in chair with peripheral IV  upon OT entry to room.      ADLs   Care Score/CAROLYN Descriptors Equipment   Oral Hygiene 6 Unsupported short sit and Wheelchair      Limiting Factors for ADLs: motor, endurance, limited ROM and weakness     Therapeutic Exercise  Pt performed BUE AROM with AAROM to L shoulder movements in all planes. Performed BUE on UBE for 5 mins forward and 5 mins backward with 3 min rest break between to increased strength and ROM to BUE to increased ADL performance.    Additional Treatments: OT applied biofreeze to L shoulder and massage to decreased stiffness/soreness of shoulder joint. At end of session, OT applied hot pack for 25 mins until PT session.    Patient left up in chair with call button in reach and chair alarm on.     Education provided: Roles and goals of OT, ADLs, safety precautions and fall prevention      Time Tracking     OT Received On: 08/02/22  Time In 0800     Time Out 0900  Total Time 60 min  Therapy Time: OT Individual: 60  Missed Time:    Missed Time Reason:      Billable Minutes: Therapeutic Exercise 45, massage 15    08/02/2022

## 2022-08-02 NOTE — PT/OT/SLP PROGRESS
Recreational Therapy Treatment    Date of Treatment: 08/02/22  Start Time: 1000  Stop Time: 1030  Total Time: 30 min  Missed Time:      General Precautions: fall  Ortho Precautions: spinal precautions  Braces:  (soft cervical collar)    Vitals   Vitals at Rest  BP    HR    O2 Sat    Pain      Vitals With Activity  BP    HR    O2 Sat    Pain        Treatment     Cognitive Skills Building   Cognitive Observation Activity Assist Position Equipment Response Comment             Comment:          Dynamic Activities   Activity Assist Position Equipment Response Comment   Activity 1 Washer toss supervision Standing Rolling walker and Metal washers good             Comment: Sit to stand was supervision/setup as was dynamic standing balance/reaching. Standing tolerance was 5 minutes. LUE coordination improved to supervision. I with problem solving skills. Cooperative       Fine Motor Activities   Activity Assist Position Equipment Response Comment            Comment:        Additional Info: Improved coordination/dexteriety with  LUE     Goals     Short Term Goals    Goal  Goal Status   Will increase sit to stand to supervision Met   Will improve dynamic standing balance/reaching to supervision Met                 Long Term Goals    Goal Goal Status   Will increase standing tolerance to 5,10 minutes Progressing   Will improve dynamic standing balance/reaching to setup Progressing

## 2022-08-02 NOTE — PLAN OF CARE
Problem: Fall Injury Risk  Goal: Absence of Fall and Fall-Related Injury  Outcome: Ongoing, Progressing  Intervention: Promote Injury-Free Environment  Flowsheets (Taken 8/2/2022 2397)  Safety Promotion/Fall Prevention:   assistive device/personal item within reach   nonskid shoes/socks when out of bed   side rails raised x 3   instructed to call staff for mobility

## 2022-08-02 NOTE — PT/OT/SLP PROGRESS
Physical Therapy Inpatient Rehab Treatment    Patient Name:  Scott Echeverria   MRN:  [id number]    Recommendations:     Discharge Recommendations:  other (see comments)   Discharge Equipment Recommendations: other (see comments) (Pending progress)   Barriers to discharge: None    Assessment:     Scott Echeverria is a 71 y.o. male admitted with a medical diagnosis of Stenosis of cervical spine with myelopathy.  He presents with the following impairments/functional limitations:  weakness, impaired endurance, impaired sensation, gait instability, impaired functional mobility, impaired balance, decreased coordination, decreased lower extremity function, decreased safety awareness, pain  .    General Precautions: Standard, honey thick     Orthopedic Precautions:spinal precautions     Braces: Cervical collar    Rehab Prognosis: Good; patient would benefit from acute skilled PT services to address these deficits and reach maximum level of function.      History:     Past Medical History:   Diagnosis Date    BPH (benign prostatic hyperplasia)     Cervical pain     Cervical radiculitis     Cervical spinal stenosis     Hypothyroidism, unspecified     Sleep apnea     resolved since surgery       Past Surgical History:   Procedure Laterality Date    APPENDECTOMY  01/25/2022    Dr. Kerry Coats    COLONOSCOPY  2021    HEMORRHOID SURGERY  2002    LAMINOPLASTY N/A 7/12/2022    Procedure: LAMINOPLASTY;  Surgeon: Paulo Rao MD;  Location: [facility];  Service: Neurosurgery;  Laterality: N/A;  left C3-4, C4-5, C5-6, C6-7 foraminotomies, C3-7 laminoplasty //  TIVA SET UP    lower back surgery  1990    RETINAL DETACHMENT SURGERY Right 2015    SINUS SURGERY      TONSILLECTOMY  1956       Subjective       Vitals   Vitals at Rest  BP    HR    O2 Sat    Pain 4/10 L shoulder pain; see flowsheet      Vitals With Activity  BP    HR    O2 Sat    Pain      Respiratory Status: Room air    Patients cultural, spiritual, Voodoo  conflicts given the current situation: yes      Objective:     Communicated with RN prior to session.  Patient found up in chair with peripheral IV  upon PT entry to room.    Pt is Oriented x3 and Alert and Motivated.    Functional Mobility:   · Transfers:     · Sit to Stand:  set up  with rolling walker  · Bed to Chair: set up  with  rolling walker  using  Step Transfer  · Gait: Pt ambulates 600 feet with RW with Standby Assistance.   · Impairments contributing to gait deviations include impaired balance and impaired coordination.     Current   Status  Discharge   Goal   Functional Area: Care Score:    Roll Left and Right   Set-up/clean-up   Sit to Lying   Set-up/clean-up   Lying to Sitting on Side of Bed   Supervision or touching assistance   Sit to Stand 5 Set-up/clean-up   Chair/Bed-to-Chair Transfer 5 Set-up/clean-up   Car Transfer   Supervision or touching assistance   Walk 10 Feet 4 Set-up/clean-up   Walk 50 Feet with Two Turns 4 Set-up/clean-up   Walk 150 Feet 4 Set-up/clean-up   Walk 10 Feet Uneven Surface   Set-up/clean-up   1 Step (Curb)   Supervision or touching assistance   4 Steps   Supervision or touching assistance   12 Steps   Supervision or touching assistance   Picking Up Object   Set-up/clean-up   Wheel 50 Feet with Two Turns       Wheel 150 Feet           Therapeutic Activities and Exercises:    Seated TherX: 3# bLE 15 x 3 with rest breaks between bouts   Knee extension    Knee flexion with TheraBand   Hip flexion    Ankle pumps    Hip adduction with ball    Hip abduction with Theraband        Co treatment with recreational therapy. Working on balance and endurance playing washer toss. Occasional seated rest breaks.     Activity Tolerance good     Patient left up in chair with all lines intact and call button in reach.    Education provided: roles and goals of PT/PTA, transfer training, gait training, stair training and strengthening exercises    Expected compliance:    GOALS:    Multidisciplinary Problems     Physical Therapy Goals        Problem: Physical Therapy    Goal Priority Disciplines Outcome Goal Variances Interventions   Physical Therapy Goal     PT, PT/OT Ongoing, Progressing     Description: Short Term goals      Bed Mobility   Roll Right and Left Setup or Clean-up Assistance .  Lying to sitting Setup or Clean-up Assistance .  Sitting to lying Setup or Clean-up Assistance .    Transfers    Pt will be able to perform Stand step chair/bed to chair transfer With RW Setup or Clean-up Assistance .  Pt will be able to perform Sit to stand with RW Setup or Clean-up Assistance .  Pt will be able to perform Car transfer with RW Setup or Clean-up Assistance .    Ambulation    Pt will ambulate 200 Feet with RW Setup or Clean-up Assistance .  Pt will be able to ascend/descend 12 stairs using B Rails Supervision or Touching Assistance .  Pt will be able to ascend/descend 4 inch curb using RW Supervision or Touching Assistance .  Pt will be able to ambulate uneven surfaces/ramp 15 feet with RW Supervision or Touching Assistance .      Timeframe: By Discharge                     Plan:     During this hospitalization, patient to be seen 5 x/week to address the identified rehab impairments via gait training, therapeutic activities, therapeutic exercises, neuromuscular re-education, wheelchair management/training and progress toward the following goals:     Plan of Care Expires:  08/01/22    Additional Information:         Time Tracking:     PT Received On: 08/02/22  Time In 0930     Time Out 1030  Total Time 60 min  Therapy Time PT Individual: 60  Missed Time:    Time Missed due to:      Billable Minutes: Gait Training 15, Therapeutic Activity 30 and Therapeutic Exercise 15    08/02/2022

## 2022-08-02 NOTE — PROGRESS NOTES
[facility] ([facility])  Rehab Progress Note  MD Telemedicine Visit    Patient Name: Scott Echeverria  MRN: [id number]  Age: 71 y.o. Sex: male  : 1950  Hospital Length of Stay: 8 days  Date of Service: 2022   Chief Complaint: Cervical spondylosis s/p C3-C7 laminiopasty and laminoforminotomies on 2022     Subjective:     Basic Information  Admit Information: 71-year-old white male presented to [facility] on 2022 for scheduled cervical laminiopasty and laminoforminotomies.  PMH significant for cervical spondylosis, hypothyroidism, VIJI, and BPH.  Tolerated C3-C7 laminiopasty and laminoforminotomies on  without perioperative complications.  On  code fast was initiated due to slurred speech after receiving IV Robaxin.  CT head, MRI, MRA negative.  Patient required Ativan prior to MRI and reportedly has some left-sided neglect.  Naloxone and romazicon received with little response. That afternoon he became hypoxic and was having agonal respirations requiring intubation.  T-max a 100.8° reported on .  Extubated on .  Repeat CT unremarkable.  NIVA negative for DVT.  Sputum culture did come back positive for Enterobacter Cloacae; resistant to cephalosporins.  Rocephin discontinued and Levaquin initiated.  Required re-intubation on  for continued alteration mental status and severe hypoxemic respiratory failure.  EEG with diffuse slowing.  LP with 12 WBC and 650 RBC. Neurology added acyclovir, rocephin, and vancomycin for possible meningitis on .  Extubated on .  Meningitis panel negative-low suspicion meningitis. Suspicion most likely secondary to medication side effect but reason for his decompensation and alteration mental status is still not clear on .  Acyclovir discontinued.  Downgraded to the floor on .  Broad coverage antibiotics/antivirals/antifungals discontinued.  Continue to participate progress in therapy.  Left arm weakness  continued to improve.  Tolerated transfer to [facility] rehab unit on 07/25 without incident.  Today's Information: No acute events overnight.  Sitting up in chair.  Reports good sleep and appetite.  Last BM 7/31.  Vital signs at goal with no recorded fevers.  No labs or imaging today.   Review of patient's allergies indicates:   Allergen Reactions    Iodine         Current Facility-Administered Medications:     acetaminophen tablet 650 mg, 650 mg, Oral, Q4H PRN,  A Jacqueline, FNP, 650 mg at 07/30/22 1341    ALPRAZolam tablet 0.25 mg, 0.25 mg, Oral, TID PRN,  A Jacqueline, FNP    aspirin chewable tablet 81 mg, 81 mg, Oral, Daily,  A Jacqueline, FNP, 81 mg at 08/01/22 0918    benzonatate capsule 100 mg, 100 mg, Oral, TID PRN,  A Jacqueline, FNP    bisacodyL suppository 10 mg, 10 mg, Rectal, Daily PRN,  A Jacqueline, FNP    docusate sodium capsule 100 mg, 100 mg, Oral, BID,  A Jacqueline, FNP, 100 mg at 08/01/22 2054    enoxaparin injection 40 mg, 40 mg, Subcutaneous, Daily,  A Jacqueline, FNP, 40 mg at 08/01/22 1742    ferrous sulfate tablet 1 each, 1 tablet, Oral, BID,  A Jacqueline, FNP, 1 each at 08/01/22 2054    gabapentin capsule 100 mg, 100 mg, Oral, TID,  A Jacqueline, FNP, 100 mg at 08/02/22 0554    hydrALAZINE injection 10 mg, 10 mg, Intravenous, Q4H PRN,  A Jacqueline, FNP    HYDROcodone-acetaminophen 5-325 mg per tablet 1 tablet, 1 tablet, Oral, Q4H PRN,  A Jacqueline, FNP, 1 tablet at 08/01/22 1745    hydrOXYzine pamoate capsule 50 mg, 50 mg, Oral, QHS,  A Jacqueline, FNP, 50 mg at 08/01/22 2054    labetalol 20 mg/4 mL (5 mg/mL) IV syring, 10 mg, Intravenous, Q4H PRN,  A Jacqueline, FNP    levothyroxine tablet 50 mcg, 50 mcg, Oral, Daily, LALO Ellison, 50 mcg at 08/01/22 0918    LIDOcaine 5 % patch 1 patch, 1 patch, Transdermal, Q24H, LALO Sawyer, 1 patch at 08/02/22 0518     "methocarbamoL tablet 750 mg, 750 mg, Oral, TID, Julianne Sims, FNP, 750 mg at 08/02/22 0554    metoprolol injection 10 mg, 10 mg, Intravenous, Q2H PRN,  JIMENA Jacqueline, FNP    metoprolol tartrate (LOPRESSOR) tablet 50 mg, 50 mg, Oral, BID,  A Jacqueline, FNP, 50 mg at 08/01/22 2054    nitroGLYCERIN SL tablet 0.4 mg, 0.4 mg, Sublingual, Q5 Min PRN,  A Jacqueline, FNP    ondansetron disintegrating tablet 4 mg, 4 mg, Oral, Q6H PRN,  A Jacqueline, FNP    ondansetron disintegrating tablet 8 mg, 8 mg, Oral, Q6H PRN,  A Jacqueline, FNP    promethazine tablet 25 mg, 25 mg, Oral, Q6H PRN,  A Jacqueline, FNP    tamsulosin 24 hr capsule 0.4 mg, 0.4 mg, Oral, QHS,  A Jacqueline, FNP, 0.4 mg at 08/01/22 2054     Review of Systems   Complete 12-point review of symptoms negative except for what's mentioned in HPI     Objective:     /67   Pulse 105   Temp 98.3 °F (36.8 °C) (Oral)   Resp 20   Ht 5' 9.02" (1.753 m)   Wt 73.1 kg (161 lb 2.5 oz)   SpO2 (!) 94%   BMI 23.79 kg/m²        Intake/Output Summary (Last 24 hours) at 8/2/2022 0838  Last data filed at 8/2/2022 0400  Gross per 24 hour   Intake 840 ml   Output 900 ml   Net -60 ml       Physical Exam  Constitutional:       Appearance: Normal appearance.   HENT:      Head: Normocephalic.      Mouth/Throat:      Mouth: Mucous membranes are moist.   Eyes:      Pupils: Pupils are equal, round, and reactive to light.   Neck:      Comments: Posterior neck incision clean and intact-TK, no redness or drainage  Cardiovascular:      Rate and Rhythm: Regular rhythm. Tachycardia present.      Heart sounds: Normal heart sounds.   Pulmonary:      Effort: Pulmonary effort is normal.      Breath sounds: Normal breath sounds.   Abdominal:      General: Bowel sounds are normal.      Palpations: Abdomen is soft.   Musculoskeletal:      Cervical back: Neck supple.      Comments: Generalized weakness and muscle atrophy   Skin:     General: " Skin is warm and dry.      Comments: Left forearm PIV infiltration with redness/indurated    Neurological:      General: No focal deficit present.      Mental Status: He is alert and oriented to person, place, and time.   Psychiatric:         Mood and Affect: Mood normal.         Behavior: Behavior normal.         Thought Content: Thought content normal.         Judgment: Judgment normal.       Lines/Drains/Airways     Peripheral Intravenous Line  Duration                Peripheral IV - Single Lumen 07/29/22 1159 20 G Anterior;Distal;Left Upper Arm 3 days              Labs  No results found for this or any previous visit (from the past 24 hour(s)).    Radiology  CT soft tissue neck without contrast on 07/18/2022 at 9:29 p.m., IMPRESSION:Postsurgical change in the cervical spine without fluid collection or abscess. Right upper lobe airspace disease.  Radiology  MRI C-spine without contrast on 07/15/2022 at 8:27 a.m., IMPRESSION: Postoperative changes are seen in the left posterior elements from C3 through C7 vertebrae. There is a postoperative collection in the surgical bed/ posterior paraspinal soft tissues surrounding the spinous process and posterior to the laminae. The collection measures approximately 7.3 cm in length and 2 cm in AP dimension. Similar findings are also seen on the prior examination. Correlate clinically as regards additional evaluation and follow-up. There are displaced fractures involving the laminae of C3 through C7 vertebrae appreciated better on the earlier CT study. There is moderate canal stenosis at C3-C4 and C4-C5 secondary to broad-based disc protrusions and degenerative joint disease. Details and other findings as noted above.  Radiology  MRI brain on 07/16/2022 at 8:30 a.m., IMPRESSION:No abnormal signal is identified on the diffusion images to suggest acute infarct. Details and findings as above. No significant discrepancy with overnight report.    Assessment/Plan:     71 y.o. WM  admitted on 7/25/2022    Cervical spondylosis   - s/p C3-C7 laminiopasty and laminoforminotomies on 7/12/2022   - continue     Bengay t.i.d. p.r.n. (initiated 7/28)                Robaxin 750 mg t.i.d. (increased 7/28)                Gabapentin 100 mg t.i.d. (increased 7/28)                Lidoderm patch daily                ASA 81 mg daily                Norco 5 mg q.4 hours p.r.n.  - defer to physiatry for rehab and pain management  - PT/OT/RT/PT following  - follow-up with Neurosurgery outpatient    Oropharyngeal dysphagia  - current  - continue   Minced and moist diet with moderately thickened liquids  - ST following     Hypothyroidism  - TSH within normal limits  - continue                Levothyroxine 50 mcg daily     VIJI  - compliant with CPAP  - continue current POC     BPH  - stable  - continue                Flomax 0.4 mg at bedtime     Normocytic anemia  - asymptomatic  - iron level low  - continue                Ferrous sulfate 325 mg b.i.d. (initiated 7/26)  - H/H stable   - no evidence of active bleeds  - will closely monitor and transfuse if needed      Constipation  - stable  - continue           Colace 100 mg BID     Left forearm PIV infiltration  - Discontinue peripheral IV  - continue                Warm compresses t.i.d. x3 days    Tachycardia  - improved  - continue   Metoprolol 50 mg b.i.d. (increased 8/1)      VTE Prophylaxis:  Lovenox 40 mg daily  COVID-19 testing:  Negative on 07/25/2022  COVID-19 vaccination status:  Vaccinated (Moderna):  02/10/2021 and 03/10/2021     POA: None  Living will: None  Contacts: Anusha Echeverria (spouse)     CODE STATUS: Full Code  Internal Medicine (attending): Luis Khan MD   Physiatry (consulting):  David Carty MD    OUTPATIENT PROVIDERS  Gary Reilly II, MD  Neurosurgery:  Paulo Rao MD  Neurology: Yan Rainey MD     DISPOSITION: Condition stable.  Sleep hygiene, bowel maintenance, and appetite at goal.  Vital signs at goal with no recorded  fevers.  No labs or imaging today.  Continue current POC.  Monitor closely.  Notify of acute changes.    Staffing 8/1/2022: Continent of bowel and bladder. Good appetite. RT: overall contact guard. PT: walking 200 feet with rolling walker, overall contact guard. Needs time. OT: overall standby assist to contact guard assist. Projected discharge 8/5.    *Verbal consent obtained for live A/V Telehealth Visit     Originating Site: [facility]  Place of service of originating site: Inpatient Rehab Facility  Distant Site (MD Location):     [address] [address]  Synchronous Communication Encounter Clock Time: 7 minutes       Simón Phillips NP conducted independent physical examination and assisted with medical documentation.    Total time spent on this encounter including chart review and direct MD + NP 1-on-1 patient interaction: 38 minutes   Over 50% of this time was spent in counseling and coordination of care

## 2022-08-02 NOTE — PT/OT/SLP PROGRESS
Physical Therapy Inpatient Rehab Treatment    Patient Name:  Scott Echeverria   MRN:  [id number]    Recommendations:     Discharge Recommendations:  other (see comments)   Discharge Equipment Recommendations: other (see comments) (Pending progress)   Barriers to discharge: None    Assessment:     Scott Echeverria is a 71 y.o. male admitted with a medical diagnosis of Stenosis of cervical spine with myelopathy.  He presents with the following impairments/functional limitations:  weakness, impaired endurance, impaired sensation, gait instability, impaired functional mobility, impaired balance, decreased coordination, decreased lower extremity function, decreased safety awareness, pain  .      General Precautions: Standard, honey thick     Orthopedic Precautions:spinal precautions     Braces: Cervical collar    Rehab Prognosis: Good; patient would benefit from acute skilled PT services to address these deficits and reach maximum level of function.      History:     Past Medical History:   Diagnosis Date    BPH (benign prostatic hyperplasia)     Cervical pain     Cervical radiculitis     Cervical spinal stenosis     Hypothyroidism, unspecified     Sleep apnea     resolved since surgery       Past Surgical History:   Procedure Laterality Date    APPENDECTOMY  01/25/2022    Dr. Kerry Coats    COLONOSCOPY  2021    HEMORRHOID SURGERY  2002    LAMINOPLASTY N/A 7/12/2022    Procedure: LAMINOPLASTY;  Surgeon: Paulo Rao MD;  Location: [facility];  Service: Neurosurgery;  Laterality: N/A;  left C3-4, C4-5, C5-6, C6-7 foraminotomies, C3-7 laminoplasty //  TIVA SET UP    lower back surgery  1990    RETINAL DETACHMENT SURGERY Right 2015    SINUS SURGERY      TONSILLECTOMY  1956       Subjective       Respiratory Status: Room air    Patients cultural, spiritual, Adventist conflicts given the current situation: yes      Objective:     Communicated with RN prior to session.  Patient found up in chair with peripheral IV   upon PT entry to room.    Pt is Oriented x3 and Alert and Motivated.    Functional Mobility:   · Transfers:     · Sit to Stand:  set up  with rolling walker  · Bed to Chair: Set up  with  rolling walker  using  Step Transfer  · Gait: Pt ambulates 200 with RW with Standby Assistance.   · Impairments contributing to gait deviations include impaired balance, impaired coordination and decreased strength.     Current   Status  Discharge   Goal   Functional Area: Care Score:    Roll Left and Right   Set-up/clean-up   Sit to Lying   Set-up/clean-up   Lying to Sitting on Side of Bed   Supervision or touching assistance   Sit to Stand 5 Set-up/clean-up   Chair/Bed-to-Chair Transfer 5 Set-up/clean-up   Car Transfer 4 Supervision or touching assistance   Walk 10 Feet 4 Set-up/clean-up   Walk 50 Feet with Two Turns 4 Set-up/clean-up   Walk 150 Feet 4 Set-up/clean-up   Walk 10 Feet Uneven Surface   Set-up/clean-up   1 Step (Curb)   Supervision or touching assistance   4 Steps   Supervision or touching assistance   12 Steps   Supervision or touching assistance   Picking Up Object   Set-up/clean-up   Wheel 50 Feet with Two Turns       Wheel 150 Feet           Activity Tolerance fair     Patient left up in chair with all lines intact and call button in reach.    Education provided: roles and goals of PT/PTA, transfer training and gait training    Expected compliance:    GOALS:   Multidisciplinary Problems     Physical Therapy Goals        Problem: Physical Therapy    Goal Priority Disciplines Outcome Goal Variances Interventions   Physical Therapy Goal     PT, PT/OT Ongoing, Progressing     Description: Short Term goals      Bed Mobility   Roll Right and Left Setup or Clean-up Assistance .  Lying to sitting Setup or Clean-up Assistance .  Sitting to lying Setup or Clean-up Assistance .    Transfers    Pt will be able to perform Stand step chair/bed to chair transfer With RW Setup or Clean-up Assistance .  Pt will be able to perform  Sit to stand with RW Setup or Clean-up Assistance .  Pt will be able to perform Car transfer with RW Setup or Clean-up Assistance .    Ambulation    Pt will ambulate 200 Feet with RW Setup or Clean-up Assistance .  Pt will be able to ascend/descend 12 stairs using B Rails Supervision or Touching Assistance .  Pt will be able to ascend/descend 4 inch curb using RW Supervision or Touching Assistance .  Pt will be able to ambulate uneven surfaces/ramp 15 feet with RW Supervision or Touching Assistance .      Timeframe: By Discharge                     Plan:     During this hospitalization, patient to be seen 5 x/week to address the identified rehab impairments via gait training, therapeutic activities, therapeutic exercises, neuromuscular re-education, wheelchair management/training and progress toward the following goals:     Plan of Care Expires:  08/01/22    Additional Information:         Time Tracking:     PT Received On: 08/02/22  Time In 1300     Time Out 1330  Total Time 30 min  Therapy Time PT Individual: 30  Missed Time:    Time Missed due to:      Billable Minutes: Therapeutic Activity 30    08/02/2022

## 2022-08-02 NOTE — PROGRESS NOTES
"[facility] - Rehab Inpatient Services  Adult Nutrition  Progress Note    SUMMARY       Recommendations    1. Continue current diet with texture/consistency mods per SLP recs: Soft and Bite Sized, Moderately Thick Liquids      2. Added Boost Pudding with meals to provide additional nourishment (230 kcal, 9 g pro/svg)      3. Continue to replenish K+ as medically feasible     Goals: Improve po intake >/=75% EEN/EPN throughout course of stay  Nutrition Goal Status: Progressing      Assessment and Plan    8/2: Per SLP, pt ugraded to soft and bite sized diet, continues with moderately thick liquids. Reports good appetite. Very pleasant. Per EMR is averaging ~90% po intake at meals x last 3 days. Std does not care for food texture, but is eating. Is excited for diet upgrade for dinner. Std continues to eat all Boost pudding. Denies any further nutrition related concerns at this time.     7/26: Pt reports fair appetite. Tells me ate "a little more" than 50% of lunch. Added Boost pudding this am d/t was receiving at [facility] prior to admit. Std ate Boost Pudding at lunch, and would like to continue. Per EMR, documented eating ~75% of breakfast. Denies any N/V/D. Std LBM today with assistance of stool softeners. Std has intermittent constipation at home. Wt stable over past x ~11 days.     Reason for Assessment    Reason For Assessment: consult (Rehab)  Diagnosis:  (Cervical myelopathy s/p left C3-7 posterior foraminotomies, C3-7 laminoplasty, and repair of Dural tear (7/12/2022))  Relevant Medical History:  BPH (benign prostatic hyperplasia)     Cervical pain     Cervical radiculitis     Cervical spinal stenosis     Hypothyroidism, unspecified     Sleep apnea      resolved since surgery    Nutrition Risk Screen    Nutrition Risk Screen: dysphagia or difficulty swallowing    Nutrition/Diet History    Spiritual, Cultural Beliefs, Sabianism Practices, Values that Affect Care: " "no    Anthropometrics    Temp: 98.3 °F (36.8 °C)  Height: 5' 9.02" (175.3 cm)  Height (inches): 69.02 in  Weight Method: Standard Scale  Weight: 73.1 kg (161 lb 2.5 oz)  Weight (lb): 161.16 lb  Ideal Body Weight (IBW), Male: 160.12 lb  % Ideal Body Weight, Male (lb): 103.54 %  BMI (Calculated): 23.8  BMI Grade: 18.5-24.9 - normal  Usual Body Weight (UBW), k.7 kg  % Usual Body Weight: 99.55  % Weight Change From Usual Weight: -0.66 %   () 73.1 kg (3% wt loss in 4 days unlikely with good appetite. Will monitor)   () 75.2 kg   (7/15) 75.7 kg        Lab/Procedures/Meds    Pertinent Labs Comments: PAB 14.9(L), Alk Phos 265(H), ALT 66(H), BUN 7.3(L), Crea .68(L), K+ 3.3(L), Iron 18(L), TIBC 194(L), Iron Sat 9(L), GFR >60  Pertinent Medications Comments: colace, ferrous sulfate, KCl, levothyroxine      Estimated/Assessed Needs    Weight Used For Calorie Calculations: 75.2 kg (165 lb 12.6 oz)  Energy Calorie Requirements (kcal):   Energy Need Method: [place]-St Jeor (x 1.3 SF)  Protein Requirements: 105.5 (1.4 g/kg)  Weight Used For Protein Calculations: 75.2 kg (165 lb 12.6 oz)  RDA Method (mL):        Nutrition Prescription Ordered    Minced and Moist Moderately Thick Liquids     Evaluation of Received Nutrient/Fluid Intake    % Intake of Estimated Energy Needs: 75 - 100 %  % Meal Intake: 75 - 100 %    Nutrition Risk    Level of Risk/Frequency of Follow-up: low     Monitor and Evaluation    Food and Nutrient Intake: energy intake, food and beverage intake  Anthropometric Measurements: weight, weight change  Biochemical Data, Medical Tests and Procedures:  (Nutrition related labs)  Nutrition-Focused Physical Findings: overall appearance     Nutrition Follow-Up    RD Follow-up?: Yes    "

## 2022-08-02 NOTE — PLAN OF CARE
Problem: Rehabilitation (IR[facility]) Plan of Care  Goal: Plan of Care Review  Outcome: Ongoing, Progressing  Goal: Patient-Specific Goal (Individualized)  Outcome: Ongoing, Progressing  Goal: Absence of New-Onset Illness or Injury  Outcome: Ongoing, Progressing  Goal: Optimal Comfort and Wellbeing  Outcome: Ongoing, Progressing  Goal: Readiness for Transition of Care  Outcome: Ongoing, Progressing     Problem: Fall Injury Risk  Goal: Absence of Fall and Fall-Related Injury  Outcome: Ongoing, Progressing

## 2022-08-03 PROCEDURE — 63600175 PHARM REV CODE 636 W HCPCS: Performed by: NURSE PRACTITIONER

## 2022-08-03 PROCEDURE — 25000003 PHARM REV CODE 250: Performed by: NURSE PRACTITIONER

## 2022-08-03 PROCEDURE — 97110 THERAPEUTIC EXERCISES: CPT

## 2022-08-03 PROCEDURE — 94799 UNLISTED PULMONARY SVC/PX: CPT

## 2022-08-03 PROCEDURE — 94761 N-INVAS EAR/PLS OXIMETRY MLT: CPT

## 2022-08-03 PROCEDURE — 97530 THERAPEUTIC ACTIVITIES: CPT

## 2022-08-03 PROCEDURE — 92526 ORAL FUNCTION THERAPY: CPT

## 2022-08-03 PROCEDURE — 11800000 HC REHAB PRIVATE ROOM

## 2022-08-03 RX ADMIN — METHOCARBAMOL 750 MG: 750 TABLET ORAL at 02:08

## 2022-08-03 RX ADMIN — GABAPENTIN 100 MG: 100 CAPSULE ORAL at 02:08

## 2022-08-03 RX ADMIN — GABAPENTIN 100 MG: 100 CAPSULE ORAL at 05:08

## 2022-08-03 RX ADMIN — HYDROCODONE BITARTRATE AND ACETAMINOPHEN 1 TABLET: 5; 325 TABLET ORAL at 08:08

## 2022-08-03 RX ADMIN — DOCUSATE SODIUM 100 MG: 100 CAPSULE, LIQUID FILLED ORAL at 08:08

## 2022-08-03 RX ADMIN — METOPROLOL TARTRATE 50 MG: 50 TABLET, FILM COATED ORAL at 08:08

## 2022-08-03 RX ADMIN — FERROUS SULFATE TAB 325 MG (65 MG ELEMENTAL FE) 1 EACH: 325 (65 FE) TAB at 08:08

## 2022-08-03 RX ADMIN — GABAPENTIN 100 MG: 100 CAPSULE ORAL at 10:08

## 2022-08-03 RX ADMIN — ASPIRIN 81 MG CHEWABLE TABLET 81 MG: 81 TABLET CHEWABLE at 08:08

## 2022-08-03 RX ADMIN — METHOCARBAMOL 750 MG: 750 TABLET ORAL at 05:08

## 2022-08-03 RX ADMIN — TAMSULOSIN HYDROCHLORIDE 0.4 MG: 0.4 CAPSULE ORAL at 08:08

## 2022-08-03 RX ADMIN — HYDROXYZINE PAMOATE 50 MG: 50 CAPSULE ORAL at 08:08

## 2022-08-03 RX ADMIN — METHOCARBAMOL 750 MG: 750 TABLET ORAL at 10:08

## 2022-08-03 RX ADMIN — LIDOCAINE 5% 1 PATCH: 700 PATCH TOPICAL at 05:08

## 2022-08-03 RX ADMIN — LEVOTHYROXINE SODIUM 50 MCG: 0.05 TABLET ORAL at 08:08

## 2022-08-03 RX ADMIN — ENOXAPARIN SODIUM 40 MG: 30 INJECTION SUBCUTANEOUS at 06:08

## 2022-08-03 NOTE — PT/OT/SLP PROGRESS
Physical Therapy Inpatient Rehab Treatment    Patient Name:  Scott Echeverria   MRN:  [id number]    Recommendations:     Discharge Recommendations:  other (see comments)   Discharge Equipment Recommendations: other (see comments) (Pending progress)   Barriers to discharge: None    Assessment:     Scott Echeverria is a 71 y.o. male admitted with a medical diagnosis of Stenosis of cervical spine with myelopathy.  He presents with the following impairments/functional limitations:  weakness, impaired endurance, impaired sensation, gait instability, impaired functional mobility, impaired balance, decreased coordination, decreased lower extremity function, decreased safety awareness, pain .    Rehab Diagnosis:     Recent Surgery: * No surgery found *      General Precautions: Standard, honey thick     Orthopedic Precautions:spinal precautions     Braces: Cervical collar    Rehab Prognosis: Good; patient would benefit from acute skilled PT services to address these deficits and reach maximum level of function.      History:     Past Medical History:   Diagnosis Date    BPH (benign prostatic hyperplasia)     Cervical pain     Cervical radiculitis     Cervical spinal stenosis     Hypothyroidism, unspecified     Sleep apnea     resolved since surgery       Past Surgical History:   Procedure Laterality Date    APPENDECTOMY  01/25/2022    Dr. Kerry Coats    COLONOSCOPY  2021    HEMORRHOID SURGERY  2002    LAMINOPLASTY N/A 7/12/2022    Procedure: LAMINOPLASTY;  Surgeon: Paulo Rao MD;  Location: [facility];  Service: Neurosurgery;  Laterality: N/A;  left C3-4, C4-5, C5-6, C6-7 foraminotomies, C3-7 laminoplasty //  TIVA SET UP    lower back surgery  1990    RETINAL DETACHMENT SURGERY Right 2015    SINUS SURGERY      TONSILLECTOMY  1956       Subjective       Respiratory Status: Room air    Patients cultural, spiritual, Yazdanism conflicts given the current situation: yes      Objective:     Communicated with RN prior  to session.  Patient found up in chair with peripheral IV  upon PT entry to room.    Pt is Oriented x3 and Alert and Motivated.    Functional Mobility:   · Bed Mobility:     · Supine to Sit: set up   · Sit to Supine: set up  · Transfers:     · Sit to Stand:  set up  with rolling walker  · Bed to Chair: set up  with  rolling walker  using  Step Transfer  · Gait: Pt ambulates 600 with RW with Standby Assistance.   · Impairments contributing to gait deviations include impaired balance, impaired coordination and pain.  · 12 stairs with bilateral handrails with Standby Assistance  ·  an object from the ground in standing position with bilateral handrails with Contact Guard Assistance          Current   Status  Discharge   Goal   Functional Area: Care Score:    Roll Left and Right 6 Set-up/clean-up   Sit to Lying 5 Set-up/clean-up   Lying to Sitting on Side of Bed 5 Supervision or touching assistance   Sit to Stand 5 Set-up/clean-up   Chair/Bed-to-Chair Transfer 5 Set-up/clean-up   Car Transfer   Supervision or touching assistance   Walk 10 Feet 4 Set-up/clean-up   Walk 50 Feet with Two Turns 4 Set-up/clean-up   Walk 150 Feet 4 Set-up/clean-up   Walk 10 Feet Uneven Surface 4 Set-up/clean-up   1 Step (Curb) 4 Supervision or touching assistance   4 Steps 4 Supervision or touching assistance   12 Steps 4 Supervision or touching assistance   Picking Up Object 4 Set-up/clean-up   Wheel 50 Feet with Two Turns       Wheel 150 Feet           Therapeutic Activities and Exercises:  Soft tissue massage to B shoulder and upper trap with biofreeze.     Pt ambulated  X 4 times in // bars with out UE assist.     Activity Tolerance    Patient left up in chair with all lines intact and call button in reach.    Education provided: roles and goals of PT/PTA, transfer training, bed mob, gait training, stair training, balance training, safety awareness and strengthening exercises    Expected compliance:    GOALS:   Multidisciplinary  Problems     Physical Therapy Goals        Problem: Physical Therapy    Goal Priority Disciplines Outcome Goal Variances Interventions   Physical Therapy Goal     PT, PT/OT Ongoing, Progressing     Description: Short Term goals      Bed Mobility   Roll Right and Left Setup or Clean-up Assistance .  Lying to sitting Setup or Clean-up Assistance .  Sitting to lying Setup or Clean-up Assistance .    Transfers    Pt will be able to perform Stand step chair/bed to chair transfer With RW Setup or Clean-up Assistance .  Pt will be able to perform Sit to stand with RW Setup or Clean-up Assistance .  Pt will be able to perform Car transfer with RW Setup or Clean-up Assistance .    Ambulation    Pt will ambulate 200 Feet with RW Setup or Clean-up Assistance .  Pt will be able to ascend/descend 12 stairs using B Rails Supervision or Touching Assistance .  Pt will be able to ascend/descend 4 inch curb using RW Supervision or Touching Assistance .  Pt will be able to ambulate uneven surfaces/ramp 15 feet with RW Supervision or Touching Assistance .      Timeframe: By Discharge                     Plan:     During this hospitalization, patient to be seen 5 x/week to address the identified rehab impairments via gait training, therapeutic activities, therapeutic exercises, neuromuscular re-education, wheelchair management/training and progress toward the following goals:     Plan of Care Expires:  08/01/22    Additional Information:         Time Tracking:     PT Received On: 08/03/22  Time In 1100     Time Out 1200  Total Time 60 min  Therapy Time PT Individual: 60  Missed Time:    Time Missed due to:      Billable Minutes: Therapeutic Activity 60    08/03/2022

## 2022-08-03 NOTE — PLAN OF CARE
Problem: Rehabilitation (IRF) Plan of Care  Goal: Readiness for Transition of Care  Outcome: Ongoing, Progressing     Problem: Impaired Wound Healing  Goal: Optimal Wound Healing  Outcome: Ongoing, Progressing     Problem: Skin Injury Risk Increased  Goal: Skin Health and Integrity  Outcome: Ongoing, Progressing     Problem: Fall Injury Risk  Goal: Absence of Fall and Fall-Related Injury  Outcome: Ongoing, Progressing

## 2022-08-03 NOTE — PT/OT/SLP PROGRESS
Recreational Therapy Treatment    Date of Treatment: 08/03/22  Start Time: 1000  Stop Time: 1030  Total Time: 30 min  Missed Time:     General Precautions: fall  Ortho Precautions: spinal precautions  Braces:  (soft cervical collar)    Vitals   Vitals at Rest  BP    HR    O2 Sat    Pain      Vitals With Activity  BP    HR    O2 Sat    Pain        Treatment     Cognitive Skills Building   Cognitive Observation Activity Assist Position Equipment Response Comment             Comment:          Dynamic Activities   Activity Assist Position Equipment Response Comment   Activity 1 Bocce ball modified independence Standing Rolling walker and Bocce balls good             Comment:          Fine Motor Activities   Activity Assist Position Equipment Response Comment            Comment:        Additional Info: Pt said he was feeling good today    Goals     Short Term Goals    Goal  Goal Status   Will increase sit to stand to supervision Met   Will improve dynamic standing balance/reaching to supervision Met                 Long Term Goals    Goal Goal Status   Will increase standing tolerance to 5,10 minutes Met   Will improve dynamic standing balance/reaching to setup Progressing

## 2022-08-03 NOTE — PROGRESS NOTES
[facility] ([facility])  Rehab Progress Note  MD Telemedicine Visit    Patient Name: Scott Echeverria  MRN: [id number]  Age: 71 y.o. Sex: male  : 1950  Hospital Length of Stay: 9 days  Date of Service: 8/3/2022   Chief Complaint: Cervical spondylosis s/p C3-C7 laminiopasty and laminoforminotomies on 2022     Subjective:     Basic Information  Admit Information: 71-year-old white male presented to [facility] on 2022 for scheduled cervical laminiopasty and laminoforminotomies.  PMH significant for cervical spondylosis, hypothyroidism, VIJI, and BPH.  Tolerated C3-C7 laminiopasty and laminoforminotomies on  without perioperative complications.  On  code fast was initiated due to slurred speech after receiving IV Robaxin.  CT head, MRI, MRA negative.  Patient required Ativan prior to MRI and reportedly has some left-sided neglect.  Naloxone and romazicon received with little response. That afternoon he became hypoxic and was having agonal respirations requiring intubation.  T-max a 100.8° reported on .  Extubated on .  Repeat CT unremarkable.  NIVA negative for DVT.  Sputum culture did come back positive for Enterobacter Cloacae; resistant to cephalosporins.  Rocephin discontinued and Levaquin initiated.  Required re-intubation on  for continued alteration mental status and severe hypoxemic respiratory failure.  EEG with diffuse slowing.  LP with 12 WBC and 650 RBC. Neurology added acyclovir, rocephin, and vancomycin for possible meningitis on .  Extubated on .  Meningitis panel negative-low suspicion meningitis. Suspicion most likely secondary to medication side effect but reason for his decompensation and alteration mental status is still not clear on .  Acyclovir discontinued.  Downgraded to the floor on .  Broad coverage antibiotics/antivirals/antifungals discontinued.  Continue to participate progress in therapy.  Left arm weakness  continued to improve.  Tolerated transfer to [facility] inpatient rehab unit on 07/25 without incident.  Today's Information: No acute events overnight.  Sitting up in chair.  Reports improved sleep and appetite.  Last BM 8/3.  Reports he is having a good day this morning.  Pain management much improved.  Reports he is becoming more independent.  Vital signs at goal.  No labs or imaging today.    Review of patient's allergies indicates:   Allergen Reactions    Iodine         Current Facility-Administered Medications:     acetaminophen tablet 650 mg, 650 mg, Oral, Q4H PRN,  A Jacqueline, FNP, 650 mg at 07/30/22 1341    ALPRAZolam tablet 0.25 mg, 0.25 mg, Oral, TID PRN,  A Jacqueline, FNP    aspirin chewable tablet 81 mg, 81 mg, Oral, Daily,  A Jacqueline, FNP, 81 mg at 08/02/22 0907    benzonatate capsule 100 mg, 100 mg, Oral, TID PRN,  A Jacqueline, FNP    bisacodyL suppository 10 mg, 10 mg, Rectal, Daily PRN,  A Jacqueline, FNP    docusate sodium capsule 100 mg, 100 mg, Oral, BID,  A Jacqueline, FNP, 100 mg at 08/02/22 2015    enoxaparin injection 40 mg, 40 mg, Subcutaneous, Daily,  A Jacqueline, FNP, 40 mg at 08/02/22 1717    ferrous sulfate tablet 1 each, 1 tablet, Oral, BID,  A Jacqueline, FNP, 1 each at 08/02/22 2015    gabapentin capsule 100 mg, 100 mg, Oral, TID,  A Jacqueline, FNP, 100 mg at 08/03/22 0515    hydrALAZINE injection 10 mg, 10 mg, Intravenous, Q4H PRN,  A Jacqueline, FNP    HYDROcodone-acetaminophen 5-325 mg per tablet 1 tablet, 1 tablet, Oral, Q4H PRN,  A Jacqueline, FNP, 1 tablet at 08/02/22 2015    hydrOXYzine pamoate capsule 50 mg, 50 mg, Oral, QHS,  A Jacqueline, FNP, 50 mg at 08/02/22 2015    labetalol 20 mg/4 mL (5 mg/mL) IV syring, 10 mg, Intravenous, Q4H PRN, LALO Ellison    levothyroxine tablet 50 mcg, 50 mcg, Oral, Daily, LALO Ellison, 50 mcg at 08/02/22 0907    LIDOcaine 5 %  "patch 1 patch, 1 patch, Transdermal, Q24H, Julianne Montes De Ocate, FNP, 1 patch at 08/03/22 0515    methocarbamoL tablet 750 mg, 750 mg, Oral, TID, Julianne Montes De Ocate, FNP, 750 mg at 08/03/22 0515    metoprolol injection 10 mg, 10 mg, Intravenous, Q2H PRN,  A Jacqueline, FNP    metoprolol tartrate (LOPRESSOR) tablet 50 mg, 50 mg, Oral, BID,  A Jacqueline, FNP, 50 mg at 08/02/22 2015    nitroGLYCERIN SL tablet 0.4 mg, 0.4 mg, Sublingual, Q5 Min PRN,  A Jacqueline, FNP    ondansetron disintegrating tablet 4 mg, 4 mg, Oral, Q6H PRN,  A Jacqueline, FNP    ondansetron disintegrating tablet 8 mg, 8 mg, Oral, Q6H PRN,  A Jacqueline, FNP    promethazine tablet 25 mg, 25 mg, Oral, Q6H PRN,  A Jacqueline, FNP    tamsulosin 24 hr capsule 0.4 mg, 0.4 mg, Oral, QHS,  A Jacqueline, FNP, 0.4 mg at 08/02/22 2015     Review of Systems   Complete 12-point review of symptoms negative except for what's mentioned in HPI     Objective:     /67   Pulse 92   Temp 98.1 °F (36.7 °C) (Oral)   Resp 20   Ht 5' 9.02" (1.753 m)   Wt 73.1 kg (161 lb 2.5 oz)   SpO2 95%   BMI 23.79 kg/m²        Intake/Output Summary (Last 24 hours) at 8/3/2022 0825  Last data filed at 8/3/2022 0800  Gross per 24 hour   Intake 1200 ml   Output 820 ml   Net 380 ml       Physical Exam  Constitutional:       Appearance: Normal appearance.   HENT:      Head: Normocephalic.      Mouth/Throat:      Mouth: Mucous membranes are moist.   Eyes:      Pupils: Pupils are equal, round, and reactive to light.   Neck:      Comments: Posterior neck incision clean and intact-TK, no redness or drainage  Cardiovascular:      Rate and Rhythm: Regular rhythm. Tachycardia present.      Heart sounds: Normal heart sounds.   Pulmonary:      Effort: Pulmonary effort is normal.      Breath sounds: Normal breath sounds.   Abdominal:      General: Bowel sounds are normal.      Palpations: Abdomen is soft.   Musculoskeletal:      Cervical " back: Neck supple.      Comments: Generalized weakness and muscle atrophy   Skin:     General: Skin is warm and dry.      Comments: Left forearm PIV infiltration with redness/indurated    Neurological:      General: No focal deficit present.      Mental Status: He is alert and oriented to person, place, and time.   Psychiatric:         Mood and Affect: Mood normal.         Behavior: Behavior normal.         Thought Content: Thought content normal.         Judgment: Judgment normal.       Lines/Drains/Airways     Peripheral Intravenous Line  Duration                Peripheral IV - Single Lumen 07/29/22 1159 20 G Anterior;Distal;Left Upper Arm 4 days              Labs  No results found for this or any previous visit (from the past 24 hour(s)).    Radiology  CT soft tissue neck without contrast on 07/18/2022 at 9:29 p.m., IMPRESSION:Postsurgical change in the cervical spine without fluid collection or abscess. Right upper lobe airspace disease.  Radiology  MRI C-spine without contrast on 07/15/2022 at 8:27 a.m., IMPRESSION: Postoperative changes are seen in the left posterior elements from C3 through C7 vertebrae. There is a postoperative collection in the surgical bed/ posterior paraspinal soft tissues surrounding the spinous process and posterior to the laminae. The collection measures approximately 7.3 cm in length and 2 cm in AP dimension. Similar findings are also seen on the prior examination. Correlate clinically as regards additional evaluation and follow-up. There are displaced fractures involving the laminae of C3 through C7 vertebrae appreciated better on the earlier CT study. There is moderate canal stenosis at C3-C4 and C4-C5 secondary to broad-based disc protrusions and degenerative joint disease. Details and other findings as noted above.  Radiology  MRI brain on 07/16/2022 at 8:30 a.m., IMPRESSION:No abnormal signal is identified on the diffusion images to suggest acute infarct. Details and findings as  above. No significant discrepancy with overnight report.    Assessment/Plan:     71 y.o. WM admitted on 7/25/2022    Cervical spondylosis   - s/p C3-C7 laminiopasty and laminoforminotomies on 7/12/2022   - continue     Bengay t.i.d. p.r.n. (initiated 7/28)                Robaxin 750 mg t.i.d. (increased 7/28)                Gabapentin 100 mg t.i.d. (increased 7/28)                Lidoderm patch daily                ASA 81 mg daily                Norco 5 mg q.4 hours p.r.n.  - defer to physiatry for rehab and pain management  - PT/OT/RT/PT following  - follow-up with Neurosurgery outpatient    Oropharyngeal dysphagia  - current  - continue   Minced and moist diet with moderately thickened liquids  - ST following     Hypothyroidism  - TSH within normal limits  - continue                Levothyroxine 50 mcg daily     VIJI  - compliant with CPAP  - continue current POC     BPH  - stable  - continue                Flomax 0.4 mg at bedtime     Normocytic anemia  - asymptomatic  - iron level low  - continue                Ferrous sulfate 325 mg b.i.d. (initiated 7/26)  - H/H stable   - no evidence of active bleeds  - will closely monitor and transfuse if needed      Constipation  - stable  - continue           Colace 100 mg BID     Left forearm PIV infiltration  - Discontinue peripheral IV  - continue                Warm compresses t.i.d. x3 days    Tachycardia  - improved  - continue   Metoprolol 50 mg b.i.d. (increased 8/1)      VTE Prophylaxis:  Lovenox 40 mg daily  COVID-19 testing:  Negative on 07/25/2022  COVID-19 vaccination status:  Vaccinated (Moderna):  02/10/2021 and 03/10/2021     POA: None  Living will: None  Contacts: Anusha Echeverria (spouse)     CODE STATUS: Full Code  Internal Medicine (attending): Luis Khan MD   Physiatry (consulting):  David Carty MD    OUTPATIENT PROVIDERS  Gary Reilly II, MD  Neurosurgery:  Paulo Rao MD  Neurology: Yan Rainey MD     DISPOSITION: Condition stable.   Sleep hygiene, bowel maintenance, and appetite at goal.  Vital signs at goal with no recorded fevers.  No labs or imaging today.  Continue current POC.  Monitor closely.  Notify of acute changes.    Staffing 8/1/2022: Continent of bowel and bladder. Good appetite. RT: overall contact guard. PT: walking 200 feet with rolling walker, overall contact guard. Needs time. OT: overall standby assist to contact guard assist. Projected discharge 8/5.    *Verbal consent obtained for live A/V Telehealth Visit     Originating Site: [facility]  Place of service of originating site: Inpatient Rehab Facility  Distant Site (MD Location):     [address] [address]  Synchronous Communication Encounter Clock Time: 6 minutes       Simón Phillips NP conducted independent physical examination and assisted with medical documentation.    Total time spent on this encounter including chart review and direct MD + NP 1-on-1 patient interaction: 39 minutes   Over 50% of this time was spent in counseling and coordination of care

## 2022-08-03 NOTE — PT/OT/SLP PROGRESS
Speech Language Pathology Treatment          Scott Echeverria   MRN: [id number]     Diet recommendations: Solid Diet Level: Soft & Bite Sized Diet - IDDSI Level 6  Liquid Diet Level: Moderately thick liquids - IDDSI Level 3     Billable Minutes:  Treatment Swallowing Dysfunction 30 minutes    General Precautions: Standard,            Subjective:  Pt awake, alert, and cooperative throughout session.    Objective:   Pt completed tongue base retraction exercises x70, laryngeal elevation exercises x60, and alfreda maneuver x10. Pt tolerated exercises well with occasional rest break.     Assessment:  Scott Echeverria is a 71 y.o. male with a SLP diagnosis of Dysphagia. SLP intervention continues to be warranted at this time. SLP to continue POC.    Discharge recommendations: Discharge Facility/Level of Care Needs: home health speech therapy     Goals:   Multidisciplinary Problems     SLP Goals        Problem: SLP    Goal Priority Disciplines Outcome   SLP Goal     SLP Ongoing, Progressing   Description: LT. Pt will tolerate least restrictive diet with no s/sx of aspiration.    ST. Pt will complete tongue base and laryngeal elevation exercises with 100% accuracy and minimal cues.--PROGRESSING, CONTINUE  2. Pt will tolerate half meal of soft & bite sized diet with no s/sx of aspiration.--GOAL MET  3. Pt will tolerate half meal of regular diet consistency with no s/sx of aspiration.--INITIAL                     Plan:   Patient to be seen Therapy Frequency: 5 x/week   Planned Interventions: Dysphagia Therapy  Plan of Care Expires: 22  Plan of Care reviewed with: patient  SLP Follow-up?: Yes  SLP - Next Visit Date: 08/09/22        8/3/2022

## 2022-08-03 NOTE — PT/OT/SLP PROGRESS
Physical Therapy Inpatient Rehab Treatment    Patient Name:  Scott Echeverria   MRN:  [id number]    Recommendations:     Discharge Recommendations:  other (see comments)   Discharge Equipment Recommendations: other (see comments) (Pending progress)   Barriers to discharge: None    Assessment:     Scott Echeverria is a 71 y.o. male admitted with a medical diagnosis of Stenosis of cervical spine with myelopathy.  He presents with the following impairments/functional limitations:  weakness, impaired endurance, impaired sensation, gait instability, impaired functional mobility, impaired balance, decreased coordination, decreased lower extremity function, decreased safety awareness, pain .    Rehab Diagnosis:     Recent Surgery: * No surgery found *      General Precautions: Standard, honey thick     Orthopedic Precautions:spinal precautions     Braces: Cervical collar    Rehab Prognosis: Good; patient would benefit from acute skilled PT services to address these deficits and reach maximum level of function.      History:     Past Medical History:   Diagnosis Date    BPH (benign prostatic hyperplasia)     Cervical pain     Cervical radiculitis     Cervical spinal stenosis     Hypothyroidism, unspecified     Sleep apnea     resolved since surgery       Past Surgical History:   Procedure Laterality Date    APPENDECTOMY  01/25/2022    Dr. Kerry Coats    COLONOSCOPY  2021    HEMORRHOID SURGERY  2002    LAMINOPLASTY N/A 7/12/2022    Procedure: LAMINOPLASTY;  Surgeon: Paulo Rao MD;  Location: [facility];  Service: Neurosurgery;  Laterality: N/A;  left C3-4, C4-5, C5-6, C6-7 foraminotomies, C3-7 laminoplasty //  TIVA SET UP    lower back surgery  1990    RETINAL DETACHMENT SURGERY Right 2015    SINUS SURGERY      TONSILLECTOMY  1956       Subjective     Chief Complaint: N/A  Patient/Family Comments/goals: N/A    Respiratory Status: Room air    Patients cultural, spiritual, Alevism conflicts given the current  situation: yes    4/10 pain to B neck and L shoulder     Objective:     Communicated with RN prior to session.  Patient found up in chair with peripheral IV  upon PT entry to room.    Pt is Oriented x3 and Alert and Motivated.    Therapeutic Activities and Exercises:    Seated TherX: 15 x 3 BLE 2#   Knee extension    Knee flexion with TheraBand   Hip flexion    Ankle pumps    Hip adduction with ball    Hip abduction with Theraband      Activity Tolerance Good    Patient left up in chair with all lines intact and call button in reach.    Education provided: roles and goals of PT/PTA and strengthening exercises    Expected compliance:    GOALS:   Multidisciplinary Problems     Physical Therapy Goals        Problem: Physical Therapy    Goal Priority Disciplines Outcome Goal Variances Interventions   Physical Therapy Goal     PT, PT/OT Ongoing, Progressing     Description: Short Term goals      Bed Mobility   Roll Right and Left Setup or Clean-up Assistance .  Lying to sitting Setup or Clean-up Assistance .  Sitting to lying Setup or Clean-up Assistance .    Transfers    Pt will be able to perform Stand step chair/bed to chair transfer With RW Setup or Clean-up Assistance .  Pt will be able to perform Sit to stand with RW Setup or Clean-up Assistance .  Pt will be able to perform Car transfer with RW Setup or Clean-up Assistance .    Ambulation    Pt will ambulate 200 Feet with RW Setup or Clean-up Assistance .  Pt will be able to ascend/descend 12 stairs using B Rails Supervision or Touching Assistance .  Pt will be able to ascend/descend 4 inch curb using RW Supervision or Touching Assistance .  Pt will be able to ambulate uneven surfaces/ramp 15 feet with RW Supervision or Touching Assistance .      Timeframe: By Discharge                     Plan:     During this hospitalization, patient to be seen 5 x/week to address the identified rehab impairments via gait training, therapeutic activities, therapeutic exercises,  neuromuscular re-education, wheelchair management/training and progress toward the following goals:     Plan of Care Expires:  08/01/22    Additional Information:         Time Tracking:     PT Received On: 08/03/22  Time In 1300     Time Out 1330  Total Time 30 min  Therapy Time PT Individual: 30  Missed Time:    Time Missed due to:      Billable Minutes: Therapeutic Exercise 30    08/03/2022

## 2022-08-03 NOTE — PROGRESS NOTES
08/03/22 1000   Rec Therapy Time Calculation   Date of Treatment 08/03/22   Rec Start Time 1000   Rec Stop Time 1030   Rec Total Time (min) 30 min   Time   Treatment time 2 units   Precautions   General Precautions fall   Pain/Comfort   Pain Rating 1 no pain   OTHER   Rehab identified problem list/impairments decreased upper extremity function;decreased lower extremity function;decreased ROM   Values/Beliefs/Spiritual Care   Spiritual, Cultural Beliefs, Druze Practices, Values that Affect Care no   Overall Level of Functioning   Activity Tolerance Independent   Right UE Coodination/Dexterity Independent   Left UE Coordination/Dexterity Mod Indep   Problem Solving/Sequencing Skills Independent   Memory Recall Independent   Attention Span Independent   Recreational Therapy Short Term Goals   Short Term Goal 1 Progression Met   Short Term Goal 2 Progression Met   Recreational Therapy Long Term Goals   Long Term Goal 1 Progression Met   Long Term Goal 2 Progression Progressing   Plan   Patient to be seen Daily   Planned Duration Other (Comment)  (2 days)   Treatments Planned Energy conservation training   Treatment plan/goals estblished with Patient/Caregiver Yes

## 2022-08-04 PROCEDURE — 11800000 HC REHAB PRIVATE ROOM

## 2022-08-04 PROCEDURE — 63600175 PHARM REV CODE 636 W HCPCS: Performed by: NURSE PRACTITIONER

## 2022-08-04 PROCEDURE — 97530 THERAPEUTIC ACTIVITIES: CPT

## 2022-08-04 PROCEDURE — 97535 SELF CARE MNGMENT TRAINING: CPT

## 2022-08-04 PROCEDURE — 92526 ORAL FUNCTION THERAPY: CPT

## 2022-08-04 PROCEDURE — 94799 UNLISTED PULMONARY SVC/PX: CPT

## 2022-08-04 PROCEDURE — 94761 N-INVAS EAR/PLS OXIMETRY MLT: CPT

## 2022-08-04 PROCEDURE — 92611 MOTION FLUOROSCOPY/SWALLOW: CPT

## 2022-08-04 PROCEDURE — 97535 SELF CARE MNGMENT TRAINING: CPT | Mod: CQ

## 2022-08-04 PROCEDURE — 25000003 PHARM REV CODE 250: Performed by: NURSE PRACTITIONER

## 2022-08-04 RX ADMIN — METHOCARBAMOL 750 MG: 750 TABLET ORAL at 05:08

## 2022-08-04 RX ADMIN — ASPIRIN 81 MG CHEWABLE TABLET 81 MG: 81 TABLET CHEWABLE at 10:08

## 2022-08-04 RX ADMIN — METHOCARBAMOL 750 MG: 750 TABLET ORAL at 02:08

## 2022-08-04 RX ADMIN — HYDROXYZINE PAMOATE 50 MG: 50 CAPSULE ORAL at 08:08

## 2022-08-04 RX ADMIN — FERROUS SULFATE TAB 325 MG (65 MG ELEMENTAL FE) 1 EACH: 325 (65 FE) TAB at 10:08

## 2022-08-04 RX ADMIN — TAMSULOSIN HYDROCHLORIDE 0.4 MG: 0.4 CAPSULE ORAL at 08:08

## 2022-08-04 RX ADMIN — DOCUSATE SODIUM 100 MG: 100 CAPSULE, LIQUID FILLED ORAL at 08:08

## 2022-08-04 RX ADMIN — HYDROCODONE BITARTRATE AND ACETAMINOPHEN 1 TABLET: 5; 325 TABLET ORAL at 04:08

## 2022-08-04 RX ADMIN — METOPROLOL TARTRATE 50 MG: 50 TABLET, FILM COATED ORAL at 08:08

## 2022-08-04 RX ADMIN — LIDOCAINE 5% 1 PATCH: 700 PATCH TOPICAL at 05:08

## 2022-08-04 RX ADMIN — GABAPENTIN 100 MG: 100 CAPSULE ORAL at 08:08

## 2022-08-04 RX ADMIN — GABAPENTIN 100 MG: 100 CAPSULE ORAL at 05:08

## 2022-08-04 RX ADMIN — DOCUSATE SODIUM 100 MG: 100 CAPSULE, LIQUID FILLED ORAL at 10:08

## 2022-08-04 RX ADMIN — FERROUS SULFATE TAB 325 MG (65 MG ELEMENTAL FE) 1 EACH: 325 (65 FE) TAB at 08:08

## 2022-08-04 RX ADMIN — HYDROCODONE BITARTRATE AND ACETAMINOPHEN 1 TABLET: 5; 325 TABLET ORAL at 10:08

## 2022-08-04 RX ADMIN — METHOCARBAMOL 750 MG: 750 TABLET ORAL at 08:08

## 2022-08-04 RX ADMIN — ENOXAPARIN SODIUM 40 MG: 30 INJECTION SUBCUTANEOUS at 04:08

## 2022-08-04 RX ADMIN — LEVOTHYROXINE SODIUM 50 MCG: 0.05 TABLET ORAL at 10:08

## 2022-08-04 RX ADMIN — GABAPENTIN 100 MG: 100 CAPSULE ORAL at 02:08

## 2022-08-04 RX ADMIN — METOPROLOL TARTRATE 50 MG: 50 TABLET, FILM COATED ORAL at 10:08

## 2022-08-04 NOTE — PROCEDURES
"Modified Barium Swallow    Patient Name:  Scott Echeverria   MRN:  [id number]      Recommendations:     Recommendations:                General Recommendations:  Dysphagia therapy and diet follow up  Diet recommendations:  Soft & Bite Sized Diet - IDDSI Level 6, Thin liquids - IDDSI Level 0   Aspiration Precautions: 1 bite/sip at a time, Alternating bites/sips, Double swallow with each bite/sip, Meds whole buried in puree, Monitor for s/s of aspiration, No straws, Small bites/sips and Standard aspiration precautions   General Precautions: Standard,    Communication strategies:  hearing aids    Referral     Reason for Referral  Patient was referred for a Modified Barium Swallow Study to assess the efficiency of his/her swallow function, rule out aspiration and make recommendations regarding safe dietary consistencies, effective compensatory strategies, and safe eating environment.     Diagnosis: Stenosis of cervical spine with myelopathy       History:     Past Medical History:   Diagnosis Date    BPH (benign prostatic hyperplasia)     Cervical pain     Cervical radiculitis     Cervical spinal stenosis     Hypothyroidism, unspecified     Sleep apnea     resolved since surgery       Objective:     Alert: yes    Cooperative: yes    Follows Directions: yes    Prior Diet: Moderately thick liquids, soft & bite sized diet    Patient goals: "to have regular coffee"    Pain/Comfort:   Pain/Comfort  Pain Rating 1: 0/10    Visualization  · Patient was seen in the lateral view  · Cervical hardware    Oral Peripheral Examination  · Oral Musculature: WFL  · Dentition: present and adequate  · Secretion Management: adequate  · Mucosal Quality: good  · Mandibular Strength and Mobility: WFL  · Oral Labial Strength and Mobility: WFL    Consistencies Tested:    Thin liquids via cup and straw   Mildly thick liquids via cup   Moderately thick liquids via cup   Puree   Chewable solids    Oral Phase:    Adequate lip " closure   Prolonged mastication   Loss of bolus control    Pharyngeal Phase:    Swallow delay with spill to the pyriform sinus   Reduced base of tongue retraction   Reduced hyolaryngeal excursion   Reduced airway protection   Laryngeal penetration of mildly thick liquids via cup and thin liquids via cup and straw; small sips effective at preventing penetration of thin liquids via cup during this study   Base of tongue residue     Posterior pharyngeal wall residue     Vallecular residue     Reduced UES opening    Cervical Esophageal Phase:    decreased UES opening    Assessment:     Impressions  Pt presents with swallow delay with prespill to the pyriform sinus, reduced tongue base retraction, and reduced laryngeal excursion resulting in laryngeal penetration of mildly thick liquids via cup (clears) and thin liquids via cup and straw. Penetration of thin liquids via cup was shallow and inconsistently cleared. Small sips were effective at preventing laryngeal penetration during this study. Laryngeal penetration of thin liquids via straw was deep and did not clear. Strategies were not effective. Mild-moderate pharyngeal residue was visualized and reduced with a double swallow. Though no aspiration visualized during this study, pt remains at risk for aspiration. Skilled SLP intervention continues to be warranted at this time.     REC: Soft & bite sized diet, thin liquids, NO straws, supervision with meals to ensure small sips    Prognosis: Good    Plan  Continue dysphagia therapy    Education  Results were discussed with patient. Results were discussed with Medical Team who was in agreement with plan.     Goals:   Multidisciplinary Problems     SLP Goals        Problem: SLP    Goal Priority Disciplines Outcome   SLP Goal     SLP Ongoing, Progressing   Description: LT. Pt will tolerate least restrictive diet with no s/sx of aspiration.    ST. Pt will complete tongue base and laryngeal elevation  exercises with 100% accuracy and minimal cues.--PROGRESSING, CONTINUE  2. Pt will tolerate half meal of soft & bite sized diet with no s/sx of aspiration.--GOAL MET  3. Pt will tolerate half meal of regular diet consistency with no s/sx of aspiration.--INITIAL                    Plan:   · Patient to be seen:  Therapy Frequency: 5 x/week   · Plan of Care expires:  08/09/22  · Plan of Care reviewed with:  patient        Discharge recommendations:  home health speech therapy   Barriers to Discharge:  None    Time Tracking:   Therapy Minutes  SLP Treatment Date: 08/04/22  Speech Start Time: 0850  Speech Stop Time: 0920  Speech Total (min): 30 min  SLP Individual: 30    Billable Minutes:  Motion Fluoro Swallow, Cine/Vid 30 minutes    08/04/2022

## 2022-08-04 NOTE — PT/OT/SLP PROGRESS
Occupational Therapy Inpatient Rehab Treatment    Name: Scott Echeverria  MRN: [id number]    Assessment:  Scott Echeverria is a 71 y.o. male admitted with a medical diagnosis of Stenosis of cervical spine with myelopathy.  He presents with the following impairments/functional limitations:  weakness, impaired endurance, impaired self care skills, impaired functional mobility, impaired balance, gait instability, decreased coordination, decreased upper extremity function, pain, decreased safety awareness, decreased ROM, impaired coordination, impaired fine motor  .    Rehab Diagnosis:     Recent Surgery: * No surgery found *      General Precautions: Standard, fall     Orthopedic Precautions:spinal precautions     Braces:  (soft cervical collar)    Rehab Prognosis: Good; patient would benefit from acute skilled OT services to address these deficits and reach maximum level of function.        History:     Past Medical History:   Diagnosis Date    BPH (benign prostatic hyperplasia)     Cervical pain     Cervical radiculitis     Cervical spinal stenosis     Hypothyroidism, unspecified     Sleep apnea     resolved since surgery       Past Surgical History:   Procedure Laterality Date    APPENDECTOMY  01/25/2022    Dr. Kerry Coats    COLONOSCOPY  2021    HEMORRHOID SURGERY  2002    LAMINOPLASTY N/A 7/12/2022    Procedure: LAMINOPLASTY;  Surgeon: Paulo Rao MD;  Location: [facility];  Service: Neurosurgery;  Laterality: N/A;  left C3-4, C4-5, C5-6, C6-7 foraminotomies, C3-7 laminoplasty //  TIVA SET UP    lower back surgery  1990    RETINAL DETACHMENT SURGERY Right 2015    SINUS SURGERY      TONSILLECTOMY  1956       Subjective     Orientation: Oriented x4    Chief Complaint: no new complaints    Respiratory Status: Room air      Objective:     Communicated with nursing prior to session.  Patient found up in chair with peripheral IV  upon OT entry to room.    Mobility   Patient completed:  Sit to Stand Transfer with  stand by assistance with rolling walker  Stand to Sit Transfer with stand by assistance with rolling walker    Functional Mobility  Pt performed FM room 411 to RT area ~200' with RW with CGA.    Limiting Factors for ADLs: motor, sensory, endurance, limited ROM, balance, weakness and safety awareness     Therapeutic Activities  Pt participated in co-tx session with RTCarmela. Activity included a game of [employer] St[employer] ball - tossing with BUE, standing, eye hand coordination, grasp/release.  Pt was able to toss with LUE but swing through was limited in order to reach the target ball.    Patient left up in chair with call button in reach.     Education provided: Roles and goals of OT, ADLs, transfer training, safety precautions, fall prevention and home safety    Time Tracking     OT Received On:  8/3/2022  Time In  1000     Time Out  1030  Total Time  30  Therapy Time:  30  Missed Time:    Missed Time Reason:      Billable Minutes: Therapeutic Activity 30    08/04/2022

## 2022-08-04 NOTE — PT/OT/SLP PROGRESS
Speech Language Pathology Treatment          Scott Echeverria   MRN: [id number]     Diet recommendations: Solid Diet Level: Soft & Bite Sized Diet - IDDSI Level 6  Liquid Diet Level: Thin liquids - IDDSI Level 0 1 bite/sip at a time, Alternating bites/sips, No straws and Standard aspiration precautions    Therapy Minutes  SLP Treatment Date: 22  Speech Start Time: 1415  Speech Stop Time: 1445  Speech Total (min): 30 min  SLP Individual: 30    Billable Minutes:  Treatment Swallowing Dysfunction 30 and Total Time 30    General Precautions: Standard, aspiration          Subjective:  SLP services delivered in patient's room. Pt's spouse present.    Objective:   SLP educated patient, pt's spouse, and NSG on recent diet upgrade per MBS today. SLP provided IDDSI handout on soft-bite sized diet.    SLp educated patient on rationale for dysphagia exercises and trained patient in effortful pitch glide (EPG) x10 x4 sets to promote laryngeal elevation and airway safety. SLP modeled task to facilitate pt accuracy with EPG.    Pain/Comfort  Pain Rating 1: 0/10  Pain Rating Post-Intervention 1: 0/10    Assessment:  Scott Echeverria is a 71 y.o. male with a SLP diagnosis of Dysphagia. He present with functional impairment sin swallow function negatively impacting safety and risk of aspiration.    Discharge recommendations: Discharge Facility/Level of Care Needs: home health speech therapy     Goals:   Multidisciplinary Problems     SLP Goals        Problem: SLP    Goal Priority Disciplines Outcome   SLP Goal     SLP Ongoing, Progressing   Description: LT. Pt will tolerate least restrictive diet with no s/sx of aspiration.    ST. Pt will complete tongue base and laryngeal elevation exercises with 100% accuracy and minimal cues.--PROGRESSING, CONTINUE  2. Pt will tolerate half meal of soft & bite sized diet with no s/sx of aspiration.--GOAL MET  3. Pt will tolerate half meal of regular diet consistency with no s/sx of  aspiration.--INITIAL                     Plan:   Patient to be seen Therapy Frequency: 5 x/week   Planned Interventions: Dysphagia Therapy  Plan of Care Expires: 08/09/22  Plan of Care reviewed with: patient, spouse  SLP Follow-up?: Yes  SLP - Next Visit Date: 08/09/22 8/4/2022

## 2022-08-04 NOTE — PT/OT/SLP PROGRESS
Physical Therapy Inpatient Rehab Treatment    Patient Name:  Scott Echeverria   MRN:  [id number]    Recommendations:     Discharge Recommendations:  other (see comments)   Discharge Equipment Recommendations: other (see comments) (Pending progress)   Barriers to discharge: None    Assessment:     Scott Echeverria is a 71 y.o. male admitted with a medical diagnosis of Stenosis of cervical spine with myelopathy.  He presents with the following impairments/functional limitations:  weakness, impaired functional mobility, impaired self care skills, decreased ROM, pain, decreased upper extremity function.    Rehab Diagnosis:     Recent Surgery: * No surgery found *      General Precautions: Standard, fall     Orthopedic Precautions:spinal precautions     Braces: Cervical collar    Rehab Prognosis: Good; patient would benefit from acute skilled PT services to address these deficits and reach maximum level of function.      History:     Past Medical History:   Diagnosis Date    BPH (benign prostatic hyperplasia)     Cervical pain     Cervical radiculitis     Cervical spinal stenosis     Hypothyroidism, unspecified     Sleep apnea     resolved since surgery       Past Surgical History:   Procedure Laterality Date    APPENDECTOMY  01/25/2022    Dr. Kerry Coats    COLONOSCOPY  2021    HEMORRHOID SURGERY  2002    LAMINOPLASTY N/A 7/12/2022    Procedure: LAMINOPLASTY;  Surgeon: Paulo Rao MD;  Location: [facility];  Service: Neurosurgery;  Laterality: N/A;  left C3-4, C4-5, C5-6, C6-7 foraminotomies, C3-7 laminoplasty //  TIVA SET UP    lower back surgery  1990    RETINAL DETACHMENT SURGERY Right 2015    SINUS SURGERY      TONSILLECTOMY  1956       Subjective     Chief Complaint: Some c/o pain at L shoulder (burning)  Patient/Family Comments/goals: To see how well pt mobilizes during therapy.  \  Respiratory Status: Room air    Patients cultural, spiritual, Episcopal conflicts given the current situation:  "yes      Objective:     Communicated with NSG prior to session.  Patient found up in chair with peripheral IV  upon PT entry to room.    Pt is Oriented x3 and Alert, Cooperative and Motivated.    Functional Mobility:   · Transfers:     · Sit to Stand:  independence with rolling walker  · Car Transfer: modified independence with  rolling walker  using  Step Transfer  · Gait: Pt ambulates 50ft in PT gym with RW with Modified [place].   · 4" curb with rolling walker with Standby Assistance     Current   Status  Discharge   Goal   Functional Area: Care Score:    Roll Left and Right 6 Set-up/clean-up   Sit to Lying 6 Set-up/clean-up   Lying to Sitting on Side of Bed 6 Supervision or touching assistance   Sit to Stand 6 Set-up/clean-up   Chair/Bed-to-Chair Transfer 5 Set-up/clean-up   Car Transfer 6 Supervision or touching assistance   Walk 10 Feet 5 Set-up/clean-up   Walk 50 Feet with Two Turns 5 Set-up/clean-up   Walk 150 Feet   Set-up/clean-up   Walk 10 Feet Uneven Surface   Set-up/clean-up   1 Step (Curb) 5 Supervision or touching assistance   4 Steps   Supervision or touching assistance   12 Steps   Supervision or touching assistance   Picking Up Object   Set-up/clean-up   Wheel 50 Feet with Two Turns       Wheel 150 Feet           Therapeutic Activities and Exercises:  HEP reviewed with pt and pt family.     Activity Tolerance    Patient left up in chair with call button in reach.    Education provided: roles and goals of PT/PTA, transfer training, bed mob, gait training, safety awareness, body mechanics, assistive device and spinal precautions    Expected compliance:    GOALS:   Multidisciplinary Problems     Physical Therapy Goals        Problem: Physical Therapy    Goal Priority Disciplines Outcome Goal Variances Interventions   Physical Therapy Goal     PT, PT/OT Ongoing, Progressing     Description: Short Term goals      Bed Mobility   Roll Right and Left Setup or Clean-up Assistance .  Lying to sitting " Setup or Clean-up Assistance .  Sitting to lying Setup or Clean-up Assistance .    Transfers    Pt will be able to perform Stand step chair/bed to chair transfer With RW Setup or Clean-up Assistance .  Pt will be able to perform Sit to stand with RW Setup or Clean-up Assistance .  Pt will be able to perform Car transfer with RW Setup or Clean-up Assistance .    Ambulation    Pt will ambulate 200 Feet with RW Setup or Clean-up Assistance .  Pt will be able to ascend/descend 12 stairs using B Rails Supervision or Touching Assistance .  Pt will be able to ascend/descend 4 inch curb using RW Supervision or Touching Assistance .  Pt will be able to ambulate uneven surfaces/ramp 15 feet with RW Supervision or Touching Assistance .      Timeframe: By Discharge                     Plan:     During this hospitalization, patient to be seen 5 x/week to address the identified rehab impairments via gait training, therapeutic activities, therapeutic exercises, neuromuscular re-education, wheelchair management/training and progress toward the following goals:     Plan of Care Expires:  08/01/22    Additional Information:   All questions and concerns were addressed.  Spouse and grandchild present throughout family training.  Wife appeared concerned, however, after observing pt's mobility, she felt a lot more comfortable with being able to manage pt at home.        Time Tracking:     PT Received On: 08/04/22  Time In 1300     Time Out 1330  Total Time 30 min  Therapy Time PT Individual: 30  Missed Time:    Time Missed due to:      Billable Minutes: Therapeutic Activity 30min (30min self-care/home management)    08/04/2022

## 2022-08-04 NOTE — DISCHARGE SUMMARY
[facility] - 7th [facility] Medicine  Discharge Summary      Patient Name: Scott Echeverria  MRN: [id number]  Admission Date: 7/12/2022  Hospital Length of Stay: 13 days  Discharge Date and Time: 7/25/2022  3:56 PM  Attending Physician: No att. providers found   Discharging Provider: RON AUGUSTIN MD  Primary Care Provider: RON AUGUSTIN MD        HPI: This is a 72 y/o male with history of cervical spinal stenosis, hypothyroidism, admitted 07/12/22 for C3-C7 laminiopasty and laminoforminotomies. On postoperative day 1, he was reported as working with PT, getting out of the bed to the chair.  However, the morning of postop day 2, the patient's wife noted that patient started to have slurred speech.  He had received Norco an hour or 2 prior to that, which he had been receiving q4h since surgery. He also received IV robaxin prior to onset of aphasia.  A code stroke was called. CT brain without contrast revealed no acute abnormal findings.  MRI and MRA brain on 07/15/2022 at 1000hrs was noted significantly limited by motion artifact with no obvious LVO, no acute intracranial abnormalities, and mild chronic microvascular ischemic changes.   For the MRI, the patient did require Ativan administration, and he was noted to have a scopolamine patch in place was was discontinued 07/15/22 at 0850hrs.  He also did reportedly began to develop some left-sided neglect.  He was then reported as having significant desaturation with near agonal respiratory effort.  Flumazenil was given on the floor.  Ventilation with BVM was initiated. Stat ICU consultation was then obtained and the patient was noted obtunded, not arousable to verbal or painful stimuli at time of intensivist arrival to floor hospital room.  He was intubated and transferred to ICU on mechanical ventilatory support. He tolerated extubation on 7/18/22; the patient has been spiking temps.  Sputum culture did come back positive for Enterobacter  Cloacae; resistant to cephalosporins.     Procedure(s) (LRB):  LAMINOPLASTY (N/A)      Hospital Course:  Hospital course was complicated after surgery.  He does have a history of cervical spinal stenosis a corrected hypothyroidism was admitted to the hospital for C3 through C7 laminoplasty.  He was working with physical therapy after day 1 but then developed some slurred speech after getting some pain medicine for 2 hours prior.  Also received some muscle relaxers as well.  She code fast was called CT MRI etc.  are all negative for acute stroke.  He did have some left-sided neglect eventually was intubated for airway protection also underwent bronchoscopy with dose significant findings.  Later on he was extubated and eventually decompensated to the point where he required re-intubation.  As LP was performed for his meningismus like signs all this was negative support he was started on broad-spectrum antibiotics and antifungals antivirals after cultures came back negative these were discontinued.  After couple days he did seem to bounce back to his baseline.  He was downgraded from ICU and eventually discharged over to rehab.  He is back to his baseline he is working with physical therapy occupational therapy.  Vital signs are stable afebrile okay to be discharged to rehab    Consults:   Consults (From admission, onward)        Status Ordering Provider     Inpatient consult to Infectious Diseases  Once        Provider:  Francois Booker MD    Completed YAN RAINEY     Inpatient consult to Interventional Radiology  Once        Provider:  (Not yet assigned)    TOMMIE Werner     Inpatient consult to Neurology  Once        Provider:  Nela Hawkins MD    Completed LOURDES JEFFERY     Inpatient consult to Neurology  Once        Provider:  Yan Rainey MD    Completed LOURDES JEFFERY          Final Active Diagnoses:    Diagnosis Date Noted POA    PRINCIPAL PROBLEM:  Stenosis of cervical spine with  myelopathy [M48.02, G99.2] 07/06/2022 Yes    Encephalopathy, metabolic [G93.41] 07/23/2022 No    Aphasia [R47.01] 07/14/2022 No    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  Medications:  Reconciled Home Medications:      Medication List      ASK your doctor about these medications    acetaminophen 325 MG tablet  Commonly known as: TYLENOL  as needed.     amitriptyline 25 MG tablet  Commonly known as: ELAVIL  Take 25 mg by mouth once daily.     aspirin 81 MG Chew  Take 81 mg by mouth once daily.     fexofenadine 180 MG tablet  Commonly known as: ALLEGRA  Take 180 mg by mouth once daily.     GERITOL ORAL  Take by mouth.     levothyroxine 50 MCG tablet  Commonly known as: SYNTHROID  Take 50 mcg by mouth once daily.     MAXIMUM RED KRILL OMEGA-3 ORAL  Take by mouth.     ondansetron 4 MG tablet  Commonly known as: ZOFRAN  Take 4 mg by mouth every 8 (eight) hours.     oxyCODONE 5 MG immediate release tablet  Commonly known as: ROXICODONE  TAKE 1 TABLET BY MOUTH EVERY 4 HOURS AS NEEDED FOR MILD PAIN     predniSONE 20 MG tablet  Commonly known as: DELTASONE  Take 20 mg by mouth 2 (two) times daily.     PROBIOTIC-10 ORAL  Take by mouth.     tamsulosin 0.4 mg Cap  Commonly known as: FLOMAX  See Instructions, TAKE ONE CAPSULE BY MOUTH EVERY DAY, # 90 cap(s), 3 Refill(s), Pharmacy: [facility]/pharmacy #[id number], 175, cm, Height/Length Dosing, 10/20/21 9:19:00 CDT, 79.83, kg, Weight Dosing, 10/20/21 9:19:00 CDT            Significant Diagnostic Studies: Labs: All labs within the past 24 hours have been reviewed    Pending Diagnostic Studies:     Procedure Component Value Units Date/Time    Cytology, Fluid/Wash/Brush [[id number]]     Order Status: Sent Lab Status: No result     Specimen: Body  Fluid from Nose         Indwelling Lines/Drains at time of discharge:   Lines/Drains/Airways     None                 Time spent on the discharge of patient: 30 minutes         RON AUGUSTIN MD  Department of Hospital Medicine  [facility] - [facility] Floor ICU

## 2022-08-04 NOTE — PLAN OF CARE
Problem: Occupational Therapy  Goal: Occupational Therapy Goal  Description: ADLs:  Pt to perform grooming tasks with indep while standing with RW MET  Pt to perform UB dressing with set up assist MET  Pt to perform LB dressing with set up assist MET  Pt to perform putting on/off footwear task with set up assist MET  Pt to perform toileting with supervision using RW MET    Functional Transfers:  Pt to perform toilet transfers with CGA and RW MET  Pt to perform a tub transfer with CGA and RW MET  Pt to perform a walk-in shower transfer with CGA and RW    IADLs:  Pt to perform IADL tasks while standing for 8 min with RW and SBA     Balance, Strengthening, Endurance, Balance:  Pt to consistently demonstrate adherence to cervical spinal precautions during all ADL's as instructed by OT. MET  Pt to demonstrate dynamic standing balance for 10 min as required to perform ADL's from standing level.  Pt to demonstrate consistent adherence to breathing control and energy conservation techniques as educated by OT.   8/4/2022 1233 by Julianne Marshall OT  Outcome: Ongoing, Progressing  8/4/2022 0753 by Julianne Marshall OT  Outcome: Ongoing, Progressing  8/4/2022 0740 by Julianne Marshall OT  Outcome: Ongoing, Progressing

## 2022-08-04 NOTE — PT/OT/SLP PROGRESS
"Occupational Therapy Inpatient Rehab Treatment    Name: Scott Echeverria  MRN: [id number]    Assessment:  Scott Echeverria is a 71 y.o. male admitted with a medical diagnosis of Stenosis of cervical spine with myelopathy.  He presents with the following impairments/functional limitations:  weakness, impaired endurance, impaired self care skills, impaired balance, impaired functional mobility, pain  .    Rehab Diagnosis:     Recent Surgery: * No surgery found *      General Precautions: Standard, fall     Orthopedic Precautions:spinal precautions     Braces: Cervical collar    Rehab Prognosis: Good; patient would benefit from acute skilled OT services to address these deficits and reach maximum level of function.        History:     Past Medical History:   Diagnosis Date    BPH (benign prostatic hyperplasia)     Cervical pain     Cervical radiculitis     Cervical spinal stenosis     Hypothyroidism, unspecified     Sleep apnea     resolved since surgery       Past Surgical History:   Procedure Laterality Date    APPENDECTOMY  01/25/2022    Dr. Kerry Coats    COLONOSCOPY  2021    HEMORRHOID SURGERY  2002    LAMINOPLASTY N/A 7/12/2022    Procedure: LAMINOPLASTY;  Surgeon: Paulo Rao MD;  Location: [facility];  Service: Neurosurgery;  Laterality: N/A;  left C3-4, C4-5, C5-6, C6-7 foraminotomies, C3-7 laminoplasty //  TIVA SET UP    lower back surgery  1990    RETINAL DETACHMENT SURGERY Right 2015    SINUS SURGERY      TONSILLECTOMY  1956       Subjective     Orientation: Oriented x4    Chief Complaint: no new complaint    Patient/Family Comments/goals: "to go home with wife"    Respiratory Status: Room air    Objective:     Communicated with nursing prior to session.  Patient found up in chair with wife present upon OT entry to room.    Mobility   Patient completed:  Sit to Stand Transfer with independence with rolling walker  Stand to Sit Transfer with independence with rolling walker  Shower Transfer Step " Transfer technique with supervision with rolling walker    Functional Mobility  FM in sandoval with wife with SPV from room 411 <> OT therapy shower (WIS) for family training.    ADLs   Care Score/CAROLYN Descriptors Equipment   Shower Transfer Supervision or Set-up Assistance   Shower chair and RW     Additional Treatments: Completed family training with wife and OT answered all questions and concerns.    Patient left up in chair with call button in reach and wife and nursing present.     Education provided: Roles and goals of OT, ADLs, transfer training, assistive device, safety precautions, fall prevention, equipment recommendations and home safety      Time Tracking     OT Received On: 08/04/22  Time In 1330     Time Out 1400  Total Time 30 min  Therapy Time: OT Individual: 30  Missed Time:    Missed Time Reason:      Billable Minutes: Self care 30    08/04/2022

## 2022-08-04 NOTE — PLAN OF CARE
ST is unable to complete SLP training with wife this afternoon, so I scheduled for wife to be here at 1000 for 1030 training with SLP before discharge tomorrow.    Met with pt to discuss plans for discharge, HH arrangements, RW order/delivery, family training schedule, etc.    Pt is very anxious to shave.  Discussed with NP Simón, who agrees with pt shaving whenever he chooses.  Informed pt and wife.  Pt will shave with an electric razor when he returns home tomorrow.

## 2022-08-04 NOTE — PT/OT/SLP PROGRESS
"Occupational Therapy Inpatient Rehab Treatment    Name: Scott Echeverria  MRN: [id number]    Assessment:  Scott Echeverria is a 71 y.o. male admitted with a medical diagnosis of Stenosis of cervical spine with myelopathy.  He presents with the following impairments/functional limitations:  weakness, impaired endurance, impaired self care skills, impaired balance, impaired functional mobility, pain  .    Rehab Diagnosis:     Recent Surgery: * No surgery found *      General Precautions: Standard, fall     Orthopedic Precautions:spinal precautions     Braces:  (soft cervial collar)    Rehab Prognosis: Good; patient would benefit from acute skilled OT services to address these deficits and reach maximum level of function.        History:     Past Medical History:   Diagnosis Date    BPH (benign prostatic hyperplasia)     Cervical pain     Cervical radiculitis     Cervical spinal stenosis     Hypothyroidism, unspecified     Sleep apnea     resolved since surgery       Past Surgical History:   Procedure Laterality Date    APPENDECTOMY  01/25/2022    Dr. Kerry Coats    COLONOSCOPY  2021    HEMORRHOID SURGERY  2002    LAMINOPLASTY N/A 7/12/2022    Procedure: LAMINOPLASTY;  Surgeon: Paulo Rao MD;  Location: [facility];  Service: Neurosurgery;  Laterality: N/A;  left C3-4, C4-5, C5-6, C6-7 foraminotomies, C3-7 laminoplasty //  TIVA SET UP    lower back surgery  1990    RETINAL DETACHMENT SURGERY Right 2015    SINUS SURGERY      TONSILLECTOMY  1956       Subjective     Orientation: Oriented x4    Chief Complaint: no new compalints    Patient/Family Comments/goals: 'To return home with wife"    Respiratory Status: Room air      Objective:     Communicated with nursing prior to session.  Patient found up in chair  upon OT entry to room.    Mobility   Patient completed:  Sit to Stand Transfer with independence with rolling walker  Stand to Sit Transfer with independence with rolling walker  Toilet Transfer Step " Transfer technique with supervision with  rolling walker  Tub Transfer Step Transfer technique with independence with rolling walker    ADLs   Care Score/CAROLYN Descriptors Equipment   Eating 6     Oral Hygiene 6 Standing with device    Shower, Bathe Self 6 Unsupported short sit Grab bars, Hand held shower and Tub bench   Upper Body Dressing 6     Lower Body Dressing 6     Toileting Hygiene 6  Bedside commode over toilet   Toilet Transfer 4  Walker   Tub Transfer Independent  Transfer tub bench and Grab bars   Putting On, Taking Off Footwear 6       Limiting Factors for ADLs: endurance and balance     Patient left up in chair with call button in reach.     Education provided: Roles and goals of OT, ADLs, transfer training, safety precautions and fall prevention    GOALS:    ADLs:  Pt to perform grooming tasks with indep while standing with RW MET  Pt to perform UB dressing with set up assist MET  Pt to perform LB dressing with set up assist MET  Pt to perform putting on/off footwear task with set up assist MET  Pt to perform toileting with supervision using RW MET    Functional Transfers:  Pt to perform toilet transfers with CGA and RW MET  Pt to perform a tub transfer with CGA and RW MET  Pt to perform a walk-in shower transfer with CGA and RW    IADLs:  Pt to perform IADL tasks while standing for 8 min with RW and SBA     Balance, Strengthening, Endurance, Balance:  Pt to consistently demonstrate adherence to cervical spinal precautions during all ADL's as instructed by OT. MET  Pt to demonstrate dynamic standing balance for 10 min as required to perform ADL's from standing level.  Pt to demonstrate consistent adherence to breathing control and energy conservation techniques as educated by OT.       Time Tracking     OT Received On: 08/04/22  Time In 1100     Time Out 1200  Total Time 60 min  Therapy Time: OT Individual: 60  Missed Time:    Missed Time Reason:      Billable Minutes: Self care 60    08/04/2022

## 2022-08-04 NOTE — PROGRESS NOTES
[facility] ([facility])  Rehab Progress Note  MD Telemedicine Visit    Patient Name: Scott Echeverria  MRN: [id number]  Age: 71 y.o. Sex: male  : 1950  Hospital Length of Stay: 10 days  Date of Service: 2022   Chief Complaint: Cervical spondylosis s/p C3-C7 laminiopasty and laminoforminotomies on 2022     Subjective:     Basic Information  Admit Information: 71-year-old white male presented to [facility] on 2022 for scheduled cervical laminiopasty and laminoforminotomies.  PMH significant for cervical spondylosis, hypothyroidism, VIJI, and BPH.  Tolerated C3-C7 laminiopasty and laminoforminotomies on  without perioperative complications.  On  code fast was initiated due to slurred speech after receiving IV Robaxin.  CT head, MRI, MRA negative.  Patient required Ativan prior to MRI and reportedly has some left-sided neglect.  Naloxone and romazicon received with little response. That afternoon he became hypoxic and was having agonal respirations requiring intubation.  T-max a 100.8° reported on .  Extubated on .  Repeat CT unremarkable.  NIVA negative for DVT.  Sputum culture did come back positive for Enterobacter Cloacae; resistant to cephalosporins.  Rocephin discontinued and Levaquin initiated.  Required re-intubation on  for continued alteration mental status and severe hypoxemic respiratory failure.  EEG with diffuse slowing.  LP with 12 WBC and 650 RBC. Neurology added acyclovir, rocephin, and vancomycin for possible meningitis on .  Extubated on .  Meningitis panel negative-low suspicion meningitis. Suspicion most likely secondary to medication side effect but reason for his decompensation and alteration mental status is still not clear on .  Acyclovir discontinued.  Downgraded to the floor on .  Broad coverage antibiotics/antivirals/antifungals discontinued.  Continue to participate progress in therapy.  Left arm weakness  continued to improve.  Tolerated transfer to [facility] inpatient rehab unit on 07/25 without incident.  Today's Information: No acute events overnight.  Sitting up in chair.  Reports improved sleep and appetite.  Last BM 8/3. Vital signs at goal.  No labs or imaging today.    Review of patient's allergies indicates:   Allergen Reactions    Iodine         Current Facility-Administered Medications:     acetaminophen tablet 650 mg, 650 mg, Oral, Q4H PRN,  A Jacqueline, FNP, 650 mg at 07/30/22 1341    ALPRAZolam tablet 0.25 mg, 0.25 mg, Oral, TID PRN,  A Jacqueline, FNP    aspirin chewable tablet 81 mg, 81 mg, Oral, Daily,  A Jacqueline, FNP, 81 mg at 08/04/22 1013    benzonatate capsule 100 mg, 100 mg, Oral, TID PRN,  A Jacqueline, FNP    bisacodyL suppository 10 mg, 10 mg, Rectal, Daily PRN,  A Jacqueline, FNP    docusate sodium capsule 100 mg, 100 mg, Oral, BID,  A Jacqueline, FNP, 100 mg at 08/04/22 1013    enoxaparin injection 40 mg, 40 mg, Subcutaneous, Daily,  A Jacqueline, FNP, 40 mg at 08/03/22 1801    ferrous sulfate tablet 1 each, 1 tablet, Oral, BID,  A Jacqueline, FNP, 1 each at 08/04/22 1013    gabapentin capsule 100 mg, 100 mg, Oral, TID,  A Jacqueline, FNP, 100 mg at 08/04/22 0520    hydrALAZINE injection 10 mg, 10 mg, Intravenous, Q4H PRN,  A Jacqueline, FNP    HYDROcodone-acetaminophen 5-325 mg per tablet 1 tablet, 1 tablet, Oral, Q4H PRN,  A Jacqueline, FNP, 1 tablet at 08/03/22 2028    hydrOXYzine pamoate capsule 50 mg, 50 mg, Oral, QHS,  A Jacqueline, FNP, 50 mg at 08/03/22 2028    labetalol 20 mg/4 mL (5 mg/mL) IV syring, 10 mg, Intravenous, Q4H PRN,  A Jacqueline, FNP    levothyroxine tablet 50 mcg, 50 mcg, Oral, Daily, LALO Ellison, 50 mcg at 08/04/22 1013    LIDOcaine 5 % patch 1 patch, 1 patch, Transdermal, Q24H, LALO Sawyer, 1 patch at 08/04/22 0521    methocarbamoL tablet 750 mg,  "750 mg, Oral, TID, Julianne Sims, FNP, 750 mg at 08/04/22 0520    metoprolol injection 10 mg, 10 mg, Intravenous, Q2H PRN,  A Jacqueline, FNP    metoprolol tartrate (LOPRESSOR) tablet 50 mg, 50 mg, Oral, BID,  A Jacqueline, FNP, 50 mg at 08/04/22 1013    nitroGLYCERIN SL tablet 0.4 mg, 0.4 mg, Sublingual, Q5 Min PRN,  A Jacqueline, FNP    ondansetron disintegrating tablet 4 mg, 4 mg, Oral, Q6H PRN,  A Jacqueline, FNP    ondansetron disintegrating tablet 8 mg, 8 mg, Oral, Q6H PRN,  A Jacqueline, FNP    promethazine tablet 25 mg, 25 mg, Oral, Q6H PRN,  A Jacqueline, FNP    tamsulosin 24 hr capsule 0.4 mg, 0.4 mg, Oral, QHS,  A Jacqueline, FNP, 0.4 mg at 08/03/22 2028     Review of Systems   Complete 12-point review of symptoms negative except for what's mentioned in HPI     Objective:     /69   Pulse 96   Temp 97.4 °F (36.3 °C) (Oral)   Resp 19   Ht 5' 9.02" (1.753 m)   Wt 73.1 kg (161 lb 2.5 oz)   SpO2 98%   BMI 23.79 kg/m²        Intake/Output Summary (Last 24 hours) at 8/4/2022 1323  Last data filed at 8/4/2022 0800  Gross per 24 hour   Intake 836 ml   Output 400 ml   Net 436 ml       Physical Exam  Constitutional:       Appearance: Normal appearance.   HENT:      Head: Normocephalic.      Mouth/Throat:      Mouth: Mucous membranes are moist.   Eyes:      Pupils: Pupils are equal, round, and reactive to light.   Neck:      Comments: Posterior neck incision clean and intact-TK, no redness or drainage  Cardiovascular:      Rate and Rhythm: Regular rhythm. Tachycardia present.      Heart sounds: Normal heart sounds.   Pulmonary:      Effort: Pulmonary effort is normal.      Breath sounds: Normal breath sounds.   Abdominal:      General: Bowel sounds are normal.      Palpations: Abdomen is soft.   Musculoskeletal:      Cervical back: Neck supple.      Comments: Generalized weakness and muscle atrophy   Skin:     General: Skin is warm and dry.      " Comments: Left forearm PIV infiltration with redness/indurated    Neurological:      General: No focal deficit present.      Mental Status: He is alert and oriented to person, place, and time.   Psychiatric:         Mood and Affect: Mood normal.         Behavior: Behavior normal.         Thought Content: Thought content normal.         Judgment: Judgment normal.       Lines/Drains/Airways     None               Labs  No results found for this or any previous visit (from the past 24 hour(s)).    Radiology  CT soft tissue neck without contrast on 07/18/2022 at 9:29 p.m., IMPRESSION:Postsurgical change in the cervical spine without fluid collection or abscess. Right upper lobe airspace disease.  Radiology  MRI C-spine without contrast on 07/15/2022 at 8:27 a.m., IMPRESSION: Postoperative changes are seen in the left posterior elements from C3 through C7 vertebrae. There is a postoperative collection in the surgical bed/ posterior paraspinal soft tissues surrounding the spinous process and posterior to the laminae. The collection measures approximately 7.3 cm in length and 2 cm in AP dimension. Similar findings are also seen on the prior examination. Correlate clinically as regards additional evaluation and follow-up. There are displaced fractures involving the laminae of C3 through C7 vertebrae appreciated better on the earlier CT study. There is moderate canal stenosis at C3-C4 and C4-C5 secondary to broad-based disc protrusions and degenerative joint disease. Details and other findings as noted above.  Radiology  MRI brain on 07/16/2022 at 8:30 a.m., IMPRESSION:No abnormal signal is identified on the diffusion images to suggest acute infarct. Details and findings as above. No significant discrepancy with overnight report.    Assessment/Plan:     71 y.o. WM admitted on 7/25/2022    Cervical spondylosis   - s/p C3-C7 laminiopasty and laminoforminotomies on 7/12/2022   - continue     Bengay t.ijovan p.r.n. (initiated  7/28)                Robaxin 750 mg t.i.d. (increased 7/28)                Gabapentin 100 mg t.i.d. (increased 7/28)                Lidoderm patch daily                ASA 81 mg daily                Norco 5 mg q.4 hours p.r.n.  - defer to physiatry for rehab and pain management  - PT/OT/RT/PT following  - follow-up with Neurosurgery outpatient    Oropharyngeal dysphagia  - current  - continue   Minced and moist diet with moderately thickened liquids  - ST following     Hypothyroidism  - TSH within normal limits  - continue                Levothyroxine 50 mcg daily     VIJI  - compliant with CPAP  - continue current POC     BPH  - stable  - continue                Flomax 0.4 mg at bedtime     Normocytic anemia  - asymptomatic  - iron level low  - continue                Ferrous sulfate 325 mg b.i.d. (initiated 7/26)  - H/H stable   - no evidence of active bleeds  - will closely monitor and transfuse if needed      Constipation  - stable  - continue           Colace 100 mg BID     Left forearm PIV infiltration  - Discontinue peripheral IV  - continue                Warm compresses t.i.d. x3 days    Tachycardia  - improved  - continue   Metoprolol 50 mg b.i.d. (increased 8/1)      VTE Prophylaxis:  Lovenox 40 mg daily  COVID-19 testing:  Negative on 07/25/2022  COVID-19 vaccination status:  Vaccinated (Moderna):  02/10/2021 and 03/10/2021     POA: None  Living will: None  Contacts: Anusha Echeverria (spouse)     CODE STATUS: Full Code  Internal Medicine (attending): Luis Khan MD   Physiatry (consulting):  David Carty MD    OUTPATIENT PROVIDERS  Gary Reilly II, MD  Neurosurgery:  Paulo Rao MD  Neurology: Yan Rainey MD     DISPOSITION: Condition stable.  Sleep hygiene, bowel maintenance, and appetite at goal.  Vital signs at goal with no recorded fevers.  No labs or imaging today.  Continue current POC.  Monitor closely.  Notify of acute changes.    Staffing 8/1/2022: Continent of bowel and bladder.  Good appetite. RT: overall contact guard. PT: walking 200 feet with rolling walker, overall contact guard. Needs time. OT: overall standby assist to contact guard assist. Projected discharge 8/5.    *Verbal consent obtained for live A/V Telehealth Visit     Originating Site: [facility]  Place of service of originating site: Inpatient Rehab Facility  Distant Site (MD Location):     [place] [address]  Synchronous Communication Encounter Clock Time: 4 minutes       Simón Phillips NP conducted independent physical examination and assisted with medical documentation.    Total time spent on this encounter including chart review and direct MD + NP 1-on-1 patient interaction: 36 minutes   Over 50% of this time was spent in counseling and coordination of care

## 2022-08-04 NOTE — PLAN OF CARE
Problem: Rehabilitation (IRF) Plan of Care  Goal: Absence of New-Onset Illness or Injury  Outcome: Ongoing, Progressing     Problem: Impaired Wound Healing  Goal: Optimal Wound Healing  Outcome: Ongoing, Progressing  Intervention: Promote Wound Healing  Flowsheets (Taken 8/4/2022 1137)  Oral Nutrition Promotion: rest periods promoted  Activity Management:   Ambulated -L4   Ambulated to bathroom - L4   Up in chair - L3     Problem: Skin Injury Risk Increased  Goal: Skin Health and Integrity  Outcome: Ongoing, Progressing     Problem: Fall Injury Risk  Goal: Absence of Fall and Fall-Related Injury  Outcome: Ongoing, Progressing  Intervention: Promote Injury-Free Environment  Flowsheets (Taken 8/4/2022 1137)  Safety Promotion/Fall Prevention:   assistive device/personal item within reach   instructed to call staff for mobility   side rails raised x 2   nonskid shoes/socks when out of bed   gait belt with ambulation

## 2022-08-04 NOTE — PT/OT/SLP PROGRESS
Physical Therapy Inpatient Rehab Treatment    Patient Name:  Scott Echeverria   MRN:  [id number]    Recommendations:     Discharge Recommendations:  other (see comments)   Discharge Equipment Recommendations: other (see comments) (Pending progress)   Barriers to discharge: None    Assessment:     Scott Echeverria is a 71 y.o. male admitted with a medical diagnosis of Stenosis of cervical spine with myelopathy.  He presents with the following impairments/functional limitations:  weakness, impaired endurance, impaired sensation, gait instability, impaired functional mobility, impaired balance, decreased coordination, decreased lower extremity function, decreased safety awareness, pain  .    Rehab Diagnosis:     Recent Surgery: * No surgery found *      General Precautions: Standard, honey thick     Orthopedic Precautions:spinal precautions     Braces: Cervical collar    Rehab Prognosis: Good; patient would benefit from acute skilled PT services to address these deficits and reach maximum level of function.      History:     Past Medical History:   Diagnosis Date    BPH (benign prostatic hyperplasia)     Cervical pain     Cervical radiculitis     Cervical spinal stenosis     Hypothyroidism, unspecified     Sleep apnea     resolved since surgery       Past Surgical History:   Procedure Laterality Date    APPENDECTOMY  01/25/2022    Dr. Kerry Coats    COLONOSCOPY  2021    HEMORRHOID SURGERY  2002    LAMINOPLASTY N/A 7/12/2022    Procedure: LAMINOPLASTY;  Surgeon: Paulo Rao MD;  Location: [facility];  Service: Neurosurgery;  Laterality: N/A;  left C3-4, C4-5, C5-6, C6-7 foraminotomies, C3-7 laminoplasty //  TIVA SET UP    lower back surgery  1990    RETINAL DETACHMENT SURGERY Right 2015    SINUS SURGERY      TONSILLECTOMY  1956       Subjective       Respiratory Status: Room air    Patients cultural, spiritual, Holiness conflicts given the current situation: yes      Objective:     Communicated with RN prior  to session.  Patient found up in chair with peripheral IV  upon PT entry to room.    Pt is Oriented x3 and Alert and Motivated.    Functional Mobility:   · Transfers:     · Car Transfer: set up  with  rolling walker  using  Step Transfer and Car height increased to 31 inches to simulate home enviroment      Current   Status  Discharge   Goal   Functional Area: Care Score:    Roll Left and Right   Set-up/clean-up   Sit to Lying   Set-up/clean-up   Lying to Sitting on Side of Bed   Supervision or touching assistance   Sit to Stand 5 Set-up/clean-up   Chair/Bed-to-Chair Transfer 5 Set-up/clean-up   Car Transfer 5 Supervision or touching assistance   Walk 10 Feet   Set-up/clean-up   Walk 50 Feet with Two Turns   Set-up/clean-up   Walk 150 Feet   Set-up/clean-up   Walk 10 Feet Uneven Surface   Set-up/clean-up   1 Step (Curb)   Supervision or touching assistance   4 Steps   Supervision or touching assistance   12 Steps   Supervision or touching assistance   Picking Up Object   Set-up/clean-up   Wheel 50 Feet with Two Turns       Wheel 150 Feet           Therapeutic Activities and Exercises:  Dynamic standing balance with recreational therapy playing horse shoes.    // bars x 6 trial ambulating with out AD and UE support. Overall cga with slight unsteadiness   Step ups on to airexfoam 10 x 2 with BUE support   Standing on airxfoam with out UE support with eyes open 2x 1min; and again with eyes closed 2 x 1 min.     Activity Tolerance    Patient left up in chair with all lines intact, call button in reach and OT present.    Education provided: roles and goals of PT/PTA, transfer training, bed mob, stair training, balance training and fall prevention    Expected compliance:    GOALS:   Multidisciplinary Problems     Physical Therapy Goals        Problem: Physical Therapy    Goal Priority Disciplines Outcome Goal Variances Interventions   Physical Therapy Goal     PT, PT/OT Ongoing, Progressing     Description: Short Term  goals      Bed Mobility   Roll Right and Left Setup or Clean-up Assistance .  Lying to sitting Setup or Clean-up Assistance .  Sitting to lying Setup or Clean-up Assistance .    Transfers    Pt will be able to perform Stand step chair/bed to chair transfer With RW Setup or Clean-up Assistance .  Pt will be able to perform Sit to stand with RW Setup or Clean-up Assistance .  Pt will be able to perform Car transfer with RW Setup or Clean-up Assistance .    Ambulation    Pt will ambulate 200 Feet with RW Setup or Clean-up Assistance .  Pt will be able to ascend/descend 12 stairs using B Rails Supervision or Touching Assistance .  Pt will be able to ascend/descend 4 inch curb using RW Supervision or Touching Assistance .  Pt will be able to ambulate uneven surfaces/ramp 15 feet with RW Supervision or Touching Assistance .      Timeframe: By Discharge                     Plan:     During this hospitalization, patient to be seen 5 x/week to address the identified rehab impairments via gait training, therapeutic activities, therapeutic exercises, neuromuscular re-education, wheelchair management/training and progress toward the following goals:     Plan of Care Expires:  08/01/22    Additional Information:         Time Tracking:     PT Received On: 08/04/22  Time In 1000     Time Out 1100  Total Time 60 min  Therapy Time PT Individual: 60  Missed Time:    Time Missed due to:      Billable Minutes: Therapeutic Activity 60    08/04/2022

## 2022-08-04 NOTE — PT/OT/SLP PROGRESS
Occupational Therapy Inpatient Rehab Treatment    Name: Scott Echeverria  MRN: [id number]    Assessment:  Scott Echeverria is a 71 y.o. male admitted with a medical diagnosis of Stenosis of cervical spine with myelopathy.  He presents with the following impairments/functional limitations:  weakness, impaired endurance, impaired sensation, impaired self care skills, impaired functional mobility, gait instability, impaired balance, decreased coordination, decreased upper extremity function, decreased safety awareness, pain, decreased ROM, impaired coordination  .    Rehab Diagnosis:     Recent Surgery: * No surgery found *      General Precautions: Standard, fall     Orthopedic Precautions:spinal precautions     Braces:  (soft cervical collar)    Rehab Prognosis: Good; patient would benefit from acute skilled OT services to address these deficits and reach maximum level of function.        History:     Past Medical History:   Diagnosis Date    BPH (benign prostatic hyperplasia)     Cervical pain     Cervical radiculitis     Cervical spinal stenosis     Hypothyroidism, unspecified     Sleep apnea     resolved since surgery       Past Surgical History:   Procedure Laterality Date    APPENDECTOMY  01/25/2022    Dr. Kerry Coats    COLONOSCOPY  2021    HEMORRHOID SURGERY  2002    LAMINOPLASTY N/A 7/12/2022    Procedure: LAMINOPLASTY;  Surgeon: Paulo Rao MD;  Location: [facility];  Service: Neurosurgery;  Laterality: N/A;  left C3-4, C4-5, C5-6, C6-7 foraminotomies, C3-7 laminoplasty //  TIVA SET UP    lower back surgery  1990    RETINAL DETACHMENT SURGERY Right 2015    SINUS SURGERY      TONSILLECTOMY  1956       Subjective     Orientation: Oriented x4    Chief Complaint: no new complaints    Respiratory Status: Room air    Patients cultural, spiritual, Zoroastrianism conflicts given the current situation: no       Objective:     Communicated with nursing prior to session.  Patient found up in chair with peripheral  IV  upon OT entry to room.    Therapeutic Exercise  Pt performed BUE AAROM with LUE 10 reps all planes, UBE for 5 mins forward and 5 mins backward to increase strength and ROM with BUE in order to improve ADL performance.    Patient left up in chair with call button in reach.     Education provided: Roles and goals of OT, ADLs, safety precautions, fall prevention and strengthening     GOALS:    VADLs:  Pt to perform grooming tasks with indep while standing with RW   Pt to perform UB dressing with set up assist   Pt to perform LB dressing with set up assist   Pt to perform putting on/off footwear task with set up assist   Pt to perform toileting with supervision using RW    Functional Transfers:  Pt to perform toilet transfers with CGA and RW   Pt to perform a tub transfer with CGA and RW  Pt to perform a walk-in shower transfer with CGA and RW    IADLs:  Pt to perform IADL tasks while standing for 8 min with RW and SBA     Balance, Strengthening, Endurance, Balance:  Pt to consistently demonstrate adherence to cervical spinal precautions during all ADL's as instructed by OT.  Pt to demonstrate dynamic standing balance for 10 min as required to perform ADL's from standing level.  Pt to demonstrate consistent adherence to breathing control and energy conservation techniques as educated by OT.       Time Tracking     OT Received On:  8/3/2022  Time In  1330     Time Out  1400  Total Time  30  Therapy Time:  30  Missed Time:    Missed Time Reason:      Billable Minutes: Therapeutic Exercise 30    08/04/2022

## 2022-08-04 NOTE — PT/OT/SLP DISCHARGE
Recreational Therapy Discharge      Date of Treatment: 08/04/22  Start Time: 1000  Stop Time: 1030  Total Time: 30 min  Missed Time:     Assessment      Scott Echeverria is a 71 y.o. male admitted with a medical diagnosis of Stenosis of cervical spine with myelopathy.  He presents with the following impairments/functional limitations:  weakness, impaired balance, decreased upper extremity function, decreased ROM, impaired coordination     Rehab Diagnosis:     Recent Surgery: * No surgery found *      General Precautions: Standard, fall     Orthopedic Precautions:spinal precautions     Braces:  (soft cervial collar)    Rehab Prognosis: Good; patient would benefit from acute skilled Recreational Therapy services to address these deficits and reach maximum level of function.      Impairments: Coordination deficits, Pain and Strength deficits  Rehab Potential: Good  Treatment Recommendations: Complete discharge plan  Treatment Diagnosis: Cervical myelopathy s/p c3-7 posterior forminotomies c3-7 laminoplasty and repair, BPH,  Orientation: Oriented x4  Affect/Behavior: Appropriate and Cooperative  Safety/Judgement: intact   Basic Command Following: intact  Spiritual Cultural: no        History     Past Medical History:   Diagnosis Date    BPH (benign prostatic hyperplasia)     Cervical pain     Cervical radiculitis     Cervical spinal stenosis     Hypothyroidism, unspecified     Sleep apnea     resolved since surgery       Past Surgical History:   Procedure Laterality Date    APPENDECTOMY  01/25/2022    Dr. Kerry Coats    COLONOSCOPY  2021    HEMORRHOID SURGERY  2002    LAMINOPLASTY N/A 7/12/2022    Procedure: LAMINOPLASTY;  Surgeon: Paulo Rao MD;  Location: [facility];  Service: Neurosurgery;  Laterality: N/A;  left C3-4, C4-5, C5-6, C6-7 foraminotomies, C3-7 laminoplasty //  TIVA SET UP    lower back surgery  1990    RETINAL DETACHMENT SURGERY Right 2015    SINUS SURGERY      TONSILLECTOMY  1956       Home  "Environment     Living Situation  Lives With: spouse  Name(s) of Who Lives With Patient: Wife  Lives in: house  Patients Responsibilities: Community mobility, , Financial management, Health and wellness, Laundry, Leisure/play/hobbies, Meal preparation, Retired, Shopping, Social participation, Yard Work    Instrumental Activities of Daily Living     Previous Hand Dominance: Right Current Hand Dominance: Right (L sided weakness)     Other iADL Information:        Cognitive Skills Building       Cognitive Observation Activity Assist Position Equipment Response Comment             Comment:        Dynamic Activities      Activity Assist Position Equipment Response Comment   Activity 1 Horseshoes modified independence Standing Rolling walker and Horseshoes good             Comment: Sit to stand was setup. Dynamic standing balance/reaching was setup/I.  Standing tolerance was 10 minutes. LUE coordination improved to setup. He remains I with sequencing skills.  Determined       Fine Motor Activities      Activity Assist Position Equipment Response Comment            Comment:          Goals     Patient Goals  Patient Goal 1: "Get back to where I was before and be my old self."    Short Term Goals    Goal  Goal Status   Will increase sit to stand to supervision Met   Will improve dynamic standing balance/reaching to supervision Met                 Long Term Goals    Goal Goal Status   Will increase standing tolerance to 5,10 minutes Met   Will improve dynamic standing balance/reaching to setup Met                     Plan       Patient to be seen: Daily  Duration: Other (Comment) (1 day)  Treatments planned: Energy conservation training  Treatment plan/goals established with Patient/Caregiver: Yes     "

## 2022-08-05 VITALS
OXYGEN SATURATION: 96 % | RESPIRATION RATE: 18 BRPM | TEMPERATURE: 98 F | HEART RATE: 109 BPM | WEIGHT: 161.19 LBS | BODY MASS INDEX: 23.87 KG/M2 | DIASTOLIC BLOOD PRESSURE: 78 MMHG | HEIGHT: 69 IN | SYSTOLIC BLOOD PRESSURE: 118 MMHG

## 2022-08-05 PROCEDURE — 94799 UNLISTED PULMONARY SVC/PX: CPT

## 2022-08-05 PROCEDURE — 97535 SELF CARE MNGMENT TRAINING: CPT

## 2022-08-05 PROCEDURE — 25000003 PHARM REV CODE 250: Performed by: NURSE PRACTITIONER

## 2022-08-05 PROCEDURE — 94761 N-INVAS EAR/PLS OXIMETRY MLT: CPT

## 2022-08-05 RX ORDER — HYDROCODONE BITARTRATE AND ACETAMINOPHEN 5; 325 MG/1; MG/1
1 TABLET ORAL EVERY 6 HOURS PRN
Qty: 15 TABLET | Refills: 0 | Status: SHIPPED | OUTPATIENT
Start: 2022-08-05 | End: 2022-08-05 | Stop reason: SDUPTHER

## 2022-08-05 RX ORDER — LIDOCAINE 50 MG/G
1 PATCH TOPICAL DAILY
Qty: 21 PATCH | Refills: 0 | Status: SHIPPED | OUTPATIENT
Start: 2022-08-05 | End: 2022-08-15

## 2022-08-05 RX ORDER — METOPROLOL TARTRATE 50 MG/1
50 TABLET ORAL 2 TIMES DAILY
Qty: 60 TABLET | Refills: 0 | Status: SHIPPED | OUTPATIENT
Start: 2022-08-05 | End: 2022-09-08

## 2022-08-05 RX ORDER — TAMSULOSIN HYDROCHLORIDE 0.4 MG/1
0.4 CAPSULE ORAL NIGHTLY
Qty: 30 CAPSULE | Refills: 2 | Status: SHIPPED | OUTPATIENT
Start: 2022-08-05 | End: 2022-09-08

## 2022-08-05 RX ORDER — LEVOTHYROXINE SODIUM 50 UG/1
50 TABLET ORAL DAILY
Qty: 30 TABLET | Refills: 2 | Status: SHIPPED | OUTPATIENT
Start: 2022-08-05 | End: 2022-10-26 | Stop reason: SDUPTHER

## 2022-08-05 RX ORDER — GABAPENTIN 100 MG/1
100 CAPSULE ORAL 3 TIMES DAILY
Qty: 90 CAPSULE | Refills: 0 | Status: SHIPPED | OUTPATIENT
Start: 2022-08-05 | End: 2022-09-06 | Stop reason: SDUPTHER

## 2022-08-05 RX ORDER — HYDROCODONE BITARTRATE AND ACETAMINOPHEN 5; 325 MG/1; MG/1
1 TABLET ORAL EVERY 6 HOURS PRN
Qty: 15 TABLET | Refills: 0 | Status: SHIPPED | OUTPATIENT
Start: 2022-08-05 | End: 2022-08-10

## 2022-08-05 RX ORDER — FERROUS SULFATE 325(65) MG
325 TABLET, DELAYED RELEASE (ENTERIC COATED) ORAL DAILY
Qty: 30 TABLET | Refills: 0 | Status: SHIPPED | OUTPATIENT
Start: 2022-08-05 | End: 2022-09-04

## 2022-08-05 RX ORDER — HYDROXYZINE PAMOATE 50 MG/1
50 CAPSULE ORAL NIGHTLY
Qty: 14 CAPSULE | Refills: 0 | Status: SHIPPED | OUTPATIENT
Start: 2022-08-05 | End: 2022-08-19

## 2022-08-05 RX ORDER — METHOCARBAMOL 750 MG/1
750 TABLET, FILM COATED ORAL 3 TIMES DAILY
Qty: 42 TABLET | Refills: 0 | Status: SHIPPED | OUTPATIENT
Start: 2022-08-05 | End: 2022-08-15

## 2022-08-05 RX ORDER — NAPROXEN SODIUM 220 MG/1
81 TABLET, FILM COATED ORAL DAILY
Qty: 90 TABLET | Refills: 0 | Status: SHIPPED | OUTPATIENT
Start: 2022-08-05 | End: 2023-04-26

## 2022-08-05 RX ADMIN — METHOCARBAMOL 750 MG: 750 TABLET ORAL at 05:08

## 2022-08-05 RX ADMIN — LEVOTHYROXINE SODIUM 50 MCG: 0.05 TABLET ORAL at 08:08

## 2022-08-05 RX ADMIN — ASPIRIN 81 MG CHEWABLE TABLET 81 MG: 81 TABLET CHEWABLE at 08:08

## 2022-08-05 RX ADMIN — METOPROLOL TARTRATE 50 MG: 50 TABLET, FILM COATED ORAL at 08:08

## 2022-08-05 RX ADMIN — GABAPENTIN 100 MG: 100 CAPSULE ORAL at 05:08

## 2022-08-05 RX ADMIN — DOCUSATE SODIUM 100 MG: 100 CAPSULE, LIQUID FILLED ORAL at 08:08

## 2022-08-05 RX ADMIN — FERROUS SULFATE TAB 325 MG (65 MG ELEMENTAL FE) 1 EACH: 325 (65 FE) TAB at 08:08

## 2022-08-05 RX ADMIN — LIDOCAINE 5% 1 PATCH: 700 PATCH TOPICAL at 05:08

## 2022-08-05 NOTE — PT/OT/SLP PROGRESS
"Physical Therapy Inpatient Rehab Treatment    Patient Name:  Scott Echeverria   MRN:  [id number]    Recommendations:     Discharge Recommendations:  other (see comments)   Discharge Equipment Recommendations: other (see comments) (Pending progress)   Barriers to discharge: None    Assessment:     Scott Echeverria is a 71 y.o. male admitted with a medical diagnosis of Stenosis of cervical spine with myelopathy.     General Precautions: Standard, fall     Orthopedic Precautions:spinal precautions     Braces: Cervical collar    Rehab Prognosis: Good; patient would benefit from acute skilled PT services to address these deficits and reach maximum level of function.      History:     Past Medical History:   Diagnosis Date    BPH (benign prostatic hyperplasia)     Cervical pain     Cervical radiculitis     Cervical spinal stenosis     Hypothyroidism, unspecified     Sleep apnea     resolved since surgery       Past Surgical History:   Procedure Laterality Date    APPENDECTOMY  01/25/2022    Dr. Kerry Coats    COLONOSCOPY  2021    HEMORRHOID SURGERY  2002    LAMINOPLASTY N/A 7/12/2022    Procedure: LAMINOPLASTY;  Surgeon: Paulo Rao MD;  Location: [facility];  Service: Neurosurgery;  Laterality: N/A;  left C3-4, C4-5, C5-6, C6-7 foraminotomies, C3-7 laminoplasty //  TIVA SET UP    lower back surgery  1990    RETINAL DETACHMENT SURGERY Right 2015    SINUS SURGERY      TONSILLECTOMY  1956       Subjective     Chief Complaint: Neck pain  Patient/Family Comments/goals: "to walk normally"    Respiratory Status: Room air    Patients cultural, spiritual, Church conflicts given the current situation: yes      Objective:     Communicated with pt prior to session.  Patient found up in chair with    upon PT entry to room.    Pt is Oriented x3 and Anxious, Alert and Cooperative.    Education provided: roles and goals of PT/PTA, transfer training, bed mob, gait training, safety awareness, body mechanics, assistive device, " strengthening exercises, fall prevention, spinal precautions and recommendations for HEP/activity at home    Expected compliance:    GOALS:   Multidisciplinary Problems     Physical Therapy Goals        Problem: Physical Therapy    Goal Priority Disciplines Outcome Goal Variances Interventions   Physical Therapy Goal     PT, PT/OT Ongoing, Progressing     Description: Short Term goals      Bed Mobility   Roll Right and Left Setup or Clean-up Assistance .  Lying to sitting Setup or Clean-up Assistance .  Sitting to lying Setup or Clean-up Assistance .    Transfers    Pt will be able to perform Stand step chair/bed to chair transfer With RW Setup or Clean-up Assistance .  Pt will be able to perform Sit to stand with RW Setup or Clean-up Assistance .  Pt will be able to perform Car transfer with RW Setup or Clean-up Assistance .    Ambulation    Pt will ambulate 200 Feet with RW Setup or Clean-up Assistance .  Pt will be able to ascend/descend 12 stairs using B Rails Supervision or Touching Assistance .  Pt will be able to ascend/descend 4 inch curb using RW Supervision or Touching Assistance .  Pt will be able to ambulate uneven surfaces/ramp 15 feet with RW Supervision or Touching Assistance .      Timeframe: By Discharge                     Plan:     During this hospitalization, patient to be seen 5 x/week to address the identified rehab impairments via gait training, therapeutic activities, therapeutic exercises, neuromuscular re-education, wheelchair management/training and progress toward the following goals:     Plan of Care Expires:  22    Additional Information:     Family training and last visit completed; all questions/concerns addressed as appropriate.    Time Tracking:     PT Received On:    Time In 930     Time Out 1000  Total Time 30 min  Therapy Time PT Individual: 30  Missed Time:    Time Missed due to:      Billable Minutes: Self Care/Home Trainin min    2022

## 2022-08-05 NOTE — PLAN OF CARE
Discharging today.    Alerted Nazanin that pt would like to purchase OOP an extra RW along with the one being billed to insurance.  Pt has the means to pay for this item here per OT Julianne.    Alerted [facility] ([facility] home office) re: discharge today.  They will provide HH PT/OT/RN services.  Will send AVS once completed this a.m.    Wife will complete SLP family training this a.m. before she brings pt home.

## 2022-08-05 NOTE — PLAN OF CARE
Problem: Rehabilitation ([facility]) Plan of Care  Goal: Absence of New-Onset Illness or Injury  Outcome: Ongoing, Progressing  Intervention: Prevent Fall and Fall Injury  Flowsheets (Taken 8/5/2022 0829)  Safety Promotion/Fall Prevention:   assistive device/personal item within reach   instructed to call staff for mobility   side rails raised x 2   gait belt with ambulation   medications reviewed   commode/urinal/bedpan at bedside  Intervention: Prevent Skin Injury  Flowsheets (Taken 8/5/2022 0829)  Body Position: weight shifting     Problem: Impaired Wound Healing  Goal: Optimal Wound Healing  Outcome: Ongoing, Progressing     Problem: Fall Injury Risk  Goal: Absence of Fall and Fall-Related Injury  Outcome: Ongoing, Progressing  Intervention: Promote Injury-Free Environment  Flowsheets (Taken 8/5/2022 0829)  Safety Promotion/Fall Prevention:   assistive device/personal item within reach   instructed to call staff for mobility   side rails raised x 2   gait belt with ambulation   medications reviewed   commode/urinal/bedpan at bedside

## 2022-08-05 NOTE — PLAN OF CARE
Problem: SLP  Goal: SLP Goal  Description: LT. Pt will tolerate least restrictive diet with no s/sx of aspiration.    ST. Pt will complete tongue base and laryngeal elevation exercises with 100% accuracy and minimal cues.  2. Pt will tolerate half meal of soft & bite sized diet with no s/sx of aspiration.--GOAL MET  3. Pt will tolerate half meal of regular diet consistency with no s/sx of aspiration.--NOT MET but could likely tolerate with exception of chewy/stringy meats  Outcome: Adequate for Care Transition

## 2022-08-05 NOTE — DISCHARGE SUMMARY
[facility] ([facility])  Rehab Discharge Summary  MD Telemedicine Visit      Patient Name: Scott Echeverria  MRN: [id number]  Age: 71 y.o. Sex: male  : 1950  Hospital Length of Stay: 11 days   Date of Service: 2022      Discharge Information   Date of Admission: 2022  Date of Discharge: 2022  Admit Diagnosis:  Cervical spondylosis          Hypothyroidism          VIJI          BPH          Normocytic anemia          Constipation          Hypokalemia          Metabolic alkalosis          Left forearm PIV infiltration  Discharge Diagnosis:  Cervical spondylosis (stable)            Oropharyngeal dysphagia (improved)            Hypothyroidism (stable)            VIJI (stable)            BPH (stable)            Normocytic anemia (stable)            Constipation (stable)            Tachycardia (resolved)  COVID-19 testing:  Negative on 2022  COVID-19 vaccination status:  Vaccinated (Moderna):  02/10/2021 and 03/10/2021  Internal Medicine (attending): Luis Khan MD   Physiatry (consulting):  David Carty MD     OUTPATIENT PROVIDERS  Gary Reilly II, MD  Neurosurgery:  Paulo Rao MD  Neurology: Yan Rainey MD    Hospital Course   71-year-old white male presented to [facility] on 2022 for scheduled cervical laminiopasty and laminoforminotomies.  PMH significant for cervical spondylosis, hypothyroidism, VIJI, and BPH.  Tolerated C3-C7 laminiopasty and laminoforminotomies on  without perioperative complications.  On  code fast was initiated due to slurred speech after receiving IV Robaxin.  CT head, MRI, MRA negative.  Patient required Ativan prior to MRI and reportedly has some left-sided neglect.  Naloxone and romazicon received with little response. That afternoon he became hypoxic and was having agonal respirations requiring intubation.  T-max a 100.8° reported on .  Extubated on .  Repeat CT unremarkable.  NIVA negative for  DVT.  Sputum culture did come back positive for Enterobacter Cloacae; resistant to cephalosporins.  Rocephin discontinued and Levaquin initiated.  Required re-intubation on 07/18 for continued alteration mental status and severe hypoxemic respiratory failure.  EEG with diffuse slowing.  LP with 12 WBC and 650 RBC. Neurology added acyclovir, rocephin, and vancomycin for possible meningitis on 07/19.  Extubated on 07/20.  Meningitis panel negative-low suspicion meningitis. Suspicion most likely secondary to medication side effect but reason for his decompensation and alteration mental status is still not clear on 7/21.  Acyclovir discontinued.  Downgraded to the floor on 07/22.  Broad coverage antibiotics/antivirals/antifungals discontinued.  Continue to participate progress in therapy.  Left arm weakness continued to improve.  Tolerated transfer to [facility] inpatient rehab unit on 07/25 without incident.  During inpatient rehab course potassium replaced.  Ferrous sulfate initiated due to low iron levels.  Left forearm IV discontinued due to infiltration.  Warm compresses initiated with resolution.  Continued to complain neck pain radiating to left shoulder.  Robaxin increased to 750 mg t.i.d. and gabapentin mg t.i.d..  Biofreeze initiated as well.  Heart rate elevated on 07/29.  Metoprolol 25 mg b.i.d. initiated received IVF.  Soft collar initiated on 07/29 to help with pain with improvement.  Metoprolol tartrate increased to 50 mg b.i.d. on 08/01.  BP and heart rate remained stable.  Speech therapy advanced diet to soft and bite size with thin liquids.  Recommended small sips.  PT reports sit-to-stand transfers with independence with rolling walker.  Shower transfers with supervision with rolling walker.  Functional mobility overall supervision to setup.  ADLs overall independent to supervision.  Vital signs at goal.  Med reconciliation completed.  Discharge orders initiated.  Stable for transfer home with home health.  " To follow-up with scheduled appointments documented below.  Chief Complaint: Cervical spondylosis s/p C3-C7 laminiopasty and laminoforminotomies on 7/12/2022     /73   Pulse 98   Temp 97.7 °F (36.5 °C) (Oral)   Resp 20   Ht 5' 9.02" (1.753 m)   Wt 73.1 kg (161 lb 2.5 oz)   SpO2 96%   BMI 23.79 kg/m²      Physical Exam  Constitutional:       Appearance: Normal appearance.   HENT:      Head: Normocephalic.      Mouth/Throat:      Mouth: Mucous membranes are moist.   Eyes:      Pupils: Pupils are equal, round, and reactive to light.   Neck:      Comments: Posterior neck incision clean and intact-TK, no redness or drainage  Cardiovascular:      Rate and Rhythm: Regular rhythm.  regular rate.     Heart sounds: Normal heart sounds.   Pulmonary:      Effort: Pulmonary effort is normal.      Breath sounds: Normal breath sounds.   Abdominal:      General: Bowel sounds are normal.      Palpations: Abdomen is soft.   Musculoskeletal:      Cervical back: Neck supple.      Comments: Generalized weakness and muscle atrophy   Skin:     General: Skin is warm and dry.    Neurological:      General: No focal deficit present.      Mental Status: He is alert and oriented to person, place, and time.   Psychiatric:         Mood and Affect: Mood normal.         Behavior: Behavior normal.         Thought Content: Thought content normal.         Judgment: Judgment normal    Labs  Admission on 07/25/2022   Component Date Value Ref Range Status    Sodium Level 07/26/2022 145  136 - 145 mmol/L Final    Potassium Level 07/26/2022 3.3 (A) 3.5 - 5.1 mmol/L Final    Chloride 07/26/2022 100  98 - 107 mmol/L Final    Carbon Dioxide 07/26/2022 33 (A) 23 - 31 mmol/L Final    Glucose Level 07/26/2022 98  82 - 115 mg/dL Final    Blood Urea Nitrogen 07/26/2022 7.3 (A) 8.4 - 25.7 mg/dL Final    Creatinine 07/26/2022 0.68 (A) 0.73 - 1.18 mg/dL Final    Calcium Level Total 07/26/2022 9.2  8.8 - 10.0 mg/dL Final    Protein Total " 07/26/2022 6.7  5.8 - 7.6 gm/dL Final    Albumin Level 07/26/2022 2.7 (A) 3.4 - 4.8 gm/dL Final    Globulin 07/26/2022 4.0 (A) 2.4 - 3.5 gm/dL Final    Albumin/Globulin Ratio 07/26/2022 0.7 (A) 1.1 - 2.0 ratio Final    Bilirubin Total 07/26/2022 0.5  <=1.5 mg/dL Final    Alkaline Phosphatase 07/26/2022 265 (A) 40 - 150 unit/L Final    Alanine Aminotransferase 07/26/2022 66 (A) 0 - 55 unit/L Final    Aspartate Aminotransferase 07/26/2022 30  5 - 34 unit/L Final    Estimated GFR-Non  07/26/2022 >60  mls/min/1.73/m2 Final    Magnesium Level 07/26/2022 2.30  1.60 - 2.60 mg/dL Final    Phosphorus Level 07/26/2022 3.2  2.3 - 4.7 mg/dL Final    Prealbumin 07/26/2022 14.9 (A) 16.0 - 42.0 mg/dL Final    Ferritin Level 07/26/2022 202.33  21.81 - 274.66 ng/mL Final    Iron Binding Capacity Unsaturated 07/26/2022 176  69 - 240 ug/dL Final    Iron Level 07/26/2022 18 (A) 65 - 175 ug/dL Final    Transferrin 07/26/2022 180  163 - 344 mg/dL Final    Iron Binding Capacity Total 07/26/2022 194 (A) 250 - 450 ug/dL Final    Iron Saturation 07/26/2022 9 (A) 20 - 50 % Final    WBC 07/26/2022 9.4  4.5 - 11.5 x10(3)/mcL Final    RBC 07/26/2022 4.15 (A) 4.70 - 6.10 x10(6)/mcL Final    Hgb 07/26/2022 12.1 (A) 14.0 - 18.0 gm/dL Final    Hct 07/26/2022 37.1 (A) 42.0 - 52.0 % Final    MCV 07/26/2022 89.4  80.0 - 94.0 fL Final    MCH 07/26/2022 29.2  27.0 - 31.0 pg Final    MCHC 07/26/2022 32.6 (A) 33.0 - 36.0 mg/dL Final    RDW 07/26/2022 15.5  11.5 - 17.0 % Final    Platelet 07/26/2022 619 (A) 130 - 400 x10(3)/mcL Final    MPV 07/26/2022 9.4  7.4 - 10.4 fL Final    Neut % 07/26/2022 63.6  % Final    Lymph % 07/26/2022 27.0  % Final    Mono % 07/26/2022 7.1  % Final    Eos % 07/26/2022 1.2  % Final    Basophil % 07/26/2022 0.5  % Final    Lymph # 07/26/2022 2.55  0.6 - 4.6 x10(3)/mcL Final    Neut # 07/26/2022 6.0  2.1 - 9.2 x10(3)/mcL Final    Mono # 07/26/2022 0.67  0.1 - 1.3 x10(3)/mcL Final     Eos # 07/26/2022 0.11  0 - 0.9 x10(3)/mcL Final    Baso # 07/26/2022 0.05  0 - 0.2 x10(3)/mcL Final    IG# 07/26/2022 0.06 (A) 0 - 0.04 x10(3)/mcL Final    IG% 07/26/2022 0.6  % Final    NRBC% 07/26/2022 0.0  % Final    Sodium Level 07/27/2022 143  136 - 145 mmol/L Final    Potassium Level 07/27/2022 4.3  3.5 - 5.1 mmol/L Final    Chloride 07/27/2022 102  98 - 107 mmol/L Final    Carbon Dioxide 07/27/2022 33 (A) 23 - 31 mmol/L Final    Glucose Level 07/27/2022 107  82 - 115 mg/dL Final    Blood Urea Nitrogen 07/27/2022 11.9  8.4 - 25.7 mg/dL Final    Creatinine 07/27/2022 0.67 (A) 0.73 - 1.18 mg/dL Final    Calcium Level Total 07/27/2022 8.8  8.8 - 10.0 mg/dL Final    Protein Total 07/27/2022 6.2  5.8 - 7.6 gm/dL Final    Albumin Level 07/27/2022 2.5 (A) 3.4 - 4.8 gm/dL Final    Globulin 07/27/2022 3.7 (A) 2.4 - 3.5 gm/dL Final    Albumin/Globulin Ratio 07/27/2022 0.7 (A) 1.1 - 2.0 ratio Final    Bilirubin Total 07/27/2022 0.4  <=1.5 mg/dL Final    Alkaline Phosphatase 07/27/2022 207 (A) 40 - 150 unit/L Final    Alanine Aminotransferase 07/27/2022 60 (A) 0 - 55 unit/L Final    Aspartate Aminotransferase 07/27/2022 37 (A) 5 - 34 unit/L Final    Estimated GFR-Non  07/27/2022 >60  mls/min/1.73/m2 Final    Sodium Level 07/28/2022 142  136 - 145 mmol/L Final    Potassium Level 07/28/2022 4.5  3.5 - 5.1 mmol/L Final    Chloride 07/28/2022 102  98 - 107 mmol/L Final    Carbon Dioxide 07/28/2022 30  23 - 31 mmol/L Final    Glucose Level 07/28/2022 102  82 - 115 mg/dL Final    Blood Urea Nitrogen 07/28/2022 10.3  8.4 - 25.7 mg/dL Final    Creatinine 07/28/2022 0.62 (A) 0.73 - 1.18 mg/dL Final    Calcium Level Total 07/28/2022 9.1  8.8 - 10.0 mg/dL Final    Protein Total 07/28/2022 6.2  5.8 - 7.6 gm/dL Final    Albumin Level 07/28/2022 2.6 (A) 3.4 - 4.8 gm/dL Final    Globulin 07/28/2022 3.6 (A) 2.4 - 3.5 gm/dL Final    Albumin/Globulin Ratio 07/28/2022 0.7 (A) 1.1 - 2.0  ratio Final    Bilirubin Total 07/28/2022 0.3  <=1.5 mg/dL Final    Alkaline Phosphatase 07/28/2022 186 (A) 40 - 150 unit/L Final    Alanine Aminotransferase 07/28/2022 51  0 - 55 unit/L Final    Aspartate Aminotransferase 07/28/2022 28  5 - 34 unit/L Final    Estimated GFR-Non  07/28/2022 >60  mls/min/1.73/m2 Final    Sodium Level 07/29/2022 142  136 - 145 mmol/L Final    Potassium Level 07/29/2022 4.6  3.5 - 5.1 mmol/L Final    Chloride 07/29/2022 102  98 - 107 mmol/L Final    Carbon Dioxide 07/29/2022 27  23 - 31 mmol/L Final    Glucose Level 07/29/2022 107  82 - 115 mg/dL Final    Blood Urea Nitrogen 07/29/2022 11.7  8.4 - 25.7 mg/dL Final    Creatinine 07/29/2022 0.66 (A) 0.73 - 1.18 mg/dL Final    Calcium Level Total 07/29/2022 9.3  8.8 - 10.0 mg/dL Final    Protein Total 07/29/2022 6.6  5.8 - 7.6 gm/dL Final    Albumin Level 07/29/2022 2.8 (A) 3.4 - 4.8 gm/dL Final    Globulin 07/29/2022 3.8 (A) 2.4 - 3.5 gm/dL Final    Albumin/Globulin Ratio 07/29/2022 0.7 (A) 1.1 - 2.0 ratio Final    Bilirubin Total 07/29/2022 0.3  <=1.5 mg/dL Final    Alkaline Phosphatase 07/29/2022 178 (A) 40 - 150 unit/L Final    Alanine Aminotransferase 07/29/2022 42  0 - 55 unit/L Final    Aspartate Aminotransferase 07/29/2022 23  5 - 34 unit/L Final    Estimated GFR-Non  07/29/2022 >60  mls/min/1.73/m2 Final    Sodium Level 07/30/2022 142  136 - 145 mmol/L Final    Potassium Level 07/30/2022 4.5  3.5 - 5.1 mmol/L Final    Chloride 07/30/2022 104  98 - 107 mmol/L Final    Carbon Dioxide 07/30/2022 27  23 - 31 mmol/L Final    Glucose Level 07/30/2022 97  82 - 115 mg/dL Final    Blood Urea Nitrogen 07/30/2022 13.5  8.4 - 25.7 mg/dL Final    Creatinine 07/30/2022 0.60 (A) 0.73 - 1.18 mg/dL Final    Calcium Level Total 07/30/2022 9.3  8.8 - 10.0 mg/dL Final    Protein Total 07/30/2022 6.6  5.8 - 7.6 gm/dL Final    Albumin Level 07/30/2022 2.9 (A) 3.4 - 4.8 gm/dL Final     Globulin 07/30/2022 3.7 (A) 2.4 - 3.5 gm/dL Final    Albumin/Globulin Ratio 07/30/2022 0.8 (A) 1.1 - 2.0 ratio Final    Bilirubin Total 07/30/2022 0.4  <=1.5 mg/dL Final    Alkaline Phosphatase 07/30/2022 175 (A) 40 - 150 unit/L Final    Alanine Aminotransferase 07/30/2022 36  0 - 55 unit/L Final    Aspartate Aminotransferase 07/30/2022 22  5 - 34 unit/L Final    Estimated GFR-Non  07/30/2022 >60  mls/min/1.73/m2 Final    Sodium Level 07/31/2022 142  136 - 145 mmol/L Final    Potassium Level 07/31/2022 4.6  3.5 - 5.1 mmol/L Final    Chloride 07/31/2022 102  98 - 107 mmol/L Final    Carbon Dioxide 07/31/2022 28  23 - 31 mmol/L Final    Glucose Level 07/31/2022 98  82 - 115 mg/dL Final    Blood Urea Nitrogen 07/31/2022 13.7  8.4 - 25.7 mg/dL Final    Creatinine 07/31/2022 0.73  0.73 - 1.18 mg/dL Final    Calcium Level Total 07/31/2022 9.3  8.8 - 10.0 mg/dL Final    Protein Total 07/31/2022 6.9  5.8 - 7.6 gm/dL Final    Albumin Level 07/31/2022 3.0 (A) 3.4 - 4.8 gm/dL Final    Globulin 07/31/2022 3.9 (A) 2.4 - 3.5 gm/dL Final    Albumin/Globulin Ratio 07/31/2022 0.8 (A) 1.1 - 2.0 ratio Final    Bilirubin Total 07/31/2022 0.3  <=1.5 mg/dL Final    Alkaline Phosphatase 07/31/2022 170 (A) 40 - 150 unit/L Final    Alanine Aminotransferase 07/31/2022 33  0 - 55 unit/L Final    Aspartate Aminotransferase 07/31/2022 23  5 - 34 unit/L Final    Estimated GFR-Non  07/31/2022 >60  mls/min/1.73/m2 Final    Prealbumin 08/01/2022 30.2  16.0 - 42.0 mg/dL Final    Sodium Level 08/01/2022 141  136 - 145 mmol/L Final    Potassium Level 08/01/2022 4.4  3.5 - 5.1 mmol/L Final    Chloride 08/01/2022 101  98 - 107 mmol/L Final    Carbon Dioxide 08/01/2022 28  23 - 31 mmol/L Final    Glucose Level 08/01/2022 101  82 - 115 mg/dL Final    Blood Urea Nitrogen 08/01/2022 14.4  8.4 - 25.7 mg/dL Final    Creatinine 08/01/2022 0.67 (A) 0.73 - 1.18 mg/dL Final    Calcium Level Total  08/01/2022 9.4  8.8 - 10.0 mg/dL Final    Protein Total 08/01/2022 6.9  5.8 - 7.6 gm/dL Final    Albumin Level 08/01/2022 3.1 (A) 3.4 - 4.8 gm/dL Final    Globulin 08/01/2022 3.8 (A) 2.4 - 3.5 gm/dL Final    Albumin/Globulin Ratio 08/01/2022 0.8 (A) 1.1 - 2.0 ratio Final    Bilirubin Total 08/01/2022 0.3  <=1.5 mg/dL Final    Alkaline Phosphatase 08/01/2022 167 (A) 40 - 150 unit/L Final    Alanine Aminotransferase 08/01/2022 30  0 - 55 unit/L Final    Aspartate Aminotransferase 08/01/2022 20  5 - 34 unit/L Final    Estimated GFR-Non  08/01/2022 >60  mls/min/1.73/m2 Final    Magnesium Level 08/01/2022 2.20  1.60 - 2.60 mg/dL Final    Phosphorus Level 08/01/2022 3.5  2.3 - 4.7 mg/dL Final    WBC 08/01/2022 6.7  4.5 - 11.5 x10(3)/mcL Final    RBC 08/01/2022 4.36 (A) 4.70 - 6.10 x10(6)/mcL Final    Hgb 08/01/2022 12.6 (A) 14.0 - 18.0 gm/dL Final    Hct 08/01/2022 39.0 (A) 42.0 - 52.0 % Final    MCV 08/01/2022 89.4  80.0 - 94.0 fL Final    MCH 08/01/2022 28.9  27.0 - 31.0 pg Final    MCHC 08/01/2022 32.3 (A) 33.0 - 36.0 mg/dL Final    RDW 08/01/2022 15.8  11.5 - 17.0 % Final    Platelet 08/01/2022 611 (A) 130 - 400 x10(3)/mcL Final    MPV 08/01/2022 10.1  7.4 - 10.4 fL Final    Neut % 08/01/2022 48.1  % Final    Lymph % 08/01/2022 38.7  % Final    Mono % 08/01/2022 10.4  % Final    Eos % 08/01/2022 1.5  % Final    Basophil % 08/01/2022 1.0  % Final    Lymph # 08/01/2022 2.60  0.6 - 4.6 x10(3)/mcL Final    Neut # 08/01/2022 3.2  2.1 - 9.2 x10(3)/mcL Final    Mono # 08/01/2022 0.70  0.1 - 1.3 x10(3)/mcL Final    Eos # 08/01/2022 0.10  0 - 0.9 x10(3)/mcL Final    Baso # 08/01/2022 0.07  0 - 0.2 x10(3)/mcL Final    IG# 08/01/2022 0.02  0 - 0.04 x10(3)/mcL Final    IG% 08/01/2022 0.3  % Final    NRBC% 08/01/2022 0.0  % Final     Radiology  CT soft tissue neck without contrast on 07/18/2022 at 9:29 p.m., IMPRESSION:Postsurgical change in the cervical spine without fluid  collection or abscess. Right upper lobe airspace disease.  Radiology  MRI C-spine without contrast on 07/15/2022 at 8:27 a.m., IMPRESSION: Postoperative changes are seen in the left posterior elements from C3 through C7 vertebrae. There is a postoperative collection in the surgical bed/ posterior paraspinal soft tissues surrounding the spinous process and posterior to the laminae. The collection measures approximately 7.3 cm in length and 2 cm in AP dimension. Similar findings are also seen on the prior examination. Correlate clinically as regards additional evaluation and follow-up. There are displaced fractures involving the laminae of C3 through C7 vertebrae appreciated better on the earlier CT study. There is moderate canal stenosis at C3-C4 and C4-C5 secondary to broad-based disc protrusions and degenerative joint disease. Details and other findings as noted above.  Radiology  MRI brain on 07/16/2022 at 8:30 a.m., IMPRESSION:No abnormal signal is identified on the diffusion images to suggest acute infarct. Details and findings as above. No significant discrepancy with overnight report.    Discharge Summary Plan   Discharge Status: Improved    Location: Discharge home with     Medications: See discharge medicine reconciliation    Activity: as tolerated    Diet:  Soft bite size with thin liquids-small bites    Instructions:  Take all medications as prescribed.      Attend appointments as scheduled.      Return to ED if symptoms worsen, or if t > 100.4.    Education:  Cervical spondylosis.  Hypothyroidism.    Follow-up:  Gary Reilly MD on 08/15/2022 at 9:20 a.m.      Paulo Rao MD on 08/24/2022 at 9:30 a.m.       Yan Rainey MD on 09/08/2022 at 8:30 a.m.    Discussed plan of care, and patient communicated understanding. Agreed to comply with recommendations.    *Verbal consent obtained for live A/V Telehealth Visit     Originating Site: [facility]  Place of service  of originating site: Inpatient Rehab Facility  Distant Site (MD Location):     [facility]                                                          [address] [place] [address]  Synchronous Communication Encounter Clock Time: 7 minutes    Simón Phillips NP conducted independent examination and assisted with medical documentation.     Discharge Time: 51 minutes

## 2022-08-05 NOTE — PT/OT/SLP DISCHARGE
Speech Language Pathology Discharge Summary    Scott Echeverria  MRN: [id number]   Stenosis of cervical spine with myelopathy     Date of Last Treatment Session: 22    Past Medical History:   Diagnosis Date    BPH (benign prostatic hyperplasia)     Cervical pain     Cervical radiculitis     Cervical spinal stenosis     Hypothyroidism, unspecified     Sleep apnea     resolved since surgery       Status at initiation of therapy: Oropharyngeal dysphagia requiring diet modifications of minced & moist diet and moderately thick liquids    Treatment Area(s):  Swallow    Goals:   Multidisciplinary Problems     SLP Goals        Problem: SLP    Goal Priority Disciplines Outcome   SLP Goal     SLP Ongoing, Progressing   Description: LT. Pt will tolerate least restrictive diet with no s/sx of aspiration.    ST. Pt will complete tongue base and laryngeal elevation exercises with 100% accuracy and minimal cues.--PROGRESSING, CONTINUE  2. Pt will tolerate half meal of soft & bite sized diet with no s/sx of aspiration.--GOAL MET  3. Pt will tolerate half meal of regular diet consistency with no s/sx of aspiration.--INITIAL                    Participation in Treatment (at discharge):  Cooperative    Functional Status at time of Discharge:    Cognition: Patient demonstrates no cognitive deficits.    Communication: Patient demonstrates no communication deficits.    Language: Patient demonstrates no language deficits.    Motor Speech: Patient demonstrates no motor speech deficits.    Swallow: Patient demonstrates minimal dysphagia.                       Instrumental assessment was completed on 22                                Swallowing Guidelines: Patient tolerating  thin liquids and soft & bite sized diet.     Speaking Valve: Patient was not discharged with speaking valve.     Patient is discharged to Home               Recommendations:  Diet:Soft and bite sized with trials of regular as tolerated  Liquids:  Thin Liquids, no straws  Continued speech therapy via home health services

## 2022-08-05 NOTE — PT/OT/SLP DISCHARGE
Physical Therapy Discharge Summary    Name: Scott Echeverria  MRN: [id number]   Principal Problem: Stenosis of cervical spine with myelopathy     Patient Discharged from acute Physical Therapy on 08/05/22.     Assessment:     Goals partially met. Patient appropriate for care in another setting.    Objective:     GOALS:   Multidisciplinary Problems     Physical Therapy Goals        Problem: Physical Therapy    Goal Priority Disciplines Outcome Goal Variances Interventions   Physical Therapy Goal     PT, PT/OT Adequate for Care Transition     Description: Short Term goals      Bed Mobility   Roll Right and Left Setup or Clean-up Assistance - MET  Lying to sitting Setup or Clean-up Assistance - MET  Sitting to lying Setup or Clean-up Assistance - MET    Transfers    Pt will be able to perform Stand step chair/bed to chair transfer With RW Setup or Clean-up Assistance - MET  Pt will be able to perform Sit to stand with RW Setup or Clean-up Assistance - MET  Pt will be able to perform Car transfer with RW Setup or Clean-up Assistance - MET    Ambulation    Pt will ambulate 200 Feet with RW Setup or Clean-up Assistance - NOT MET  Pt will be able to ascend/descend 12 stairs using B Rails Supervision or Touching Assistance - NOT MET  Pt will be able to ascend/descend 4 inch curb using RW Supervision or Touching Assistance - MET  Pt will be able to ambulate uneven surfaces/ramp 15 feet with RW Supervision or Touching Assistance - MET      Timeframe: By Discharge                     Care Scores:   Admission Assessment Current   Status  Discharge   Goal   Functional Area: Care Score:  Care Score:    Roll Left and Right 4 6 Set-up/clean-up   Sit to Lying 4 6 Set-up/clean-up   Lying to Sitting on Side of Bed 3 6 Supervision or touching assistance   Sit to Stand 4 6 Set-up/clean-up   Chair/Bed-to-Chair Transfer 4 5 Set-up/clean-up   Car Transfer 3 6 Supervision or touching assistance   Walk 10 Feet 4 5 Set-up/clean-up   Walk 50 Feet  with Two Turns 3 5 Set-up/clean-up   Walk 150 Feet 3 4 Set-up/clean-up   Walk 10 Feet Uneven Surface 88 4 Set-up/clean-up   1 Step (Curb) 88 5 Supervision or touching assistance   4 Steps 88 4 Supervision or touching assistance   12 Steps 88 4 Supervision or touching assistance   Picking Up Object 4 4 Set-up/clean-up   Wheel 50 Feet with Two Turns 9 9     Wheel 150 Feet 9 9         Reasons for Discontinuation of Therapy Services  Transfer to alternate level of care. and Satisfactory goal achievement.      Plan:     Patient Discharged to: Home with Home Health Service.    8/5/2022

## 2022-08-05 NOTE — PT/OT/SLP PROGRESS
Speech Language Pathology Treatment          Scott Echeverria   MRN: [id number]     Diet recommendations: Solid Diet Level: Soft & Bite Sized Diet - IDDSI Level 6  Liquid Diet Level: Thin liquids - IDDSI Level 0      Billable Minutes:  Self Care/Home Management Training 30 minutes    General Precautions: Standard, aspiration          Subjective:  Pt and wife present in room for training. Both alert and cooperative throughout session.    Objective:     SLP completed family training with pt and wife regarding SLPPOC, current functional deficits and continued SLP intervention warranted at home. SLP reviewed current diet of soft and bite sized diet and thin liquids. All questions and comments were addressed and comprehension was demonstrated by all parties.     Assessment:  Scott Echeverria is a 71 y.o. male with a SLP diagnosis of Dysphagia. SLP intervention continues to be warranted.    Discharge recommendations: Discharge Facility/Level of Care Needs: home health speech therapy     Goals:   Multidisciplinary Problems     SLP Goals        Problem: SLP    Goal Priority Disciplines Outcome   SLP Goal     SLP Ongoing, Progressing   Description: LT. Pt will tolerate least restrictive diet with no s/sx of aspiration.    ST. Pt will complete tongue base and laryngeal elevation exercises with 100% accuracy and minimal cues.--PROGRESSING, CONTINUE  2. Pt will tolerate half meal of soft & bite sized diet with no s/sx of aspiration.--GOAL MET  3. Pt will tolerate half meal of regular diet consistency with no s/sx of aspiration.--INITIAL                     Plan:   Patient to be seen Therapy Frequency: 5 x/week   Planned Interventions: Dysphagia Therapy  Plan of Care Expires: 22  Plan of Care reviewed with: patient, spouse  SLP Follow-up?: Yes  SLP - Next Visit Date: 22

## 2022-08-11 DIAGNOSIS — R52 PAIN: Primary | ICD-10-CM

## 2022-08-11 RX ORDER — HYDROCODONE BITARTRATE AND ACETAMINOPHEN 5; 325 MG/1; MG/1
1 TABLET ORAL EVERY 12 HOURS PRN
Qty: 30 TABLET | Refills: 0 | Status: SHIPPED | OUTPATIENT
Start: 2022-08-11 | End: 2022-08-25 | Stop reason: SDUPTHER

## 2022-08-15 ENCOUNTER — OFFICE VISIT (OUTPATIENT)
Dept: INTERNAL MEDICINE | Facility: CLINIC | Age: 72
End: 2022-08-15
Payer: MEDICARE

## 2022-08-15 ENCOUNTER — TELEPHONE (OUTPATIENT)
Dept: INTERNAL MEDICINE | Facility: CLINIC | Age: 72
End: 2022-08-15

## 2022-08-15 VITALS
HEART RATE: 84 BPM | HEIGHT: 69 IN | RESPIRATION RATE: 14 BRPM | DIASTOLIC BLOOD PRESSURE: 68 MMHG | BODY MASS INDEX: 23.11 KG/M2 | SYSTOLIC BLOOD PRESSURE: 118 MMHG | OXYGEN SATURATION: 94 % | WEIGHT: 156 LBS

## 2022-08-15 DIAGNOSIS — M54.12 CERVICAL RADICULITIS: ICD-10-CM

## 2022-08-15 DIAGNOSIS — H43.11 VITREOUS HEMORRHAGE, RIGHT EYE: ICD-10-CM

## 2022-08-15 DIAGNOSIS — R42 POSTURAL DIZZINESS WITH NEAR SYNCOPE: ICD-10-CM

## 2022-08-15 DIAGNOSIS — Z91.81 RISK FOR FALLS: ICD-10-CM

## 2022-08-15 DIAGNOSIS — R55 POSTURAL DIZZINESS WITH NEAR SYNCOPE: ICD-10-CM

## 2022-08-15 DIAGNOSIS — Z09 HOSPITAL DISCHARGE FOLLOW-UP: Primary | ICD-10-CM

## 2022-08-15 DIAGNOSIS — D50.8 OTHER IRON DEFICIENCY ANEMIA: ICD-10-CM

## 2022-08-15 DIAGNOSIS — M54.2 NECK PAIN: ICD-10-CM

## 2022-08-15 PROBLEM — G89.29 CHRONIC NECK PAIN: Status: ACTIVE | Noted: 2022-05-23

## 2022-08-15 PROCEDURE — 99214 PR OFFICE/OUTPT VISIT, EST, LEVL IV, 30-39 MIN: ICD-10-PCS | Mod: 24,,, | Performed by: NURSE PRACTITIONER

## 2022-08-15 PROCEDURE — 99214 OFFICE O/P EST MOD 30 MIN: CPT | Mod: 24,,, | Performed by: NURSE PRACTITIONER

## 2022-08-15 RX ORDER — AMITRIPTYLINE HYDROCHLORIDE 25 MG/1
25 TABLET, FILM COATED ORAL DAILY
Qty: 90 TABLET | Refills: 3
Start: 2022-08-15 | End: 2022-10-26 | Stop reason: SDUPTHER

## 2022-08-15 NOTE — PROGRESS NOTES
Subjective:      Patient ID: Scott Echeverria is a 71 y.o. male.    Chief Complaint: Follow-up (Pt here for f/u on Neck Sx that he had trouble getting rid of medicine in system so ended up in ICU, pt is feeling better now.)    Family and/or Caretaker present at visit?  Yes.  Diagnostic tests reviewed/disposition: No diagnosic tests pending after this hospitalization.  Disease/illness education: Yes, regarding pain management  Home health/community services discussion/referrals: Patient has home health established at [facility]. Will refer to [facility] due to lack of response.  Establishment or re-establishment of referral orders for community resources: Proactive PT/OT HH.   Discussion with other health care providers: No discussion with other health care providers necessary.  I reviewed and reconciled the medications from hospital discharge.    HPI: 71-year-old white male presented to [facility] on 07/12/2022 for scheduled cervical laminiopasty and laminoforminotomies.  PMH significant for cervical spondylosis, hypothyroidism, VIJI, and BPH.  Tolerated C3-C7 laminiopasty and laminoforminotomies on 7/12 without perioperative complications.  On 07/14 code fast was initiated due to slurred speech after receiving IV Robaxin.  CT head, MRI, MRA negative.  Patient required Ativan prior to MRI and reportedly has some left-sided neglect.  Naloxone and romazicon received with little response. That afternoon he became hypoxic and was having agonal respirations requiring intubation.  T-max a 100.8° reported on 07/16.  Extubated on 07/17.  Repeat CT unremarkable.  NIVA negative for DVT.  Sputum culture did come back positive for Enterobacter Cloacae; resistant to cephalosporins.  Rocephin discontinued and Levaquin initiated.  Required re-intubation on 07/18 for continued alteration mental status and severe hypoxemic respiratory failure.  EEG with diffuse slowing.  LP with 12 WBC and 650 RBC. Neurology added acyclovir, rocephin, and  "vancomycin for possible meningitis on 07/19.  Extubated on 07/20.  Meningitis panel negative-low suspicion meningitis. Suspicion most likely secondary to medication side effect but reason for his decompensation and alteration mental status is still not clear on 7/21.  Acyclovir discontinued.  Downgraded to the floor on 07/22.  Broad coverage antibiotics/antivirals/antifungals discontinued.  Continue to participate progress in therapy.  Left arm weakness continued to improve. Tolerated transfer to [facility] inpatient rehab unit on 07/25 without incident. D/c home 8/5/22.    Patient reports that he is continuing to have neck pain with radiation down bilateral aspects of neck an down L deltoid.  He is taking Norco twice daily, as well as Tylenol. Questioning whether he can take Advil. Seeing Dr. Rao on 8/24.    He reports that he has been having episodes of near syncope with sitting up in bed. Unsure if he has actually "passed out". Denies CP, palpitations, or SOB.     He was previously on Elavil for neck pain x 20 years. D/c'd following surgery.    Appetite stable. He is on soft diet with meds being crushed. No fever,chills or sweats.        Review of patient's allergies indicates:   Allergen Reactions    Iodine        Review of Systems  Constitutional: No fever, No chills, No sweats  Respiratory: No shortness of breath, No exertional dyspnea.   Cardiovascular: No chest pain, No palpitations  Gastrointestinal: No nausea, No vomiting, No diarrhea, No rectal bleeding, No constipation, No abdominal pain.  Genitourinary: No dysuria, No hematuria, No frequency.  Musculoskeletal: Neck pain  Integumentary: No rash, No ecchymosis.  Neurologic: No altered mental status, freq headaches.  Psychiatrics: No anxiety,No depression, No SI/HI.  Objective:   /68 (BP Location: Left arm, Patient Position: Sitting)   Pulse 84   Resp 14   Ht 5' 9" (1.753 m)   Wt 70.8 kg (156 lb)   SpO2 (!) 94%   BMI 23.04 kg/m²     Physical " Exam  Vitals and nursing note reviewed.   Constitutional:       General: He is not in acute distress.     Appearance: Normal appearance. He is normal weight. He is not ill-appearing, toxic-appearing or diaphoretic.   HENT:      Head: Normocephalic and atraumatic.      Right Ear: Tympanic membrane, ear canal and external ear normal.      Left Ear: Tympanic membrane, ear canal and external ear normal.      Nose: Nose normal. No congestion or rhinorrhea.      Mouth/Throat:      Mouth: Mucous membranes are moist.      Pharynx: Oropharynx is clear. No oropharyngeal exudate or posterior oropharyngeal erythema.   Eyes:      General: No scleral icterus.        Right eye: No discharge.         Left eye: No discharge.      Extraocular Movements: Extraocular movements intact.      Conjunctiva/sclera: Conjunctivae normal.      Pupils: Pupils are equal, round, and reactive to light.   Neck:      Vascular: No carotid bruit.   Cardiovascular:      Rate and Rhythm: Normal rate and regular rhythm.      Pulses: Normal pulses.      Heart sounds: No murmur heard.    No friction rub. No gallop.   Pulmonary:      Effort: Pulmonary effort is normal. No respiratory distress.      Breath sounds: Normal breath sounds. No stridor. No wheezing, rhonchi or rales.   Chest:      Chest wall: No tenderness.   Abdominal:      General: Abdomen is flat. Bowel sounds are normal. There is no distension.      Palpations: Abdomen is soft. There is no mass.      Tenderness: There is no abdominal tenderness. There is no right CVA tenderness, left CVA tenderness, guarding or rebound.      Hernia: No hernia is present.   Musculoskeletal:         General: Tenderness (to bilateral aspect of neck.) present. No swelling, deformity or signs of injury. Normal range of motion.      Cervical back: Normal range of motion and neck supple. No rigidity or tenderness.      Right lower leg: No edema.      Left lower leg: No edema.   Lymphadenopathy:      Cervical: No  cervical adenopathy.   Skin:     General: Skin is warm and dry.      Coloration: Skin is not jaundiced or pale.      Findings: No bruising, erythema, lesion or rash.   Neurological:      General: No focal deficit present.      Mental Status: He is alert and oriented to person, place, and time. Mental status is at baseline.      Cranial Nerves: No cranial nerve deficit.      Sensory: No sensory deficit.      Motor: No weakness.      Coordination: Coordination normal.      Gait: Gait normal.      Deep Tendon Reflexes: Reflexes normal.   Psychiatric:         Mood and Affect: Mood normal.         Thought Content: Thought content normal.         Judgment: Judgment normal.         Assessment/Plan:   1. Hospital discharge follow-up  Collab with Dr. Reilly, who also saw patient. Reviewed all hospital doc.    2. Neck pain  Ok to take Norco as needed, as well as Tylenol/Advil.    3. Postural dizziness with near syncope  Will d/c Flomax and metoprolol for possible provoking factors.    Continue to monitor Bps. Consider resuming at bedtime if >140/90s.    4. Cervical radiculitis  Follow up with specialist that is also caring for this problem.    5. Other iron deficiency anemia  Stable. Continue iron supp.    6. Risk for falls  Rec cautious mobility and use of walker at all times.    7. Vitreous hemorrhage, right eye  Per Dr. Morrell in [place].      No follow-ups on file.

## 2022-08-15 NOTE — PT/OT/SLP DISCHARGE
Occupational Therapy Discharge Summary    Scott Echeverria  MRN: [id number]   Principal Problem: Stenosis of cervical spine with myelopathy      Patient Discharged from acute Occupational Therapy on 8/5/2022.  Please refer to prior OT note dated 8/4/2022 for functional status.    Assessment:      Patient has met all goals and is not appropriate for therapy.    Objective:     GOALS:   Multidisciplinary Problems     Occupational Therapy Goals        Problem: Occupational Therapy    Goal Priority Disciplines Outcome Interventions   Occupational Therapy Goal     OT, PT/OT Ongoing, Progressing    Description: ADLs:  Pt to perform grooming tasks with indep while standing with RW MET  Pt to perform UB dressing with set up assist MET  Pt to perform LB dressing with set up assist MET  Pt to perform putting on/off footwear task with set up assist MET  Pt to perform toileting with supervision using RW MET    Functional Transfers:  Pt to perform toilet transfers with CGA and RW MET  Pt to perform a tub transfer with CGA and RW MET  Pt to perform a walk-in shower transfer with CGA and RW    IADLs:  Pt to perform IADL tasks while standing for 8 min with RW and SBA     Balance, Strengthening, Endurance, Balance:  Pt to consistently demonstrate adherence to cervical spinal precautions during all ADL's as instructed by OT. MET  Pt to demonstrate dynamic standing balance for 10 min as required to perform ADL's from standing level.  Pt to demonstrate consistent adherence to breathing control and energy conservation techniques as educated by OT.                    Care Scores:   Admission Assessment Current Status Discharge  Goal   Functional Area: Care Score:  Care Score:    Eating 5 6 Independent   Oral Hygiene 5 6 Independent   Toileting Hygiene 3 6 Independent   Shower/Bathe Self 3 6 Independent   Upper Body Dressing 3 6 Independent   Lower Body Dressing 3 6 Independent   Putting On/Taking Off Footwear 3 6 Independent   Toilet  Transfer 3 4 Independent     Reasons for Discontinuation of Therapy Services   Satisfactory goal achievement.      Plan:     Patient Discharged to: Home with Home Health Service      8/15/2022

## 2022-08-17 PROCEDURE — G0180 MD CERTIFICATION HHA PATIENT: HCPCS | Mod: ,,, | Performed by: INTERNAL MEDICINE

## 2022-08-17 PROCEDURE — G0180 PR HOME HEALTH MD CERTIFICATION: ICD-10-PCS | Mod: ,,, | Performed by: INTERNAL MEDICINE

## 2022-08-18 ENCOUNTER — TELEPHONE (OUTPATIENT)
Dept: INTERNAL MEDICINE | Facility: CLINIC | Age: 72
End: 2022-08-18
Payer: MEDICARE

## 2022-08-18 DIAGNOSIS — K59.00 CONSTIPATION, UNSPECIFIED CONSTIPATION TYPE: Primary | ICD-10-CM

## 2022-08-18 RX ORDER — ENEMA 19; 7 G/133ML; G/133ML
1 ENEMA RECTAL ONCE
Qty: 1 ENEMA | Refills: 0 | Status: SHIPPED | OUTPATIENT
Start: 2022-08-18 | End: 2022-08-22

## 2022-08-22 RX ORDER — ENEMA 19; 7 G/133ML; G/133ML
1 ENEMA RECTAL ONCE
Qty: 1 ENEMA | Refills: 0 | Status: SHIPPED | OUTPATIENT
Start: 2022-08-22 | End: 2022-08-22

## 2022-08-24 ENCOUNTER — OFFICE VISIT (OUTPATIENT)
Dept: NEUROSURGERY | Facility: CLINIC | Age: 72
End: 2022-08-24
Payer: MEDICARE

## 2022-08-24 DIAGNOSIS — M47.22 CERVICAL SPONDYLOSIS WITH RADICULOPATHY: ICD-10-CM

## 2022-08-24 DIAGNOSIS — M48.02 STENOSIS OF CERVICAL SPINE WITH MYELOPATHY: Primary | ICD-10-CM

## 2022-08-24 DIAGNOSIS — G99.2 STENOSIS OF CERVICAL SPINE WITH MYELOPATHY: Primary | ICD-10-CM

## 2022-08-24 DIAGNOSIS — K59.00 CONSTIPATION, UNSPECIFIED CONSTIPATION TYPE: ICD-10-CM

## 2022-08-24 PROCEDURE — 99024 PR POST-OP FOLLOW-UP VISIT: ICD-10-PCS | Mod: POP,,, | Performed by: NURSE PRACTITIONER

## 2022-08-24 PROCEDURE — 99024 POSTOP FOLLOW-UP VISIT: CPT | Mod: POP,,, | Performed by: NURSE PRACTITIONER

## 2022-08-24 RX ORDER — METHOCARBAMOL 500 MG/1
500 TABLET, FILM COATED ORAL SEE ADMIN INSTRUCTIONS
COMMUNITY
End: 2022-09-06 | Stop reason: SDUPTHER

## 2022-08-24 RX ORDER — SODIUM PHOSPHATE,MONO-DIBASIC 19G-7G/118
ENEMA (ML) RECTAL
COMMUNITY
Start: 2022-08-23 | End: 2022-09-08

## 2022-08-24 NOTE — PROGRESS NOTES
[facility]  Neurosurgery Post-Operative Visit        Scott Echeverria   [id number]   1950       CHIEF COMPLAINT:  6 week post-op    HPI:  Scott Echeverria is a 71 y.o. male who presents today for post-operative follow-up. He is s/p C3-7 laminoplasty and left C3-7 laminoforaminotomies by Dr. Rao that took place on 07/12/2022. He tolerated procedure well and was transferred to floor for routine postoperative care. However, on 07/14/2022 RRT was initiated due to neuro changes, followed by stroke alert for worsening slurred speech and aphasia. CT head, MRI, MRA negative. Patient required Ativan prior to MRI and reportedly had some left-sided neglect. Naloxone and romazicon were administered with little response. Later that day he became hypoxic with agonal respirations requiring intubation. Following extended stay in ICU, transferred to floor on 07/22/2022, where he progressed to inpatient rehab on 07/25/2022 and discharged home on 08/05/2022. Decompensation and alteration in mental status believed to most likely be secondary to medication side effect following unremarkable findings on imaging/studies performed.     Currently he reports tightness in his neck and paracervical region bilaterally, left > right, continues but has improved postoperatively. Radiating left arm pain has largely resolved, however he does report some new onset weakness with left shoulder abduction and left elbow flexion/extension postoperatively. Reports resolution of numbness in the 2nd and 3rd fingers of the left hand, weakness in his  strength in both hands, as well as persistent dizziness. Overall he states he is slowly improving.     He has physical therapy 2x per week with home health. Instructed on exercises for prevention of frozen shoulder, as well as activity limitations/restrictions. Gait is slow and unsteady, with balance difficulty at times requiring walker. No apparent or reported difficulties with speech or  swallowing since discharge, however he does report a decreased appetite with weight loss since entering hospital in July. Denies any difficulty with bowel or bladder function with the exception of constipation, for which he was advised on otc treatments. No additional complaints or concerns reported.     Review of patient's allergies indicates:   Allergen Reactions    Iodine      Current Outpatient Medications   Medication Sig Dispense Refill    amitriptyline (ELAVIL) 25 MG tablet Take 1 tablet (25 mg total) by mouth once daily. 90 tablet 3    aspirin 81 MG Chew Take 1 tablet (81 mg total) by mouth once daily. 90 tablet 0    ferrous sulfate 325 (65 FE) MG EC tablet Take 1 tablet (325 mg total) by mouth once daily. 30 tablet 0    gabapentin (NEURONTIN) 100 MG capsule Take 1 capsule (100 mg total) by mouth 3 (three) times daily. 90 capsule 0    HYDROcodone-acetaminophen (NORCO) 5-325 mg per tablet Take 1 tablet by mouth every 12 (twelve) hours as needed for Pain. 30 tablet 0    levothyroxine (SYNTHROID) 50 MCG tablet Take 1 tablet (50 mcg total) by mouth once daily. 30 tablet 2    methocarbamoL (ROBAXIN) 500 MG Tab Take 500 mg by mouth As instructed for Muscle spasms.      docusate sodium (STOOL SOFTENER) 50 MG capsule Take by mouth 2 (two) times daily.      metoprolol tartrate (LOPRESSOR) 50 MG tablet Take 1 tablet (50 mg total) by mouth 2 (two) times daily. (Patient not taking: Reported on 8/24/2022) 60 tablet 0    READY-TO-USE ENEMA 19-7 gram/118 mL Enem Place rectally.      tamsulosin (FLOMAX) 0.4 mg Cap Take 1 capsule (0.4 mg total) by mouth every evening. (Patient not taking: Reported on 8/24/2022) 30 capsule 2     No current facility-administered medications for this visit.     Past Medical History:   Diagnosis Date    BPH (benign prostatic hyperplasia)     Cervical pain     Cervical radiculitis     Cervical spinal stenosis     Hypothyroidism, unspecified     Sleep apnea     resolved since  "surgery     Past Surgical History:   Procedure Laterality Date    APPENDECTOMY  01/25/2022    Dr. Kerry Coats    CERVICAL SPINE SURGERY  07/12/2022    COLONOSCOPY  2021    HEMORRHOID SURGERY  2002    LAMINOPLASTY N/A 07/12/2022    Procedure: LAMINOPLASTY;  Surgeon: Paulo Rao MD;  [facility];  Service: Neurosurgery;  Laterality: N/A;  left C3-4, C4-5, C5-6, C6-7 foraminotomies, C3-7 laminoplasty //  TIVA SET UP    lower back surgery  1990    RETINAL DETACHMENT SURGERY Right 2015    SINUS SURGERY      TONSILLECTOMY  1956     Family History     Problem Relation (Age of Onset)    Alzheimer's disease Mother    Heart attack Father    Hyperlipidemia Father    Hypertension Sister, Brother    Parkinsonism Mother    Sickle cell trait Sister    Stroke Father    Thyroid disease Mother        Social History     Socioeconomic History    Marital status:    Tobacco Use    Smoking status: Never Smoker    Smokeless tobacco: Never Used   Substance and Sexual Activity    Alcohol use: Not Currently    Drug use: Never     Review of systems:  Constitutional: negative except for weight loss  Gastrointestinal: negative except for constipation  Musculoskeletal:negative except for muscle weakness, neck pain and stiff joints  Neurological: negative except for gait problems, headaches and weakness    Vital Signs  Pulse: (P) 108  Resp: (P) 16  BP: (P) 111/81  Pain Score: (P)   3  Height: (P) 5' 9" (175.3 cm)  Weight: (P) 68.9 kg (152 lb)  Body mass index is 22.45 kg/m² (pended).    Physical Exam:  General:  Pleasant. Well-nourished. Well-groomed. Alert. Oriented. No acute distress.    Head:  Normocephalic, without obvious abnormality, atraumatic    Lungs:   Breathing is quiet, non-lablored    Neurological:    Oriented to Person, Place, Time   Speech:  normal  Memory, cognition, and affect are appropriate.  Sensation is intact in bilateral upper extremities to a light touch.   Motor Strength: Moves all extremities " spontaneously with good tone.  No abnormal movements seen.  Muscle strength against resistance:  Strength  Deltoids Triceps Biceps Wrist Extension [employer] Hand    Upper: R 5/5 5/5 5/5 5/5 5/5 5/5    L 4-/5 5/5 4-/5 5/5 5/5 5/5     Reflexes:   Right Left   Triceps 2+ 2+   Biceps 2+ 1+   Brachioradialis 2+ 2+     Cervical ROM: Pain and stiffness with flexion. Mildly limited rotation.   Cervical spine palpation: Denies tenderness to cervical spine palpation.    Posterior Cervical incision:  Well healed, no tenderness, redness, mild edema     Gait:  Unsteady, slow, using walker and gait belt      ASSESSMENT:     1. Stenosis of cervical spine with myelopathy    2. Cervical spondylosis with radiculopathy    3. Constipation, unspecified constipation type     PLAN:  Stable postoperative course with no acute neurosurgical concerns.   Overall seeing slow improvements in preoperative symptoms.   Posterior cervical incision is well healed with mild fluid collection under skin at incision site; no s/s of infection.   Continues to require narcotic medication for pain management, risks and benefits of continued use discussed with understanding verbalized; taking gabapentin, and prn robaxin for additional symptom relief.   Will continue PT with home health, to progress to outpatient therapy when able.   Instructed on exercises for prevention of frozen shoulder with understanding verbalized by patient and spouse.   Okay to advance diet as tolerated from soft mechanical, from a neurosurgical standpoint.   Will return to clinic at 3 months postop with cervical x-rays prior to appointment.   He will call as needed in the meantime with any issues.

## 2022-08-25 DIAGNOSIS — R52 PAIN: ICD-10-CM

## 2022-08-25 DIAGNOSIS — G99.2 STENOSIS OF CERVICAL SPINE WITH MYELOPATHY: Primary | ICD-10-CM

## 2022-08-25 DIAGNOSIS — G89.18 ACUTE POSTOPERATIVE PAIN: ICD-10-CM

## 2022-08-25 DIAGNOSIS — M48.02 STENOSIS OF CERVICAL SPINE WITH MYELOPATHY: Primary | ICD-10-CM

## 2022-08-25 RX ORDER — HYDROCODONE BITARTRATE AND ACETAMINOPHEN 5; 325 MG/1; MG/1
1 TABLET ORAL EVERY 12 HOURS PRN
Qty: 20 TABLET | Refills: 0 | Status: SHIPPED | OUTPATIENT
Start: 2022-08-25 | End: 2022-09-06 | Stop reason: SDUPTHER

## 2022-08-25 NOTE — TELEPHONE ENCOUNTER
I spoke with the patient's wife.  He is taking 2/day of the Norco.  He was last given Norco 5-325mg 1 po q12h prn #30, 15 day supply on 8/12 by Dr. Reilly, his PCP.  He would be due tomorrow, 8/26.  He has enough to get to Monday morning.  He is s/p C3-7 laminoplasty and left C3-7 laminoforaminotomies by Dr. Rao on 07/12/2022.  He denies any change in symptoms since his visit yesterday, aside from the fact that he is a little better this morning.  He continues with tightness in his neck and paracervical region bilaterally, left > right.  His upper extremity symptoms have resolved for the most part, although he has some new left shoulder weakness.  He is doing physical therapy through home health for this.  He continues to take gabapentin and Robaxin, but did not need refills of either.  He was seen by Meredith yesterday and is scheduled to follow up at 3 months post op, 10/5/22.  I checked Rx history, he has only received the above mentioned prescription from [facility], a prescription for 18 [place] 5mg at d/c from rehab on 8/5, and a prescription for 18 P[place] 5mg in January from the ER.  His pharmacy is [facility] in [place].

## 2022-08-31 ENCOUNTER — DOCUMENT SCAN (OUTPATIENT)
Dept: HOME HEALTH SERVICES | Facility: HOSPITAL | Age: 72
End: 2022-08-31
Payer: MEDICARE

## 2022-09-01 LAB — MYCOBACTERIUM SPEC QL CULT: NORMAL

## 2022-09-06 ENCOUNTER — TELEPHONE (OUTPATIENT)
Dept: NEUROSURGERY | Facility: CLINIC | Age: 72
End: 2022-09-06
Payer: MEDICARE

## 2022-09-06 ENCOUNTER — EXTERNAL HOME HEALTH (OUTPATIENT)
Dept: HOME HEALTH SERVICES | Facility: HOSPITAL | Age: 72
End: 2022-09-06
Payer: MEDICARE

## 2022-09-06 DIAGNOSIS — M48.02 STENOSIS OF CERVICAL SPINE WITH MYELOPATHY: Primary | ICD-10-CM

## 2022-09-06 DIAGNOSIS — G89.18 ACUTE POSTOPERATIVE PAIN: ICD-10-CM

## 2022-09-06 DIAGNOSIS — G99.2 STENOSIS OF CERVICAL SPINE WITH MYELOPATHY: Primary | ICD-10-CM

## 2022-09-06 RX ORDER — METHOCARBAMOL 500 MG/1
500 TABLET, FILM COATED ORAL 3 TIMES DAILY PRN
Qty: 30 TABLET | Refills: 2 | Status: SHIPPED | OUTPATIENT
Start: 2022-09-06 | End: 2022-10-17 | Stop reason: SDUPTHER

## 2022-09-06 RX ORDER — HYDROCODONE BITARTRATE AND ACETAMINOPHEN 5; 325 MG/1; MG/1
1 TABLET ORAL EVERY 12 HOURS PRN
Qty: 20 TABLET | Refills: 0 | Status: SHIPPED | OUTPATIENT
Start: 2022-09-06 | End: 2022-09-20 | Stop reason: SDUPTHER

## 2022-09-06 RX ORDER — GABAPENTIN 100 MG/1
100 CAPSULE ORAL 3 TIMES DAILY
Qty: 90 CAPSULE | Refills: 2 | Status: SHIPPED | OUTPATIENT
Start: 2022-09-06 | End: 2022-10-17 | Stop reason: DRUGHIGH

## 2022-09-06 NOTE — TELEPHONE ENCOUNTER
Instruct him to take Aleve or Ibuprofen instead of the [place].  If it does not help with the pain, he can take the Norco later.

## 2022-09-06 NOTE — TELEPHONE ENCOUNTER
I spoke with the patient.  He is taking 2/day of the Norco.  He was last given Norco 5-325mg 1 po q12h prn #20, 10 day supply on 8/25, filled 8/29.  He would be due tomorrow, 9/7.  He has enough to get to Thursday morning.  He is s/p C3-7 laminoplasty and left C3-7 laminoforaminotomies by Dr. Rao on 07/12/2022.  He continues with pain in his neck, bilateral shoulders and shoulder blades, left > right.  He has some new left shoulder weakness that is improving, although slowly.  He is doing physical therapy through home health for this and does exercises daily given by PT.  He is also walking a few times a day.  He rates his pain at about a 2/10 today, but says his symptoms seem to fluctuate from day to day.  Overall, he is improving.  He had questions about when he needs to get off the hydrocodone and how to go about doing so.  He is concerned about becoming addicted.  We talked about cutting the medication in half once he is ready to begin tapering and discussed going slowly (maybe 1/2 in the morning and whole at night for a few days, then 1/2 bid, etc).  I told him if he had questions when he was ready to taper to call.  He continues to take gabapentin 100mg TID and Robaxin 500mg TID prn.  I sent in refills of both.  He was seen by Meredith on 8/24 and is scheduled to follow up at 3 months post op, 10/5/22.  I checked Rx history, nothing from anyone else since our prescription.  His pharmacy is [facility] in [place].  I put in for the same prescription so may need to adjust before sending.

## 2022-09-07 NOTE — TELEPHONE ENCOUNTER
I spoke with the patient and let him know refills were sent in.  I instructed him on Advil/Aleve as mentioned below.  I asked if he had any more questions for Meredith and needed her to call him back.  He said he did not, I was able to answer his questions.  He will call with any other questions or problems.

## 2022-09-08 ENCOUNTER — OFFICE VISIT (OUTPATIENT)
Dept: NEUROLOGY | Facility: CLINIC | Age: 72
End: 2022-09-08
Payer: MEDICARE

## 2022-09-08 VITALS
HEIGHT: 69 IN | SYSTOLIC BLOOD PRESSURE: 108 MMHG | WEIGHT: 152 LBS | BODY MASS INDEX: 22.51 KG/M2 | DIASTOLIC BLOOD PRESSURE: 74 MMHG

## 2022-09-08 DIAGNOSIS — G93.41 ENCEPHALOPATHY, METABOLIC: Primary | ICD-10-CM

## 2022-09-08 PROCEDURE — 99213 OFFICE O/P EST LOW 20 MIN: CPT | Mod: PBBFAC | Performed by: PSYCHIATRY & NEUROLOGY

## 2022-09-08 PROCEDURE — 99213 PR OFFICE/OUTPT VISIT, EST, LEVL III, 20-29 MIN: ICD-10-PCS | Mod: S$PBB,,, | Performed by: PSYCHIATRY & NEUROLOGY

## 2022-09-08 PROCEDURE — 99999 PR PBB SHADOW E&M-EST. PATIENT-LVL III: ICD-10-PCS | Mod: PBBFAC,,, | Performed by: PSYCHIATRY & NEUROLOGY

## 2022-09-08 PROCEDURE — 99999 PR PBB SHADOW E&M-EST. PATIENT-LVL III: CPT | Mod: PBBFAC,,, | Performed by: PSYCHIATRY & NEUROLOGY

## 2022-09-08 PROCEDURE — 99213 OFFICE O/P EST LOW 20 MIN: CPT | Mod: S$PBB,,, | Performed by: PSYCHIATRY & NEUROLOGY

## 2022-09-08 NOTE — PROGRESS NOTES
Subjective:       Patient ID: Scott Echeverria is a 71 y.o. male.    Chief Complaint: encephalopathy (Hospital f/u Acute encephalopathy/)    HPI this patient is new to me  In brief, July 2022 had neck surgery; postoperatively had abrupt AMS, requiring intubation; attributed to opiates  Seen by neurology   LP negative  Mri negative  Eeg slowing    Went to rehab    Back home  Getting better  Still walking with walker and has LUE weakness (preceded the surgery), but overall function/memory are good  Independent with most ADLs  Review of Systems    The remainder of the 14 system ROS is noncontributory or negative unless mentioned/reviewed above.    Objective:      Physical Exam  LUE 4/5  Mental Status: Alert and oriented x3. Language is fluent with good comprehension.    Cranial Nerve: Pupils are equal, round, and reactive to light. Visual fields are intact to confrontation. Normal fundi. Ocular movements are intact. Face is symmetric at rest and with activation with intact sensation throughout. Hearing intact to finger rub bilaterally. Muscles of tongue and palate activate symmetrically. No dysarthria. Strength is full in sternocleidomastoid and trapezius bilaterally.    Motor: Muscle bulk and tone are normal. Strength is 5/5 in all four extremities both proximally and distally. Intact fine motor movements bilaterally. There is no pronator drift or satelliting on arm roll.    Sensory: Sensation is intact to light touch, pinprick, vibration, and proprioception throughout. Romberg is negative.    Reflexes: 2+ and symmetric at the biceps, triceps, brachioradialis, patella, and Achilles bilaterally. Plantar response is flexor bilaterally.    Coordination: No dysmetria on finger-nose-finger or heel-knee-shin. Normal rapid alternating movements. Fast finger tapping with normal amplitude and speed.    Gait: Narrow based with normal stride length and good arm swing bilaterally. Able to walk on heels, toes, and in  tandem.    Hospital chart/notes reviewd    Assessment:       Problem List Items Addressed This Visit    None      Plan:       Ams  Resolved  Rtc as needed

## 2022-09-19 DIAGNOSIS — G99.2 STENOSIS OF CERVICAL SPINE WITH MYELOPATHY: ICD-10-CM

## 2022-09-19 DIAGNOSIS — G89.18 ACUTE POSTOPERATIVE PAIN: ICD-10-CM

## 2022-09-19 DIAGNOSIS — M48.02 STENOSIS OF CERVICAL SPINE WITH MYELOPATHY: ICD-10-CM

## 2022-09-20 RX ORDER — HYDROCODONE BITARTRATE AND ACETAMINOPHEN 5; 325 MG/1; MG/1
1 TABLET ORAL EVERY 12 HOURS PRN
Qty: 20 TABLET | Refills: 0 | Status: SHIPPED | OUTPATIENT
Start: 2022-09-20 | End: 2022-10-03 | Stop reason: SDUPTHER

## 2022-09-20 NOTE — TELEPHONE ENCOUNTER
I spoke with the patient.  He is taking 2/day of the Norco.  He was last given Norco 5-325mg 1 po q12h prn #20, 10 day supply on 9/8.  He would be due last Saturday, 9/17.  He has one pill left.  He is s/p C3-7 laminoplasty and left C3-7 laminoforaminotomies by Dr. Rao on 07/12/2022.  He continues with pain and stiffness in his neck.  He has intermittent pain into bilateral shoulders, left > right.  He has pain into the upper arm on the left.  He continues with left shoulder weakness.  He has pain in bilateral posterolateral calves that began after surgery.  He reports swelling at the base of the neck when he wakes in the mornings.  The swelling subsides as he is up moving around.  I asked if there was any pain associated with the swelling.  He said maybe a little, but not enough to really say yes.  He is doing physical therapy through home health and does exercises daily on his own.  He rates his pain at about a 2-3/10 on average, but says it does get worse at times, depending on his activity.  Overall, he is slowly improving, although he continues to have good days and bad days.  He continues to take gabapentin 100mg TID and Robaxin 500mg TID prn.  He did not need refills of either.  He was seen by Meredith on 8/24 and is scheduled to follow up at 3 months post op, 10/5/22.  I checked Rx history, nothing from anyone else since our prescription.  His pharmacy is [facility] in [place].  I put in for the same prescription so may need to adjust before sending.

## 2022-09-20 NOTE — PROGRESS NOTES
[facility] [address]  Adult Nutrition  Progress Note    SUMMARY       Recommendations    Recommendation/Intervention:   1- TF recs:  Peptamen AF, start @ 10mL/hr (trickle feeds) per MD then advance as tolerated to goal rate of 70mL/hr. At goal (based on TF running ~20 hrs per day), TF will provide:  1680 kcal (93% est needs)  106 gm protein (93% est needs)  1134mL free water (60% est needs)  2- Replete potassium as medically feasible.     Malnutrition Assessment  Malnutrition in the context of acute illness or injury    Degree of Malnutrition:  Does not meet criteria  Energy Intake:  unable to obtain  Interpretation of Weight Loss:  unable to obtain  Body Fat: does not meet criteria  Area of Body Fat Loss:  does not meet criteria and unable to obtain  Muscle Mass Loss:  unable to obtain  Area of Muscle Mass Loss: does not meet criteria  Fluid Accumulation:  does not meet criteria  Edema:  does not meet criteria   Reduced  Strength:  unable to obtain    A minimum of two characteristics is recommended for diagnosis of either severe or non-severe malnutrition.      Reason for Assessment    Reason For Assessment: identified at risk by screening criteria, RD f/u, new TF  Diagnosis:   1. Acute respiratory failure, apnea possibly 2/2 opioid/sedative use vs hypercarbia vs seizure  2. Stenosis of cervical spine (s/p C3-7 laminoplasty, left C3-4, C4-5, C5-6, C6-7 forminotomies)  3. Hypothyroidism    Relevant Medical History: BPH, Hypothyroidism, Sleep apnea  General Information Comments:   7/15/22: Pt remains intubated; discussed with RN and MD would like to start trickle feeds today.  7/18/22: TF started at 10mL/hr, tolerating per RN. Pt is no longer extubated but still requiring EN. Estimated needs updated.     Nutrition Risk Screen    Nutrition Risk Screen: other (see comments) (vent)    Nutrition/Diet History    Spiritual, Cultural Beliefs, Yarsani Practices, Values that Affect Care: no  Factors  "Affecting Nutritional Intake: on mechanical ventilation    Anthropometrics    Temp: 98.9 °F (37.2 °C)  Height Method: Stated  Height: 5' 9.02" (175.3 cm)  Height (inches): 69.02 in  Weight Method: Standard Scale  Weight: 75.7 kg (166 lb 14.2 oz)  Weight (lb): 166.89 lb  Ideal Body Weight (IBW), Male: 160.12 lb  % Ideal Body Weight, Male (lb): 104.23 %  BMI (Calculated): 24.6  BMI Grade: 18.5-24.9 - normal   7/18/22: no new wt noted    Lab/Procedures/Meds    Pertinent Labs Reviewed: reviewed  Pertinent Labs Comments: 7/18: K 2.8, Glu 107, GFR>60  Pertinent Medications Reviewed: reviewed  Pertinent Medications Comments: Kcl, Zofran PRN    Estimated/Assessed Needs    Weight Used For Calorie Calculations: 75.7 kg (166 lb 14.2 oz)  Energy Calorie Requirements (kcal): 1810 kcal (MSJ x 1.2 SF)  Energy Need Method: [place]  Protein Requirements: 114 gm  Weight Used For Protein Calculations: 75.7 kg (166 lb 14.2 oz)  Fluid Requirements (mL): 1893  Estimated Fluid Requirement Method: other (see comments) (25mL/kg)    Nutrition Prescription Ordered    Current Diet Order: NPO  Current Nutrition Support Formula Ordered: Peptamen AF  Current Nutrition Support Rate Ordered: 10 (ml) (mL/hr)  Current Nutrition Support Frequency Ordered: based on TF running 20 hrs per day    Evaluation of Received Nutrient/Fluid Intake    Enteral Calories (kcal): 240  Enteral Protein (gm): 15  Enteral (Free Water) Fluid (mL): 162  % Kcal Needs: 13  % Protein Needs: 13  Energy Calories Required: not meeting needs  Protein Required: not meeting needs  Fluid Required: not meeting needs  % Intake of Estimated Energy Needs: 0 - 25 %  % Meal Intake: NPO    Nutrition Plan and Goals    Goal: Provide adequate nutrition to meet estimated needs by follow-up. (goal progressing)    Diagnosis (PES): Inadequate oral intake related to acute respiratory failure as evidenced by intubation/NPO since admit. (continues)    Intervention(s): modified composition of " enteral nutrition, modified rate of enteral nutrition and collaboration with other providers    Monitoring: Dietitian will monitor enteral nutrition formula/solution.    Nutrition Risk: moderate (follow-up in 3-5 days)      None known

## 2022-09-26 ENCOUNTER — TELEPHONE (OUTPATIENT)
Dept: NEUROSURGERY | Facility: CLINIC | Age: 72
End: 2022-09-26
Payer: MEDICARE

## 2022-09-26 ENCOUNTER — OFFICE VISIT (OUTPATIENT)
Dept: NEUROSURGERY | Facility: CLINIC | Age: 72
End: 2022-09-26
Payer: MEDICARE

## 2022-09-26 ENCOUNTER — TELEPHONE (OUTPATIENT)
Dept: INTERNAL MEDICINE | Facility: CLINIC | Age: 72
End: 2022-09-26
Payer: MEDICARE

## 2022-09-26 ENCOUNTER — HOSPITAL ENCOUNTER (OUTPATIENT)
Dept: RADIOLOGY | Facility: HOSPITAL | Age: 72
Discharge: HOME | End: 2022-09-26
Attending: NURSE PRACTITIONER
Payer: MEDICARE

## 2022-09-26 VITALS
RESPIRATION RATE: 20 BRPM | DIASTOLIC BLOOD PRESSURE: 94 MMHG | BODY MASS INDEX: 22.51 KG/M2 | SYSTOLIC BLOOD PRESSURE: 135 MMHG | HEART RATE: 97 BPM | WEIGHT: 152 LBS | HEIGHT: 69 IN

## 2022-09-26 DIAGNOSIS — G99.2 STENOSIS OF CERVICAL SPINE WITH MYELOPATHY: Primary | ICD-10-CM

## 2022-09-26 DIAGNOSIS — E03.9 HYPOTHYROIDISM, UNSPECIFIED TYPE: ICD-10-CM

## 2022-09-26 DIAGNOSIS — M48.02 STENOSIS OF CERVICAL SPINE WITH MYELOPATHY: ICD-10-CM

## 2022-09-26 DIAGNOSIS — R53.83 FATIGUE, UNSPECIFIED TYPE: Primary | ICD-10-CM

## 2022-09-26 DIAGNOSIS — M47.22 CERVICAL SPONDYLOSIS WITH RADICULOPATHY: ICD-10-CM

## 2022-09-26 DIAGNOSIS — E66.9 OBESITY, UNSPECIFIED CLASSIFICATION, UNSPECIFIED OBESITY TYPE, UNSPECIFIED WHETHER SERIOUS COMORBIDITY PRESENT: ICD-10-CM

## 2022-09-26 DIAGNOSIS — M48.02 STENOSIS OF CERVICAL SPINE WITH MYELOPATHY: Primary | ICD-10-CM

## 2022-09-26 DIAGNOSIS — Z12.5 SCREENING PSA (PROSTATE SPECIFIC ANTIGEN): ICD-10-CM

## 2022-09-26 DIAGNOSIS — R53.83 FATIGUE, UNSPECIFIED TYPE: ICD-10-CM

## 2022-09-26 DIAGNOSIS — G99.2 STENOSIS OF CERVICAL SPINE WITH MYELOPATHY: ICD-10-CM

## 2022-09-26 DIAGNOSIS — Z00.00 WELLNESS EXAMINATION: Primary | ICD-10-CM

## 2022-09-26 PROCEDURE — 72040 X-RAY EXAM NECK SPINE 2-3 VW: CPT | Mod: TC

## 2022-09-26 PROCEDURE — 99024 PR POST-OP FOLLOW-UP VISIT: ICD-10-PCS | Mod: POP,,, | Performed by: NURSE PRACTITIONER

## 2022-09-26 PROCEDURE — 99024 POSTOP FOLLOW-UP VISIT: CPT | Mod: POP,,, | Performed by: NURSE PRACTITIONER

## 2022-09-26 RX ORDER — MUPIROCIN 20 MG/G
OINTMENT TOPICAL 2 TIMES DAILY
COMMUNITY
Start: 2022-09-07 | End: 2023-02-06 | Stop reason: CLARIF

## 2022-09-26 NOTE — TELEPHONE ENCOUNTER
The patients wife is calling asking for the patient to be seen today per home health nurse.  She stated over the past week and a half he is having swelling, redness, and pain (hard.)  She stated that he does not have a fever, but it is getting worse.  He is s/p C3-7 laminoplasty and left C3-7 laminoforaminotomies by Dr. Rao that took place on 07/12/2022.  He is scheduled to follow up next week with you for a 3 month PO with xrays.  Do you want him to just come in today to look at the wound or does he need to have the xrays first then come in?  BH

## 2022-09-26 NOTE — PROGRESS NOTES
[facility]  Neurosurgery Post-Operative Visit        Scott Echeverria   [id number]   1950     CHIEF COMPLAINT:  3 month post-op    HPI:  Scott Echeverria is a 72 y.o. male who presents today for post-operative follow-up. He is s/p C3-7 laminoplasty and left C3-7 laminoforaminotomies by Dr. Rao that took place on 07/12/2022. He presents today following a visit with his home health nurse, who advised he and his wife to call clinic for assessment of incision site. Midline cervical incision is well healed with continued mild edema; he reports minimal soreness with palpation, there is no drainage, redness, or additional s/s of infection apparent. He states the swelling is worse in the mornings and improves throughout the day. Currently he reports tightness in his neck and paracervical region, extending into shoulders bilaterally, and pain he describes as soreness with ROM; he admits to having decreased ROM, and limiting stretching/activity because he was unsure of restrictions. He had also been limiting his prn robaxin. Instructed on exercises/stretches for cervical spine and prevention of frozen shoulder, as well as activity limitations/restrictions, and medication management. He reports in addition to PT 2x weekly with , massage therapy has also aided in relief of symptoms. He continues to have some weakness with left shoulder abduction and left elbow flexion/extension, however this has improved since previously seen in clinic. Overall he continues to slowly improve. Continues to require walker intermittently for ambulation but says his balance is improving. Denies continued difficulties with speech or swallowing; reports improvement in appetite. No difficulty with bowel or bladder function. No additional complaints or concerns reported.     Review of patient's allergies indicates:   Allergen Reactions    Iodine      Current Outpatient Medications   Medication Sig Dispense Refill    amitriptyline  (ELAVIL) 25 MG tablet Take 1 tablet (25 mg total) by mouth once daily. 90 tablet 3    aspirin 81 MG Chew Take 1 tablet (81 mg total) by mouth once daily. 90 tablet 0    docusate sodium (COLACE) 50 MG capsule Take by mouth 2 (two) times daily.      gabapentin (NEURONTIN) 100 MG capsule Take 1 capsule (100 mg total) by mouth 3 (three) times daily. 90 capsule 2    HYDROcodone-acetaminophen (NORCO) 5-325 mg per tablet Take 1 tablet by mouth every 12 (twelve) hours as needed for Pain. 20 tablet 0    levothyroxine (SYNTHROID) 50 MCG tablet Take 1 tablet (50 mcg total) by mouth once daily. 30 tablet 2    methocarbamoL (ROBAXIN) 500 MG Tab Take 1 tablet (500 mg total) by mouth 3 (three) times daily as needed (muscle spasms). (Patient taking differently: Take 500 mg by mouth 3 (three) times daily as needed (muscle spasms). Patient takes qd) 30 tablet 2    mupirocin (BACTROBAN) 2 % ointment Apply topically 2 (two) times daily.       No current facility-administered medications for this visit.     Past Medical History:   Diagnosis Date    BPH (benign prostatic hyperplasia)     Cervical pain     Cervical radiculitis     Cervical spinal stenosis     Hypothyroidism, unspecified     Sleep apnea     resolved since surgery     Past Surgical History:   Procedure Laterality Date    APPENDECTOMY  01/25/2022    Dr. Kerry Coats    CERVICAL SPINE SURGERY  07/12/2022    COLONOSCOPY  2021    HEMORRHOID SURGERY  2002    LAMINOPLASTY N/A 07/12/2022    Procedure: LAMINOPLASTY;  Surgeon: Paulo Rao MD;  Location: [facility];  Service: Neurosurgery;  Laterality: N/A;  left C3-4, C4-5, C5-6, C6-7 foraminotomies, C3-7 laminoplasty //  TIVA SET UP    lower back surgery  1990    RETINAL DETACHMENT SURGERY Right 2015    SINUS SURGERY      TONSILLECTOMY  1956     Family History       Problem Relation (Age of Onset)    Alzheimer's disease Mother    Heart attack Father    Hyperlipidemia Father    Hypertension Sister, Brother    Parkinsonism Mother     "Sickle cell trait Sister    Stroke Father    Thyroid disease Mother          Social History     Socioeconomic History    Marital status:    Tobacco Use    Smoking status: Never    Smokeless tobacco: Never   Substance and Sexual Activity    Alcohol use: Not Currently    Drug use: Never     Review of systems:  Comprehensive ROS was noncontributory except for that noted in the HPI    Vital Signs  BP (!) 135/94 (BP Location: Other (Comment), Patient Position: Sitting)   Pulse 97   Resp 20   Ht 5' 9" (1.753 m)   Wt 68.9 kg (152 lb)   BMI 22.45 kg/m²     Physical Exam:  General:  Pleasant. Well-nourished. Well-groomed. Alert. Oriented. No acute distress.  Head:  Normocephalic, without obvious abnormality, atraumatic  Lungs:   Breathing is quiet, non-lablored  Neurological:    Oriented to Person, Place, Time   Speech:  normal  Memory, cognition, and affect are appropriate.  Sensation is intact in bilateral upper extremities to a light touch.   Motor Strength: Moves all extremities spontaneously with good tone. No abnormal movements seen.  Muscle strength against resistance:  Strength  Deltoids Triceps Biceps Wrist Extension Intrinsics Hand    Upper: R 5/5 5/5 5/5 5/5 5/5 5/5    L 4/5 5/5 4-/5 5/5 5/5 5/5   Cervical ROM: Pain and stiffness with flexion/extension. Limited rotation  Cervical spine palpation: soreness and muscle tightness on palpation  Posterior Midline Cervical incision:  Well healed, no redness/drainage, mild edema   Gait:  Unsteady, slow, using walker and gait belt    Imaging:   X-rays of Cervical Spine independently reviewed with stable postoperative changes; visible hardware and proper alignment.     ASSESSMENT:     1. Stenosis of cervical spine with myelopathy    2. Cervical spondylosis with radiculopathy     PLAN:  Stable postoperative course with no acute neurosurgical concerns.   X-rays of the cervical spine reviewed and discussed at length   Overall seeing slow improvements in " preoperative symptoms  Posterior cervical incision is well healed with mild fluid collection under skin at incision site that has not worsened since last seen in clinic; no apparent s/s of infection  Continues to require narcotic medication for pain management, risks and benefits of continued use discussed with understanding verbalized  Instructed to continue taking gabapentin, and prn robaxin for additional symptom relief  PT with home health continues, to progress to outpatient therapy when able   Encouraged to increase activity as tolerated; Information handout and verbal instruction/demonstration provided on home exercises/stretches for cervical spine and prevention of frozen shoulder with understanding verbalized by patient and spouse  All questions and concerns addressed   Will return to clinic at 6 months postop   He will call as needed in the meantime with any issues

## 2022-09-27 ENCOUNTER — DOCUMENT SCAN (OUTPATIENT)
Dept: HOME HEALTH SERVICES | Facility: HOSPITAL | Age: 72
End: 2022-09-27
Payer: MEDICARE

## 2022-09-28 ENCOUNTER — DOCUMENT SCAN (OUTPATIENT)
Dept: HOME HEALTH SERVICES | Facility: HOSPITAL | Age: 72
End: 2022-09-28
Payer: MEDICARE

## 2022-10-03 DIAGNOSIS — G89.18 ACUTE POSTOPERATIVE PAIN: ICD-10-CM

## 2022-10-03 DIAGNOSIS — G99.2 STENOSIS OF CERVICAL SPINE WITH MYELOPATHY: ICD-10-CM

## 2022-10-03 DIAGNOSIS — M48.02 STENOSIS OF CERVICAL SPINE WITH MYELOPATHY: ICD-10-CM

## 2022-10-03 LAB
CHOLEST SERPL-MSCNC: 231 MG/DL (ref 0–200)
HDLC SERPL-MCNC: 57 MG/DL (ref 35–70)
LDLC SERPL CALC-MCNC: 147 MG/DL (ref 0–160)
TRIGL SERPL-MCNC: 136 MG/DL (ref 40–160)

## 2022-10-03 RX ORDER — HYDROCODONE BITARTRATE AND ACETAMINOPHEN 5; 325 MG/1; MG/1
1 TABLET ORAL EVERY 12 HOURS PRN
Qty: 20 TABLET | Refills: 0 | Status: SHIPPED | OUTPATIENT
Start: 2022-10-03 | End: 2022-10-17 | Stop reason: SDUPTHER

## 2022-10-03 NOTE — TELEPHONE ENCOUNTER
I spoke with the patient.  He is taking 2/day of the Norco.  He was last given Norco 5-325mg 1 po BID prn #20, 10 day supply on 9/21.  He would be due last Friday, 9/30.  He is completely out.  He is s/p C3-7 laminoplasty and left C3-7 laminoforaminotomies by Dr. Rao on 07/12/2022.  He continues with pain and stiffness in his neck, bilateral shoulders and shoulder blades, left > right.  He has pain into the upper arm on the left.  He says it goes about 1/2 way to the elbow.  He continues with left shoulder weakness.  He is doing physical therapy through home health and does exercises daily on his own.  He finds the exercises cause an increase in pain, but he continues to do them.  He rates his pain at about a 3-4/10 on average.  He continues to have good days and bad days, but overall feels like he is improving, although slowly.  He continues to take gabapentin 100mg TID and Robaxin 500mg TID prn.  He still has two refills left on both.  He was seen by Meredith on 9/26 and is scheduled to follow up at 6 months post op, 12/28/22.  I checked Rx history, nothing from anyone else since our prescription.  His pharmacy is [facility] in [place].  I put in for the same prescription so may need to adjust before sending.

## 2022-10-03 NOTE — PROGRESS NOTES
Subjective:      Patient ID: Scott Echeverria is a 72 y.o. male.    Chief Complaint: Neck Swelling (Pt here for neck swelling in the surgery area and pt has also went to Surgical NP and they said nothing was wrong but it keeps swelling and getting bigger and hard like a rock. Wife has picture on phone.)      HPI: Patient here today for c/o continued neck swelling.  Significant h/o C3-C7 lamioplasty and laminoforminotomies on 7/12 without perioperative complications. Postoperatively, he did have a few bouts of AMS resulting in ventilation.  He has seen Dr. Rao in follow up in August with fluid collection noted to incision site.  Also, he saw Neuro Sx NP 9/26/22 with neg Xr of cervical spine at that time.  He has continued concerns of intermittent swelling to posterior aspect of neck as sx incision. Denies fever, chills, sweats. Slight bilateral neck pain noted to area of swelling. No d/c or redness.  Patient/wife requesting further eval with imaging.  Labs done per Mary PALOMARES yesterday, all of which were normal without evidence of acute infection or otherwise.      Additionally, continued issues with postural hypotension.  He reports drinking enough fluids. No syncope, but some near syncopal episodes noted with positional changes.  No LOC.  Wife reports that Neuro was suppose to further eval with Echo while in hospital in July, but never did. Denies CP, palpitations, or SOB.      Review of patient's allergies indicates:   Allergen Reactions    Iodine        Review of Systems  Constitutional: No fever, No chills, No sweats, No fatigue, No weight loss.  Eyes: No blurring.  Ear/Nose/Mouth/Throat: No vertigo.  Respiratory: No shortness of breath, No exertional dyspnea.   Cardiovascular: No chest pain, No palpitations, No claudication, No orthopnea, No peripheral edema.  Gastrointestinal: No nausea, No vomiting  Genitourinary: No dysuria, No hematuria, No frequency.  Endocrine: No excessive thirst, No polyuria, No cold  "intolerance, No heat intolerance.  Musculoskeletal: Neck pain/swelling at sx incision.  Integumentary: No rash, No ecchymosis. Surgical incision mass  Neurologic: No altered mental status, No headaches.    Objective:   /74 (BP Location: Left arm, Patient Position: Sitting, BP Method: Medium (Manual))   Pulse 98   Resp 14   Ht 5' 9" (1.753 m)   Wt 69.6 kg (153 lb 6.4 oz)   SpO2 98%   BMI 22.65 kg/m²     The patient's weight trend is below:   Wt Readings from Last 4 Encounters:   10/04/22 69.6 kg (153 lb 6.4 oz)   09/26/22 68.9 kg (152 lb)   09/08/22 68.9 kg (152 lb)   08/24/22 (P) 68.9 kg (152 lb)      Physical Exam  Constitutional:       General: He is not in acute distress.     Appearance: Normal appearance. He is normal weight. He is not ill-appearing, toxic-appearing or diaphoretic.   HENT:      Head: Normocephalic and atraumatic.   Cardiovascular:      Rate and Rhythm: Normal rate and regular rhythm.      Pulses: Normal pulses.      Heart sounds: Normal heart sounds.   Pulmonary:      Effort: Pulmonary effort is normal. No respiratory distress.      Breath sounds: Normal breath sounds. No stridor. No wheezing, rhonchi or rales.   Musculoskeletal:         General: Swelling (posterior neck swelling), tenderness and deformity present. Normal range of motion.   Skin:     General: Skin is warm and dry.      Capillary Refill: Capillary refill takes less than 2 seconds.   Neurological:      General: No focal deficit present.      Mental Status: He is alert and oriented to person, place, and time. Mental status is at baseline.   Psychiatric:         Mood and Affect: Mood normal.         Behavior: Behavior normal.         Thought Content: Thought content normal.         Judgment: Judgment normal.           Assessment/Plan:   1. Neck swelling  Will order CT soft tissue neck w/wo contrast    - CT Soft Tissue Neck W WO Contrast; Future    2. Cervical spondylosis with radiculopathy  See above    - CT Soft Tissue " Neck W WO Contrast; Future    3. Status post neck surgery, follow-up exam  See above.    - CT Soft Tissue Neck W WO Contrast; Future    4. Postural dizziness with near syncope  Will proceed with Echo and referral to CV for eval/tx.  Rec continued adequate fluids and cautious, slow positional changes.     - Ambulatory referral/consult to Cardiology; Future  - Echo; Future    Medication List with Changes/Refills   Current Medications    AMITRIPTYLINE (ELAVIL) 25 MG TABLET    Take 1 tablet (25 mg total) by mouth once daily.    ASPIRIN 81 MG CHEW    Take 1 tablet (81 mg total) by mouth once daily.    DOCUSATE SODIUM (COLACE) 50 MG CAPSULE    Take by mouth 2 (two) times daily.    GABAPENTIN (NEURONTIN) 100 MG CAPSULE    Take 1 capsule (100 mg total) by mouth 3 (three) times daily.    HYDROCODONE-ACETAMINOPHEN (NORCO) 5-325 MG PER TABLET    Take 1 tablet by mouth every 12 (twelve) hours as needed for Pain.    LEVOTHYROXINE (SYNTHROID) 50 MCG TABLET    Take 1 tablet (50 mcg total) by mouth once daily.    METHOCARBAMOL (ROBAXIN) 500 MG TAB    Take 1 tablet (500 mg total) by mouth 3 (three) times daily as needed (muscle spasms).    MUPIROCIN (BACTROBAN) 2 % OINTMENT    Apply topically 2 (two) times daily.        No follow-ups on file.

## 2022-10-03 NOTE — TELEPHONE ENCOUNTER
I spoke with the patient and let him know refill was sent in.  There are only two outpatient physical therapy places in [place] where he lives.  He and his wife have been to both and found they are not very hands on.  He says they will start you on an exercise and then walk away.  Once he finished with the exercise, he would have to sit and wait for them to return.  He feels like he is getting more with home health PT and home exercises.  He is not able to drive to [place] multiple times a week for therapy.  He would like to continue PT at home.  I told him that was fine.

## 2022-10-04 ENCOUNTER — OFFICE VISIT (OUTPATIENT)
Dept: INTERNAL MEDICINE | Facility: CLINIC | Age: 72
End: 2022-10-04
Payer: MEDICARE

## 2022-10-04 ENCOUNTER — DOCUMENTATION ONLY (OUTPATIENT)
Dept: INTERNAL MEDICINE | Facility: CLINIC | Age: 72
End: 2022-10-04

## 2022-10-04 VITALS
HEART RATE: 98 BPM | RESPIRATION RATE: 14 BRPM | HEIGHT: 69 IN | BODY MASS INDEX: 22.72 KG/M2 | OXYGEN SATURATION: 98 % | WEIGHT: 153.38 LBS | SYSTOLIC BLOOD PRESSURE: 117 MMHG | DIASTOLIC BLOOD PRESSURE: 74 MMHG

## 2022-10-04 DIAGNOSIS — Z09 STATUS POST NECK SURGERY, FOLLOW-UP EXAM: ICD-10-CM

## 2022-10-04 DIAGNOSIS — R42 POSTURAL DIZZINESS WITH NEAR SYNCOPE: ICD-10-CM

## 2022-10-04 DIAGNOSIS — M47.22 CERVICAL SPONDYLOSIS WITH RADICULOPATHY: ICD-10-CM

## 2022-10-04 DIAGNOSIS — R22.1 NECK SWELLING: Primary | ICD-10-CM

## 2022-10-04 DIAGNOSIS — R55 POSTURAL DIZZINESS WITH NEAR SYNCOPE: ICD-10-CM

## 2022-10-04 PROCEDURE — 99214 PR OFFICE/OUTPT VISIT, EST, LEVL IV, 30-39 MIN: ICD-10-PCS | Mod: ,,, | Performed by: NURSE PRACTITIONER

## 2022-10-04 PROCEDURE — 99214 OFFICE O/P EST MOD 30 MIN: CPT | Mod: ,,, | Performed by: NURSE PRACTITIONER

## 2022-10-05 ENCOUNTER — TELEPHONE (OUTPATIENT)
Dept: NEUROSURGERY | Facility: CLINIC | Age: 72
End: 2022-10-05

## 2022-10-05 ENCOUNTER — TELEPHONE (OUTPATIENT)
Dept: INTERNAL MEDICINE | Facility: CLINIC | Age: 72
End: 2022-10-05
Payer: MEDICARE

## 2022-10-05 ENCOUNTER — OFFICE VISIT (OUTPATIENT)
Dept: NEUROSURGERY | Facility: CLINIC | Age: 72
End: 2022-10-05
Payer: MEDICARE

## 2022-10-05 VITALS
HEIGHT: 69 IN | WEIGHT: 154 LBS | SYSTOLIC BLOOD PRESSURE: 128 MMHG | DIASTOLIC BLOOD PRESSURE: 78 MMHG | HEART RATE: 110 BPM | RESPIRATION RATE: 16 BRPM | BODY MASS INDEX: 22.81 KG/M2

## 2022-10-05 DIAGNOSIS — M96.842 POSTOPERATIVE SEROMA OF MUSCULOSKELETAL STRUCTURE AFTER MUSCULOSKELETAL PROCEDURE: ICD-10-CM

## 2022-10-05 DIAGNOSIS — G99.2 STENOSIS OF CERVICAL SPINE WITH MYELOPATHY: Primary | ICD-10-CM

## 2022-10-05 DIAGNOSIS — M48.02 STENOSIS OF CERVICAL SPINE WITH MYELOPATHY: Primary | ICD-10-CM

## 2022-10-05 PROCEDURE — 99024 POSTOP FOLLOW-UP VISIT: CPT | Mod: ,,, | Performed by: NURSE PRACTITIONER

## 2022-10-05 PROCEDURE — 99024 PR POST-OP FOLLOW-UP VISIT: ICD-10-PCS | Mod: ,,, | Performed by: NURSE PRACTITIONER

## 2022-10-05 NOTE — TELEPHONE ENCOUNTER
----- Message from Kala Ervin sent at 10/5/2022 10:20 AM CDT -----  Julia pickett/[facility] left message asking for vision note from yesterday to be faxed  To Home Health fax#[phone]  Julia Phone# [phone]

## 2022-10-10 ENCOUNTER — TELEPHONE (OUTPATIENT)
Dept: NEUROSURGERY | Facility: CLINIC | Age: 72
End: 2022-10-10
Payer: MEDICARE

## 2022-10-12 ENCOUNTER — TELEPHONE (OUTPATIENT)
Dept: NEUROSURGERY | Facility: CLINIC | Age: 72
End: 2022-10-12
Payer: MEDICARE

## 2022-10-13 ENCOUNTER — DOCUMENTATION ONLY (OUTPATIENT)
Dept: INTERNAL MEDICINE | Facility: CLINIC | Age: 72
End: 2022-10-13
Payer: MEDICARE

## 2022-10-16 PROCEDURE — G0179 MD RECERTIFICATION HHA PT: HCPCS | Mod: ,,, | Performed by: INTERNAL MEDICINE

## 2022-10-16 PROCEDURE — G0179 PR HOME HEALTH MD RECERTIFICATION: ICD-10-PCS | Mod: ,,, | Performed by: INTERNAL MEDICINE

## 2022-10-17 ENCOUNTER — TELEPHONE (OUTPATIENT)
Dept: NEUROSURGERY | Facility: CLINIC | Age: 72
End: 2022-10-17
Payer: MEDICARE

## 2022-10-17 DIAGNOSIS — G99.2 STENOSIS OF CERVICAL SPINE WITH MYELOPATHY: ICD-10-CM

## 2022-10-17 DIAGNOSIS — G89.18 ACUTE POSTOPERATIVE PAIN: ICD-10-CM

## 2022-10-17 DIAGNOSIS — M48.02 STENOSIS OF CERVICAL SPINE WITH MYELOPATHY: ICD-10-CM

## 2022-10-17 RX ORDER — HYDROCODONE BITARTRATE AND ACETAMINOPHEN 5; 325 MG/1; MG/1
1 TABLET ORAL EVERY 12 HOURS PRN
Qty: 20 TABLET | Refills: 0 | Status: SHIPPED | OUTPATIENT
Start: 2022-10-17 | End: 2022-10-26

## 2022-10-17 RX ORDER — METHOCARBAMOL 500 MG/1
500 TABLET, FILM COATED ORAL 3 TIMES DAILY PRN
Qty: 40 TABLET | Refills: 2 | Status: ON HOLD | OUTPATIENT
Start: 2022-10-17 | End: 2023-02-14 | Stop reason: CLARIF

## 2022-10-17 RX ORDER — GABAPENTIN 100 MG/1
200 CAPSULE ORAL 3 TIMES DAILY
Qty: 90 CAPSULE | Refills: 2 | Status: SHIPPED | OUTPATIENT
Start: 2022-10-17 | End: 2022-11-28

## 2022-10-17 NOTE — PROGRESS NOTES
Had a very lengthy phone discussion with the patient, wife in the background during conversation. Symptoms unchanged compared to his appointment 11/5/2022. Persistent fluid collection. Explained Dr. Rao recommends giving it more time. Discussed increasing activity as tolerated and OK to drive. Whenever he gets a PCP in [place], Dr. Rao recommends discussing referral to Neurosurgery with them. Will get him in the see Dr. Rao in a few weeks prior to his potential move to [place]. Will also continue following via telemedicine until he is established with a Neurosurgeon in [place]. Will send refills for Norco, Robaxin and gabapentin as he increased his dosage to 200mg TID.

## 2022-10-18 ENCOUNTER — HOSPITAL ENCOUNTER (OUTPATIENT)
Dept: CARDIOLOGY | Facility: HOSPITAL | Age: 72
Discharge: HOME | End: 2022-10-18
Attending: NURSE PRACTITIONER
Payer: MEDICARE

## 2022-10-18 DIAGNOSIS — R42 POSTURAL DIZZINESS WITH NEAR SYNCOPE: ICD-10-CM

## 2022-10-18 DIAGNOSIS — R55 POSTURAL DIZZINESS WITH NEAR SYNCOPE: ICD-10-CM

## 2022-10-18 LAB
AV INDEX (PROSTH): 0.85
AV MEAN GRADIENT: 2 MMHG
AV PEAK GRADIENT: 3 MMHG
AV VALVE AREA: 2.67 CM2
AV VELOCITY RATIO: 0.91
CV ECHO LV RWT: 0.38 CM
DOP CALC AO PEAK VEL: 0.9 M/S
DOP CALC AO VTI: 16.2 CM
DOP CALC LVOT AREA: 3.1 CM2
DOP CALC LVOT DIAMETER: 2 CM
DOP CALC LVOT PEAK VEL: 0.82 M/S
DOP CALC LVOT STROKE VOLUME: 43.33 CM3
DOP CALC MV VTI: 15.6 CM
DOP CALCLVOT PEAK VEL VTI: 13.8 CM
E WAVE DECELERATION TIME: 166 MSEC
E/A RATIO: 0.65
E/E' RATIO: 5.11 M/S
ECHO LV POSTERIOR WALL: 0.79 CM (ref 0.6–1.1)
EJECTION FRACTION: 50 %
FRACTIONAL SHORTENING: 28 % (ref 28–44)
INTERVENTRICULAR SEPTUM: 1.13 CM (ref 0.6–1.1)
LEFT ATRIUM SIZE: 2.8 CM
LEFT INTERNAL DIMENSION IN SYSTOLE: 2.95 CM (ref 2.1–4)
LEFT VENTRICLE DIASTOLIC VOLUME: 74.7 ML
LEFT VENTRICLE SYSTOLIC VOLUME: 33.6 ML
LEFT VENTRICULAR INTERNAL DIMENSION IN DIASTOLE: 4.11 CM (ref 3.5–6)
LEFT VENTRICULAR MASS: 125.26 G
LV LATERAL E/E' RATIO: 4.6 M/S
LV SEPTAL E/E' RATIO: 5.75 M/S
LVOT MG: 1 MMHG
LVOT MV: 0.54 CM/S
MV MEAN GRADIENT: 1 MMHG
MV PEAK A VEL: 0.71 M/S
MV PEAK E VEL: 0.46 M/S
MV PEAK GRADIENT: 3 MMHG
MV STENOSIS PRESSURE HALF TIME: 52 MS
MV VALVE AREA BY CONTINUITY EQUATION: 2.78 CM2
MV VALVE AREA P 1/2 METHOD: 4.23 CM2
RA PRESSURE: 3 MMHG
TDI LATERAL: 0.1 M/S
TDI SEPTAL: 0.08 M/S
TDI: 0.09 M/S
TRICUSPID ANNULAR PLANE SYSTOLIC EXCURSION: 1.74 CM

## 2022-10-18 PROCEDURE — 93306 TTE W/DOPPLER COMPLETE: CPT | Mod: 26,,, | Performed by: INTERNAL MEDICINE

## 2022-10-18 PROCEDURE — 93306 TTE W/DOPPLER COMPLETE: CPT

## 2022-10-18 PROCEDURE — 93306 ECHO (CUPID ONLY): ICD-10-PCS | Mod: 26,,, | Performed by: INTERNAL MEDICINE

## 2022-10-18 NOTE — TELEPHONE ENCOUNTER
Mary spoke with the patient yesterday and medication refills were sent in.  Need to move his appt from Meredith's schedule to Dr. Rao's, mid November.

## 2022-10-19 ENCOUNTER — TELEPHONE (OUTPATIENT)
Dept: INTERNAL MEDICINE | Facility: CLINIC | Age: 72
End: 2022-10-19
Payer: MEDICARE

## 2022-10-19 ENCOUNTER — PATIENT OUTREACH (OUTPATIENT)
Dept: HOME HEALTH SERVICES | Facility: HOSPITAL | Age: 72
End: 2022-10-19
Payer: MEDICARE

## 2022-10-19 NOTE — TELEPHONE ENCOUNTER
----- Message from Kala Ervin sent at 10/19/2022  9:39 AM CDT -----  Regarding: RE: antonella narvaez 10/26 @ 9:40  Spoke to pt wife she voiced understanding about the date and time of katheryn  ----- Message -----  From: Parmjit Salazar LPN  Sent: 10/19/2022   9:33 AM CDT  To: Kala Ervin  Subject: antonella narvaez 10/26 @ 9:40                         Are there any outstanding tasks in patient chart? no    Is there documentation of outstanding tasks in patient chart? no    Has patient been to the ER, urgent care, or another physician since last visit?    Has patient done any blood work or x-rays since last visit?

## 2022-10-20 ENCOUNTER — EXTERNAL HOME HEALTH (OUTPATIENT)
Dept: HOME HEALTH SERVICES | Facility: HOSPITAL | Age: 72
End: 2022-10-20
Payer: MEDICARE

## 2022-10-23 PROBLEM — M96.842 POSTOPERATIVE SEROMA OF MUSCULOSKELETAL STRUCTURE AFTER MUSCULOSKELETAL PROCEDURE: Status: ACTIVE | Noted: 2022-10-23

## 2022-10-23 PROBLEM — T81.9XXA POSTOPERATIVE COMPLICATION: Status: ACTIVE | Noted: 2022-10-23

## 2022-10-23 NOTE — PROGRESS NOTES
[facility]  Neurosurgery Post-Operative Visit        Scott Echeverria   [id number]   1950     CHIEF COMPLAINT:  Follow up with CT    HPI:  Scott Echeverria is a 72 y.o. male who presents today for unscheduled follow-up. He is s/p C3-7 laminoplasty and left C3-7 laminoforaminotomies by Dr. Rao that took place on 07/12/2022.  The patient did have a spinal fluid leak intraoperatively which was repaired by Dr. Rao.  His postoperative course was complicated by severe encephalopathy likely secondary to medication.  He was eventually discharged to inpatient rehab and then home. His most recent office visit was on 9/26/2022 at 3 months postop.  He was noted to have postoperative fluid collection without any evidence of infection.  He was instructed to continue monitoring the fluid collection.  He was seen by his primary care physician on 10/4/2022 and fluid collection was again noted at that time.  CT soft tissue of the neck was obtained on 10/4/2022 which revealed a midline posterior fluid collection extending along the operative levels of C3-C7; increased in size compared to postoperative imaging performed in the hospital in July.  He was instructed to call our office for an appointment due to CT findings.    The patient continues with palpable posterior cervical fluid collection.  There is no significant change compared to his last office visit on 9/26/2022.  The patient reports the swelling is worst when he 1st wakes in the morning.  In the morning the area is very firm.  As the day goes on, the collection seems softer and not as large.  His main complaint is soreness and pain along both trapezius muscles.  He feels it is difficult to lift his head up due to the swelling in the back of his neck.  He has not increased his daily activities much due to concerns with fluid collection.  He continues to work with therapy at home however he states it is somewhat limited also due to concerns about fluid  collection.  He continues with weakness in the left deltoid.  This is improve compared to his hospital stay.  He continues to feel that it is gradually improving.  His wife is present with him today.    Review of patient's allergies indicates:   Allergen Reactions    Iodine      Current Outpatient Medications   Medication Sig Dispense Refill    amitriptyline (ELAVIL) 25 MG tablet Take 1 tablet (25 mg total) by mouth once daily. 90 tablet 3    aspirin 81 MG Chew Take 1 tablet (81 mg total) by mouth once daily. 90 tablet 0    docusate sodium (COLACE) 50 MG capsule Take by mouth 2 (two) times daily.      levothyroxine (SYNTHROID) 50 MCG tablet Take 1 tablet (50 mcg total) by mouth once daily. 30 tablet 2    mupirocin (BACTROBAN) 2 % ointment Apply topically 2 (two) times daily.      gabapentin (NEURONTIN) 100 MG capsule Take 2 capsules (200 mg total) by mouth 3 (three) times daily. 90 capsule 2    HYDROcodone-acetaminophen (NORCO) 5-325 mg per tablet Take 1 tablet by mouth every 12 (twelve) hours as needed for Pain. 20 tablet 0    methocarbamoL (ROBAXIN) 500 MG Tab Take 1 tablet (500 mg total) by mouth 3 (three) times daily as needed (muscle spasms). 40 tablet 2     No current facility-administered medications for this visit.     Past Medical History:   Diagnosis Date    BPH (benign prostatic hyperplasia)     Cervical pain     Cervical radiculitis     Cervical spinal stenosis     Hypothyroidism, unspecified     Sleep apnea     resolved since surgery     Past Surgical History:   Procedure Laterality Date    APPENDECTOMY  01/25/2022    Dr. Kerry Coats    CERVICAL SPINE SURGERY  07/12/2022    COLONOSCOPY  2021    HEMORRHOID SURGERY  2002    LAMINOPLASTY N/A 07/12/2022    Procedure: LAMINOPLASTY;  Surgeon: Paulo Rao MD;  Location: [facility];  Service: Neurosurgery;  Laterality: N/A;  left C3-4, C4-5, C5-6, C6-7 foraminotomies, C3-7 laminoplasty //  TIVA SET UP    lower back surgery  1990    RETINAL DETACHMENT SURGERY  "Right 2015    SINUS SURGERY      TONSILLECTOMY  1956     Family History       Problem Relation (Age of Onset)    Alzheimer's disease Mother    Heart attack Father    Hyperlipidemia Father    Hypertension Sister, Brother    Parkinsonism Mother    Sickle cell trait Sister    Stroke Father    Thyroid disease Mother          Social History     Socioeconomic History    Marital status:    Tobacco Use    Smoking status: Never    Smokeless tobacco: Never   Substance and Sexual Activity    Alcohol use: Not Currently    Drug use: Never    Sexual activity: Not Currently     Review of systems:  Comprehensive ROS was noncontributory except for that noted in the HPI    Vital Signs  /78 (BP Location: Other (Comment), Patient Position: Sitting)   Pulse 110   Resp 16   Ht 5' 9" (1.753 m)   Wt 69.9 kg (154 lb)   BMI 22.74 kg/m²     Physical Exam:  General:  Pleasant. Well-nourished. Well-groomed. Alert. Oriented. No acute distress.  Head:  Normocephalic, without obvious abnormality, atraumatic  Lungs:   Breathing is quiet, non-lablored  Neurological:    Oriented to Person, Place, Time   Speech:  normal  Sensation is intact in bilateral upper extremities to a light touch.   Motor Strength:   Muscle strength against resistance:  Strength  Deltoids Triceps Biceps Wrist Extension Intrinsics Hand    Upper: R 5/5 5/5 5/5 5/5 5/5 5/5    L 4/5 5-/5 4-/5 5/5 5/5 5/5   Cervical ROM: Pain and stiffness with flexion/extension. Limited rotation, reports tightness in the back of his neck  Cervical spine palpation: soreness  along bilateral trapezius muscles.    Posterior Midline Cervical incision:  Well healed, no redness/drainage, There is a palpable fluid collection along posterior cervical incision.  No induration.  Gait:  Guarded, slow.  Gait belt.      Posterior cervical fluid collection was tapped in a sterile fashion.  About 75 cc of straw-colored CSF was withdrawn.  No significant improvement in pain or stiffness " immediately afterward.    Imaging:   CT soft tissue of the neck performed on 10/4/2022 reveals midline posterior cervical fluid collection along operative levels.    ASSESSMENT:     1. Stenosis of cervical spine with myelopathy    2. Postoperative seroma      PLAN:  The patient was seen and examined by Dr. Rao.  Discussed options for postoperative seroma including ongoing monitoring and surgical reexploration.  Still the patient's complicated postoperative course and no evidence of infection, Dr. Rao is recommending that he continue to monitor fluid collection.  The patient and his wife are in agreement with this.      Explained to the patient and his wife that he should start getting back into his daily activities.  Discussed increasing his gabapentin.  He will continue with therapy at home.  He is scheduled to follow up in December.  He will call with any issues between now and then.

## 2022-10-26 ENCOUNTER — OFFICE VISIT (OUTPATIENT)
Dept: INTERNAL MEDICINE | Facility: CLINIC | Age: 72
End: 2022-10-26
Payer: MEDICARE

## 2022-10-26 VITALS
WEIGHT: 156 LBS | HEART RATE: 80 BPM | OXYGEN SATURATION: 99 % | SYSTOLIC BLOOD PRESSURE: 122 MMHG | BODY MASS INDEX: 23.11 KG/M2 | HEIGHT: 69 IN | DIASTOLIC BLOOD PRESSURE: 82 MMHG | RESPIRATION RATE: 18 BRPM

## 2022-10-26 DIAGNOSIS — M54.2 NECK PAIN: ICD-10-CM

## 2022-10-26 DIAGNOSIS — E03.9 HYPOTHYROIDISM, UNSPECIFIED TYPE: ICD-10-CM

## 2022-10-26 DIAGNOSIS — Z00.00 ANNUAL PHYSICAL EXAM: Primary | ICD-10-CM

## 2022-10-26 PROCEDURE — G0439 PR MEDICARE ANNUAL WELLNESS SUBSEQUENT VISIT: ICD-10-PCS | Mod: ,,, | Performed by: INTERNAL MEDICINE

## 2022-10-26 PROCEDURE — G0439 PPPS, SUBSEQ VISIT: HCPCS | Mod: ,,, | Performed by: INTERNAL MEDICINE

## 2022-10-26 RX ORDER — AMITRIPTYLINE HYDROCHLORIDE 25 MG/1
25 TABLET, FILM COATED ORAL DAILY
Qty: 90 TABLET | Refills: 3
Start: 2022-10-26 | End: 2023-10-02

## 2022-10-26 RX ORDER — LEVOTHYROXINE SODIUM 50 UG/1
50 TABLET ORAL DAILY
Qty: 90 TABLET | Refills: 3 | Status: SHIPPED | OUTPATIENT
Start: 2022-10-26 | End: 2023-10-02 | Stop reason: SDUPTHER

## 2022-10-26 NOTE — PROGRESS NOTES
Patient ID: Scott Echeverria is a 72 y.o. male.    Chief Complaint: Medicare AWV (Labs scanned in)      Patient Care Team:  Gary Reilly II, MD as PCP - General (Internal Medicine)  Gary Reilly II, MD     HPI:   Patient presents here today for above reason.     Diagnosis iliofemoral presents today with visit.  Had a multilevel spinal procedure done couple months ago complicated with a metabolic encephalopathy which he has completely recovered from also developed a fluid collection and posterior spine/ neck.  Suspected CSF.  However he is not showing signs of meningismus or meningitis.  He is working with physical therapy he is doing quite well postoperatively.  Otherwise here for follow-up labs are all within normal limits age-appropriate screening up-to-date    The patient's Health Maintenance was reviewed and the following appears to be due at this time:   Health Maintenance Due   Topic Date Due    Hepatitis C Screening  Never done    Sign Pain Contract  Never done    Complete Opioid Risk Tool  Never done    Shingles Vaccine (1 of 2) Never done    Pneumococcal Vaccines (Age 65+) (2 - PCV) 11/13/2013    COVID-19 Vaccine (3 - Booster for Moderna series) 05/05/2021        Past Medical History:  Past Medical History:   Diagnosis Date    BPH (benign prostatic hyperplasia)     Cervical pain     Cervical radiculitis     Cervical spinal stenosis     Hypothyroidism, unspecified     Sleep apnea     resolved since surgery     Past Surgical History:   Procedure Laterality Date    APPENDECTOMY  01/25/2022    Dr. Kerry Coats    CERVICAL SPINE SURGERY  07/12/2022    COLONOSCOPY  2021    HEMORRHOID SURGERY  2002    LAMINOPLASTY N/A 07/12/2022    Procedure: LAMINOPLASTY;  Surgeon: Paulo Rao MD;  [facility];  Service: Neurosurgery;  Laterality: N/A;  left C3-4, C4-5, C5-6, C6-7 foraminotomies, C3-7 laminoplasty //  TIVA SET UP    lower back surgery  1990    RETINAL DETACHMENT SURGERY Right 2015    SINUS  SURGERY      TONSILLECTOMY  1956     Review of patient's allergies indicates:   Allergen Reactions    Iodine      Current Outpatient Medications on File Prior to Visit   Medication Sig Dispense Refill    amitriptyline (ELAVIL) 25 MG tablet Take 1 tablet (25 mg total) by mouth once daily. 90 tablet 3    aspirin 81 MG Chew Take 1 tablet (81 mg total) by mouth once daily. 90 tablet 0    docusate sodium (COLACE) 50 MG capsule Take by mouth 2 (two) times daily.      gabapentin (NEURONTIN) 100 MG capsule Take 2 capsules (200 mg total) by mouth 3 (three) times daily. 90 capsule 2    levothyroxine (SYNTHROID) 50 MCG tablet Take 1 tablet (50 mcg total) by mouth once daily. 30 tablet 2    methocarbamoL (ROBAXIN) 500 MG Tab Take 1 tablet (500 mg total) by mouth 3 (three) times daily as needed (muscle spasms). (Patient taking differently: Take 500 mg by mouth once daily.) 40 tablet 2    mupirocin (BACTROBAN) 2 % ointment Apply topically 2 (two) times daily.      [DISCONTINUED] fexofenadine (ALLEGRA) 180 MG tablet Take 180 mg by mouth once daily.      [DISCONTINUED] HYDROcodone-acetaminophen (NORCO) 5-325 mg per tablet Take 1 tablet by mouth every 12 (twelve) hours as needed for Pain. (Patient not taking: Reported on 10/26/2022) 20 tablet 0     No current facility-administered medications on file prior to visit.     Social History     Socioeconomic History    Marital status:    Tobacco Use    Smoking status: Never    Smokeless tobacco: Never   Substance and Sexual Activity    Alcohol use: Not Currently    Drug use: Never    Sexual activity: Not Currently     Family History   Problem Relation Age of Onset    Alzheimer's disease Mother     Parkinsonism Mother     Thyroid disease Mother     Stroke Father     Heart attack Father     Hyperlipidemia Father     Hypertension Sister     Sickle cell trait Sister     Hypertension Brother        ROS:   Review of Systems  Constitutional: No weight gain, No fever, No chills, No  fatigue.   Eyes: No blurring, No visual disturbances.   Ear/Nose/Mouth/Throat: No decreased hearing, No ear pain, No nasal congestion, No sore throat.   Respiratory: No shortness of breath, No cough, No wheezing.   Cardiovascular: No chest pain, No palpitations, No peripheral edema.   Gastrointestinal: No nausea, No vomiting, No diarrhea, No constipation, No abdominal pain.   Genitourinary: No dysuria, No hematuria.   Hematology/Lymphatics: No bruising tendency, No bleeding tendency, No swollen lymph glands.   Endocrine: No excessive thirst, No polyuria, No excessive hunger.   Musculoskeletal: No joint pain, No muscle pain, No decreased range of motion.   Integumentary: No rash, No pruritus.   Neurologic: No abnormal balance, No confusion, No headache.   Psychiatric: No anxiety, No depression, Not suicidal, No hallucinations.        Patient Reported Health Risk Assessment  What [age] 70-79  Are you male or female?: Male  During the past four weeks, how much have you been bothered by emotional problems such as feeling anxious, depressed, irritable, sad, or downhearted and blue?: Not at all  During the past five weeks, has your physical and/or emotional health limited your social activities with family, friends, neighbors, or groups?: Not at all  During the past four weeks, how much bodily pain have you generally had?: No pain  During the past four weeks, was someone available to help if you needed and wanted help?: Yes, as much as I wanted  During the past four weeks, what was the hardest physical activity you could do for at least two minutes?: Light  Can you get to places out of walking distance without help?  (For example, can you travel alone on buses or taxis, or drive your own car?): Yes  Can you go shopping for groceries or clothes without someone's help?: Yes  Can you prepare your own meals?: Yes  Can you do your own housework without help?: Yes  Because of any health problems, do you need the help of  "another person with your personal care needs such as eating, bathing, dressing, or getting around the house?: No  Can you handle your own money without help?: Yes  During the past four weeks, how would you rate your health in general?: Excellent  How have things been going for you during the past four weeks?: Very well  Are you having difficulties driving your car?: No  Do you always fasten your seat belt when you are in a car?: Yes, usually  How often in the past four weeks have you been bothered by falling or dizzy when standing up?: Never  How often in the past four weeks have you been bothered by sexual problems?: Never  How often in the past four weeks have you been bothered by trouble eating well?: Never  How often in the past four weeks have you been bothered by teeth or denture problems?: Never  How often in the past four weeks have you been bothered with problems using the telephone?: Never  How often in the past four weeks have you been bothered by tiredness or fatigue?: Never  Have you fallen two or more times in the past year?: No  Are you afraid of falling?: No  Are you a smoker?: No  During the past four weeks, how many drinks of wine, beer, or other alcoholic beverages did you have?: No alcohol at all  Do you exercise for about 20 minutes three or more days a week?: Yes, some of the time  Have you been given any information to help you with hazards in your house that might hurt you?: Yes  Have you been given any information to help you with keeping track of your medications?: Yes  How often do you have trouble taking medicines the way you've been told to take them?: I always take them as prescribed  How confident are you that you can control and manage most of your health problems?: Very confident  What is your race? (Check all that apply.):     Vitals/PE:   /82 (BP Location: Left arm, Patient Position: Sitting)   Pulse 80   Resp 18   Ht 5' 9" (1.753 m)   Wt 70.8 kg (156 lb)   SpO2 " "99%   BMI 23.04 kg/m²   Physical Exam    General: Alert and oriented, No acute distress.   Eye: Normal conjunctiva without exudate.  HENMT: Normocephalic/AT, Normal hearing, Oral mucosa is moist and pink   Neck: No goiter visualized.   Respiratory: Lungs CTAB, Respirations are non-labored, Breath sounds are equal, Symmetrical chest wall expansion.  Cardiovascular: Normal rate, Regular rhythm, No murmur, No edema.   Gastrointestinal: Non-distended.   Genitourinary: Deferred.  Musculoskeletal: Normal ROM, Normal gait, No deformities or amputations.  Integumentary: Warm, Dry, Intact. No diaphoresis, or flushing.  Neurologic: No focal deficits, Cranial Nerves II-XII are grossly intact.   Psychiatric: Cooperative, Appropriate mood & affect, Normal judgment, Non-suicidal.        No flowsheet data found.  Fall Risk Assessment - Outpatient 10/26/2022 10/5/2022 10/4/2022 9/26/2022 8/24/2022 7/6/2022 6/8/2022   Mobility Status Ambulatory Ambulatory w/ assistance Ambulatory Ambulatory w/ assistance Ambulatory w/ assistance Ambulatory Ambulatory   Number of falls 0 0 0 0 0 0 0   Identified as fall risk 0 0 0 0 0 0 0           Depression Screening  Over the past two weeks, has the patient felt down, depressed, or hopeless?: No  Over the past two weeks, has the patient felt little interest or pleasure in doing things?: No  Functional Ability/Safety Screening  Was the patient's timed Up & Go test unsteady or longer than 30 seconds?: No  Does the patient need help with phone, transportation, shopping, preparing meals, housework, laundry, meds, or managing money?: No  Does the patient's home have rugs in the hallway, lack grab bars in the bathroom, lack handrails on the stairs or have poor lighting?: No  Have you noticed any hearing difficulties?: No  A "Yes" response to any of the above questions should trigger further investigation.: 0  Cognitive Function (Assessed through direct observation with due consideration of information " "obtained by way of patient reports and/or concerns raised by family, friends, caretakers, or others)    Does the patient repeat questions/statements in the same day?: No  Does the patient have trouble remembering the date, year, and time?: No  Does the patient have difficulty managing finances?: No  Does the patient have a decreased sense of direction?: No  A "Yes" response to any of the above questions could indicate mild cognitive impairment and should trigger further investigation.: 0    Assessment/Plan:       1. Annual physical exam    2. Hypothyroidism, unspecified type       3. Post op    Plan:  Cpmm  Labs wnl  Screening uptodate  Give seroma/questionable csf collection some time.  Sees neurosurgery      Education and counseling done face to face regarding medical conditions and plan. Contact office if new symptoms develop. Should any symptoms ever significantly worsen seek emergency medical attention/go to ER. Follow up at least yearly for wellness or sooner PRN. Nurse to call patient with any results. The patient is receptive, expresses understanding and is agreeable to plan. All questions have been answered.    No follow-ups on file.      Medicare Annual Wellness and Personalized Prevention Plan:   Fall Risk + Home Safety + Hearing Impairment + Depression Screen + Cognitive Impairment Screen + Health Risk Assessment all reviewed.    Advance Care Planning   I attest to discussing Advance Care Planning with patient and/or family member.  Education was provided including the importance of the Health Care Power of , Advance Directives, and/or [place] documentation.  The patient expressed understanding to the importance of this information and discussion.  Length of ACP conversation in minutes:          Opioid Screening: Patient medication list reviewed, patient is not taking prescription opioids. Patient is not using additional opioids than prescribed. Patient is at low risk of substance abuse based on " this opioid use history.

## 2022-12-22 ENCOUNTER — DOCUMENTATION ONLY (OUTPATIENT)
Dept: INTERNAL MEDICINE | Facility: CLINIC | Age: 72
End: 2022-12-22
Payer: MEDICARE

## 2022-12-28 ENCOUNTER — OFFICE VISIT (OUTPATIENT)
Dept: NEUROSURGERY | Facility: CLINIC | Age: 72
End: 2022-12-28
Payer: MEDICARE

## 2022-12-28 VITALS
HEIGHT: 69 IN | DIASTOLIC BLOOD PRESSURE: 86 MMHG | HEART RATE: 77 BPM | BODY MASS INDEX: 23.99 KG/M2 | RESPIRATION RATE: 20 BRPM | SYSTOLIC BLOOD PRESSURE: 134 MMHG | WEIGHT: 162 LBS

## 2022-12-28 DIAGNOSIS — G99.2 STENOSIS OF CERVICAL SPINE WITH MYELOPATHY: Primary | ICD-10-CM

## 2022-12-28 DIAGNOSIS — Z88.8 ALLERGY TO IODINE: ICD-10-CM

## 2022-12-28 DIAGNOSIS — M48.02 STENOSIS OF CERVICAL SPINE WITH MYELOPATHY: Primary | ICD-10-CM

## 2022-12-28 DIAGNOSIS — M54.9 DORSALGIA, UNSPECIFIED: ICD-10-CM

## 2022-12-28 DIAGNOSIS — M47.22 CERVICAL SPONDYLOSIS WITH RADICULOPATHY: ICD-10-CM

## 2022-12-28 DIAGNOSIS — M54.16 LUMBAR RADICULOPATHY: ICD-10-CM

## 2022-12-28 DIAGNOSIS — M96.842 POSTOPERATIVE SEROMA OF MUSCULOSKELETAL STRUCTURE AFTER MUSCULOSKELETAL PROCEDURE: ICD-10-CM

## 2022-12-28 PROCEDURE — 99214 OFFICE O/P EST MOD 30 MIN: CPT | Mod: ,,, | Performed by: NEUROLOGICAL SURGERY

## 2022-12-28 PROCEDURE — 99214 PR OFFICE/OUTPT VISIT, EST, LEVL IV, 30-39 MIN: ICD-10-PCS | Mod: ,,, | Performed by: NEUROLOGICAL SURGERY

## 2022-12-28 RX ORDER — DIPHENHYDRAMINE HCL 50 MG
CAPSULE ORAL
Qty: 1 CAPSULE | Refills: 0 | Status: ON HOLD | OUTPATIENT
Start: 2022-12-28 | End: 2023-02-20 | Stop reason: HOSPADM

## 2022-12-28 RX ORDER — PREDNISONE 50 MG/1
TABLET ORAL
Qty: 3 TABLET | Refills: 0 | Status: ON HOLD | OUTPATIENT
Start: 2022-12-28 | End: 2023-02-14 | Stop reason: CLARIF

## 2022-12-28 NOTE — PROGRESS NOTES
"[facility]  Neurosurgery        Scott Echeverria   [id number]   1950       SUBJECTIVE:     CHIEF COMPLAINT:    6 months post-op    HPI:    Scott Echeverria is a 72 y.o.-year-old male who presents today for post-operative follow-up.  He is s/p C3-7 laminoplasty and left C3-7 laminoforaminotomies that was done on 7/12/22.  He continues with palpable posterior cervical fluid collection.  The patient reports the swelling is worst when he 1st wakes in the morning.  In the morning the area is very firm.  As the day goes on, the collection seems softer and not as large.  His main complaint is soreness and pain along both trapezius muscles.  He feels it is difficult to lift his head up due to the swelling in the back of his neck.  He feels the swelling is affecting his balance.  He has to use a walker when he first gets out of bed in the mornings due to this.  The symptoms improve as he is up and moving around.  He does not require a walker later in the day.  He continues with weakness in the left deltoid.  This is improved compared to his hospital stay.  He continues to feel that it is gradually improving.  He continues to have episodes in which he "goes out" when he first sits up in the mornings.  They have checked his blood pressure when the episodes occur and have found that his pressure goes up when he transitions from lying to sitting.  They describe the episodes as similar to narcolepsy.  They last about 30s then resolve.  He had an echocardiogram recently with his cardiologist that was normal.  He is following up for further testing in the next few weeks.      Review of patient's allergies indicates:   Allergen Reactions    Iodine      Current Outpatient Medications   Medication Sig Dispense Refill    amitriptyline (ELAVIL) 25 MG tablet Take 1 tablet (25 mg total) by mouth once daily. 90 tablet 3    docusate sodium (COLACE) 50 MG capsule Take by mouth 2 (two) times daily.      gabapentin (NEURONTIN) " 100 MG capsule TAKE 1 CAPSULE (100 MG TOTAL) BY MOUTH THREE TIMES DAILY. 90 capsule 2    levothyroxine (SYNTHROID) 50 MCG tablet Take 1 tablet (50 mcg total) by mouth once daily. 90 tablet 3    aspirin 81 MG Chew Take 1 tablet (81 mg total) by mouth once daily. 90 tablet 0    diphenhydrAMINE (BENADRYL) 50 MG capsule Take one tablet (50mg) by mouth 1hr prior to contrast 1 capsule 0    methocarbamoL (ROBAXIN) 500 MG Tab Take 1 tablet (500 mg total) by mouth 3 (three) times daily as needed (muscle spasms). (Patient not taking: Reported on 12/28/2022) 40 tablet 2    mupirocin (BACTROBAN) 2 % ointment Apply topically 2 (two) times daily.      predniSONE (DELTASONE) 50 MG Tab One tablet by mouth 13hrs prior to contrast, then one tablet by mouth 7hrs prior to contrast, then one tablet by mouth 1hr prior to contrast 3 tablet 0     No current facility-administered medications for this visit.     Past Medical History:   Diagnosis Date    BPH (benign prostatic hyperplasia)     Cervical pain     Cervical radiculitis     Cervical spinal stenosis     Hypothyroidism, unspecified     Personal history of colonic polyps     Sleep apnea     resolved since surgery     Past Surgical History:   Procedure Laterality Date    APPENDECTOMY  01/25/2022    Dr. Kerry Coats    CERVICAL SPINE SURGERY  07/12/2022    COLONOSCOPY W/ POLYPECTOMY  04/27/2021    Dr. aDvid Montgomery    HEMORRHOID SURGERY  2002    LAMINOPLASTY N/A 07/12/2022    Procedure: LAMINOPLASTY;  Surgeon: Paulo Rao MD;  Location: [facility];  Service: Neurosurgery;  Laterality: N/A;  left C3-4, C4-5, C5-6, C6-7 foraminotomies, C3-7 laminoplasty //  TIVA SET UP    lower back surgery  1990    RETINAL DETACHMENT SURGERY Right 2015    SINUS SURGERY      TONSILLECTOMY  1956     Family History       Problem Relation (Age of Onset)    Alzheimer's disease Mother    Heart attack Father    Hyperlipidemia Father    Hypertension Sister, Brother    Parkinsonism Mother    Sickle cell trait  "Sister    Stroke Father    Thyroid disease Mother          Social History     Socioeconomic History    Marital status:    Tobacco Use    Smoking status: Never    Smokeless tobacco: Never   Substance and Sexual Activity    Alcohol use: Not Currently    Drug use: Never    Sexual activity: Not Currently       Review of systems:    Pertinent items are noted in HPI.      OBJECTIVE:     Vital Signs  Pulse: 77  Resp: 20  BP: 134/86  Pain Score:   1  Height: 5' 9" (175.3 cm)  Weight: 73.5 kg (162 lb)  Body mass index is 23.92 kg/m².      Physical Exam:    General:  Pleasant. Well-nourished. Alert. No acute distress.    Head:  Normocephalic, without obvious abnormality, atraumatic    Lungs:   Breathing is quiet, non-lablored    Neurological:    Oriented to Person, Place, Time   Speech:  normal  Memory, cognition, and affect are appropriate.  Motor Strength: Moves all extremities spontaneously with good tone.  No abnormal movements seen.        Cervical incision:  Well healed; There is a palpable fluid collection along posterior cervical incision.  No induration.    Gait:  normal        ASSESSMENT/PLAN:     1. Stenosis of cervical spine with myelopathy      2. Cervical spondylosis with radiculopathy    3. Postoperative seroma of musculoskeletal structure after musculoskeletal procedure  - FL MYELOGRAM CERVICAL, INJECTION ONLY WITH CT TO FOLLOW (XPD); Future  - Ambulatory referral/consult to Interventional Radiology; Epidural blood patch    4. Lumbar radiculopathy  - FL MYELOGRAM LUMBAR, INJECTION ONLY WITH CT TO FOLLOW (XPD); Future    5. Allergy to iodine  - predniSONE (DELTASONE) 50 MG Tab; One tablet by mouth 13hrs prior to contrast, then one tablet by mouth 7hrs prior to contrast, then one tablet by mouth 1hr prior to contrast  Dispense: 3 tablet; Refill: 0  - diphenhydrAMINE (BENADRYL) 50 MG capsule; Take one tablet (50mg) by mouth 1hr prior to contrast  Dispense: 1 capsule; Refill: 0     - Follow up pending " imaging/procedure    In contrast to almost all the other pseudomeningocele that we see, I think that his is symptomatic.  However, I can not fully explain all of his complaints nonetheless, while I did recommend re-exploration and duraplasty as the best way to get rid of this, they are very reluctant to consider another general anesthetic at this time and asked about a blood patch.  I told him I will try and find an interventional radiologist work on this as this needs to be a combined myelogram/blood patch or glue injection with CT guidance.      I, Dr. Paulo Rao, personally performed the services described in this documentation. All medical record entries made by the scribe, Angely Vela RN, were at my direction and in my presence.  I have reviewed the chart and agree that the record reflects my personal performance and is accurate and complete. Paulo Rao MD.  11:24 AM 12/28/2022           Paulo Rao MD FACS [facility]

## 2023-01-09 ENCOUNTER — PATIENT MESSAGE (OUTPATIENT)
Dept: NEUROSURGERY | Facility: CLINIC | Age: 73
End: 2023-01-09
Payer: MEDICARE

## 2023-02-02 ENCOUNTER — OFFICE VISIT (OUTPATIENT)
Dept: NEUROSURGERY | Facility: CLINIC | Age: 73
End: 2023-02-02
Payer: MEDICARE

## 2023-02-02 VITALS
DIASTOLIC BLOOD PRESSURE: 78 MMHG | RESPIRATION RATE: 16 BRPM | HEART RATE: 76 BPM | WEIGHT: 164 LBS | HEIGHT: 69 IN | BODY MASS INDEX: 24.29 KG/M2 | SYSTOLIC BLOOD PRESSURE: 128 MMHG

## 2023-02-02 DIAGNOSIS — M96.842 POSTOPERATIVE SEROMA OF MUSCULOSKELETAL STRUCTURE AFTER MUSCULOSKELETAL PROCEDURE: Primary | ICD-10-CM

## 2023-02-02 DIAGNOSIS — Z01.818 PREOP TESTING: ICD-10-CM

## 2023-02-02 DIAGNOSIS — G97.82 PSEUDOMENINGOCELE DUE TO SURGICAL PROCEDURE: ICD-10-CM

## 2023-02-02 PROCEDURE — 99214 PR OFFICE/OUTPT VISIT, EST, LEVL IV, 30-39 MIN: ICD-10-PCS | Mod: ,,, | Performed by: NURSE PRACTITIONER

## 2023-02-02 PROCEDURE — 99214 OFFICE O/P EST MOD 30 MIN: CPT | Mod: ,,, | Performed by: NURSE PRACTITIONER

## 2023-02-02 RX ORDER — ROSUVASTATIN CALCIUM 10 MG/1
10 TABLET, COATED ORAL DAILY
COMMUNITY
Start: 2023-01-25 | End: 2023-04-26

## 2023-02-02 RX ORDER — HYDROCODONE BITARTRATE AND ACETAMINOPHEN 7.5; 325 MG/1; MG/1
1 TABLET ORAL EVERY 6 HOURS PRN
Status: ON HOLD | COMMUNITY
Start: 2023-01-19 | End: 2023-02-14 | Stop reason: CLARIF

## 2023-02-02 RX ORDER — DIAZEPAM 5 MG/1
5 TABLET ORAL
Status: ON HOLD | COMMUNITY
Start: 2023-01-19 | End: 2023-02-14 | Stop reason: ALTCHOICE

## 2023-02-02 NOTE — H&P (VIEW-ONLY)
"[facility]  Office Visit  Neurosurgery  Scott Echeverria  [id number]  1950    HPI:  The patient presents today for pre-operative appointment.  He is s/p C3-7 laminoplasty and left C3-7 laminoforaminotomies on 7/12/2022.  He developed a pseudomeningocele postoperatively.  Initially he was felt to be asymptomatic from this, however he has been having some issues lately likely secondary to fluid collection.  He met with Dr. Edge with Interventional Radiology last Monday to discuss possible blood patch.  According to the patient, Dr. Edge felt there was a very low chance of success.  The patient has opted for surgical re-exploration due to his persistent symptoms.  He presents today to discuss surgery.    The patient continues with swelling that is worse when he 1st wakes in the morning.  He states there is a very firm area about the size of his fist in the back of his neck.  As he is up and moving around, the area become softer and not as large.  His main complaint is the inability to hold his head up straight for long periods of time.  His muscles fatigue by the end of the day and he is unable to lift his head up.  He reports pain along bilateral trapezius muscles and down both shoulder blades as well.  He has to use a walker initially in the morning due to balance difficulties when he 1st wakes up.  He continues with some weakness in the left deltoid.  This continues to gradually improve.  Continues having episodes where he "goes out" when he 1st rises from a lying position.  He has been worked up by his cardiologist.  He does have a nuclear med stress test currently scheduled for 2/17/2023.     Of note, the patient had complications during the postoperative period following surgery in July.  He actually did very well initially following surgery and was near being discharged home, however he suddenly became aphasic with unilateral weakness the early morning of 7/14/2022.  His neurological exam " continued to decline requiring intubation for airway protection. He received narcan and romazicon without any change in neurological exam. He remained minimally responsive for several days.  Multiple MRIs and other tests were performed without any solid explanation of his acute change in neurological status. He eventually had some slow improvement and is currently at his baseline cognitively.  He does not recall any events of his postoperative course after surgery.     Review of patient's allergies indicates:   Allergen Reactions    Iodine      Current Outpatient Medications   Medication Sig Dispense Refill    amitriptyline (ELAVIL) 25 MG tablet Take 1 tablet (25 mg total) by mouth once daily. 90 tablet 3    aspirin 81 MG Chew Take 1 tablet (81 mg total) by mouth once daily. 90 tablet 0    docusate sodium (COLACE) 50 MG capsule Take by mouth 2 (two) times daily.      gabapentin (NEURONTIN) 100 MG capsule TAKE 1 CAPSULE (100 MG TOTAL) BY MOUTH THREE TIMES DAILY. 90 capsule 2    levothyroxine (SYNTHROID) 50 MCG tablet Take 1 tablet (50 mcg total) by mouth once daily. 90 tablet 3    rosuvastatin (CRESTOR) 10 MG tablet Take 10 mg by mouth Daily.      diazePAM (VALIUM) 5 MG tablet Take 5 mg by mouth.      diphenhydrAMINE (BENADRYL) 50 MG capsule Take one tablet (50mg) by mouth 1hr prior to contrast (Patient not taking: Reported on 2/2/2023) 1 capsule 0    HYDROcodone-acetaminophen (NORCO) 7.5-325 mg per tablet Take 1 tablet by mouth every 6 (six) hours as needed.      methocarbamoL (ROBAXIN) 500 MG Tab Take 1 tablet (500 mg total) by mouth 3 (three) times daily as needed (muscle spasms). (Patient not taking: Reported on 12/28/2022) 40 tablet 2    mupirocin (BACTROBAN) 2 % ointment Apply topically 2 (two) times daily.      predniSONE (DELTASONE) 50 MG Tab One tablet by mouth 13hrs prior to contrast, then one tablet by mouth 7hrs prior to contrast, then one tablet by mouth 1hr prior to contrast (Patient not taking:  Reported on 2/2/2023) 3 tablet 0     No current facility-administered medications for this visit.     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 Kerry Coats    CERVICAL SPINE SURGERY  07/12/2022    COLONOSCOPY W/ POLYPECTOMY  04/27/2021    Dr. David Montgomery    EXTRACTION OF TOOTH      HEMORRHOID SURGERY  2002    LAMINOPLASTY N/A 07/12/2022    Procedure: LAMINOPLASTY;  Surgeon: Paulo Rao MD;  Location: [facility];  Service: Neurosurgery;  Laterality: N/A;  left C3-4, C4-5, C5-6, C6-7 foraminotomies, C3-7 laminoplasty //  TIVA SET UP    lower back surgery  1990    RETINAL DETACHMENT SURGERY Right 2015    SINUS SURGERY      TONSILLECTOMY  1956     Social History     Tobacco Use    Smoking status: Never    Smokeless tobacco: Never   Substance Use Topics    Alcohol use: Not Currently     Family History   Problem Relation Age of Onset    Alzheimer's disease Mother     Parkinsonism Mother      "Thyroid disease Mother     Stroke Father     Heart attack Father     Hyperlipidemia Father     Hypertension Sister     Sickle cell trait Sister     Hypertension Brother        Vital Signs  Pulse: 76  Resp: 16  BP: 128/78  BP Location: Other (Comment)  Patient Position: Sitting  Pain Score:   2  Height and Weight  Height: 5' 9" (175.3 cm)  Weight: 74.4 kg (164 lb)  BSA (Calculated - sq m): 1.9 sq meters  BMI (Calculated): 24.2  Weight in (lb) to have BMI = 25: 168.9]    ROS:  Review of Systems   Musculoskeletal:  Positive for gait problem, myalgias, neck pain and neck stiffness.   Neurological:  Positive for weakness.     Vitals within last 24hrs:  Vitals:    02/02/23 0942   BP: 128/78   Pulse: 76   Resp: 16       Physical Exam:  General: well developed, well nourished, no distress.   Head: normocephalic, atraumatic  Respiratory: respiration non-labored; clear to auscultation  Cardiac: RRR, No murmur  GI: abd soft, non-tender, non-distended  Pulses: 2+ and symmetric radial and dorsalis pedis. No lower extremity edema  Neurologic: Alert and oriented. Thought content appropriate.  GCS: Motor: 6/Verbal: 5/Eyes: 4 GCS Total: 15  Mental Status: Awake, Alert, Oriented x3  Cranial nerves: face symmetric, tongue midline, CN II-XII grossly intact.   Eyes: pupils equal, round, reactive to light with accomodation, EOMI.   Sensory: intact to light touch throughout  Motor Strength:Moves all extremities spontaneously with good tone.  Full strength upper and lower extremities. No abnormal movements seen.   Strength  Deltoids Triceps Biceps Wrist Extension Wrist Flexion Hand    Upper: R 5/5 5/5 5/5 5/5 5/5 5/5    L 4+/5 5/5 5-/5 5/5 5/5 5/5   Gait: normal    Cervical ROM: limited extension s/t swelling and pain  Cervical incision: Well healed; There is a palpable fluid collection along posterior cervical incision.  No induration.      Assessment/Plan:  Problem List Items Addressed This Visit          Other    Pseudomeningocele " due to surgical procedure     Other Visit Diagnoses       Preop testing        Relevant Orders    EKG 12-lead    X-Ray Chest PA And Lateral    Comprehensive Metabolic Panel    CBC Auto Differential          PLAN  Will request cardiac clearance from Dr. Mccall.   The nature of the procedure, as well as its attendant risks, were discussed in detail with the patient.  All questions were answered.  They are agreement with proceeding with surgery as planned, and are tentatively scheduled for posterior cervical re-exploration with drainage of pseudomeningocele and repair and CSF leak on 2/14/2023.          DANNY Tucker-JAQUANP

## 2023-02-02 NOTE — PATIENT INSTRUCTIONS
-STOP aspirin on 2/7/2023.   -Nothing to eat or drink after midnight the night before surgery, EXCEPT for synthroid the morning of surgery with a small sip of water.   -Clean back of neck with hibiclens (or Dial soap if unable to find hibiclens) the night before and morning of surgery.

## 2023-02-02 NOTE — PROGRESS NOTES
"[facility]  Office Visit  Neurosurgery  Scott Echeverria  [id number]  1950    HPI:  The patient presents today for pre-operative appointment.  He is s/p C3-7 laminoplasty and left C3-7 laminoforaminotomies on 7/12/2022.  He developed a pseudomeningocele postoperatively.  Initially he was felt to be asymptomatic from this, however he has been having some issues lately likely secondary to fluid collection.  He met with Dr. Edge with Interventional Radiology last Monday to discuss possible blood patch.  According to the patient, Dr. Edge felt there was a very low chance of success.  The patient has opted for surgical re-exploration due to his persistent symptoms.  He presents today to discuss surgery.    The patient continues with swelling that is worse when he 1st wakes in the morning.  He states there is a very firm area about the size of his fist in the back of his neck.  As he is up and moving around, the area become softer and not as large.  His main complaint is the inability to hold his head up straight for long periods of time.  His muscles fatigue by the end of the day and he is unable to lift his head up.  He reports pain along bilateral trapezius muscles and down both shoulder blades as well.  He has to use a walker initially in the morning due to balance difficulties when he 1st wakes up.  He continues with some weakness in the left deltoid.  This continues to gradually improve.  Continues having episodes where he "goes out" when he 1st rises from a lying position.  He has been worked up by his cardiologist.  He does have a nuclear med stress test currently scheduled for 2/17/2023.     Of note, the patient had complications during the postoperative period following surgery in July.  He actually did very well initially following surgery and was near being discharged home, however he suddenly became aphasic with unilateral weakness the early morning of 7/14/2022.  His neurological exam " continued to decline requiring intubation for airway protection. He received narcan and romazicon without any change in neurological exam. He remained minimally responsive for several days.  Multiple MRIs and other tests were performed without any solid explanation of his acute change in neurological status. He eventually had some slow improvement and is currently at his baseline cognitively.  He does not recall any events of his postoperative course after surgery.     Review of patient's allergies indicates:   Allergen Reactions    Iodine      Current Outpatient Medications   Medication Sig Dispense Refill    amitriptyline (ELAVIL) 25 MG tablet Take 1 tablet (25 mg total) by mouth once daily. 90 tablet 3    aspirin 81 MG Chew Take 1 tablet (81 mg total) by mouth once daily. 90 tablet 0    docusate sodium (COLACE) 50 MG capsule Take by mouth 2 (two) times daily.      gabapentin (NEURONTIN) 100 MG capsule TAKE 1 CAPSULE (100 MG TOTAL) BY MOUTH THREE TIMES DAILY. 90 capsule 2    levothyroxine (SYNTHROID) 50 MCG tablet Take 1 tablet (50 mcg total) by mouth once daily. 90 tablet 3    rosuvastatin (CRESTOR) 10 MG tablet Take 10 mg by mouth Daily.      diazePAM (VALIUM) 5 MG tablet Take 5 mg by mouth.      diphenhydrAMINE (BENADRYL) 50 MG capsule Take one tablet (50mg) by mouth 1hr prior to contrast (Patient not taking: Reported on 2/2/2023) 1 capsule 0    HYDROcodone-acetaminophen (NORCO) 7.5-325 mg per tablet Take 1 tablet by mouth every 6 (six) hours as needed.      methocarbamoL (ROBAXIN) 500 MG Tab Take 1 tablet (500 mg total) by mouth 3 (three) times daily as needed (muscle spasms). (Patient not taking: Reported on 12/28/2022) 40 tablet 2    mupirocin (BACTROBAN) 2 % ointment Apply topically 2 (two) times daily.      predniSONE (DELTASONE) 50 MG Tab One tablet by mouth 13hrs prior to contrast, then one tablet by mouth 7hrs prior to contrast, then one tablet by mouth 1hr prior to contrast (Patient not taking:  Reported on 2/2/2023) 3 tablet 0     No current facility-administered medications for this visit.     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 Kerry Coats    CERVICAL SPINE SURGERY  07/12/2022    COLONOSCOPY W/ POLYPECTOMY  04/27/2021    Dr. David Montgomery    EXTRACTION OF TOOTH      HEMORRHOID SURGERY  2002    LAMINOPLASTY N/A 07/12/2022    Procedure: LAMINOPLASTY;  Surgeon: Paulo Rao MD;  Location: [facility];  Service: Neurosurgery;  Laterality: N/A;  left C3-4, C4-5, C5-6, C6-7 foraminotomies, C3-7 laminoplasty //  TIVA SET UP    lower back surgery  1990    RETINAL DETACHMENT SURGERY Right 2015    SINUS SURGERY      TONSILLECTOMY  1956     Social History     Tobacco Use    Smoking status: Never    Smokeless tobacco: Never   Substance Use Topics    Alcohol use: Not Currently     Family History   Problem Relation Age of Onset    Alzheimer's disease Mother     Parkinsonism Mother      "Thyroid disease Mother     Stroke Father     Heart attack Father     Hyperlipidemia Father     Hypertension Sister     Sickle cell trait Sister     Hypertension Brother        Vital Signs  Pulse: 76  Resp: 16  BP: 128/78  BP Location: Other (Comment)  Patient Position: Sitting  Pain Score:   2  Height and Weight  Height: 5' 9" (175.3 cm)  Weight: 74.4 kg (164 lb)  BSA (Calculated - sq m): 1.9 sq meters  BMI (Calculated): 24.2  Weight in (lb) to have BMI = 25: 168.9]    ROS:  Review of Systems   Musculoskeletal:  Positive for gait problem, myalgias, neck pain and neck stiffness.   Neurological:  Positive for weakness.     Vitals within last 24hrs:  Vitals:    02/02/23 0942   BP: 128/78   Pulse: 76   Resp: 16       Physical Exam:  General: well developed, well nourished, no distress.   Head: normocephalic, atraumatic  Respiratory: respiration non-labored; clear to auscultation  Cardiac: RRR, No murmur  GI: abd soft, non-tender, non-distended  Pulses: 2+ and symmetric radial and dorsalis pedis. No lower extremity edema  Neurologic: Alert and oriented. Thought content appropriate.  GCS: Motor: 6/Verbal: 5/Eyes: 4 GCS Total: 15  Mental Status: Awake, Alert, Oriented x3  Cranial nerves: face symmetric, tongue midline, CN II-XII grossly intact.   Eyes: pupils equal, round, reactive to light with accomodation, EOMI.   Sensory: intact to light touch throughout  Motor Strength:Moves all extremities spontaneously with good tone.  Full strength upper and lower extremities. No abnormal movements seen.   Strength  Deltoids Triceps Biceps Wrist Extension Wrist Flexion Hand    Upper: R 5/5 5/5 5/5 5/5 5/5 5/5    L 4+/5 5/5 5-/5 5/5 5/5 5/5   Gait: normal    Cervical ROM: limited extension s/t swelling and pain  Cervical incision: Well healed; There is a palpable fluid collection along posterior cervical incision.  No induration.      Assessment/Plan:  Problem List Items Addressed This Visit          Other    Pseudomeningocele " due to surgical procedure     Other Visit Diagnoses       Preop testing        Relevant Orders    EKG 12-lead    X-Ray Chest PA And Lateral    Comprehensive Metabolic Panel    CBC Auto Differential          PLAN  Will request cardiac clearance from Dr. Mccall.   The nature of the procedure, as well as its attendant risks, were discussed in detail with the patient.  All questions were answered.  They are agreement with proceeding with surgery as planned, and are tentatively scheduled for posterior cervical re-exploration with drainage of pseudomeningocele and repair and CSF leak on 2/14/2023.          DANNY Tucker-JAQUANP

## 2023-02-06 RX ORDER — IBUPROFEN 200 MG
200 TABLET ORAL 2 TIMES DAILY PRN
COMMUNITY

## 2023-02-06 RX ORDER — ADHESIVE BANDAGE
15 BANDAGE TOPICAL DAILY PRN
COMMUNITY

## 2023-02-06 RX ORDER — DOCUSATE SODIUM 100 MG/1
100 CAPSULE, LIQUID FILLED ORAL 2 TIMES DAILY
COMMUNITY

## 2023-02-08 ENCOUNTER — HOSPITAL ENCOUNTER (OUTPATIENT)
Dept: RADIOLOGY | Facility: HOSPITAL | Age: 73
Discharge: HOME | End: 2023-02-08
Attending: NURSE PRACTITIONER
Payer: MEDICARE

## 2023-02-08 DIAGNOSIS — M96.842 POSTOPERATIVE SEROMA OF MUSCULOSKELETAL STRUCTURE AFTER MUSCULOSKELETAL PROCEDURE: ICD-10-CM

## 2023-02-08 DIAGNOSIS — Z01.818 PREOP TESTING: ICD-10-CM

## 2023-02-08 PROCEDURE — 71046 X-RAY EXAM CHEST 2 VIEWS: CPT | Mod: TC

## 2023-02-08 NOTE — PT/OT/SLP PROGRESS
[facility]  Otolaryngology Clinic Note    Josafat Boudreaux  Encounter Date: 2/8/2023  YOB: 1964  Physician: Lisseth Krueger MD    Chief Complaint: Laryngeal mass    HPI: Josafat Boudreaux is a 58 y.o. male referred to clinic by Dr. Emory Alonso for laryngeal mass. Patients wife provides history. She states that patient first noticed hoarseness in November 2021. Got progressively worse. Presented to Dr. Alonso in early June 2022 for these complaints. Was noted to have a laryngeal mass on flexible laryngoscopy. Several days after the flexible laryngoscopy patient developed worsening swelling of the airway and presented to the ER in respiratory distress. He underwent emergent tracheostomy with Dr. Junior Alonso. Patient was subsequently discharged home but readmitted shortly after with seizures. He spent 3 days in the ICU on the ventilator. During hospitalization, patient underwent PEG tube placement. He reports that he was not having any issues with PO intake prior to the tracheostomy placement. Now he is PEG dependent. Only taking some ice chips by mouth. He presents today to discuss laryngectomy.     11/2/22:  Since last visit, patient total laryngectomy with bilateral neck dissections level 3 and 4, primary closure, he did not undergo TEP placement.  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  He underwent radiation completed this in the middle of September 2022.  He received carboplatin and Taxol which he still has a few more treatments of.  He presented to the emergency department on 10/08/2022 with a right neck infection.  This was treated with IV antibiotics and incision and drainage with UCHE drain placement.  He was kept NPO and did feeds through his PEG tube.  He met criteria for discharge on 10/25/22 with UCHE drain.  He had outpatient swallow study which did not demonstrate any  Recreational Therapy Treatment    Date of Treatment: 7/27/2022  Start Time: 1000  Stop Time: 1030  Total Time: 30  Missed Time:     General Precautions: honey thick  Ortho Precautions: spinal precautions  Braces: N/A    Vitals   Vitals at Rest  BP    HR    O2 Sat    Pain 6/10     Vitals With Activity  BP    HR    O2 Sat    Pain 6/10       Treatment     Cognitive Skills Building   Cognitive Observation Activity Assist Position Equipment Response Comment             Comment:          Dynamic Activities   Activity Assist Position Equipment Response Comment   Activity 1 Bocce ball minimum assistance Standing Rolling walker and Bocce Balls fair             Comment: Sit to stand was min/contact guard as was dynamic standing balance/reaching. Standing tolerance was 5 minutes. LUE coordination was supervision. Problem solving and memory sere setup. Hearing deficits interfere with participation .         Fine Motor Activities   Activity Assist Position Equipment Response Comment            Comment:        Additional Info: Pt stated that he was having 6/10 pain in his neck and L shoulder.     Goals     Short Term Goals    Goal  Goal Status   Will increase sit to stand to supervision  Progessing   Will improve dynamic standing balance/reaching to supervision  Progressing                 Long Term Goals    Goal Goal Status   Will increase standing tolerance to 5,10 minutes  Progressing    Will improve dynamic standing balance/reaching to setup  Progressing                          evidence of a leak.  He returns to clinic today reporting improvement in his symptoms including right neck pain and drainage.  He has not had to empty his UCHE drain on a daily basis at this point.  He had a PET performed on 10/13/2022 to assess response to chemotherapy.  On that PET scan, right hilar and mediastinal lymph nodes were decreased in size but had increased metabolic activity.  He denies any other issues today    11/7/22: Reports worsening swelling under the right jawline the past few days.  Denies any erythema, infection, fever or chills.    12/6/22:  Feeling a little run down, increased cough with blood tinged secretions, running temperature to 101 at home.  Received azithromycin abx last night from Dr. Alonso's office.    12/28/22:  Was hospitalized for Pseudomonal pneumonia.  He also had Blastomycetes positive antigen on arrival, but was unable to tolerate amphotericin B due to significant hypotension.  Prior to discharge, his repeat Blastomycetes antigen was negative.  Discharged with levaquin and portable home oxygen.  His pneumonia symptoms have all improved since discharge.  Ulceration spots have persisted with one spot having a small amount of growth adjacent.  No bleeding or pain.    1/3/22:  Returns for follow up for monitoring of tracheal ulceration.  Reports interval improvement.  Denies pain or bleeding from area.  Denies swelling or tenderness to neck.    1/11/22:  Returns for follow up.  No changes to tracheal ulceration.  Denies pain or bleeding.  Otherwise doing well.    2/8/23:  Patient is here for regular scheduled cancer surveillance.  He denies any changes since his last appointment.  He has lost about 5 lb since last December.  He contributes his weight loss to being hospitalized for pneumonia.  His swallow stable and his appetite is improving.  Denies odynophagia, otalgia, new head or neck masses, oral lesions, oral bleeding, hematemesis or hemoptysis.  He is still undergoing  chemotherapy treatments with Dr. Atkinson.  States that he has 2 more infusion scheduled at this time.  He is tolerating treatments well.    ROS:   General: Negative except per HPI  Skin: Denies rash, ulcer, or lesion.  Eyes: Denies vision changes or diplopia.  Ears: Negative except per HPI  Nose: Negative except per HPI  Throat/mouth: Negative except per HPI  Cardiovascular: Negative except per HPI  Respiratory: Negative except per HPI  Neck: Negative except per HPI  Endocrine: Negative except per HPI  Neurologic: Negative except per HPI    Other 10-point review of systems negative except per HPI      Review of patient's allergies indicates:  No Known Allergies    Past Medical History:   Diagnosis Date    Cancer     COPD (chronic obstructive pulmonary disease)     Dysphagia     Lung cancer     Vocal cord mass        Past Surgical History:   Procedure Laterality Date    DIRECT DIAGNOSTIC LARYNGOSCOPY WITH BRONCHOSCOPY AND ESOPHAGOSCOPY N/A 2022    Procedure: LARYNGOSCOPY, DIRECT, DIAGNOSTIC, WITH BRONCHOSCOPY AND ESOPHAGOSCOPY;  Surgeon: Emory Alonso MD;  Location: [facility];  Service: ENT;  Laterality: N/A;    HIP SURGERY      HUMERUS FRACTURE SURGERY      INCISION AND DRAINAGE, NECK Bilateral 10/21/2022    Procedure: INCISION AND DRAINAGE,NECK;  Surgeon: Madison Worley MD;  Location: [facility];  Service: ENT;  Laterality: Bilateral;    INSERT ARTERIAL LINE  2022         JOINT REPLACEMENT  2019    R hip    TRACHEOSTOMY N/A 06/10/2022    Procedure: CREATION, TRACHEOSTOMY;  Surgeon: Junior Alonso MD;  Location: [facility];  Service: ENT;  Laterality: N/A;       Social History     Socioeconomic History    Marital status:    Tobacco Use    Smoking status: Former     Packs/day: 0.50     Years: 15.00     Pack years: 7.50     Types: Cigarettes     Start date: 1980     Quit date: 2022     Years since quittin.6    Smokeless tobacco: Never   Substance and Sexual Activity    Alcohol  use: Not Currently    Drug use: Never    Sexual activity: Not Currently     Partners: Female     Social Determinants of Health     Financial Resource Strain: Low Risk     Difficulty of Paying Living Expenses: Not hard at all   Food Insecurity: No Food Insecurity    Worried About Running Out of Food in the Last Year: Never true    Ran Out of Food in the Last Year: Never true   Transportation Needs: No Transportation Needs    Lack of Transportation (Medical): No    Lack of Transportation (Non-Medical): No   Physical Activity: Inactive    Days of Exercise per Week: 0 days    Minutes of Exercise per Session: 0 min   Stress: No Stress Concern Present    Feeling of Stress : Not at all   Social Connections: Moderately Isolated    Frequency of Communication with Friends and Family: More than three times a week    Frequency of Social Gatherings with Friends and Family: More than three times a week    Attends [employer] Services: Never    Active Member of Clubs or Organizations: No    Attends Club or Organization Meetings: Never    Marital Status:    Housing Stability: Low Risk     Unable to Pay for Housing in the Last Year: No    Number of Places Lived in the Last Year: 1    Unstable Housing in the Last Year: No       Family History   Problem Relation Age of Onset    Lung cancer Father     Cancer Father         Lung cancer    Throat cancer Brother     Cancer Brother         Throat       Outpatient Encounter Medications as of 2/8/2023   Medication Sig Dispense Refill    albuterol-ipratropium (DUO-NEB) 2.5 mg-0.5 mg/3 mL nebulizer solution Take 3 mLs by nebulization every 4 (four) hours as needed for Shortness of Breath or Wheezing. Rescue 90 mL 11    diphenhydrAMINE (BENADRYL) 25 mg capsule 1 capsule (25 mg total) by Per G Tube route every 6 (six) hours as needed for Itching. 90 capsule 1    famotidine (PEPCID) 20 MG tablet Take 1 tablet (20 mg total) by mouth every evening. 30 tablet 11    glutamine 10 gram PwPk Take  10 g by mouth 2 (two) times a day.      HYDROcodone-acetaminophen (NORCO) 5-325 mg per tablet Take 1 tablet by mouth every 4 (four) hours as needed for Pain.      metroNIDAZOLE (METROGEL) 0.75 % (37.5mg/5 gram) vaginal gel apply to affected area BID      nystatin (MYCOSTATIN) cream Apply topically 2 (two) times daily. (Patient not taking: Reported on 2/7/2023) 15 g 5    ondansetron (ZOFRAN-ODT) 8 MG TbDL Take 8 mg by mouth every 8 (eight) hours as needed for Nausea. Tab 1 ODT in AM for 2 days after chemotherapy      pantoprazole (PROTONIX) 20 MG tablet Take 1 tablet (20 mg total) by mouth once daily. 30 tablet 11    [DISCONTINUED] albuterol-ipratropium (DUO-NEB) 2.5 mg-0.5 mg/3 mL nebulizer solution Take 3 mLs by nebulization every 4 (four) hours as needed for Shortness of Breath or Wheezing. Rescue 90 mL 1    [DISCONTINUED] aspirin (ECOTRIN) 81 MG EC tablet Take 81 mg by mouth once daily.       Facility-Administered Encounter Medications as of 2/8/2023   Medication Dose Route Frequency Provider Last Rate Last Admin    LIDOcaine (PF) 40 mg/mL (4 %) injection 2 mL  2 mL Tracheal Tube 1 time in Clinic/HOD Kevin Palomino MD        phenylephrine HCL 1% nasal spray 1 spray  1 spray Each Nostril 1 time in Clinic/HOD Kevin Palomino MD           Physical Exam:  Vitals:    02/08/23 0809   BP: 130/79   Pulse: 101   Weight: 65.5 kg (144 lb 4.8 oz)         Constitutional  General Appearance: well nourished, well-developed, AAO x3, NAD  HEENT  Eyes: PEERLA, EOMI, normal conjunctivae  Ears: Hearing well at conversation level, EAC patent, TM intact, no effusion bilateral  Nose: septum midline, no inferior turbinate hypertrophy, no polyps, moist mucosa without erythema or blue hue  OC/OP: dentition moderate, no oral lesions, tongue/FOM/BOT- soft, no leukoplakia/ulcerations/ tenderness   Nasopharynx, Hypopharynx, and Larynx:               Indirect: attempted, limited view due to patient intolerance  Neck: post-radiation changes,  no lymphadenopathy, no tenderness  Laryngectomy tube in place, trach collar and ties on; lateral tracheal walls with ulceration, healing phase with healthy wound bed, no surrounding erythema or purulent drainage.  Breathing well without issues  Neuro: CN II - XII intact bilaterally  Cardiovascular: peripheral pulses palpable  Respiratory: non-labored respirations  Psychiatric: oriented to time, place and person, no depression, anxiety or agitation    12/6/22      Procedures:Flexible Fiberoptic Laryngoscopy/Nasopharyngoscopy via right nare  Performed on 2/8/2023  Marty Jeffries MD    Procedure in Detail: Informed consent was obtained from the patient after explanation of procedure, indications, risks and benefits. Flexible endoscopy was performed through the nasal passages. The nasal cavity, nasopharynx, oropharynx, hypopharynx and larynx were adequately visualized. The true vocal cords and arytenoids were examined during phonation and repose.    Anesthesia: None  Adverse Events: None  Blood loss: none  Condition: good    Findings:  NP/OP: no masses/lesions of NC, eustachian tube, fossa of Rosenmuller, no adenoid hypertrophy  BOT/vallecula: no lingual hypertrophy, no masses/lesions  Neopharynx without evidence of discrete lesion but with mild bulkiness of the right hypopharyngeal area      Pertinent Data:  NM PET CT ROUTINE     CLINICAL HISTORY  Non-small cell lung cancer (NSCLC), metastatic, assess treatment response; Malignant neoplasm of unspecified part of right bronchus or lung; status post 2 weeks of chemo/radiotherapy (surveillance/restaging)     TECHNIQUE  Intravenous injection of 9.8 mCi 18F-FDG was performed, with subsequent PET imaging from skull base through the upper thighs.  Corresponding non-contrast helical-acquisition CT images were obtained for anatomic correlation and attenuation correction.  Fused-modality multiplanar, PET rotational planar, and PET coronal MIP reconstructions were accomplished by  a technologist at a separate workstation and submitted to PACS for physician review.     COMPARISON  1 August 2022     FINDINGS  Exam quality: adequate for evaluation        HEAD / NECK:     There is symmetric radiotracer activity through the visualized brain.     Ill-defined hypermetabolic tissue is again appreciated at the left neck, just superior and lateral to the tracheostomy insertion site (fused axial series, images 40-50).  The regional maximum SUV is 6.8, with discrete CT-correlate lesion poorly delineated secondary to non-contrast protocol; prior SUV max 8.3. An adjacent, better delineated right cervical chain lymph node is visualized posteriorly, measuring 1.1 cm short axis with SUV max of 6 (image 45); this previously measured 1.5 cm in least dimension with maximum SUV of 7.7.  No convincing new or enlarging cervical adenopathy or newly hypermetabolic lymph nodes are visualized.  The prior left neck fluid collection is no longer evident.        CHEST:     The somewhat lobulated right upper lobe hilar mass is now approximately 3.4 cm x 1.4 cm and maximum SUV 23 (series 3, image 86); previously 3.6 cm x 1.8 cm and SUV max 14.8.  No new or enlarging hypermetabolic lung lesion, and no development of organized airspace consolidation or pleural effusion.     No suspicious abnormality of the mediastinal vasculature is identified.  There is interval placement of a left chest wall subcutaneous port with catheter terminating at the mid SVC region.  The heart chambers are of normal-appearing volume. There is no significant pericardial fluid.     Mediastinal and right hilar FDG-avid adenopathy is again appreciated.  The index right precarinal lymph node measures 1.2 cm short axis with SUV max of 16 (series 3, image 88); previously 1.6 cm and maximum SUV of 13.8.  The reference right hilar lymph node is poorly delineated from adjacent vascular structures secondary to non-contrast protocol, but is estimated to measure  1.8 cm short axis and has maximum SUV of 21 (series 3, image 92); this node previously measured 2.9 cm in least dimension with maximum SUV of 9.8.        ABDOMEN / PELVIS:     Normal physiologic distribution of activity is appreciated through the abdomen and pelvis.     No findings to suggest new or worsened focal FDG-avid process or CT-evident lesion involving the upper abdominal solid organs, and no acute findings appreciated.  The urinary bladder and reproductive structures are unremarkable for PET-CT evaluation.     Abdominopelvic vascular structures are unchanged. No evidence of new/worsening hypermetabolic lou disease.        MUSCULOSKELETAL / SUBCUTANEOUS:     No development of suspicious FDG uptake, destructive lesion, or acute process.     IMPRESSION  1. Findings suggestive of mixed disease response.  Right lower cervical chain nodes are less metabolically active.  Right hilar mass and mediastinal/right hilar lymph nodes are decreased in size, but demonstrate increased metabolic activity.  2. No findings to suggest new or worsening FDG-avid metastatic disease within the left thoracic cavity, abdomen, or pelvis.  3. Previously visualized left neck fluid collection is no longer clearly identified.     RADIATION DOSE  Automated tube current modulation, weight-based exposure dosing, and/or iterative reconstruction technique utilized to reach lowest reasonably achievable exposure rate.     DLP: 661 mGy*cm        Electronically signed by: Haja Paulson  Date:                                            10/13/2022  Time:                                           16:25    FL ESOPHAGRAM COMPLETE     CLIICAL HISTORY:  Cellulitis of neck     TECHNIQUE:  The patient was given oral contrast and multiple fluoroscopic images of the esophagus obtained in various positions. Total fluoroscopy time is 0.7 minutes with absorbed dose of 7.245 mGy.     COMPARISON:  Esophagram performed 07/15/2022     FINDINGS:  Exam performed  with Gastrografin and thin barium.  Patient swallowed contrast without difficulty.  No cervical esophageal contrast extravasation identified.     Impression:     No cervical esophageal leak identified.        Electronically signed by: Lance Boudreaux  Date:                                            10/31/2022  Time:                                           11:10      Right tracheostomal biopsy 1/11/23:  - Squamous mucosa with epithelial erosion and acute inflammation.  - Lamina propria with reactive fibroblasts and fibrosis, consistent with radiation changes, see comment.  - No evidence of malignancy.    Assessment/Plan:  Josafat Boudreaux is a 58 y.o. 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 Emory Alonso  Medical Oncologist: Yara Atkinson MD (Last seen: 10/18/22)  Radiation Oncologist: Romie Das MD (Last seen: Unknown)    Primary tumor: Endolaryngeal [id number] SCCA    Date of Diagnosis: 7/11/22  Surgery Date: 7/11/22  Induction Chemotherapy: No  Adjuvant Therapy: CRT  Completion Date: Sept 2022 for RT, still undergoing chemo    Pertinent Treatment History:  CRT    Place date if completed   3 6 12 18 24 36 48 60   CT Neck 10/19/22 X X X X X X X   PET/CT 10/13/22          CXR  12/21/22 X X X X X X   TFT X 12/6/22 X  X X X X     Current Surveillance Interval: <1 year post-treatment, q6 wks     - ZEUS on exam today.  - Continue chemotherapy treatments (2 more infusions scheduled at this time)  - Surveillance labs and imaging up to date.  - Continue stomal care.    RTC in 1 month on 3/8/23 for routine surveillance    Marty Jeffries MD  [facility] Otolaryngology PGY III

## 2023-02-13 ENCOUNTER — ANESTHESIA EVENT (OUTPATIENT)
Dept: SURGERY | Facility: HOSPITAL | Age: 73
DRG: 029 | End: 2023-02-13
Payer: MEDICARE

## 2023-02-13 ENCOUNTER — TELEPHONE (OUTPATIENT)
Dept: NEUROSURGERY | Facility: CLINIC | Age: 73
End: 2023-02-13
Payer: MEDICARE

## 2023-02-13 DIAGNOSIS — G96.00 POSTOPERATIVE CEREBROSPINAL FLUID LEAK: ICD-10-CM

## 2023-02-13 DIAGNOSIS — G97.82 POSTOPERATIVE CEREBROSPINAL FLUID LEAK: ICD-10-CM

## 2023-02-13 DIAGNOSIS — G97.82 PSEUDOMENINGOCELE DUE TO SURGICAL PROCEDURE: Primary | ICD-10-CM

## 2023-02-13 RX ORDER — MUPIROCIN 20 MG/G
1 OINTMENT TOPICAL 2 TIMES DAILY
Status: CANCELLED | OUTPATIENT
Start: 2023-02-13 | End: 2023-02-14

## 2023-02-13 NOTE — TELEPHONE ENCOUNTER
----- Message from Angely Alonzo LPN sent at 2/9/2023  4:43 PM CST -----  Regarding: CARDIAC CLEARANCE  On 2/2, I faxed cardiac clearance req to Dr. Mccall. Patient was originally scheduled for a stress test on 2/17.  I spoke with his nurse today. They moved up the stress test to Monday, 2/13, only availability they had. Hopefully he can be cleared after that.

## 2023-02-14 ENCOUNTER — ANESTHESIA (OUTPATIENT)
Dept: SURGERY | Facility: HOSPITAL | Age: 73
DRG: 029 | End: 2023-02-14
Payer: MEDICARE

## 2023-02-14 ENCOUNTER — HOSPITAL ENCOUNTER (INPATIENT)
Facility: HOSPITAL | Age: 73
LOS: 6 days | Discharge: HOME | DRG: 029 | End: 2023-02-20
Attending: NEUROLOGICAL SURGERY | Admitting: NEUROLOGICAL SURGERY
Payer: MEDICARE

## 2023-02-14 DIAGNOSIS — G96.00 POSTOPERATIVE CEREBROSPINAL FLUID LEAK: ICD-10-CM

## 2023-02-14 DIAGNOSIS — R00.0 TACHYCARDIA: ICD-10-CM

## 2023-02-14 DIAGNOSIS — G97.82 POSTOPERATIVE CEREBROSPINAL FLUID LEAK: ICD-10-CM

## 2023-02-14 DIAGNOSIS — G97.82 PSEUDOMENINGOCELE DUE TO SURGICAL PROCEDURE: ICD-10-CM

## 2023-02-14 PROCEDURE — 63600175 PHARM REV CODE 636 W HCPCS: Performed by: NURSE ANESTHETIST, CERTIFIED REGISTERED

## 2023-02-14 PROCEDURE — 25000003 PHARM REV CODE 250: Performed by: NEUROLOGICAL SURGERY

## 2023-02-14 PROCEDURE — 62329 PR SPINAL PUNCTURE, THERAP, DRAIN CSP FLUID, W/FLUORO/CT GUIDANCE: ICD-10-PCS | Mod: 51,,, | Performed by: NEUROLOGICAL SURGERY

## 2023-02-14 PROCEDURE — 63600175 PHARM REV CODE 636 W HCPCS: Performed by: PHYSICIAN ASSISTANT

## 2023-02-14 PROCEDURE — 62329 THER SPI PNXR CSF FLUOR/CT: CPT | Mod: 51,,, | Performed by: NEUROLOGICAL SURGERY

## 2023-02-14 PROCEDURE — 71000039 HC RECOVERY, EACH ADD'L HOUR: Performed by: NEUROLOGICAL SURGERY

## 2023-02-14 PROCEDURE — 25000003 PHARM REV CODE 250: Performed by: PHYSICIAN ASSISTANT

## 2023-02-14 PROCEDURE — 36000711: Performed by: NEUROLOGICAL SURGERY

## 2023-02-14 PROCEDURE — 36620 INSERTION CATHETER ARTERY: CPT | Performed by: NURSE ANESTHETIST, CERTIFIED REGISTERED

## 2023-02-14 PROCEDURE — 63600175 PHARM REV CODE 636 W HCPCS: Performed by: NEUROLOGICAL SURGERY

## 2023-02-14 PROCEDURE — 25000003 PHARM REV CODE 250: Performed by: NURSE ANESTHETIST, CERTIFIED REGISTERED

## 2023-02-14 PROCEDURE — 63707 PR REPR,DURAL/CSF LEAK,NOT REQ LAMINECTOMY: ICD-10-PCS | Mod: ,,, | Performed by: NEUROLOGICAL SURGERY

## 2023-02-14 PROCEDURE — 20000000 HC ICU ROOM

## 2023-02-14 PROCEDURE — 27201423 OPTIME MED/SURG SUP & DEVICES STERILE SUPPLY: Performed by: NEUROLOGICAL SURGERY

## 2023-02-14 PROCEDURE — 25000003 PHARM REV CODE 250

## 2023-02-14 PROCEDURE — 37000008 HC ANESTHESIA 1ST 15 MINUTES: Performed by: NEUROLOGICAL SURGERY

## 2023-02-14 PROCEDURE — 63707 REPAIR SPINAL FLUID LEAKAGE: CPT | Mod: ,,, | Performed by: NEUROLOGICAL SURGERY

## 2023-02-14 PROCEDURE — 36000710: Performed by: NEUROLOGICAL SURGERY

## 2023-02-14 PROCEDURE — 37000009 HC ANESTHESIA EA ADD 15 MINS: Performed by: NEUROLOGICAL SURGERY

## 2023-02-14 PROCEDURE — 63707 PR REPR,DURAL/CSF LEAK,NOT REQ LAMINECTOMY: ICD-10-PCS | Mod: AS,,, | Performed by: PHYSICIAN ASSISTANT

## 2023-02-14 PROCEDURE — 88300 SURGICAL PATH GROSS: CPT

## 2023-02-14 PROCEDURE — 63600175 PHARM REV CODE 636 W HCPCS: Performed by: ANESTHESIOLOGY

## 2023-02-14 PROCEDURE — 63707 REPAIR SPINAL FLUID LEAKAGE: CPT | Mod: AS,,, | Performed by: PHYSICIAN ASSISTANT

## 2023-02-14 PROCEDURE — 71000033 HC RECOVERY, INTIAL HOUR: Performed by: NEUROLOGICAL SURGERY

## 2023-02-14 RX ORDER — BACITRACIN 500 [USP'U]/G
OINTMENT TOPICAL
Status: DISCONTINUED | OUTPATIENT
Start: 2023-02-14 | End: 2023-02-14

## 2023-02-14 RX ORDER — PROCHLORPERAZINE EDISYLATE 5 MG/ML
5 INJECTION INTRAMUSCULAR; INTRAVENOUS EVERY 6 HOURS PRN
Status: DISCONTINUED | OUTPATIENT
Start: 2023-02-14 | End: 2023-02-20 | Stop reason: HOSPADM

## 2023-02-14 RX ORDER — DIPHENHYDRAMINE HYDROCHLORIDE 50 MG/ML
INJECTION INTRAMUSCULAR; INTRAVENOUS
Status: DISCONTINUED | OUTPATIENT
Start: 2023-02-14 | End: 2023-02-14

## 2023-02-14 RX ORDER — LIDOCAINE HYDROCHLORIDE 10 MG/ML
1 INJECTION, SOLUTION EPIDURAL; INFILTRATION; INTRACAUDAL; PERINEURAL ONCE
Status: DISCONTINUED | OUTPATIENT
Start: 2023-02-14 | End: 2023-02-14

## 2023-02-14 RX ORDER — SODIUM CHLORIDE, SODIUM LACTATE, POTASSIUM CHLORIDE, CALCIUM CHLORIDE 600; 310; 30; 20 MG/100ML; MG/100ML; MG/100ML; MG/100ML
INJECTION, SOLUTION INTRAVENOUS CONTINUOUS
Status: DISCONTINUED | OUTPATIENT
Start: 2023-02-14 | End: 2023-02-14

## 2023-02-14 RX ORDER — PROPOFOL 10 MG/ML
VIAL (ML) INTRAVENOUS
Status: DISCONTINUED | OUTPATIENT
Start: 2023-02-14 | End: 2023-02-14

## 2023-02-14 RX ORDER — CALCIUM CHLORIDE INJECTION 100 MG/ML
INJECTION, SOLUTION INTRAVENOUS
Status: DISCONTINUED | OUTPATIENT
Start: 2023-02-14 | End: 2023-02-14

## 2023-02-14 RX ORDER — HYDRALAZINE HYDROCHLORIDE 20 MG/ML
10 INJECTION INTRAMUSCULAR; INTRAVENOUS EVERY 4 HOURS PRN
Status: DISCONTINUED | OUTPATIENT
Start: 2023-02-14 | End: 2023-02-20 | Stop reason: HOSPADM

## 2023-02-14 RX ORDER — DOCUSATE SODIUM 100 MG/1
100 CAPSULE, LIQUID FILLED ORAL 2 TIMES DAILY
Status: DISCONTINUED | OUTPATIENT
Start: 2023-02-14 | End: 2023-02-20 | Stop reason: HOSPADM

## 2023-02-14 RX ORDER — LIDOCAINE HYDROCHLORIDE 20 MG/ML
INJECTION, SOLUTION EPIDURAL; INFILTRATION; INTRACAUDAL; PERINEURAL
Status: DISCONTINUED | OUTPATIENT
Start: 2023-02-14 | End: 2023-02-14

## 2023-02-14 RX ORDER — EPHEDRINE SULFATE 50 MG/ML
INJECTION, SOLUTION INTRAVENOUS
Status: DISCONTINUED | OUTPATIENT
Start: 2023-02-14 | End: 2023-02-14

## 2023-02-14 RX ORDER — PHENYLEPHRINE HCL IN 0.9% NACL 1 MG/10 ML
SYRINGE (ML) INTRAVENOUS
Status: DISCONTINUED | OUTPATIENT
Start: 2023-02-14 | End: 2023-02-14

## 2023-02-14 RX ORDER — LIDOCAINE HYDROCHLORIDE AND EPINEPHRINE 5; 5 MG/ML; UG/ML
INJECTION, SOLUTION INFILTRATION; PERINEURAL
Status: DISCONTINUED | OUTPATIENT
Start: 2023-02-14 | End: 2023-02-14

## 2023-02-14 RX ORDER — LEVOTHYROXINE SODIUM 50 UG/1
50 TABLET ORAL DAILY
Status: DISCONTINUED | OUTPATIENT
Start: 2023-02-14 | End: 2023-02-20 | Stop reason: HOSPADM

## 2023-02-14 RX ORDER — LABETALOL HYDROCHLORIDE 5 MG/ML
10 INJECTION, SOLUTION INTRAVENOUS EVERY 4 HOURS PRN
Status: DISCONTINUED | OUTPATIENT
Start: 2023-02-14 | End: 2023-02-20 | Stop reason: HOSPADM

## 2023-02-14 RX ORDER — KETOROLAC TROMETHAMINE 30 MG/ML
INJECTION, SOLUTION INTRAMUSCULAR; INTRAVENOUS
Status: DISCONTINUED | OUTPATIENT
Start: 2023-02-14 | End: 2023-02-14

## 2023-02-14 RX ORDER — CEFAZOLIN SODIUM 1 G/3ML
INJECTION, POWDER, FOR SOLUTION INTRAMUSCULAR; INTRAVENOUS
Status: DISCONTINUED | OUTPATIENT
Start: 2023-02-14 | End: 2023-02-14

## 2023-02-14 RX ORDER — ADHESIVE BANDAGE
15 BANDAGE TOPICAL DAILY PRN
Status: DISCONTINUED | OUTPATIENT
Start: 2023-02-14 | End: 2023-02-20 | Stop reason: HOSPADM

## 2023-02-14 RX ORDER — CEFAZOLIN SODIUM 2 G/50ML
2 SOLUTION INTRAVENOUS
Status: COMPLETED | OUTPATIENT
Start: 2023-02-14 | End: 2023-02-14

## 2023-02-14 RX ORDER — ATORVASTATIN CALCIUM 40 MG/1
40 TABLET, FILM COATED ORAL DAILY
Status: DISCONTINUED | OUTPATIENT
Start: 2023-02-14 | End: 2023-02-20 | Stop reason: HOSPADM

## 2023-02-14 RX ORDER — HYDROMORPHONE HYDROCHLORIDE 2 MG/ML
0.2 INJECTION, SOLUTION INTRAMUSCULAR; INTRAVENOUS; SUBCUTANEOUS EVERY 5 MIN PRN
Status: DISCONTINUED | OUTPATIENT
Start: 2023-02-14 | End: 2023-02-15

## 2023-02-14 RX ORDER — AMITRIPTYLINE HYDROCHLORIDE 25 MG/1
25 TABLET, FILM COATED ORAL DAILY
Status: DISCONTINUED | OUTPATIENT
Start: 2023-02-14 | End: 2023-02-15

## 2023-02-14 RX ORDER — HYDROCODONE BITARTRATE AND ACETAMINOPHEN 7.5; 325 MG/1; MG/1
1 TABLET ORAL EVERY 4 HOURS PRN
Status: DISCONTINUED | OUTPATIENT
Start: 2023-02-14 | End: 2023-02-20 | Stop reason: HOSPADM

## 2023-02-14 RX ORDER — ACETAMINOPHEN 10 MG/ML
INJECTION, SOLUTION INTRAVENOUS
Status: DISCONTINUED | OUTPATIENT
Start: 2023-02-14 | End: 2023-02-14

## 2023-02-14 RX ORDER — ONDANSETRON 4 MG/1
8 TABLET, ORALLY DISINTEGRATING ORAL EVERY 6 HOURS PRN
Status: DISCONTINUED | OUTPATIENT
Start: 2023-02-14 | End: 2023-02-20 | Stop reason: HOSPADM

## 2023-02-14 RX ORDER — ALBUMIN HUMAN 250 G/1000ML
SOLUTION INTRAVENOUS CONTINUOUS PRN
Status: DISCONTINUED | OUTPATIENT
Start: 2023-02-14 | End: 2023-02-14

## 2023-02-14 RX ORDER — MAG HYDROX/ALUMINUM HYD/SIMETH 200-200-20
30 SUSPENSION, ORAL (FINAL DOSE FORM) ORAL EVERY 4 HOURS PRN
Status: DISCONTINUED | OUTPATIENT
Start: 2023-02-14 | End: 2023-02-20 | Stop reason: HOSPADM

## 2023-02-14 RX ORDER — DEXAMETHASONE SODIUM PHOSPHATE 4 MG/ML
INJECTION, SOLUTION INTRA-ARTICULAR; INTRALESIONAL; INTRAMUSCULAR; INTRAVENOUS; SOFT TISSUE
Status: DISCONTINUED | OUTPATIENT
Start: 2023-02-14 | End: 2023-02-14

## 2023-02-14 RX ORDER — ONDANSETRON HYDROCHLORIDE 2 MG/ML
INJECTION, SOLUTION INTRAMUSCULAR; INTRAVENOUS
Status: DISCONTINUED | OUTPATIENT
Start: 2023-02-14 | End: 2023-02-14

## 2023-02-14 RX ORDER — ONDANSETRON 2 MG/ML
4 INJECTION INTRAMUSCULAR; INTRAVENOUS ONCE AS NEEDED
Status: DISCONTINUED | OUTPATIENT
Start: 2023-02-14 | End: 2023-02-20 | Stop reason: HOSPADM

## 2023-02-14 RX ORDER — GABAPENTIN 100 MG/1
100 CAPSULE ORAL 3 TIMES DAILY
Status: DISCONTINUED | OUTPATIENT
Start: 2023-02-14 | End: 2023-02-20 | Stop reason: HOSPADM

## 2023-02-14 RX ORDER — FENTANYL CITRATE 50 UG/ML
INJECTION, SOLUTION INTRAMUSCULAR; INTRAVENOUS
Status: DISCONTINUED | OUTPATIENT
Start: 2023-02-14 | End: 2023-02-14

## 2023-02-14 RX ORDER — ROCURONIUM BROMIDE 10 MG/ML
INJECTION, SOLUTION INTRAVENOUS
Status: DISCONTINUED | OUTPATIENT
Start: 2023-02-14 | End: 2023-02-14

## 2023-02-14 RX ORDER — HEPARIN 100 UNIT/ML
SYRINGE INTRAVENOUS
Status: DISPENSED
Start: 2023-02-14 | End: 2023-02-14

## 2023-02-14 RX ADMIN — HYDROMORPHONE HYDROCHLORIDE 0.2 MG: 2 INJECTION INTRAMUSCULAR; INTRAVENOUS; SUBCUTANEOUS at 02:02

## 2023-02-14 RX ADMIN — SUGAMMADEX 200 MG: 100 INJECTION, SOLUTION INTRAVENOUS at 01:02

## 2023-02-14 RX ADMIN — Medication 100 MCG: at 11:02

## 2023-02-14 RX ADMIN — DIPHENHYDRAMINE HYDROCHLORIDE 12.5 MG: 50 INJECTION INTRAMUSCULAR; INTRAVENOUS at 11:02

## 2023-02-14 RX ADMIN — EPHEDRINE SULFATE 5 MG: 50 INJECTION INTRAVENOUS at 11:02

## 2023-02-14 RX ADMIN — DEXTROSE MONOHYDRATE 1 G: 2.5 INJECTION INTRAVENOUS at 06:02

## 2023-02-14 RX ADMIN — ROCURONIUM BROMIDE 20 MG: 50 INJECTION INTRAVENOUS at 12:02

## 2023-02-14 RX ADMIN — CALCIUM CHLORIDE INJECTION 0.5 G: 100 INJECTION, SOLUTION INTRAVENOUS at 12:02

## 2023-02-14 RX ADMIN — SODIUM CHLORIDE, SODIUM GLUCONATE, SODIUM ACETATE, POTASSIUM CHLORIDE AND MAGNESIUM CHLORIDE: 526; 502; 368; 37; 30 INJECTION, SOLUTION INTRAVENOUS at 10:02

## 2023-02-14 RX ADMIN — ALBUMIN HUMAN: 250 SOLUTION INTRAVENOUS at 12:02

## 2023-02-14 RX ADMIN — Medication 300 MCG: at 10:02

## 2023-02-14 RX ADMIN — Medication 150 MCG: at 10:02

## 2023-02-14 RX ADMIN — FENTANYL CITRATE 50 MCG: 50 INJECTION, SOLUTION INTRAMUSCULAR; INTRAVENOUS at 10:02

## 2023-02-14 RX ADMIN — DEXAMETHASONE SODIUM PHOSPHATE 12 MG: 4 INJECTION, SOLUTION INTRA-ARTICULAR; INTRALESIONAL; INTRAMUSCULAR; INTRAVENOUS; SOFT TISSUE at 11:02

## 2023-02-14 RX ADMIN — PROPOFOL 130 MG: 10 INJECTION, EMULSION INTRAVENOUS at 10:02

## 2023-02-14 RX ADMIN — LABETALOL HYDROCHLORIDE 10 MG: 5 INJECTION, SOLUTION INTRAVENOUS at 06:02

## 2023-02-14 RX ADMIN — PROPOFOL 50 MG: 10 INJECTION, EMULSION INTRAVENOUS at 12:02

## 2023-02-14 RX ADMIN — PROPOFOL 70 MG: 10 INJECTION, EMULSION INTRAVENOUS at 10:02

## 2023-02-14 RX ADMIN — PROPOFOL 120 MG: 10 INJECTION, EMULSION INTRAVENOUS at 10:02

## 2023-02-14 RX ADMIN — PHENYLEPHRINE HYDROCHLORIDE 0.05 MCG/KG/MIN: 10 INJECTION INTRAVENOUS at 11:02

## 2023-02-14 RX ADMIN — LIDOCAINE HYDROCHLORIDE 4 ML: 20 INJECTION, SOLUTION EPIDURAL; INFILTRATION; INTRACAUDAL; PERINEURAL at 10:02

## 2023-02-14 RX ADMIN — PHENYLEPHRINE HYDROCHLORIDE 0.25 MCG/KG/MIN: 10 INJECTION INTRAVENOUS at 10:02

## 2023-02-14 RX ADMIN — GABAPENTIN 100 MG: 100 CAPSULE ORAL at 03:02

## 2023-02-14 RX ADMIN — ONDANSETRON HYDROCHLORIDE 4 MG: 2 INJECTION, SOLUTION INTRAMUSCULAR; INTRAVENOUS at 10:02

## 2023-02-14 RX ADMIN — CEFAZOLIN SODIUM 2 G: 2 SOLUTION INTRAVENOUS at 10:02

## 2023-02-14 RX ADMIN — DIPHENHYDRAMINE HYDROCHLORIDE 12.5 MG: 50 INJECTION INTRAMUSCULAR; INTRAVENOUS at 10:02

## 2023-02-14 RX ADMIN — ROCURONIUM BROMIDE 30 MG: 50 INJECTION INTRAVENOUS at 10:02

## 2023-02-14 RX ADMIN — KETOROLAC TROMETHAMINE 30 MG: 30 INJECTION, SOLUTION INTRAMUSCULAR at 12:02

## 2023-02-14 RX ADMIN — ROCURONIUM BROMIDE 20 MG: 50 INJECTION INTRAVENOUS at 11:02

## 2023-02-14 RX ADMIN — PHENYLEPHRINE HYDROCHLORIDE 0.25 MCG/KG/MIN: 10 INJECTION INTRAVENOUS at 09:02

## 2023-02-14 RX ADMIN — Medication 500 MCG: at 10:02

## 2023-02-14 RX ADMIN — ACETAMINOPHEN 1000 MG: 10 INJECTION, SOLUTION INTRAVENOUS at 10:02

## 2023-02-14 RX ADMIN — DOCUSATE SODIUM 100 MG: 100 CAPSULE, LIQUID FILLED ORAL at 08:02

## 2023-02-14 RX ADMIN — Medication 100 MCG: at 01:02

## 2023-02-14 RX ADMIN — GABAPENTIN 100 MG: 100 CAPSULE ORAL at 08:02

## 2023-02-14 NOTE — ANESTHESIA PREPROCEDURE EVALUATION
"                                                                                                             02/13/2023  Scott Echeverria is a 72 y.o., male , who presents for the following:    Procedure: REPAIR, CSF LEAK, SPINE - posterior cervical re-exploration, drainage of pseudomeningiocele/repair of CSF leak //  XX   Anesthesia type: General   Diagnosis: Postoperative cerebrospinal fluid leak [G97.82, G96.00]   Pre-op diagnosis: Postoperative cerebrospinal fluid leak [G97.82, G96.00]   Location: [facility] OR  / [facility] OR   Surgeons: Paulo Rao MD     HPI:  He is s/p C3-7 laminoplasty and left C3-7 laminoforaminotomies on 7/12/2022.  He developed a pseudomeningocele postoperatively.  Initially he was felt to be asymptomatic from this, however he has been having some issues lately likely secondary to fluid collection.  He met with Dr. Edge with Interventional Radiology last Monday to discuss possible blood patch.  According to the patient, Dr. Edge felt there was a very low chance of success.  The patient has opted for surgical re-exploration due to his persistent symptoms.  He presents today to discuss surgery.    The patient continues with swelling that is worse when he 1st wakes in the morning.  He states there is a very firm area about the size of his fist in the back of his neck.  As he is up and moving around, the area become softer and not as large.  His main complaint is the inability to hold his head up straight for long periods of time.  His muscles fatigue by the end of the day and he is unable to lift his head up.  He reports pain along bilateral trapezius muscles and down both shoulder blades as well.  He has to use a walker initially in the morning due to balance difficulties when he 1st wakes up. Continues having episodes where he "goes out" when he 1st rises from a lying position.  He has been worked up by his cardiologist.  He does have a nuclear med stress test currently scheduled for " 2/17/2023.    Of note, the patient had complications during the postoperative period following surgery in July.  He actually did very well initially following surgery and was near being discharged home, however he suddenly became aphasic with unilateral weakness the early morning of 7/14/2022.  His neurological exam continued to decline requiring intubation for airway protection. He received narcan and romazicon without any change in neurological exam. He remained minimally responsive for several days.  Multiple MRIs and other tests were performed without any solid explanation of his acute change in neurological status. He eventually had some slow improvement and is currently at his baseline cognitively.    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    I have reviewed the NPO Status.   I have reviewed the Medications.     Review of Systems  Anesthesia Hx:  No problems with previous Anesthesia  Denies Family Hx of Anesthesia complications.   Denies Personal Hx of Anesthesia complications.   Social:  Non-Smoker    Cardiovascular:  Cardiovascular Normal  No Cardiac Complaints   Pulmonary:  Pulmonary Normal Sleep Apnea No Pulmonary Complaints   Renal/:   BPH    Hepatic/GI:   No Current  GERD Sx   Musculoskeletal:   Cervical Spondylosis with radiculopathy   Endocrine:   Hypothyroidism        Physical Exam  General: Alert and Oriented    Airway:  Mallampati: II   Mouth Opening: Normal  TM Distance: Normal  Tongue: Normal  Neck ROM: Normal ROM    Dental:  Intact    Chest/Lungs:  Normal Respiratory Rate    Heart:  Rate: Normal  Rhythm: Regular Rhythm        Latest Reference Range & Units 02/08/23 08:46   Hemoglobin 14.0 - 18.0 gm/dL 13.4 (L)   Hematocrit 42.0 - 52.0 % 43.1   MCV 80.0 - 94.0 fL 89.6   MCH pg 27.9   MCHC 33.0 - 36.0 mg/dL 31.1 (L)   RDW 11.5 - 17.0 % 14.4   Platelets 130 - 400 x10(3)/mcL 256   (L): Data is abnormally low   Latest Reference Range & Units 02/08/23 08:46   Sodium 136 - 145 mmol/L 140   Potassium 3.5 - 5.1 mmol/L 4.7   Chloride 98 - 107 mmol/L 104   CO2 23 - 31 mmol/L 28   BUN 8.4 - 25.7 mg/dL 6.9 (L)   Creatinine 0.73 - 1.18 mg/dL 0.84   eGFR mls/min/1.73/m2 >60   Glucose 82 - 115 mg/dL 93   (L): Data is abnormally low    CXR : NAF        TTE:  Summary     The left ventricle is normal in size with low normal systolic function.   The estimated ejection fraction is 50%.   Normal left ventricular diastolic function.   Normal right ventricular size with normal right ventricular systolic function.   Normal central venous pressure (3 mmHg).           Anesthesia Plan  Type of Anesthesia, risks & benefits discussed:    Anesthesia Type: Gen ETT  Intra-op Monitoring Plan: Standard ASA Monitors and Art Line  Post Op Pain Control Plan: IV/PO Opioids PRN and multimodal analgesia  Induction:  IV  Airway Plan: Direct, Post-Induction  Informed Consent: Informed consent signed with the Patient and all parties understand the risks and agree with anesthesia plan.  All questions answered. Patient consented to blood products? Yes  ASA Score: 3  Day of Surgery Review of History & Physical: H&P Update referred to the surgeon/provider.  Anesthesia Plan Notes: Intubation with Glidescope to  maintain neutral cervical alignment    Ready For Surgery From Anesthesia Perspective.     .

## 2023-02-14 NOTE — ANESTHESIA PROCEDURE NOTES
Arterial    Diagnosis: CSF Leak    Patient location during procedure: holding area  Procedure start time: 2/14/2023 9:30 AM  Procedure end time: 2/14/2023 9:45 AM    Staffing  Authorizing Provider: Paulo Rao MD  Performing Provider: Joselin Brunner CRNA    Anesthesiologist was present at the time of the procedure.    Preanesthetic Checklist  Completed: patient identified, IV checked, site marked, risks and benefits discussed, surgical consent, monitors and equipment checked, pre-op evaluation, timeout performed and anesthesia consent givenArterial  Skin Prep: chlorhexidine gluconate  Local Infiltration: lidocaine  Orientation: right  Location: radial    Catheter Size: 18 G  Catheter placement by Ultrasound guidance. Heme positive aspiration all ports.   Vessel Caliber: medium, patent, compressibility normal  Vascular Doppler:  not done  Needle advanced into vessel with real time Ultrasound guidance.Insertion Attempts: 2  Assessment  Dressing: secured with tape and tegaderm  Patient: Tolerated well

## 2023-02-14 NOTE — ANESTHESIA PROCEDURE NOTES
Intubation    Date/Time: 2/14/2023 10:25 AM  Performed by: Joselin Brunner CRNA  Authorized by: Paulo Rao MD     Intubation:     Induction:  Intravenous    Intubated:  Postinduction    Mask Ventilation:  Easy mask    Attempts:  1    Attempted By:  Student    Method of Intubation:  Video laryngoscopy    Blade:  Elliott 3    Laryngeal View Grade: Grade I - full view of cords      Difficult Airway Encountered?: No      Complications:  None    Airway Device:  Oral endotracheal tube    Airway Device Size:  7.5    Style/Cuff Inflation:  Cuffed (inflated to minimal occlusive pressure)    Inflation Amount (mL):  5    Tube secured:  23    Secured at:  The lips    Placement Verified By:  Capnometry    Complicating Factors:  None    Findings Post-Intubation:  BS equal bilateral and atraumatic/condition of teeth unchanged

## 2023-02-14 NOTE — BRIEF OP NOTE
[facility] - Periop Services  Brief Operative Note    SUMMARY     Surgery Date: 2/14/2023     Surgeon(s) and Role:     * Paulo Rao MD - Primary     * ATIYA Garcia - Assisting        Pre-op Diagnosis:  Postoperative cerebrospinal fluid leak [G97.82, G96.00]    Post-op Diagnosis:  Post-Op Diagnosis Codes:     * Postoperative cerebrospinal fluid leak [G97.82, G96.00]    Procedure(s) (LRB):  REPAIR, CSF LEAK, SPINE (N/A)  INSERTION, DRAIN, SPINAL CANAL (N/A)    Anesthesia: General    Operative Findings: Patient tolerated procedure well and was transferred to PACU.      Estimated Blood Loss: See anesthesia note    Estimated Blood Loss has been documented.         Specimens:   Specimen (24h ago, onward)       Start     Ordered    02/14/23 1228  Specimen to Pathology  RELEASE UPON ORDERING        References:    Click here for ordering Quick Tip   Question:  Release to patient  Answer:  Immediate    02/14/23 4110                    [id number]

## 2023-02-14 NOTE — OP NOTE
DATE OF OPERATION:   February 14, 2023    PREOPERATIVE DIAGNOSIS:   1. Postoperative cervical pseudomeningocele    POSTOPERATIVE DIAGNOSIS:   1. Postoperative cervical pseudomeningocele    SURGEON:  Paulo Rao M.D.    ASSISTANT: Mariely Friedman PA-C     PROCEDURE:   1.  Posterior cervical wound re-exploration and repair of dural tear  2.  Insertion of lumbar spinal subarachnoid drainage catheter    ANESTHESIA:   General endotracheal     BLOOD LOSS:   50 cc     SPECIMEN(s):   Removed C6 laminoplasty plate and screws for ID only    COMPLICATIONS:   None     HISTORY:   The patient is a 72-year-old gentleman who on 07/12/2022 underwent C3-C7 laminoplasty and left C3-4, C4-5, C5-6, C6-7 foraminotomies.  He had a complicated postoperative course with a.  If prolonged decreased level of consciousness.  It was all was difficult to explain that, but we felt it was likely a combination medication effects.  In any event he was finally discharged to rehab, but was seen postoperatively in the office in October when he was complaining of a pseudomeningocele.  This was observed for a period of time, but it enlarged and seemed to be symptomatic in multiple ways.  We considered a interventional radiology procedure with Dr. Edge, but because of very poor chance of success, we decided to go ahead with surgery.  Options were discussed with the patient and his family.  They understood and accepted the nature of this surgery as well as its attendant risks.     FINDINGS:   As expected, there was a large amount of clear and colorless CSF in the subfascial space.  There was a shiny translucent membrane lining the pseudomeningocele.  I was actually able to see the dural tear at the C4-5 level as expected.  This was closed primarily with 4 interrupted 6 0 Vicryl Jeffrey-Jef sutures in what seemed to be a watertight fashion.  There was no leakage with Valsalva maneuver.  Adherus dural sealant was placed over this and a drain was placed on  the right side of the subfascial space.  To assist in the repair healing correctly, a lumbar drain was placed at the L3-4 interspace without difficulty.  The patient tolerated the procedure well.     PROCEDURE IN DETAIL:   After endotracheal intubation and induction of general anesthesia, the patient was placed in the prone position on the spinal frame with pressure points appropriately padded after placing the head in the Pratt 3 point pin fixation head rest.  The patient received intravenous antibiotics prior to the start of the procedure.  Previous incision was marked out and infiltrated with local anesthetic containing epinephrine.  That incision was carried down through the scar with a knife and then we fairly quickly encountered the fluid collection.  We could see a clear-cut dural defect on the left at the C5 level as expected.  We are able to close this primarily and what seemed to be a fairly watertight fashion with 4 interrupted 6 0 York-Jef suture.  This was covered with a layer of Adherus dural sealant.  A drain was placed to the right of the spinous processes and brought out through a separate stab incision.  The wound was then closed with interrupted 0 Vicryl for the fascia, 2-0 Vicryl for the subcutaneous tissue and Dermabond glue for the skin edges.    The drapes were removed and then the lumbar area was prepped and draped in the usual fashion.  Using C-arm to ensure midline positioning as well as to confirm depth of penetration, a 14 gauge Touhy needle was used to access the intrathecal space.  Then a lumbar drain was placed without difficulty into the thecal sac and the needle was removed.  There was free flow of clear and colorless CSF.  The drain was secured to the back and then connected to a drainage system.  The patient was then placed back into the supine position and taken to the post anesthetic care unit in satisfactory condition with correct sponge and needle counts.

## 2023-02-14 NOTE — PT/OT/SLP PROGRESS
Occupational Therapy      Patient Name:  Scott Echeverria   MRN:  [id number]    Patient not seen today secondary to Nurse hold; requesting therapy hold off on eval at this time. Will follow-up when appropriate.      2/14/2023

## 2023-02-14 NOTE — ANESTHESIA POSTPROCEDURE EVALUATION
Anesthesia Post Evaluation    Patient: Scott Echeverria    Procedure(s) Performed: Procedure(s) (LRB):  REPAIR, CSF LEAK, SPINE (N/A)  INSERTION, DRAIN, SPINAL CANAL (N/A)    Final Anesthesia Type: general      Patient location during evaluation: PACU  Patient participation: Yes- Able to Participate  Level of consciousness: awake  Post-procedure vital signs: reviewed and stable  Pain management: adequate  Airway patency: patent    PONV status at discharge: vomiting (controlled) and nausea (controlled)  Anesthetic complications: no      Cardiovascular status: hemodynamically stable  Respiratory status: spontaneous ventilation and unassisted  Hydration status: euvolemic  Follow-up not needed.  Comments:              Vitals Value Taken Time   /62 02/14/23 1441   Temp ** 02/14/23 1447   Pulse 90 02/14/23 1447   Resp 13 02/14/23 1446   SpO2 99 % 02/14/23 1447   Vitals shown include unvalidated device data.      No case tracking events are documented in the log.      Pain/Joaquin Score: Pain Rating Prior to Med Admin: 6 (2/14/2023  2:35 PM)  Joaquin Score: 10 (2/14/2023  2:30 PM)

## 2023-02-14 NOTE — H&P
"[facility]  Periop Services  Pulmonary Critical Care Note    Patient Name: Scott Echeverria  MRN: [id number]  Admission Date: 2/14/2023  Hospital Length of Stay: 0 days  Code Status: Prior  Attending Provider: Paulo Roa MD  Primary Care Provider: RON AUGUSTIN MD     Subjective:     HPI:     Mr. Echeverria is a 73 yo male with medical history of hypothyroidism, hyperlipidemia,  and C3-7 laminoplasty and left C3-7 laminoforaminotomies (7/12/2022) complicated by post-operative pseudomeningocele. Of note, patient had complications during postoperative period following surgery in July. Despite initially doing well, he became aphasic with unilateral weakness and progressive neurological decline requiring intubation for airway protection. MRIs unrevealing at the time, thought to be due to medication effects. Patient returned to baseline and discharged to rehab, however he has been progressively symptomatic from pseudomeningocele. Patient underwent posterior cervical re-exploration and repair of dural tear with insertion of lumbar spinal subarachnoid drainage catheter with Dr. Rao today,  2/14/23. He tolerated procedure well. Admitted to ICU for close monitoring.       Hospital Course/Significant events:  2/14/23 posterior cervical wound re-exploration and repair of dural tear   2/14/23 Admitted to ICU     24 Hour Interval History:  Pending     Past Medical History:   Diagnosis Date    Anesthesia complication     "post op day 2-3 had to be given narcan, intubated, and sent to ICU"    Balance problems     BPH (benign prostatic hyperplasia)     Cervical pain     Cervical radiculitis     Cervical spinal stenosis     HLD (hyperlipidemia)     Hypothyroidism, unspecified     Personal history of colonic polyps     Postoperative CSF leak     Shoulder pain, bilateral     Sleep apnea     resolved since surgery       Past Surgical History:   Procedure Laterality Date    APPENDECTOMY  01/25/2022    Dr. Kerry Coats "    CERVICAL SPINE SURGERY  07/12/2022    COLONOSCOPY W/ POLYPECTOMY  04/27/2021    Dr. David Montgomery    EXTRACTION OF TOOTH  01/20/2023    HEMORRHOID SURGERY  2002    LAMINOPLASTY N/A 07/12/2022    Procedure: LAMINOPLASTY;  Surgeon: Paulo Rao MD;  Location: [facility];  Service: Neurosurgery;  Laterality: N/A;  left C3-4, C4-5, C5-6, C6-7 foraminotomies, C3-7 laminoplasty //  TIVA SET UP    lower back surgery  1990    RETINAL DETACHMENT SURGERY Right 2015    SINUS SURGERY      TONSILLECTOMY  1956       Social History     Socioeconomic History    Marital status:    Tobacco Use    Smoking status: Never    Smokeless tobacco: Never   Substance and Sexual Activity    Alcohol use: Not Currently    Drug use: Never    Sexual activity: Not Currently           Current Outpatient Medications   Medication Instructions    amitriptyline (ELAVIL) 25 mg, Oral, Daily    aspirin 81 mg, Oral, Daily    diazePAM (VALIUM) 5 mg, Oral    diphenhydrAMINE (BENADRYL) 50 MG capsule Take one tablet (50mg) by mouth 1hr prior to contrast    docusate sodium (COLACE) 100 mg, Oral, 2 times daily    gabapentin (NEURONTIN) 100 MG capsule TAKE 1 CAPSULE (100 MG TOTAL) BY MOUTH THREE TIMES DAILY.    HYDROcodone-acetaminophen (NORCO) 7.5-325 mg per tablet 1 tablet, Oral, Every 6 hours PRN    ibuprofen (ADVIL,MOTRIN) 200 mg, Oral, 2 times daily PRN    levothyroxine (SYNTHROID) 50 mcg, Oral, Daily    magnesium hydroxide 400 mg/5 ml (MILK OF MAGNESIA) 400 mg/5 mL Susp 15 mLs, Oral, Daily PRN    methocarbamoL (ROBAXIN) 500 mg, Oral, 3 times daily PRN    predniSONE (DELTASONE) 50 MG Tab One tablet by mouth 13hrs prior to contrast, then one tablet by mouth 7hrs prior to contrast, then one tablet by mouth 1hr prior to contrast    psyllium (HYDROCIL) packet 1 packet, Oral, Daily    rosuvastatin (CRESTOR) 10 mg, Oral, Daily       Current Inpatient Medications   amitriptyline  25 mg Oral Daily    atorvastatin  40 mg Oral Daily    ceFAZolin (ANCEF) IVPB   1 g Intravenous Q8H    docusate sodium  100 mg Oral BID    gabapentin  100 mg Oral TID    heparin, porcine (PF)        levothyroxine  50 mcg Oral Daily       Current Intravenous Infusions        Review of Systems   Constitutional:  Negative for chills and fever.   HENT: Negative.     Eyes: Negative.    Respiratory:  Negative for cough and shortness of breath.    Cardiovascular:  Negative for chest pain and palpitations.   Gastrointestinal:  Negative for abdominal pain, nausea and vomiting.   Genitourinary: Negative.    Musculoskeletal:  Negative for neck pain.   Neurological:  Negative for dizziness, tingling, tremors, sensory change, speech change, focal weakness, weakness and headaches.        Objective:       Intake/Output Summary (Last 24 hours) at 2/14/2023 1430  Last data filed at 2/14/2023 1345  Gross per 24 hour   Intake 2400 ml   Output 660 ml   Net 1740 ml         Vital Signs (Most Recent):  Temp: 98.1 °F (36.7 °C) (02/14/23 0651)  Pulse: 92 (02/14/23 1425)  Resp: 14 (02/14/23 1425)  BP: 110/62 (02/14/23 1425)  SpO2: 99 % (02/14/23 1425)  Body mass index is 23.34 kg/m².  Weight: 71.7 kg (158 lb 1.1 oz) Vital Signs (24h Range):  Temp:  [98.1 °F (36.7 °C)] 98.1 °F (36.7 °C)  Pulse:  [83-92] 92  Resp:  [10-21] 14  SpO2:  [97 %-100 %] 99 %  BP: (110-136)/(62-86) 110/62     Physical Exam  Constitutional:       General: He is not in acute distress.     Appearance: Normal appearance.   HENT:      Head: Normocephalic.      Nose: Nose normal.      Mouth/Throat:      Mouth: Mucous membranes are moist.      Pharynx: Oropharynx is clear.   Eyes:      Extraocular Movements: Extraocular movements intact.      Conjunctiva/sclera: Conjunctivae normal.      Pupils: Pupils are equal, round, and reactive to light.   Neck:      Comments: ELISA drain   Cardiovascular:      Rate and Rhythm: Normal rate and regular rhythm.      Pulses: Normal pulses.      Heart sounds: Normal heart sounds. No murmur heard.  Pulmonary:      Effort:  Pulmonary effort is normal. No respiratory distress.      Breath sounds: Normal breath sounds. No wheezing, rhonchi or rales.   Abdominal:      General: Abdomen is flat. Bowel sounds are normal. There is no distension.      Palpations: Abdomen is soft.      Tenderness: There is no abdominal tenderness.      Comments: Lumbar drainage catheter    Musculoskeletal:         General: No swelling. Normal range of motion.   Skin:     General: Skin is warm and dry.   Neurological:      General: No focal deficit present.      Mental Status: He is alert and oriented to person, place, and time. Mental status is at baseline.   Psychiatric:         Mood and Affect: Mood normal.         Lines/Drains/Airways       Drain  Duration                  Closed/Suction Drain 02/14/23 1218 Posterior Neck Bulb 10 Fr. <1 day         Lumbar Drain 02/14/23 <1 day         Urethral Catheter 02/14/23 1031 Straight-tip 16 Fr. <1 day              Arterial Line  Duration             Arterial Line 02/14/23 0930 Right Radial <1 day              Peripheral Intravenous Line  Duration                  Peripheral IV - Single Lumen 02/14/23 0750 18 G Anterior;Distal;Left Forearm <1 day         Peripheral IV - Single Lumen 02/14/23 1041 20 G Right Antecubital <1 day                    Significant Labs:    Lab Results   Component Value Date    WBC 7.9 02/08/2023    HGB 13.4 (L) 02/08/2023    HCT 43.1 02/08/2023    MCV 89.6 02/08/2023     02/08/2023         BMP  Lab Results   Component Value Date     02/08/2023    K 4.7 02/08/2023    CHLORIDE 104 02/08/2023    CO2 28 02/08/2023    BUN 6.9 (L) 02/08/2023    CREATININE 0.84 02/08/2023    CALCIUM 9.7 02/08/2023    AGAP 10.0 07/25/2022    EGFRNONAA >60 08/01/2022       ABG  No results for input(s): PH, PO2, PCO2, HCO3, BE in the last 168 hours.    Mechanical Ventilation Support:       Significant Imaging:  I have reviewed the pertinent imaging within the past 24 hours.        Assessment/Plan:      Assessment  Postoperative cervical pseudomeningocele   S/p posterior cervical wound re-exploration and repair of dural tear   Insertion of lumbar spinal subarachnoid drainage catheter   Hypothyroidism   Hyperlipidemia       Plan  Admit to ICU for close monitoring   Neurosurgery following. Will continue to follow recommendations.   Q1h lumbar draining  Continue to closely monitor neurological status with q2h neuro exams   Avoid any CNS depressive medications   Zofran and norco for pain management   Continue current medications   PRN labetalol and hydralazine     DVT Prophylaxis: defer to Neurosurgery   GI Prophylaxis: defer to Neurosurgery     32 minutes of critical care was time spent personally by me on the following activities: development of treatment plan with patient or surrogate and bedside caregivers, discussions with consultants, evaluation of patient's response to treatment, examination of patient, ordering and performing treatments and interventions, ordering and review of laboratory studies, ordering and review of radiographic studies, pulse oximetry, re-evaluation of patient's condition.  This critical care time did not overlap with that of any other provider or involve time for any procedures.     Rosaura Gregory MD  Pulmonary Critical Care Medicine  [facility] - Periop Services

## 2023-02-15 LAB — PSYCHE PATHOLOGY RESULT: NORMAL

## 2023-02-15 PROCEDURE — 99024 PR POST-OP FOLLOW-UP VISIT: ICD-10-PCS | Mod: POP,,, | Performed by: NEUROLOGICAL SURGERY

## 2023-02-15 PROCEDURE — 25000003 PHARM REV CODE 250: Performed by: INTERNAL MEDICINE

## 2023-02-15 PROCEDURE — 20000000 HC ICU ROOM

## 2023-02-15 PROCEDURE — 63600175 PHARM REV CODE 636 W HCPCS: Performed by: PHYSICIAN ASSISTANT

## 2023-02-15 PROCEDURE — 97161 PT EVAL LOW COMPLEX 20 MIN: CPT

## 2023-02-15 PROCEDURE — 99024 POSTOP FOLLOW-UP VISIT: CPT | Mod: POP,,, | Performed by: NEUROLOGICAL SURGERY

## 2023-02-15 PROCEDURE — 97165 OT EVAL LOW COMPLEX 30 MIN: CPT

## 2023-02-15 PROCEDURE — 25000003 PHARM REV CODE 250: Performed by: PHYSICIAN ASSISTANT

## 2023-02-15 RX ORDER — AMITRIPTYLINE HYDROCHLORIDE 25 MG/1
25 TABLET, FILM COATED ORAL NIGHTLY
Status: DISCONTINUED | OUTPATIENT
Start: 2023-02-15 | End: 2023-02-20 | Stop reason: HOSPADM

## 2023-02-15 RX ORDER — ACETAMINOPHEN 325 MG/1
650 TABLET ORAL EVERY 6 HOURS PRN
Status: DISCONTINUED | OUTPATIENT
Start: 2023-02-15 | End: 2023-02-17

## 2023-02-15 RX ADMIN — DOCUSATE SODIUM 100 MG: 100 CAPSULE, LIQUID FILLED ORAL at 08:02

## 2023-02-15 RX ADMIN — GABAPENTIN 100 MG: 100 CAPSULE ORAL at 08:02

## 2023-02-15 RX ADMIN — LEVOTHYROXINE SODIUM 50 MCG: 50 TABLET ORAL at 08:02

## 2023-02-15 RX ADMIN — AMITRIPTYLINE HYDROCHLORIDE 25 MG: 25 TABLET, FILM COATED ORAL at 08:02

## 2023-02-15 RX ADMIN — ACETAMINOPHEN 650 MG: 325 TABLET ORAL at 12:02

## 2023-02-15 RX ADMIN — GABAPENTIN 100 MG: 100 CAPSULE ORAL at 03:02

## 2023-02-15 RX ADMIN — DEXTROSE MONOHYDRATE 1 G: 2.5 INJECTION INTRAVENOUS at 10:02

## 2023-02-15 RX ADMIN — DEXTROSE MONOHYDRATE 1 G: 2.5 INJECTION INTRAVENOUS at 01:02

## 2023-02-15 NOTE — NURSING
Nurses Note -- 4 Eyes      2/14/2023   6:37 PM      Skin assessed during: Admit      [x] No Pressure Injuries Present    [x]Prevention Measures Documented      [] Yes- Altered Skin Integrity Present or Discovered   [] LDA Added if Not in Epic (Describe Wound)   [] New Altered Skin Integrity was Present on Admit and Documented in LDA   [] Wound Image Taken    Wound Care Consulted? No    Attending Nurse:  Romi Abraham RN     Second RN/Staff Member:  Hailey Alex RN

## 2023-02-15 NOTE — NURSING
Nurses Note -- 4 Eyes      2/15/2023   11:30 AM      Skin assessed during: Daily Assessment      [x] No Pressure Injuries Present    [x]Prevention Measures Documented      [] Yes- Altered Skin Integrity Present or Discovered   [] LDA Added if Not in Epic (Describe Wound)   [] New Altered Skin Integrity was Present on Admit and Documented in LDA   [] Wound Image Taken    Wound Care Consulted? No    Attending Nurse:  Nenita Sanchez RN     Second RN/Staff Member:  Tab Parikh RN

## 2023-02-15 NOTE — PT/OT/SLP EVAL
"Occupational Therapy   Evaluation and Discharge Note    Name: Scott Echeverria  MRN: [id number]  Admitting Diagnosis: Postoperative cerebrospinal fluid leak  Recent Surgery: Procedure(s) (LRB):  REPAIR, CSF LEAK, SPINE (N/A)  INSERTION, DRAIN, SPINAL CANAL (N/A) 1 Day Post-Op    Recommendations:     Discharge Recommendations: home wife able to assist  Discharge Equipment Recommendations: none      Assessment:     Scott Echeverria is a 72 y.o. male with a medical diagnosis of cervcial exploration with dural tear repair and lumbar drain insertion.  Pt sitting up in chair with drain clamped.  Doing well.  No deficits in strength, endurance, ADLs, functional mobility noted.  Family reports he is moving better than he did before.  Has some L shoulder issues but WFL and not limiting.  Wife will be home at all times to ensure safety at home.  RN reports syncopal episode from supine>sit did not occur.  At this time, patient is functioning at their prior level of function and does not require further acute OT services.     Plan:     During this hospitalization, patient does not require further acute OT services.  Please re-consult if situation changes.    Plan of Care Reviewed with: patient, spouse, son    Subjective     Chief Complaint: no complaints  Patient/Family Comments/goals: "to be more active"    Occupational Profile:  Living Environment: lives with wife, threshold to enter, uses RW in am, built in shower chair and grab bars  Previous level of function: wife there for safety but pt able to complete ADLs and ambulate well.  Reports he falls asleep for supine>sit in am.  This did not occur this am.  They also have an adjustable bed at home.  Roles and Routines: , father, retired from [employer]  Equipment Used at home:  (uses RW at home in the morning PRN, has built in shower chair and grab bars in shower)  Assistance upon Discharge: wife    Pain/Comfort:  Pain Rating 1: 0/10    Patients cultural, spiritual, Gnosticist " "conflicts given the current situation:      Objective:     Communicated with: PLACIDO Gutierres prior to session.  Patient found up in chair with  (lumbar drain (CLAMPED BY RN), vital monitoring) upon OT entry to room.    General Precautions: Standard,  (clamp lumbar drain with mobility)  Orthopedic Precautions: N/A  Braces: N/A  Respiratory Status: Room air   133/89  O2 saturation 98%    Occupational Performance:    Functional Mobility/Transfers:  Patient completed Sit <> Stand Transfer with independence  with  no assistive device   Patient completed Toilet Transfer Step Transfer technique with independence with  no AD  Functional Mobility: Gait belt utilized.    Activities of Daily Living:  Upper Body Dressing: independence gown  Lower Body Dressing: reports no issue with underwear    Toileting: independent      Cognitive/Visual Perceptual:  Cognitive/Psychosocial Skills:     -       Oriented to: Person, Place, Time, and Situation   -       Follows Commands/attention:Follows multistep  commands    Physical Exam:  Upper Extremity Strength:    -       Right Upper Extremity: WFL  -       Left Upper Extremity: WFL    Treatment & Education:  OT signing off at this time.  No concerns related to ADLs, functional mobility.  Family in agreement with signing off.  Please re-consult if needs arise.      Patient left up in chair with all lines intact and family present.    GOALS:   Multidisciplinary Problems       Occupational Therapy Goals       Not on file                    History:     Past Medical History:   Diagnosis Date    Anesthesia complication     "post op day 2-3 had to be given narcan, intubated, and sent to ICU"    Balance problems     BPH (benign prostatic hyperplasia)     Cervical pain     Cervical radiculitis     Cervical spinal stenosis     HLD (hyperlipidemia)     Hypothyroidism, unspecified     Personal history of colonic polyps     Postoperative CSF leak     Shoulder pain, bilateral     Sleep apnea     " resolved since surgery         Past Surgical History:   Procedure Laterality Date    APPENDECTOMY  01/25/2022    Dr. Kerry Coats    CERVICAL SPINE SURGERY  07/12/2022    COLONOSCOPY W/ POLYPECTOMY  04/27/2021    Dr. David Montgomery    EXTRACTION OF TOOTH  01/20/2023    HEMORRHOID SURGERY  2002    INSERTION OF DRAIN INTO SPINAL CANAL N/A 2/14/2023    Procedure: INSERTION, DRAIN, SPINAL CANAL;  Surgeon: Paulo aRo MD;  Location: [facility] OR;  Service: Neurosurgery;  Laterality: N/A;  LUMBAR DRAIN     LAMINOPLASTY N/A 07/12/2022    Procedure: LAMINOPLASTY;  Surgeon: Paulo Rao MD;  Location: [facility] OR;  Service: Neurosurgery;  Laterality: N/A;  left C3-4, C4-5, C5-6, C6-7 foraminotomies, C3-7 laminoplasty //  TIVA SET UP    lower back surgery  1990    REPAIR OF CEREBROSPINAL FLUID LEAK OF SPINE N/A 2/14/2023    Procedure: REPAIR, CSF LEAK, SPINE;  Surgeon: Paulo Rao MD;  Location: [facility] OR;  Service: Neurosurgery;  Laterality: N/A;  posterior cervical re-exploration, drainage of pseudomeningiocele/repair of CSF leak //  XX    RETINAL DETACHMENT SURGERY Right 2015    SINUS SURGERY      TONSILLECTOMY  1956       Time Tracking:     OT Date of Treatment:    OT Start Time: 1416  OT Stop Time: 1435  OT Total Time (min): 19 min    Billable Minutes:Evaluation LOW    2/15/2023

## 2023-02-15 NOTE — PT/OT/SLP EVAL
"Physical Therapy Evaluation and Discharge Note    Patient Name:  Scott Echeverria   MRN:  [id number]    Recommendations:     Discharge Recommendations: home  Discharge Equipment Recommendations: none   Barriers to discharge:  medical dx    Assessment:     Scott Echeverria is a 72 y.o. male admitted with a medical diagnosis of Postoperative cerebrospinal fluid leak s/p re-exploration and repair of dural tear, insertion of L-spine subarachnoid drainage catheter. Pt with hx of C3-7 laminoplasty and L laminoforminotomies-- following this pt became aphasic with unilateral weakness that has now resolved. Pt recently becoming progressively symptomatic from pseudomeningocele.   At this time, patient is functioning at their prior level of function and does not require further acute PT services.     Recent Surgery: Procedure(s) (LRB):  REPAIR, CSF LEAK, SPINE (N/A)  INSERTION, DRAIN, SPINAL CANAL (N/A) 1 Day Post-Op    Plan:     During this hospitalization, patient does not require further acute PT services.  Please re-consult if situation changes.      Subjective     Chief Complaint: n/a  Patient/Family Comments/goals: to go home  Pain/Comfort:  Pain Rating 1: 0/10    Patients cultural, spiritual, Mu-ism conflicts given the current situation: no    Living Environment:  Pt lives with his spouse in a [facility], threshold to enter/exit  Prior to admission, patients level of function was independent. It was reported that wife helps pt get OOB in the mornings 2/2 upon sitting up pt experiences "fainting".    Equipment used at home:  (RW only when first getting up in the mornings).  DME owned (not currently used): rolling walker.    Upon discharge, patient will have assistance from spouse/family.    Objective:     Communicated with NSG prior to session.  Patient found up in chair with blood pressure cuff, peripheral IV, pulse ox (continuous), telemetry (lumbar drain-- RN clampped prior to ax) upon PT entry to room.    General " "Precautions: Standard,  (clamp lumbar drain when mobilizing)    Orthopedic Precautions:N/A   Braces: N/A  Respiratory Status: Room air  Vitals   BP: 133/89   HR: 106   SpO2: 98%    Exams:  Cognitive Exam:  Patient is oriented to Person, Place, Time, and Situation  RLE Strength: 5/5  LLE Strength: 5/5    Functional Mobility:  Transfers:     Sit to Stand:  independence with no AD  Gait: pt ambulated approx 370 feet without use of an AD; steady step through pattern; no overt LOB or safety concerns     Patient left  on toilet  with  RN notified and spouse present.    GOALS:   Multidisciplinary Problems       Physical Therapy Goals       Not on file                    History:     Past Medical History:   Diagnosis Date    Anesthesia complication     "post op day 2-3 had to be given narcan, intubated, and sent to ICU"    Balance problems     BPH (benign prostatic hyperplasia)     Cervical pain     Cervical radiculitis     Cervical spinal stenosis     HLD (hyperlipidemia)     Hypothyroidism, unspecified     Personal history of colonic polyps     Postoperative CSF leak     Shoulder pain, bilateral     Sleep apnea     resolved since surgery       Past Surgical History:   Procedure Laterality Date    APPENDECTOMY  01/25/2022    Dr. Kerry Coats    CERVICAL SPINE SURGERY  07/12/2022    COLONOSCOPY W/ POLYPECTOMY  04/27/2021    Dr. David Montgomery    EXTRACTION OF TOOTH  01/20/2023    HEMORRHOID SURGERY  2002    INSERTION OF DRAIN INTO SPINAL CANAL N/A 2/14/2023    Procedure: INSERTION, DRAIN, SPINAL CANAL;  Surgeon: Paulo Rao MD;  Location: [facility];  Service: Neurosurgery;  Laterality: N/A;  LUMBAR DRAIN     LAMINOPLASTY N/A 07/12/2022    Procedure: LAMINOPLASTY;  Surgeon: Paulo Rao MD;  Location: [facility];  Service: Neurosurgery;  Laterality: N/A;  left C3-4, C4-5, C5-6, C6-7 foraminotomies, C3-7 laminoplasty //  TIVA SET UP    lower back surgery  1990    REPAIR OF CEREBROSPINAL FLUID LEAK OF SPINE N/A 2/14/2023    " Procedure: REPAIR, CSF LEAK, SPINE;  Surgeon: Paulo Rao MD;  Location: [facility] OR;  Service: Neurosurgery;  Laterality: N/A;  posterior cervical re-exploration, drainage of pseudomeningiocele/repair of CSF leak //  XX    RETINAL DETACHMENT SURGERY Right 2015    SINUS SURGERY      TONSILLECTOMY  1956       Time Tracking:     PT Received On: 02/15/23  PT Start Time: 1420     PT Stop Time: 1436  PT Total Time (min): 16 min     Billable Minutes: Evaluation low      02/15/2023

## 2023-02-15 NOTE — PROGRESS NOTES
Left VM and canceled Judith's gerardo for 5/6/2020 due to COVID-19.  # for central scheduling given     POD#1 Posterior wound exploration with repair of dural tear with insertion of lumbar spinal drainage catheter  He is sitting up in bed, NAD  He currently denies pain at the incision site  He denies HA, N/V and blurred vision  He is tolerating PO well    AFVSS  PERRL, EOMI  Robert well, no deficits appreciated  Alert, oriented to all spheres. Follows commands in all ext.  Incision c/d/I. Dermabond to incision  ELISA output 30 overnight, serosanguineous  Lumbar drain functioning, draining 10cc/hr    Plan: Continue ELISA drain and lumbar drain  OK to leave incision open to air  Continue Q2 hour neuro checks  PT/OT ordered; fall precautions when OOB  SCDs for DVT prophylaxis

## 2023-02-15 NOTE — PLAN OF CARE
02/15/23 1139   Discharge Assessment   Assessment Type Discharge Planning Assessment   Confirmed/corrected address, phone number and insurance Yes   Confirmed Demographics Correct on Facesheet   Source of Information patient;family   When was your last doctors appointment?   (Patient reports October 26, 2022)   Communicated [place]IN[place] with patient/caregiver Yes   Reason For Admission Postoperative Cerebrospinal Fluid Leak   People in Home spouse   Do you expect to return to your current living situation? Yes   Do you have help at home or someone to help you manage your care at home? Yes   Who are your caregiver(s) and their phone number(s)? Spouse: Anusha Echeverria: [phone]   Prior to hospitilization cognitive status: Unable to Assess   Current cognitive status: Alert/Oriented   Home Accessibility wheelchair accessible   Home Layout Able to live on 1st floor   Equipment Currently Used at Home wheelchair;shower chair   Readmission within 30 days? No   Patient currently being followed by outpatient case management? No   Do you currently have service(s) that help you manage your care at home? No   Do you take prescription medications? Yes   Do you have prescription coverage? Yes   Coverage Medicare Part A & B   Do you have any problems affording any of your prescribed medications? No   Is the patient taking medications as prescribed? yes   Who is going to help you get home at discharge? Spouse   How do you get to doctors appointments? family or friend will provide   Are you on dialysis? No   Do you take coumadin? No   Discharge Plan A Home with family   Discharge Plan B Home   DME Needed Upon Discharge  none   Discharge Plan discussed with: Patient;Adult children;Spouse/sig other   Name(s) and Number(s) Spouse   Discharge Barriers Identified None   OTHER   Name(s) of People in Home Patient resides with his wife       No barriers to discharge at this time.

## 2023-02-16 PROCEDURE — 99024 POSTOP FOLLOW-UP VISIT: CPT | Mod: POP,,, | Performed by: NEUROLOGICAL SURGERY

## 2023-02-16 PROCEDURE — 99024 PR POST-OP FOLLOW-UP VISIT: ICD-10-PCS | Mod: POP,,, | Performed by: NEUROLOGICAL SURGERY

## 2023-02-16 PROCEDURE — 25000003 PHARM REV CODE 250: Performed by: INTERNAL MEDICINE

## 2023-02-16 PROCEDURE — 25000003 PHARM REV CODE 250: Performed by: NEUROLOGICAL SURGERY

## 2023-02-16 PROCEDURE — 63600175 PHARM REV CODE 636 W HCPCS: Performed by: STUDENT IN AN ORGANIZED HEALTH CARE EDUCATION/TRAINING PROGRAM

## 2023-02-16 PROCEDURE — 20000000 HC ICU ROOM

## 2023-02-16 PROCEDURE — 25000003 PHARM REV CODE 250: Performed by: PHYSICIAN ASSISTANT

## 2023-02-16 RX ORDER — MUPIROCIN 20 MG/G
OINTMENT TOPICAL 2 TIMES DAILY
Status: DISCONTINUED | OUTPATIENT
Start: 2023-02-16 | End: 2023-02-20 | Stop reason: HOSPADM

## 2023-02-16 RX ADMIN — DOCUSATE SODIUM 100 MG: 100 CAPSULE, LIQUID FILLED ORAL at 08:02

## 2023-02-16 RX ADMIN — GABAPENTIN 100 MG: 100 CAPSULE ORAL at 08:02

## 2023-02-16 RX ADMIN — MUPIROCIN: 20 OINTMENT TOPICAL at 08:02

## 2023-02-16 RX ADMIN — AMITRIPTYLINE HYDROCHLORIDE 25 MG: 25 TABLET, FILM COATED ORAL at 08:02

## 2023-02-16 RX ADMIN — GABAPENTIN 100 MG: 100 CAPSULE ORAL at 02:02

## 2023-02-16 RX ADMIN — LEVOTHYROXINE SODIUM 50 MCG: 50 TABLET ORAL at 08:02

## 2023-02-16 RX ADMIN — SODIUM CHLORIDE, POTASSIUM CHLORIDE, SODIUM LACTATE AND CALCIUM CHLORIDE 500 ML: 600; 310; 30; 20 INJECTION, SOLUTION INTRAVENOUS at 03:02

## 2023-02-16 NOTE — PROGRESS NOTES
"[facility] - Periop Services  Pulmonary Critical Care Note    Patient Name: Scott Echeverria  MRN: [id number]  Admission Date: 2/14/2023  Hospital Length of Stay: 2 days  Code Status: Prior  Attending Provider: Paulo Rao MD  Primary Care Provider: RON AUGUSTIN MD     Subjective:     HPI:     Mr. Echeverria is a 71 yo male with medical history of hypothyroidism, hyperlipidemia,  and C3-7 laminoplasty and left C3-7 laminoforaminotomies (7/12/2022) complicated by post-operative pseudomeningocele. Of note, patient had complications during postoperative period following surgery in July. Despite initially doing well, he became aphasic with unilateral weakness and progressive neurological decline requiring intubation for airway protection. MRIs unrevealing at the time, thought to be due to medication effects. Patient returned to baseline and discharged to rehab, however he has been progressively symptomatic from pseudomeningocele. Patient underwent posterior cervical re-exploration and repair of dural tear with insertion of lumbar spinal subarachnoid drainage catheter with Dr. Rao today,  2/14/23. He tolerated procedure well. Admitted to ICU for close monitoring.       Hospital Course/Significant events:  2/14/23 posterior cervical wound re-exploration and repair of dural tear   2/14/23 Admitted to ICU       24 Hour Interval History:  No acute events overnight. Denies any headaches, tolerating CSF drainage well. Sitting in chair at bedside, planning to ambulate today.         Past Medical History:   Diagnosis Date    Anesthesia complication     "post op day 2-3 had to be given narcan, intubated, and sent to ICU"    Balance problems     BPH (benign prostatic hyperplasia)     Cervical pain     Cervical radiculitis     Cervical spinal stenosis     HLD (hyperlipidemia)     Hypothyroidism, unspecified     Personal history of colonic polyps     Postoperative CSF leak     Shoulder pain, bilateral     Sleep " apnea     resolved since surgery       Past Surgical History:   Procedure Laterality Date    APPENDECTOMY  01/25/2022    Dr. Kerry Coats    CERVICAL SPINE SURGERY  07/12/2022    COLONOSCOPY W/ POLYPECTOMY  04/27/2021    Dr. David Montgomery    EXTRACTION OF TOOTH  01/20/2023    HEMORRHOID SURGERY  2002    INSERTION OF DRAIN INTO SPINAL CANAL N/A 2/14/2023    Procedure: INSERTION, DRAIN, SPINAL CANAL;  Surgeon: Paulo Rao MD;  Location: [facility] OR;  Service: Neurosurgery;  Laterality: N/A;  LUMBAR DRAIN     LAMINOPLASTY N/A 07/12/2022    Procedure: LAMINOPLASTY;  Surgeon: Paulo Rao MD;  Location: [facility] OR;  Service: Neurosurgery;  Laterality: N/A;  left C3-4, C4-5, C5-6, C6-7 foraminotomies, C3-7 laminoplasty //  TIVA SET UP    lower back surgery  1990    REPAIR OF CEREBROSPINAL FLUID LEAK OF SPINE N/A 2/14/2023    Procedure: REPAIR, CSF LEAK, SPINE;  Surgeon: Paulo Rao MD;  Location: [facility] OR;  Service: Neurosurgery;  Laterality: N/A;  posterior cervical re-exploration, drainage of pseudomeningiocele/repair of CSF leak //  XX    RETINAL DETACHMENT SURGERY Right 2015    SINUS SURGERY      TONSILLECTOMY  1956       Social History     Socioeconomic History    Marital status:    Tobacco Use    Smoking status: Never    Smokeless tobacco: Never   Substance and Sexual Activity    Alcohol use: Not Currently    Drug use: Never    Sexual activity: Not Currently           Current Outpatient Medications   Medication Instructions    amitriptyline (ELAVIL) 25 mg, Oral, Daily    aspirin 81 mg, Oral, Daily    diphenhydrAMINE (BENADRYL) 50 MG capsule Take one tablet (50mg) by mouth 1hr prior to contrast    docusate sodium (COLACE) 100 mg, Oral, 2 times daily    gabapentin (NEURONTIN) 100 MG capsule TAKE 1 CAPSULE (100 MG TOTAL) BY MOUTH THREE TIMES DAILY.    ibuprofen (ADVIL,MOTRIN) 200 mg, Oral, 2 times daily PRN    levothyroxine (SYNTHROID) 50 mcg, Oral, Daily    magnesium hydroxide 400 mg/5 ml (MILK OF MAGNESIA) 400  mg/5 mL Susp 15 mLs, Oral, Daily PRN    rosuvastatin (CRESTOR) 10 mg, Oral, Daily       Current Inpatient Medications   amitriptyline  25 mg Oral QHS    atorvastatin  40 mg Oral Daily    docusate sodium  100 mg Oral BID    gabapentin  100 mg Oral TID    levothyroxine  50 mcg Oral Daily         14 point ROS reviewed and negative unless documented in the history of present illness.         Objective:       Intake/Output Summary (Last 24 hours) at 2/16/2023 1038  Last data filed at 2/16/2023 1000  Gross per 24 hour   Intake 1060 ml   Output 1925 ml   Net -865 ml           Vital Signs (Most Recent):  Temp: 98.1 °F (36.7 °C) (02/16/23 0800)  Pulse: (!) 112 (02/16/23 1000)  Resp: (!) 23 (02/16/23 1000)  BP: 129/84 (02/16/23 1000)  SpO2: 98 % (02/16/23 1000)  Body mass index is 23.34 kg/m².  Weight: 71.7 kg (158 lb 1.1 oz) Vital Signs (24h Range):  Temp:  [97.7 °F (36.5 °C)-98.5 °F (36.9 °C)] 98.1 °F (36.7 °C)  Pulse:  [] 112  Resp:  [16-25] 23  SpO2:  [94 %-100 %] 98 %  BP: ()/(66-94) 129/84         Physical exam:  Gen- A/O, NAD  HENT- ATNC, MMM  CV- RRR, no murmurs  Resp- CTAB, normal work of breathing   Neuro- A/Ox3, ESTEBAN, no gross deficits  Psych- appropriate mood and affect         Lines/Drains/Airways       Drain  Duration                  Lumbar Drain 02/14/23 2 days              Peripheral Intravenous Line  Duration                  Peripheral IV - Single Lumen 02/14/23 0750 18 G Anterior;Distal;Left Forearm 2 days         Peripheral IV - Single Lumen 02/14/23 1041 20 G Right Antecubital 1 day                    Significant Labs:    Lab Results   Component Value Date    WBC 7.9 02/08/2023    HGB 13.4 (L) 02/08/2023    HCT 43.1 02/08/2023    MCV 89.6 02/08/2023     02/08/2023         BMP  Lab Results   Component Value Date     02/08/2023    K 4.7 02/08/2023    CHLORIDE 104 02/08/2023    CO2 28 02/08/2023    BUN 6.9 (L) 02/08/2023    CREATININE 0.84 02/08/2023    CALCIUM 9.7 02/08/2023     AGAP 10.0 07/25/2022    EGFRNONAA >60 08/01/2022             Assessment/Plan:     Assessment  Postoperative cervical pseudomeningocele   S/p posterior cervical wound re-exploration and repair of dural tear   Insertion of lumbar spinal subarachnoid drainage catheter   Hypothyroidism   Hyperlipidemia       Plan  -continue q2h neuro exams   -increase q1hr drainage to 15mL per neurosurgery  -will remain in ICU for close monitoring and drainage for until Saturday   -case discussed with Dr. Rao this AM       DVT ppx: SCDs given lumbar drain   GI ppx: none        Shaw Khan MD  Pulmonary Critical Care Medicine  [facility] - Periop Services

## 2023-02-16 NOTE — NURSING
Nurses Note -- 4 Eyes      2/15/2023   10:40 PM      Skin assessed during: Q Shift Change      [x] No Pressure Injuries Present    [x]Prevention Measures Documented      [] Yes- Altered Skin Integrity Present or Discovered   [] LDA Added if Not in Epic (Describe Wound)   [] New Altered Skin Integrity was Present on Admit and Documented in LDA   [] Wound Image Taken    Wound Care Consulted? No    Attending Nurse:  Delphine Martines RN     Second RN/Staff Member:  PLACIDO Gutierres

## 2023-02-16 NOTE — NURSING
Nurses Note -- 4 Eyes      2/16/2023   10:44 AM      Skin assessed during: Daily Assessment      [x] No Pressure Injuries Present    [x]Prevention Measures Documented      [] Yes- Altered Skin Integrity Present or Discovered   [] LDA Added if Not in Epic (Describe Wound)   [] New Altered Skin Integrity was Present on Admit and Documented in LDA   [] Wound Image Taken    Wound Care Consulted? No    Attending Nurse:  Nenita Sanchez RN     Second RN/Staff Member:  Tab Parikh RN

## 2023-02-17 LAB
ANION GAP SERPL CALC-SCNC: 12 MEQ/L
BUN SERPL-MCNC: 11.8 MG/DL (ref 8.4–25.7)
CALCIUM SERPL-MCNC: 9.7 MG/DL (ref 8.8–10)
CHLORIDE SERPL-SCNC: 103 MMOL/L (ref 98–107)
CO2 SERPL-SCNC: 28 MMOL/L (ref 23–31)
CREAT SERPL-MCNC: 0.85 MG/DL (ref 0.73–1.18)
CREAT/UREA NIT SERPL: 14
GFR SERPLBLD CREATININE-BSD FMLA CKD-EPI: >60 MLS/MIN/1.73/M2
GLUCOSE SERPL-MCNC: 99 MG/DL (ref 82–115)
MAGNESIUM SERPL-MCNC: 2.2 MG/DL (ref 1.6–2.6)
POTASSIUM SERPL-SCNC: 3.9 MMOL/L (ref 3.5–5.1)
SODIUM SERPL-SCNC: 143 MMOL/L (ref 136–145)

## 2023-02-17 PROCEDURE — 25000003 PHARM REV CODE 250: Performed by: INTERNAL MEDICINE

## 2023-02-17 PROCEDURE — 93005 ELECTROCARDIOGRAM TRACING: CPT

## 2023-02-17 PROCEDURE — 93010 ELECTROCARDIOGRAM REPORT: CPT | Mod: ,,, | Performed by: INTERNAL MEDICINE

## 2023-02-17 PROCEDURE — 25000003 PHARM REV CODE 250: Performed by: PHYSICIAN ASSISTANT

## 2023-02-17 PROCEDURE — 80048 BASIC METABOLIC PNL TOTAL CA: CPT | Performed by: INTERNAL MEDICINE

## 2023-02-17 PROCEDURE — 93010 EKG 12-LEAD: ICD-10-PCS | Mod: ,,, | Performed by: INTERNAL MEDICINE

## 2023-02-17 PROCEDURE — 20000000 HC ICU ROOM

## 2023-02-17 PROCEDURE — 83735 ASSAY OF MAGNESIUM: CPT | Performed by: INTERNAL MEDICINE

## 2023-02-17 RX ORDER — ACETAMINOPHEN 325 MG/1
650 TABLET ORAL EVERY 6 HOURS PRN
Status: DISCONTINUED | OUTPATIENT
Start: 2023-02-17 | End: 2023-02-20 | Stop reason: HOSPADM

## 2023-02-17 RX ADMIN — MUPIROCIN: 20 OINTMENT TOPICAL at 08:02

## 2023-02-17 RX ADMIN — GABAPENTIN 100 MG: 100 CAPSULE ORAL at 03:02

## 2023-02-17 RX ADMIN — DOCUSATE SODIUM 100 MG: 100 CAPSULE, LIQUID FILLED ORAL at 08:02

## 2023-02-17 RX ADMIN — GABAPENTIN 100 MG: 100 CAPSULE ORAL at 08:02

## 2023-02-17 RX ADMIN — AMITRIPTYLINE HYDROCHLORIDE 25 MG: 25 TABLET, FILM COATED ORAL at 08:02

## 2023-02-17 RX ADMIN — LEVOTHYROXINE SODIUM 50 MCG: 50 TABLET ORAL at 08:02

## 2023-02-17 NOTE — PROGRESS NOTES
"[facility]  Periop Services  Pulmonary Critical Care Note    Patient Name: Scott Echeverria  MRN: [id number]  Admission Date: 2/14/2023  Hospital Length of Stay: 3 days  Code Status: Prior  Attending Provider: Paulo Rao MD  Primary Care Provider: RON AUGUSTIN MD     Subjective:     HPI:     Mr. Echeverria is a 71 yo male with medical history of hypothyroidism, hyperlipidemia,  and C3-7 laminoplasty and left C3-7 laminoforaminotomies (7/12/2022) complicated by post-operative pseudomeningocele. Of note, patient had complications during postoperative period following surgery in July. Despite initially doing well, he became aphasic with unilateral weakness and progressive neurological decline requiring intubation for airway protection. MRIs unrevealing at the time, thought to be due to medication effects. Patient returned to baseline and discharged to rehab, however he has been progressively symptomatic from pseudomeningocele. Patient underwent posterior cervical re-exploration and repair of dural tear with insertion of lumbar spinal subarachnoid drainage catheter with Dr. Rao today,  2/14/23. He tolerated procedure well. Admitted to ICU for close monitoring.       Hospital Course/Significant events:  2/14/23 posterior cervical wound re-exploration and repair of dural tear   2/14/23 Admitted to ICU       24 Hour Interval History:  Tolerated 15mL drainage well yesterday, no headaches. Drainage catheter fractured overnight requiring repair by neurosurgery, currently functioning. Ambulatory around unit yesterday without issues.         Past Medical History:   Diagnosis Date    Anesthesia complication     "post op day 2-3 had to be given narcan, intubated, and sent to ICU"    Balance problems     BPH (benign prostatic hyperplasia)     Cervical pain     Cervical radiculitis     Cervical spinal stenosis     HLD (hyperlipidemia)     Hypothyroidism, unspecified     Personal history of colonic polyps     " Postoperative CSF leak     Shoulder pain, bilateral     Sleep apnea     resolved since surgery       Past Surgical History:   Procedure Laterality Date    APPENDECTOMY  01/25/2022    Dr. Kerry Coats    CERVICAL SPINE SURGERY  07/12/2022    COLONOSCOPY W/ POLYPECTOMY  04/27/2021    Dr. David Montgomery    EXTRACTION OF TOOTH  01/20/2023    HEMORRHOID SURGERY  2002    INSERTION OF DRAIN INTO SPINAL CANAL N/A 2/14/2023    Procedure: INSERTION, DRAIN, SPINAL CANAL;  Surgeon: Paulo Rao MD;  Location: [facility] OR;  Service: Neurosurgery;  Laterality: N/A;  LUMBAR DRAIN     LAMINOPLASTY N/A 07/12/2022    Procedure: LAMINOPLASTY;  Surgeon: Paulo Rao MD;  Location: [facility] OR;  Service: Neurosurgery;  Laterality: N/A;  left C3-4, C4-5, C5-6, C6-7 foraminotomies, C3-7 laminoplasty //  TIVA SET UP    lower back surgery  1990    REPAIR OF CEREBROSPINAL FLUID LEAK OF SPINE N/A 2/14/2023    Procedure: REPAIR, CSF LEAK, SPINE;  Surgeon: Paulo Rao MD;  Location: [facility] OR;  Service: Neurosurgery;  Laterality: N/A;  posterior cervical re-exploration, drainage of pseudomeningiocele/repair of CSF leak //  XX    RETINAL DETACHMENT SURGERY Right 2015    SINUS SURGERY      TONSILLECTOMY  1956       Social History     Socioeconomic History    Marital status:    Tobacco Use    Smoking status: Never    Smokeless tobacco: Never   Substance and Sexual Activity    Alcohol use: Not Currently    Drug use: Never    Sexual activity: Not Currently           Current Outpatient Medications   Medication Instructions    amitriptyline (ELAVIL) 25 mg, Oral, Daily    aspirin 81 mg, Oral, Daily    diphenhydrAMINE (BENADRYL) 50 MG capsule Take one tablet (50mg) by mouth 1hr prior to contrast    docusate sodium (COLACE) 100 mg, Oral, 2 times daily    gabapentin (NEURONTIN) 100 MG capsule TAKE 1 CAPSULE (100 MG TOTAL) BY MOUTH THREE TIMES DAILY.    ibuprofen (ADVIL,MOTRIN) 200 mg, Oral, 2 times daily PRN    levothyroxine (SYNTHROID) 50 mcg, Oral,  Daily    magnesium hydroxide 400 mg/5 ml (MILK OF MAGNESIA) 400 mg/5 mL Susp 15 mLs, Oral, Daily PRN    rosuvastatin (CRESTOR) 10 mg, Oral, Daily       Current Inpatient Medications   amitriptyline  25 mg Oral QHS    atorvastatin  40 mg Oral Daily    docusate sodium  100 mg Oral BID    gabapentin  100 mg Oral TID    levothyroxine  50 mcg Oral Daily    mupirocin   Nasal BID         14 point ROS reviewed and negative unless documented in the history of present illness.         Objective:       Intake/Output Summary (Last 24 hours) at 2/17/2023 0914  Last data filed at 2/17/2023 0900  Gross per 24 hour   Intake 1100 ml   Output 2455 ml   Net -1355 ml         Vital Signs (Most Recent):  Temp: 97.9 °F (36.6 °C) (02/17/23 0800)  Pulse: (!) 121 (02/17/23 0900)  Resp: 20 (02/17/23 0900)  BP: 105/69 (02/17/23 0900)  SpO2: 96 % (02/17/23 0900)  Body mass index is 23.34 kg/m².  Weight: 71.7 kg (158 lb 1.1 oz) Vital Signs (24h Range):  Temp:  [97.9 °F (36.6 °C)-98.6 °F (37 °C)] 97.9 °F (36.6 °C)  Pulse:  [] 121  Resp:  [14-25] 20  SpO2:  [91 %-100 %] 96 %  BP: ()/(66-93) 105/69         Physical exam:  Gen- A/O, NAD  HENT- ATNC, MMM  CV- RRR, no murmurs  Resp- CTAB, normal work of breathing   MSK- WWP, no LE edema; well healed posterior cervical incision   Neuro- A/Ox3, ESTEBAN, no gross deficits  Psych- appropriate mood and affect         Lines/Drains/Airways       Drain  Duration                  Lumbar Drain 02/14/23 3 days              Peripheral Intravenous Line  Duration                  Peripheral IV - Single Lumen 02/14/23 0750 18 G Anterior;Distal;Left Forearm 3 days         Peripheral IV - Single Lumen 02/14/23 1041 20 G Right Antecubital 2 days                    Significant Labs:    Lab Results   Component Value Date    WBC 7.9 02/08/2023    HGB 13.4 (L) 02/08/2023    HCT 43.1 02/08/2023    MCV 89.6 02/08/2023     02/08/2023         BMP  Lab Results   Component Value Date     02/08/2023    K 4.7  02/08/2023    CHLORIDE 104 02/08/2023    CO2 28 02/08/2023    BUN 6.9 (L) 02/08/2023    CREATININE 0.84 02/08/2023    CALCIUM 9.7 02/08/2023    AGAP 10.0 07/25/2022    EGFRNONAA >60 08/01/2022             Assessment/Plan:     Assessment  Postoperative cervical pseudomeningocele   S/p posterior cervical wound re-exploration and repair of dural tear   Insertion of lumbar spinal subarachnoid drainage catheter   Hypothyroidism   Hyperlipidemia       Plan  -continue q2h neuro exams   -q1hr 15mL drainage per neurosurgery  -will remain in ICU for close monitoring and drainage until Saturday, with clamping trial to follow at NSX discretion    -EKG this AM given mild tachycardia, check electrolytes         DVT ppx: SCDs given lumbar drain   GI ppx: none          Shaw Khan MD  Pulmonary Critical Care Medicine  [facility] - Periop Services

## 2023-02-17 NOTE — PROGRESS NOTES
I was contacted by Dr. Echeverria's ICU nurse this evening on 02/16/2023 as the cross covering neurosurgeon for Dr. Rao. The patient's lumbar drain had been functional, draining 15 cc/hour of CSF, but the tubing broke.  The nurse placed a hemostat on the lumbar drain catheter and kept the patient on his side.     In reviewing his history, Mr. Echeverria is a 72-year-old male who is s/p C3-7 laminoplasty and left C3-7 laminoforaminotomies on 7/12/2022.  He developed a pseudomeningocele postoperatively.  He is POD #2 s/p posterior cervical wound re-exploration, repair of dural tear, and placement of a lumbar drain on 02/14/2023 by Dr. Rao.    I evaluated Mr. Echeverria in the ICU immediately after being contacted.  He does not have any headaches or drainage from his posterior cervical wound or lumbar drain skin site.  The patient has a GCS 15, is conversant, and moves all extremities well.    With sterile technique, I prepped the end of the lumbar tubing with Betadine, cut off approximately 2 cm, and attempted to use the connector in the CSF collection system to reconnect this drain.  This connector did not fit into the thin diameter lumbar drain.  Therefore, a second attempt using sterile technique was made with using a more specific connector that came in a lumbar drain kit.  Again, I prepped the end of the lumbar tubing with Betadine, cut off approximately 2 cm, and attached the lumbar drain tubing to a CSF collection system with this lumbar drain connector.  There was good flow of clear CSF into the drainage system without any difficulties. The plan is to continue lumbar drainage at a rate of 15 cc/hour.    Mr. Echeverria was provided an update of this situation and several ICU nurses were at the bedside.  All questions were answered to their satisfaction.      I will be discussing this lumbar drainage situation with Dr. Rao in the morning.  A consideration will be made to collect CSF in the morning and send it for  routine lab work to ensure that CSF is not infected.

## 2023-02-17 NOTE — NURSING
Nurses Note -- 4 Eyes      2/17/2023   9:25 AM      Skin assessed during: Daily Assessment      [x] No Pressure Injuries Present    [x]Prevention Measures Documented      [] Yes- Altered Skin Integrity Present or Discovered   [] LDA Added if Not in Epic (Describe Wound)   [] New Altered Skin Integrity was Present on Admit and Documented in LDA   [] Wound Image Taken    Wound Care Consulted? No    Attending Nurse:  Nenita Sanchez RN     Second RN/Staff Member:  Tab Parikh RN

## 2023-02-17 NOTE — NURSING
Nurses Note -- 4 Eyes      2/17/2023   3:04 AM      Skin assessed during: Q Shift Change      [x] No Pressure Injuries Present    [x]Prevention Measures Documented      [] Yes- Altered Skin Integrity Present or Discovered   [] LDA Added if Not in Epic (Describe Wound)   [] New Altered Skin Integrity was Present on Admit and Documented in LDA   [] Wound Image Taken    Wound Care Consulted? No    Attending Nurse:  Delphine Martines RN     Second RN/Staff Member:  PLACIDO Gutierres

## 2023-02-18 PROCEDURE — 25000003 PHARM REV CODE 250: Performed by: INTERNAL MEDICINE

## 2023-02-18 PROCEDURE — 25000003 PHARM REV CODE 250: Performed by: PHYSICIAN ASSISTANT

## 2023-02-18 PROCEDURE — 20000000 HC ICU ROOM

## 2023-02-18 RX ADMIN — MUPIROCIN: 20 OINTMENT TOPICAL at 08:02

## 2023-02-18 RX ADMIN — AMITRIPTYLINE HYDROCHLORIDE 25 MG: 25 TABLET, FILM COATED ORAL at 08:02

## 2023-02-18 RX ADMIN — GABAPENTIN 100 MG: 100 CAPSULE ORAL at 02:02

## 2023-02-18 RX ADMIN — DOCUSATE SODIUM 100 MG: 100 CAPSULE, LIQUID FILLED ORAL at 08:02

## 2023-02-18 RX ADMIN — GABAPENTIN 100 MG: 100 CAPSULE ORAL at 08:02

## 2023-02-18 RX ADMIN — LEVOTHYROXINE SODIUM 50 MCG: 50 TABLET ORAL at 08:02

## 2023-02-18 NOTE — NURSING
Nurses Note -- 4 Eyes      2/18/2023   12:48 PM      Skin assessed during: Daily Assessment      [x] No Pressure Injuries Present    [x]Prevention Measures Documented      [] Yes- Altered Skin Integrity Present or Discovered   [] LDA Added if Not in Epic (Describe Wound)   [] New Altered Skin Integrity was Present on Admit and Documented in LDA   [] Wound Image Taken    Wound Care Consulted? No    Attending Nurse:  Scar Ramos RN     Second RN/Staff Member:  Susy Lawrence RN

## 2023-02-18 NOTE — NURSING
Nurses Note -- 4 Eyes      2/18/2023   5:57 AM      Skin assessed during: Q Shift Change      [x] No Pressure Injuries Present    [x]Prevention Measures Documented      [] Yes- Altered Skin Integrity Present or Discovered   [] LDA Added if Not in Epic (Describe Wound)   [] New Altered Skin Integrity was Present on Admit and Documented in LDA   [] Wound Image Taken    Wound Care Consulted? No    Attending Nurse:  Delphine Martines RN     Second RN/Staff Member:  PLACIDO Gutierres

## 2023-02-18 NOTE — PROGRESS NOTES
POD#4 Posterior wound exploration with repair of dural tear with insertion of lumbar spinal drainage catheter    Doing very well.  Extremely pleasant.  Ambulating with minimal assistance often.  He denies HA, N/V and blurred vision  He is tolerating PO well    AFVSS  PERRL, EOMI  Robert well, no deficits appreciated  Alert, oriented to all spheres. Follows commands in all ext.  Incision c/d/I.  No drainage.    Lumbar drain functioning, draining 15 mL per mL/hr.      Plan:     Continue lumbar drain for now.  The plan so far is to clamp the drain on Sunday morning and pull on Monday morning hopefully discharge home if patient remains neurologically stable.   OK to leave incision open to air  Continue Q2 hour neuro checks  PT/OT ordered; fall precautions when OOB  SCDs for DVT prophylaxis  Fall precautions   SCDs    Spent some time answering all of the patient's questions in a manner in which I felt he could easily understand.  He expressed that longer more detailed conversations alleviate his anxiety.  Support offered.    Post-Op Info:  Procedure(s) (LRB):  REPAIR, CSF LEAK, SPINE (N/A)  INSERTION, DRAIN, SPINAL CANAL (N/A)   4 Days Post-Op      Medications:  Continuous Infusions:  Scheduled Meds:   amitriptyline  25 mg Oral QHS    atorvastatin  40 mg Oral Daily    docusate sodium  100 mg Oral BID    gabapentin  100 mg Oral TID    levothyroxine  50 mcg Oral Daily    mupirocin   Nasal BID     PRN Meds:acetaminophen, aluminum-magnesium hydroxide-simethicone, hydrALAZINE, HYDROcodone-acetaminophen, labetalol, magnesium hydroxide 400 mg/5 ml, ondansetron, ondansetron, prochlorperazine     Review of Systems  Objective:     Weight: 71.7 kg (158 lb 1.1 oz)  Body mass index is 23.34 kg/m².  Vital Signs (Most Recent):  Temp: 98.3 °F (36.8 °C) (02/18/23 0400)  Pulse: 106 (02/18/23 1000)  Resp: 13 (02/18/23 1000)  BP: 115/73 (02/18/23 1000)  SpO2: 100 % (02/18/23 1000) Vital Signs (24h Range):  Temp:  [97.3 °F (36.3 °C)-98.4 °F  (36.9 °C)] 98.3 °F (36.8 °C)  Pulse:  [] 106  Resp:  [12-24] 13  SpO2:  [94 %-100 %] 100 %  BP: ()/(62-81) 115/73     Date 02/18/23 0700 - 02/19/23 0659   Shift 9008-0880 5237-1618 0858-2723 24 Hour Total   INTAKE   Shift Total(mL/kg)       OUTPUT   Urine(mL/kg/hr) 225   225   Drains 60   60   Shift Total(mL/kg) 285(4)   285(4)   Weight (kg) 71.7 71.7 71.7 71.7                        Lumbar Drain 02/14/23 (Active)   Drain Status Clamped 02/18/23 0800   Drain Level (cm) 0 cm 02/16/23 0800   CSF Color Serous 02/18/23 0800   Site Description Bleeding 02/14/23 1630   Dressing Status Clean;Dry;Intact 02/18/23 0800   Interventions HOB degrees (see comment) 02/18/23 0800   Output (mL) 15 mL 02/18/23 1000       Neurosurgery Physical Exam    Significant Labs:  Recent Labs   Lab 02/17/23  1047      K 3.9   CO2 28   BUN 11.8   CREATININE 0.85   CALCIUM 9.7   MG 2.20     No results for input(s): WBC, HGB, HCT, PLT in the last 48 hours.  No results for input(s): LABPT, INR, APTT in the last 48 hours.  Microbiology Results (last 7 days)       ** No results found for the last 168 hours. **            Active Diagnoses:    Diagnosis Date Noted POA    PRINCIPAL PROBLEM:  Postoperative cerebrospinal fluid leak [G97.82, G96.00] 02/02/2023 Unknown      Problems Resolved During this Admission:       Carly Armando, [facility]  Neurosurgery  [facility] - [facility] East ICU

## 2023-02-18 NOTE — PROGRESS NOTES
"[facility] - Periop Services  Pulmonary Critical Care Note    Patient Name: Scott Echeverria  MRN: [id number]  Admission Date: 2/14/2023  Hospital Length of Stay: 4 days  Code Status: Prior  Attending Provider: Paulo Rao MD  Primary Care Provider: RON AUGUSTIN MD     Subjective:     HPI:   Mr. Echeverria is a 71 yo male with medical history of hypothyroidism, hyperlipidemia,  and C3-7 laminoplasty and left C3-7 laminoforaminotomies (7/12/2022) complicated by post-operative pseudomeningocele. Of note, patient had complications during postoperative period following surgery in July. Despite initially doing well, he became aphasic with unilateral weakness and progressive neurological decline requiring intubation for airway protection. MRIs unrevealing at the time, thought to be due to medication effects. Patient returned to baseline and discharged to rehab, however he has been progressively symptomatic from pseudomeningocele. Patient underwent posterior cervical re-exploration and repair of dural tear with insertion of lumbar spinal subarachnoid drainage catheter with Dr. Rao today,  2/14/23. He tolerated procedure well. Admitted to ICU for close monitoring.     Hospital Course/Significant events:  2/14/23 posterior cervical wound re-exploration and repair of dural tear   2/14/23 Admitted to ICU     24 Hour Interval History:  No acute events overnight. He has no acute complaints at this time. Patient states he has been able to ambulate and walk with assistance. I/O: 1.1L urine and 345 mL drainage past 24 hours, net negative 3.5 L.    Past Medical History:   Diagnosis Date    Anesthesia complication     "post op day 2-3 had to be given narcan, intubated, and sent to ICU"    Balance problems     BPH (benign prostatic hyperplasia)     Cervical pain     Cervical radiculitis     Cervical spinal stenosis     HLD (hyperlipidemia)     Hypothyroidism, unspecified     Personal history of colonic polyps     " Postoperative CSF leak     Shoulder pain, bilateral     Sleep apnea     resolved since surgery       Past Surgical History:   Procedure Laterality Date    APPENDECTOMY  01/25/2022    Dr. Kerry Coats    CERVICAL SPINE SURGERY  07/12/2022    COLONOSCOPY W/ POLYPECTOMY  04/27/2021    Dr. David Montgomery    EXTRACTION OF TOOTH  01/20/2023    HEMORRHOID SURGERY  2002    INSERTION OF DRAIN INTO SPINAL CANAL N/A 2/14/2023    Procedure: INSERTION, DRAIN, SPINAL CANAL;  Surgeon: Paulo Rao MD;  Location: [facility] OR;  Service: Neurosurgery;  Laterality: N/A;  LUMBAR DRAIN     LAMINOPLASTY N/A 07/12/2022    Procedure: LAMINOPLASTY;  Surgeon: Paulo Rao MD;  Location: [facility] OR;  Service: Neurosurgery;  Laterality: N/A;  left C3-4, C4-5, C5-6, C6-7 foraminotomies, C3-7 laminoplasty //  TIVA SET UP    lower back surgery  1990    REPAIR OF CEREBROSPINAL FLUID LEAK OF SPINE N/A 2/14/2023    Procedure: REPAIR, CSF LEAK, SPINE;  Surgeon: Paulo Rao MD;  Location: [facility] OR;  Service: Neurosurgery;  Laterality: N/A;  posterior cervical re-exploration, drainage of pseudomeningiocele/repair of CSF leak //  XX    RETINAL DETACHMENT SURGERY Right 2015    SINUS SURGERY      TONSILLECTOMY  1956       Social History     Socioeconomic History    Marital status:    Tobacco Use    Smoking status: Never    Smokeless tobacco: Never   Substance and Sexual Activity    Alcohol use: Not Currently    Drug use: Never    Sexual activity: Not Currently           Current Outpatient Medications   Medication Instructions    amitriptyline (ELAVIL) 25 mg, Oral, Daily    aspirin 81 mg, Oral, Daily    diphenhydrAMINE (BENADRYL) 50 MG capsule Take one tablet (50mg) by mouth 1hr prior to contrast    docusate sodium (COLACE) 100 mg, Oral, 2 times daily    gabapentin (NEURONTIN) 100 MG capsule TAKE 1 CAPSULE (100 MG TOTAL) BY MOUTH THREE TIMES DAILY.    ibuprofen (ADVIL,MOTRIN) 200 mg, Oral, 2 times daily PRN    levothyroxine (SYNTHROID) 50 mcg, Oral,  Daily    magnesium hydroxide 400 mg/5 ml (MILK OF MAGNESIA) 400 mg/5 mL Susp 15 mLs, Oral, Daily PRN    rosuvastatin (CRESTOR) 10 mg, Oral, Daily       Current Inpatient Medications   amitriptyline  25 mg Oral QHS    atorvastatin  40 mg Oral Daily    docusate sodium  100 mg Oral BID    gabapentin  100 mg Oral TID    levothyroxine  50 mcg Oral Daily    mupirocin   Nasal BID         14 point ROS reviewed and negative unless documented in the history of present illness.         Objective:       Intake/Output Summary (Last 24 hours) at 2/18/2023 0712  Last data filed at 2/18/2023 0600  Gross per 24 hour   Intake 920 ml   Output 1485 ml   Net -565 ml         Vital Signs (Most Recent):  Temp: 98.3 °F (36.8 °C) (02/18/23 0400)  Pulse: 81 (02/18/23 0600)  Resp: 18 (02/18/23 0600)  BP: 127/79 (02/18/23 0600)  SpO2: (!) 94 % (02/18/23 0600)  Body mass index is 23.34 kg/m².  Weight: 71.7 kg (158 lb 1.1 oz) Vital Signs (24h Range):  Temp:  [97.3 °F (36.3 °C)-98.4 °F (36.9 °C)] 98.3 °F (36.8 °C)  Pulse:  [] 81  Resp:  [12-24] 18  SpO2:  [94 %-100 %] 94 %  BP: ()/(62-81) 127/79     Physical exam:  General: Well nourished w/o distress  HEENT: NC/AT; PERRL; nasal and oral mucosa moist and clear  Pulm: CTA bilaterally, normal work of breathing on room air  CV: S1, S2 w/o murmurs or gallops; no edema noted  GI: Soft with normal bowel sounds in all quadrants, no masses on palpation  MSK: Full ROM of all extremities  Neuro: AAOx4; motor/sensory function intact  Psych: Cooperative; appropriate mood and affect     Lines/Drains/Airways       Drain  Duration                  Lumbar Drain 02/14/23 4 days              Peripheral Intravenous Line  Duration                  Peripheral IV - Single Lumen 02/14/23 0750 18 G Anterior;Distal;Left Forearm 3 days         Peripheral IV - Single Lumen 02/14/23 1041 20 G Right Antecubital 3 days                    Significant Labs:    Lab Results   Component Value Date    WBC 7.9 02/08/2023     HGB 13.4 (L) 02/08/2023    HCT 43.1 02/08/2023    MCV 89.6 02/08/2023     02/08/2023         BMP  Lab Results   Component Value Date     02/17/2023    K 3.9 02/17/2023    CHLORIDE 103 02/17/2023    CO2 28 02/17/2023    BUN 11.8 02/17/2023    CREATININE 0.85 02/17/2023    CALCIUM 9.7 02/17/2023    AGAP 12.0 02/17/2023    EGFRNONAA >60 08/01/2022       Assessment/Plan:     Assessment  Postoperative cervical pseudomeningocele   S/p posterior cervical wound re-exploration and repair of dural tear   Insertion of lumbar spinal subarachnoid drainage catheter   Hypothyroidism   Hyperlipidemia     Plan  -Continue ICU level of care for ongoing monitoring and medical therapy  -Will remain in ICU for close monitoring and drainage until Saturday (2/18/2023), with clamping trial to follow at neurosurgery discretion    -Rest of care per neurosurgery team    DVT ppx: SCDs given lumbar drain   GI ppx: None      Anaya Sullivan DO  Pulmonary Critical Care Medicine  [facility] - Periop Services

## 2023-02-19 PROCEDURE — 99024 POSTOP FOLLOW-UP VISIT: CPT | Mod: POP,,, | Performed by: NEUROLOGICAL SURGERY

## 2023-02-19 PROCEDURE — 25000003 PHARM REV CODE 250: Performed by: INTERNAL MEDICINE

## 2023-02-19 PROCEDURE — 25000003 PHARM REV CODE 250: Performed by: PHYSICIAN ASSISTANT

## 2023-02-19 PROCEDURE — 99024 PR POST-OP FOLLOW-UP VISIT: ICD-10-PCS | Mod: POP,,, | Performed by: NEUROLOGICAL SURGERY

## 2023-02-19 PROCEDURE — 20000000 HC ICU ROOM

## 2023-02-19 RX ADMIN — MUPIROCIN: 20 OINTMENT TOPICAL at 09:02

## 2023-02-19 RX ADMIN — GABAPENTIN 100 MG: 100 CAPSULE ORAL at 03:02

## 2023-02-19 RX ADMIN — AMITRIPTYLINE HYDROCHLORIDE 25 MG: 25 TABLET, FILM COATED ORAL at 08:02

## 2023-02-19 RX ADMIN — MUPIROCIN: 20 OINTMENT TOPICAL at 08:02

## 2023-02-19 RX ADMIN — LEVOTHYROXINE SODIUM 50 MCG: 50 TABLET ORAL at 09:02

## 2023-02-19 RX ADMIN — DOCUSATE SODIUM 100 MG: 100 CAPSULE, LIQUID FILLED ORAL at 09:02

## 2023-02-19 RX ADMIN — GABAPENTIN 100 MG: 100 CAPSULE ORAL at 08:02

## 2023-02-19 RX ADMIN — DOCUSATE SODIUM 100 MG: 100 CAPSULE, LIQUID FILLED ORAL at 08:02

## 2023-02-19 RX ADMIN — GABAPENTIN 100 MG: 100 CAPSULE ORAL at 09:02

## 2023-02-19 NOTE — NURSING
Nurses Note -- 4 Eyes      2/19/2023   5:45 PM      Skin assessed during: Daily Assessment      [x] No Pressure Injuries Present    [x]Prevention Measures Documented      [] Yes- Altered Skin Integrity Present or Discovered   [] LDA Added if Not in Epic (Describe Wound)   [] New Altered Skin Integrity was Present on Admit and Documented in LDA   [] Wound Image Taken    Wound Care Consulted? No    Attending Nurse:  Ramses Goncalves RN     Second RN/Staff Member:  Jude Rosales RN

## 2023-02-19 NOTE — PROGRESS NOTES
Doing very well   Feels better every day   Cognition seems improved also compared to the last couple of days.  Cervical wound still flat, clean and dry    Lumbar drain clamped and we will remove tomorrow and discharge home unless any adverse events develop overnight.

## 2023-02-19 NOTE — PROGRESS NOTES
"[facility]  Periop Services  Pulmonary Critical Care Note    Patient Name: Scott Echeverria  MRN: [id number]  Admission Date: 2/14/2023  Hospital Length of Stay: 5 days  Code Status: Prior  Attending Provider: Paulo Rao MD  Primary Care Provider: RON AUGUSTIN MD     Subjective:     HPI:   Mr. Echeverria is a 73 yo male with medical history of hypothyroidism, hyperlipidemia,  and C3-7 laminoplasty and left C3-7 laminoforaminotomies (7/12/2022) complicated by post-operative pseudomeningocele. Of note, patient had complications during postoperative period following surgery in July. Despite initially doing well, he became aphasic with unilateral weakness and progressive neurological decline requiring intubation for airway protection. MRIs unrevealing at the time, thought to be due to medication effects. Patient returned to baseline and discharged to rehab, however he has been progressively symptomatic from pseudomeningocele. Patient underwent posterior cervical re-exploration and repair of dural tear with insertion of lumbar spinal subarachnoid drainage catheter with Dr. Rao today,  2/14/23. He tolerated procedure well. Admitted to ICU for close monitoring.     Hospital Course/Significant events:  2/14/23 posterior cervical wound re-exploration and repair of dural tear   2/14/23 Admitted to ICU     24 Hour Interval History:  No acute events overnight. Patient remains hemodynamically stable and has no acute complaints. I/O: 1.4L urine and 345 mL drainage past 24 hours, net negative 5.2 L.    Past Medical History:   Diagnosis Date    Anesthesia complication     "post op day 2-3 had to be given narcan, intubated, and sent to ICU"    Balance problems     BPH (benign prostatic hyperplasia)     Cervical pain     Cervical radiculitis     Cervical spinal stenosis     HLD (hyperlipidemia)     Hypothyroidism, unspecified     Personal history of colonic polyps     Postoperative CSF leak     Shoulder pain, " bilateral     Sleep apnea     resolved since surgery       Past Surgical History:   Procedure Laterality Date    APPENDECTOMY  01/25/2022    Dr. Kerry Coats    CERVICAL SPINE SURGERY  07/12/2022    COLONOSCOPY W/ POLYPECTOMY  04/27/2021    Dr. David Montgomery    EXTRACTION OF TOOTH  01/20/2023    HEMORRHOID SURGERY  2002    INSERTION OF DRAIN INTO SPINAL CANAL N/A 2/14/2023    Procedure: INSERTION, DRAIN, SPINAL CANAL;  Surgeon: Paulo Rao MD;  Location: [facility] OR;  Service: Neurosurgery;  Laterality: N/A;  LUMBAR DRAIN     LAMINOPLASTY N/A 07/12/2022    Procedure: LAMINOPLASTY;  Surgeon: Paulo Rao MD;  Location: [facility] OR;  Service: Neurosurgery;  Laterality: N/A;  left C3-4, C4-5, C5-6, C6-7 foraminotomies, C3-7 laminoplasty //  TIVA SET UP    lower back surgery  1990    REPAIR OF CEREBROSPINAL FLUID LEAK OF SPINE N/A 2/14/2023    Procedure: REPAIR, CSF LEAK, SPINE;  Surgeon: Paulo Rao MD;  Location: [facility] OR;  Service: Neurosurgery;  Laterality: N/A;  posterior cervical re-exploration, drainage of pseudomeningiocele/repair of CSF leak //  XX    RETINAL DETACHMENT SURGERY Right 2015    SINUS SURGERY      TONSILLECTOMY  1956       Social History     Socioeconomic History    Marital status:    Tobacco Use    Smoking status: Never    Smokeless tobacco: Never   Substance and Sexual Activity    Alcohol use: Not Currently    Drug use: Never    Sexual activity: Not Currently           Current Outpatient Medications   Medication Instructions    amitriptyline (ELAVIL) 25 mg, Oral, Daily    aspirin 81 mg, Oral, Daily    diphenhydrAMINE (BENADRYL) 50 MG capsule Take one tablet (50mg) by mouth 1hr prior to contrast    docusate sodium (COLACE) 100 mg, Oral, 2 times daily    gabapentin (NEURONTIN) 100 MG capsule TAKE 1 CAPSULE (100 MG TOTAL) BY MOUTH THREE TIMES DAILY.    ibuprofen (ADVIL,MOTRIN) 200 mg, Oral, 2 times daily PRN    levothyroxine (SYNTHROID) 50 mcg, Oral, Daily    magnesium hydroxide 400 mg/5 ml  (MILK OF MAGNESIA) 400 mg/5 mL Susp 15 mLs, Oral, Daily PRN    rosuvastatin (CRESTOR) 10 mg, Oral, Daily       Current Inpatient Medications   amitriptyline  25 mg Oral QHS    atorvastatin  40 mg Oral Daily    docusate sodium  100 mg Oral BID    gabapentin  100 mg Oral TID    levothyroxine  50 mcg Oral Daily    mupirocin   Nasal BID         14 point ROS reviewed and negative unless documented in the history of present illness.         Objective:       Intake/Output Summary (Last 24 hours) at 2/19/2023 0650  Last data filed at 2/19/2023 0600  Gross per 24 hour   Intake --   Output 1715 ml   Net -1715 ml         Vital Signs (Most Recent):  Temp: 97.3 °F (36.3 °C) (02/19/23 0400)  Pulse: 83 (02/19/23 0600)  Resp: 18 (02/19/23 0600)  BP: 108/69 (02/19/23 0600)  SpO2: 97 % (02/19/23 0600)  Body mass index is 23.34 kg/m².  Weight: 71.7 kg (158 lb 1.1 oz) Vital Signs (24h Range):  Temp:  [97.3 °F (36.3 °C)-98.3 °F (36.8 °C)] 97.3 °F (36.3 °C)  Pulse:  [] 83  Resp:  [13-26] 18  SpO2:  [95 %-100 %] 97 %  BP: ()/(63-79) 108/69     Physical exam:  General: Well nourished w/o distress  HEENT: NC/AT; PERRL; nasal and oral mucosa moist and clear  Pulm: CTA bilaterally, normal work of breathing on room air  CV: S1, S2 w/o murmurs or gallops; no edema noted  GI: Soft with normal bowel sounds in all quadrants, no masses on palpation  MSK: Full ROM of all extremities  Neuro: AAOx4; motor/sensory function intact  Psych: Cooperative; appropriate mood and affect     Lines/Drains/Airways       Drain  Duration                  Lumbar Drain 02/14/23 5 days              Peripheral Intravenous Line  Duration                  Peripheral IV - Single Lumen 02/14/23 0750 18 G Anterior;Distal;Left Forearm 4 days         Peripheral IV - Single Lumen 02/14/23 1041 20 G Right Antecubital 4 days                    Significant Labs:    Lab Results   Component Value Date    WBC 7.9 02/08/2023    HGB 13.4 (L) 02/08/2023    HCT 43.1  02/08/2023    MCV 89.6 02/08/2023     02/08/2023         BMP  Lab Results   Component Value Date     02/17/2023    K 3.9 02/17/2023    CHLORIDE 103 02/17/2023    CO2 28 02/17/2023    BUN 11.8 02/17/2023    CREATININE 0.85 02/17/2023    CALCIUM 9.7 02/17/2023    AGAP 12.0 02/17/2023    EGFRNONAA >60 08/01/2022       Assessment/Plan:     Assessment  Postoperative cervical pseudomeningocele   S/p posterior cervical wound re-exploration and repair of dural tear   Insertion of lumbar spinal subarachnoid drainage catheter   Hypothyroidism   Hyperlipidemia     Plan  -Continue ICU level of care for ongoing monitoring and medical therapy  -Rest of care per neurosurgery team    DVT ppx: SCDs given lumbar drain   GI ppx: None      Anaya Sullivan,   Pulmonary Critical Care Medicine  [facility] - Periop Services

## 2023-02-19 NOTE — NURSING
Nurses Note -- 4 Eyes      2/19/2023   6:17 AM      Skin assessed during: Q Shift Change      [x] No Pressure Injuries Present    [x]Prevention Measures Documented      [] Yes- Altered Skin Integrity Present or Discovered   [] LDA Added if Not in Epic (Describe Wound)   [] New Altered Skin Integrity was Present on Admit and Documented in LDA   [] Wound Image Taken    Wound Care Consulted? No    Attending Nurse:  Rachid Clark RN     Second RN/Staff Member:  Marielle Dinh RN

## 2023-02-20 VITALS
TEMPERATURE: 98 F | RESPIRATION RATE: 19 BRPM | DIASTOLIC BLOOD PRESSURE: 73 MMHG | BODY MASS INDEX: 23.41 KG/M2 | SYSTOLIC BLOOD PRESSURE: 138 MMHG | WEIGHT: 158.06 LBS | HEART RATE: 83 BPM | HEIGHT: 69 IN | OXYGEN SATURATION: 87 %

## 2023-02-20 LAB
ALBUMIN SERPL-MCNC: 3.4 G/DL (ref 3.4–4.8)
ALBUMIN/GLOB SERPL: 1.4 RATIO (ref 1.1–2)
ALP SERPL-CCNC: 89 UNIT/L (ref 40–150)
ALT SERPL-CCNC: 30 UNIT/L (ref 0–55)
AST SERPL-CCNC: 21 UNIT/L (ref 5–34)
BASOPHILS # BLD AUTO: 0.09 X10(3)/MCL (ref 0–0.2)
BASOPHILS NFR BLD AUTO: 1.4 %
BILIRUBIN DIRECT+TOT PNL SERPL-MCNC: 0.3 MG/DL
BUN SERPL-MCNC: 11.5 MG/DL (ref 8.4–25.7)
CALCIUM SERPL-MCNC: 8.6 MG/DL (ref 8.8–10)
CHLORIDE SERPL-SCNC: 106 MMOL/L (ref 98–107)
CO2 SERPL-SCNC: 27 MMOL/L (ref 23–31)
CREAT SERPL-MCNC: 0.75 MG/DL (ref 0.73–1.18)
EOSINOPHIL # BLD AUTO: 0.55 X10(3)/MCL (ref 0–0.9)
EOSINOPHIL NFR BLD AUTO: 8.4 %
ERYTHROCYTE [DISTWIDTH] IN BLOOD BY AUTOMATED COUNT: 14.4 % (ref 11.5–17)
GFR SERPLBLD CREATININE-BSD FMLA CKD-EPI: >60 MLS/MIN/1.73/M2
GLOBULIN SER-MCNC: 2.4 GM/DL (ref 2.4–3.5)
GLUCOSE SERPL-MCNC: 91 MG/DL (ref 82–115)
HCT VFR BLD AUTO: 37.6 % (ref 42–52)
HGB BLD-MCNC: 12.2 G/DL (ref 14–18)
IMM GRANULOCYTES # BLD AUTO: 0.02 X10(3)/MCL (ref 0–0.04)
IMM GRANULOCYTES NFR BLD AUTO: 0.3 %
LYMPHOCYTES # BLD AUTO: 2.34 X10(3)/MCL (ref 0.6–4.6)
LYMPHOCYTES NFR BLD AUTO: 35.9 %
MAGNESIUM SERPL-MCNC: 2.1 MG/DL (ref 1.6–2.6)
MCH RBC QN AUTO: 28.5 PG
MCHC RBC AUTO-ENTMCNC: 32.4 G/DL (ref 33–36)
MCV RBC AUTO: 87.9 FL (ref 80–94)
MONOCYTES # BLD AUTO: 0.49 X10(3)/MCL (ref 0.1–1.3)
MONOCYTES NFR BLD AUTO: 7.5 %
NEUTROPHILS # BLD AUTO: 3.02 X10(3)/MCL (ref 2.1–9.2)
NEUTROPHILS NFR BLD AUTO: 46.5 %
NRBC BLD AUTO-RTO: 0 %
PHOSPHATE SERPL-MCNC: 3.8 MG/DL (ref 2.3–4.7)
PLATELET # BLD AUTO: 257 X10(3)/MCL (ref 130–400)
PMV BLD AUTO: 10.6 FL (ref 7.4–10.4)
POTASSIUM SERPL-SCNC: 4.2 MMOL/L (ref 3.5–5.1)
PROT SERPL-MCNC: 5.8 GM/DL (ref 5.8–7.6)
RBC # BLD AUTO: 4.28 X10(6)/MCL (ref 4.7–6.1)
SODIUM SERPL-SCNC: 140 MMOL/L (ref 136–145)
WBC # SPEC AUTO: 6.5 X10(3)/MCL (ref 4.5–11.5)

## 2023-02-20 PROCEDURE — 83735 ASSAY OF MAGNESIUM: CPT | Performed by: NEUROLOGICAL SURGERY

## 2023-02-20 PROCEDURE — 99024 POSTOP FOLLOW-UP VISIT: CPT | Mod: POP,,, | Performed by: NEUROLOGICAL SURGERY

## 2023-02-20 PROCEDURE — 25000003 PHARM REV CODE 250: Performed by: NURSE PRACTITIONER

## 2023-02-20 PROCEDURE — 84100 ASSAY OF PHOSPHORUS: CPT | Performed by: NEUROLOGICAL SURGERY

## 2023-02-20 PROCEDURE — 99024 PR POST-OP FOLLOW-UP VISIT: ICD-10-PCS | Mod: POP,,, | Performed by: NEUROLOGICAL SURGERY

## 2023-02-20 PROCEDURE — 85025 COMPLETE CBC W/AUTO DIFF WBC: CPT | Performed by: NEUROLOGICAL SURGERY

## 2023-02-20 PROCEDURE — 25000003 PHARM REV CODE 250: Performed by: PHYSICIAN ASSISTANT

## 2023-02-20 PROCEDURE — 80053 COMPREHEN METABOLIC PANEL: CPT | Performed by: NEUROLOGICAL SURGERY

## 2023-02-20 RX ORDER — LIDOCAINE HYDROCHLORIDE 20 MG/ML
10 INJECTION, SOLUTION EPIDURAL; INFILTRATION; INTRACAUDAL; PERINEURAL ONCE
Status: COMPLETED | OUTPATIENT
Start: 2023-02-20 | End: 2023-02-20

## 2023-02-20 RX ADMIN — LIDOCAINE HYDROCHLORIDE 100 MG: 20 INJECTION, SOLUTION EPIDURAL; INFILTRATION; INTRACAUDAL; PERINEURAL at 10:02

## 2023-02-20 RX ADMIN — DOCUSATE SODIUM 100 MG: 100 CAPSULE, LIQUID FILLED ORAL at 09:02

## 2023-02-20 RX ADMIN — GABAPENTIN 100 MG: 100 CAPSULE ORAL at 09:02

## 2023-02-20 RX ADMIN — MUPIROCIN: 20 OINTMENT TOPICAL at 09:02

## 2023-02-20 RX ADMIN — LEVOTHYROXINE SODIUM 50 MCG: 50 TABLET ORAL at 09:02

## 2023-02-20 NOTE — NURSING
Nurses Note -- 4 Eyes      2/20/2023   4:44 AM      Skin assessed during: Q Shift Change      [x] No Pressure Injuries Present    [x]Prevention Measures Documented      [] Yes- Altered Skin Integrity Present or Discovered   [] LDA Added if Not in Epic (Describe Wound)   [] New Altered Skin Integrity was Present on Admit and Documented in LDA   [] Wound Image Taken    Wound Care Consulted? No    Attending Nurse:  Rachid Clark RN     Second RN/Staff Member:  Dottie Carter RN

## 2023-02-20 NOTE — DISCHARGE SUMMARY
[facility] - [facility] East ICU  Neurosurgery  Discharge Summary      Patient Name: Scott Echeverria  MRN: [id number]  Admission Date: 2/14/2023  Hospital Length of Stay: 6 days  Discharge Date and Time:  02/20/2023 10:36 AM  Attending Physician: Paulo Rao MD   Discharging Provider: Paulo Rao MD  Primary Care Provider: RON AUGUSTIN MD    HPI:   No notes on file    Procedure(s) (LRB):  REPAIR, CSF LEAK, SPINE (N/A)  INSERTION, DRAIN, SPINAL CANAL (N/A)     Hospital Course: He was taken to surgery for repair of the dural tear and insertion of lumbar drain on 2/14/23. He has had resolution of the pseudomeningocele and no spinal headache-like symptoms in spite of 10-15cc drainage/hr for 5 days. No refill of fluid 24 hrs clamped. Drain removed. Home today    Pending Diagnostic Studies:     None        Final Active Diagnoses:    Diagnosis Date Noted POA    PRINCIPAL PROBLEM:  Postoperative cerebrospinal fluid leak [G97.82, G96.00] 02/02/2023 Unknown      Problems Resolved During this Admission:      Discharged Condition: good     Disposition: Home or Self Care    Follow Up:   Follow-up Information     Paulo Rao MD. Schedule an appointment as soon as possible for a visit in 1 week(s).    Specialty: Neurosurgery  Why: 1 week for wound re-check; return in 2-3 weeks with c-spine xrays just prior to appt  Contact information:  [address] [place] [id number] [phone]                       Patient Instructions:   No discharge procedures on file.  Medications:  Reconciled Home Medications:      Medication List      CHANGE how you take these medications    amitriptyline 25 MG tablet  Commonly known as: ELAVIL  Take 1 tablet (25 mg total) by mouth once daily.  What changed: when to take this        CONTINUE taking these medications    aspirin 81 MG Chew  Take 1 tablet (81 mg total) by mouth once daily.     docusate sodium 100 MG capsule  Commonly known as: COLACE  Take 100 mg by  mouth 2 (two) times daily.     ibuprofen 200 MG tablet  Commonly known as: ADVIL,MOTRIN  Take 200 mg by mouth 2 (two) times daily as needed for Pain.     levothyroxine 50 MCG tablet  Commonly known as: SYNTHROID  Take 1 tablet (50 mcg total) by mouth once daily.     magnesium hydroxide 400 mg/5 ml 400 mg/5 mL Susp  Commonly known as: MILK OF MAGNESIA  Take 15 mLs by mouth daily as needed.     rosuvastatin 10 MG tablet  Commonly known as: CRESTOR  Take 10 mg by mouth Daily.        STOP taking these medications    diphenhydrAMINE 50 MG capsule  Commonly known as: BENADRYL     gabapentin 100 MG capsule  Commonly known as: NEURONTIN            Paulo Rao MD  Neurosurgery  [facility] - [facility] ICU

## 2023-02-20 NOTE — DISCHARGE INSTRUCTIONS
No heavy lifting    No soaking in water/tub     Showers only     No lotions, perfumes or body washes. Soap only

## 2023-02-20 NOTE — NURSING
Nurses Note -- 4 Eyes      2/20/2023   4:24 PM      Skin assessed during: Daily Assessment      [x] No Pressure Injuries Present    []Prevention Measures Documented      [] Yes- Altered Skin Integrity Present or Discovered   [] LDA Added if Not in Epic (Describe Wound)   [] New Altered Skin Integrity was Present on Admit and Documented in LDA   [] Wound Image Taken    Wound Care Consulted? No    Attending Nurse:  Marylou Alfonso RN     Second RN/Staff Member:    Byron CUMMINS

## 2023-02-20 NOTE — NURSING
1500 Upon awaiting for discharge after Lumbar drain pulled hours ago patient started leaking from suture site. Tr NP  notified and returned to see patient with extra sutures applied.

## 2023-02-20 NOTE — PROGRESS NOTES
"[facility] - Periop Services  Pulmonary Critical Care Note    Patient Name: Scott Echeverria  MRN: [id number]  Admission Date: 2/14/2023  Hospital Length of Stay: 6 days  Code Status: Prior  Attending Provider: Paulo Rao MD  Primary Care Provider: RON AUGUSTIN MD     Subjective:     HPI:   Mr. Echeverria is a 71 yo male with medical history of hypothyroidism, hyperlipidemia,  and C3-7 laminoplasty and left C3-7 laminoforaminotomies (7/12/2022) complicated by post-operative pseudomeningocele. Of note, patient had complications during postoperative period following surgery in July. Despite initially doing well, he became aphasic with unilateral weakness and progressive neurological decline requiring intubation for airway protection. MRIs unrevealing at the time, thought to be due to medication effects. Patient returned to baseline and discharged to rehab, however he has been progressively symptomatic from pseudomeningocele. Patient underwent posterior cervical re-exploration and repair of dural tear with insertion of lumbar spinal subarachnoid drainage catheter with Dr. Rao on 2/14/23. He tolerated procedure well. Admitted to ICU for close monitoring.     Hospital Course/Significant events:  2/14/23 posterior cervical wound re-exploration and repair of dural tear   2/14/23 Admitted to ICU remained in ICU s/t lumbar drain; drain was clamped on 2/19/2023 pulled this AM by NS.       24 Hour Interval History:  No acute events overnight.     Past Medical History:   Diagnosis Date    Anesthesia complication     "post op day 2-3 had to be given narcan, intubated, and sent to ICU"    Balance problems     BPH (benign prostatic hyperplasia)     Cervical pain     Cervical radiculitis     Cervical spinal stenosis     HLD (hyperlipidemia)     Hypothyroidism, unspecified     Personal history of colonic polyps     Postoperative CSF leak     Shoulder pain, bilateral     Sleep apnea     resolved since surgery "       Past Surgical History:   Procedure Laterality Date    APPENDECTOMY  01/25/2022    Dr. Kerry Coats    CERVICAL SPINE SURGERY  07/12/2022    COLONOSCOPY W/ POLYPECTOMY  04/27/2021    Dr. David Montgomery    EXTRACTION OF TOOTH  01/20/2023    HEMORRHOID SURGERY  2002    INSERTION OF DRAIN INTO SPINAL CANAL N/A 2/14/2023    Procedure: INSERTION, DRAIN, SPINAL CANAL;  Surgeon: Paulo Rao MD;  Location: [facility] OR;  Service: Neurosurgery;  Laterality: N/A;  LUMBAR DRAIN     LAMINOPLASTY N/A 07/12/2022    Procedure: LAMINOPLASTY;  Surgeon: Paulo Rao MD;  Location: [facility] OR;  Service: Neurosurgery;  Laterality: N/A;  left C3-4, C4-5, C5-6, C6-7 foraminotomies, C3-7 laminoplasty //  TIVA SET UP    lower back surgery  1990    REPAIR OF CEREBROSPINAL FLUID LEAK OF SPINE N/A 2/14/2023    Procedure: REPAIR, CSF LEAK, SPINE;  Surgeon: Paulo Rao MD;  Location: [facility] OR;  Service: Neurosurgery;  Laterality: N/A;  posterior cervical re-exploration, drainage of pseudomeningiocele/repair of CSF leak //  XX    RETINAL DETACHMENT SURGERY Right 2015    SINUS SURGERY      TONSILLECTOMY  1956       Social History     Socioeconomic History    Marital status:    Tobacco Use    Smoking status: Never    Smokeless tobacco: Never   Substance and Sexual Activity    Alcohol use: Not Currently    Drug use: Never    Sexual activity: Not Currently           Current Outpatient Medications   Medication Instructions    amitriptyline (ELAVIL) 25 mg, Oral, Daily    aspirin 81 mg, Oral, Daily    diphenhydrAMINE (BENADRYL) 50 MG capsule Take one tablet (50mg) by mouth 1hr prior to contrast    docusate sodium (COLACE) 100 mg, Oral, 2 times daily    gabapentin (NEURONTIN) 100 MG capsule TAKE 1 CAPSULE (100 MG TOTAL) BY MOUTH THREE TIMES DAILY.    ibuprofen (ADVIL,MOTRIN) 200 mg, Oral, 2 times daily PRN    levothyroxine (SYNTHROID) 50 mcg, Oral, Daily    magnesium hydroxide 400 mg/5 ml (MILK OF MAGNESIA) 400 mg/5 mL Susp 15 mLs, Oral, Daily  PRN    rosuvastatin (CRESTOR) 10 mg, Oral, Daily       Current Inpatient Medications   amitriptyline  25 mg Oral QHS    atorvastatin  40 mg Oral Daily    docusate sodium  100 mg Oral BID    gabapentin  100 mg Oral TID    levothyroxine  50 mcg Oral Daily    LIDOcaine (PF) 20 mg/mL (2%)  10 mL Other Once    mupirocin   Nasal BID         14 point ROS reviewed and negative unless documented in the history of present illness.     Objective:       Intake/Output Summary (Last 24 hours) at 2/20/2023 0948  Last data filed at 2/20/2023 0400  Gross per 24 hour   Intake 80 ml   Output 1000 ml   Net -920 ml       Vital Signs (Most Recent):  Temp: 98.1 °F (36.7 °C) (02/20/23 0400)  Pulse: 81 (02/20/23 0700)  Resp: 14 (02/20/23 0700)  BP: 111/67 (02/20/23 0700)  SpO2: 98 % (02/20/23 0700)  Body mass index is 23.34 kg/m².  Weight: 71.7 kg (158 lb 1.1 oz) Vital Signs (24h Range):  Temp:  [97.9 °F (36.6 °C)-99.1 °F (37.3 °C)] 98.1 °F (36.7 °C)  Pulse:  [] 81  Resp:  [10-31] 14  SpO2:  [80 %-100 %] 98 %  BP: (100-139)/(61-88) 111/67     Physical exam:  General: Well nourished w/o distress  HEENT: NC/AT; PERRL; nasal and oral mucosa moist and clear  Pulm: CTA bilaterally, normal work of breathing on room air  CV: S1, S2 w/o murmurs or gallops; no edema noted  GI: Soft with normal bowel sounds in all quadrants, no masses on palpation  MSK: Full ROM of all extremities  Neuro: AAOx4; motor/sensory function intact  Psych: Cooperative; appropriate mood and affect     Lines/Drains/Airways       Drain  Duration                  Lumbar Drain 02/14/23 6 days              Peripheral Intravenous Line  Duration                  Peripheral IV - Single Lumen 02/14/23 0750 18 G Anterior;Distal;Left Forearm 6 days         Peripheral IV - Single Lumen 02/14/23 1041 20 G Right Antecubital 5 days                    Significant Labs:    Lab Results   Component Value Date    WBC 6.5 02/20/2023    HGB 12.2 (L) 02/20/2023    HCT 37.6 (L) 02/20/2023     MCV 87.9 02/20/2023     02/20/2023       BMP  Lab Results   Component Value Date     02/20/2023    K 4.2 02/20/2023    CHLORIDE 106 02/20/2023    CO2 27 02/20/2023    BUN 11.5 02/20/2023    CREATININE 0.75 02/20/2023    CALCIUM 8.6 (L) 02/20/2023    AGAP 12.0 02/17/2023    EGFRNONAA >60 08/01/2022       Assessment/Plan:     Assessment  Postoperative cervical pseudomeningocele   S/p posterior cervical wound re-exploration and repair of dural tear   Insertion of lumbar spinal subarachnoid drainage catheter   Hypothyroidism   Hyperlipidemia     Plan  -lumbar drain clamped on 2/19/23; Drain discontinued on 2/20/23   - NS cleared discharge to home        Meghan Li ANP  Pulmonary Critical Care Medicine  [facility] - Periop Services

## 2023-02-20 NOTE — PROGRESS NOTES
"Inpatient Nutrition Evaluation    Admit Date: 2/14/2023   Total duration of encounter: 6 days    Nutrition Recommendation/Prescription     Continue regular diet as tolerated  RD to monitor po intake and weight changes     Nutrition Assessment     Chart Review    Reason Seen: length of stay    Malnutrition Screening Tool Results   Have you recently lost weight without trying?: No  Have you been eating poorly because of a decreased appetite?: No   MST Score: 0     Diagnosis:  Postoperative cervical pseudomeningocele s/p posterior cervical wound re-exploration and repair of dural tear   Insertion of lumbar spinal subarachnoid drainage catheter     Relevant Medical History: hypothyroidism, HLD, C3-7 laminoplasty and left C3-7 laminoforaminotomies (7/12/2022) complicated by post-op pseudomeningocele    Nutrition-Related Medications: colace BID, Zofran PRN    Nutrition-Related Labs: 2/20: RBC-4.28, H/H-12.2/37.6, brayden-8.6      Diet Order: Diet Adult Regular  Oral Supplement Order: none  Appetite/Oral Intake: good/% of meals  Factors Affecting Nutritional Intake: none identified  Food/Anglican/Cultural Preferences: none reported  Food Allergies: none reported    Skin Integrity: drain/device(s) (lumbar drain present)  Wound(s):       Comments    2/20: pt reports good appetite, with no n/v/d/c. UBW reported 172 lb with weight loss in July after surgeries, with recent weight re-gain. Weight of 71.7 kg (158 lb) on 2/14 and weight of 172 lb on 6/8/22. Weight loss and re-gain noted per EMR weights. Pt with 8% weight loss in 8 months, not significant at this time. Recent weight loss of 3.6% in 1 week noted per weight of 164 lb on 2/6, likely fluid related. Will continue to monitor.    Anthropometrics    Height: 5' 9" (175.3 cm) Height Method: Stated  Last Weight: 71.7 kg (158 lb 1.1 oz) (02/14/23 0651) Weight Method: Standard Scale  BMI (Calculated): 23.3  BMI Classification: normal (BMI 18.5-24.9)        Ideal Body Weight " (IBW), Male: 160 lb     % Ideal Body Weight, Male (lb): 98.79 %                          Usual Weight Provided By: patient and EMR weight history    Wt Readings from Last 3 Encounters:   02/14/23 0651 71.7 kg (158 lb 1.1 oz)   02/06/23 1559 74.6 kg (164 lb 6.4 oz)   02/02/23 0942 74.4 kg (164 lb)   12/28/22 1045 73.5 kg (162 lb)      Weight Change(s) Since Admission:  Admit Weight: 74.6 kg (164 lb 6.4 oz) (02/06/23 1559)      Patient Education    Not applicable.    Monitoring & Evaluation     Dietitian will monitor food and beverage intake and weight change.  Nutrition Risk/Follow-Up: low (follow-up in 5-7 days)  Patients assigned 'low nutrition risk' status do not qualify for a full nutritional assessment but will be monitored and re-evaluated in a 5-7 day time period. Please consult if re-evaluation needed sooner.    Laura Szymanski, Registration Eligible, Provisional LDN

## 2023-02-20 NOTE — PROGRESS NOTES
Cervical wound is clean dry with no fluid collection identified.  Healing well.    Patient with no headache.  He has been ambulating independently.  No new issues identified.    Per Dr. Rao's instructions the lumbar drain was removed.  Under sterile conditions a suture was placed.  Patient tolerated well.    Patient will be discharged home with a follow-up appointment.  Safety and activity discussed with patient.      Patient's wife was present as well.  They voiced an understanding.

## 2023-02-22 ENCOUNTER — PATIENT OUTREACH (OUTPATIENT)
Dept: ADMINISTRATIVE | Facility: CLINIC | Age: 73
End: 2023-02-22
Payer: MEDICARE

## 2023-02-22 NOTE — PROGRESS NOTES
C3 nurse spoke with Scott Echeverria and his spouse for a TCC post hospital discharge follow up call. Pt has no new complaints or symptoms, and verbalizes he is calling his cardiologist today to clarify Crestor administration. Pt canceled his one week suture removal with Paulo Rao MD (Neurosurgery) on 2/24/23 bc he doesnt have any staples, pt also has 2wk postop apt scheduled with MD Jalen on 3/1/23 at 1130. Pt verbalized he does not feel the need to see his PCP before his appointment in April. Message routed to PCP.

## 2023-02-23 ENCOUNTER — PATIENT MESSAGE (OUTPATIENT)
Dept: ADMINISTRATIVE | Facility: OTHER | Age: 73
End: 2023-02-23
Payer: MEDICARE

## 2023-03-01 ENCOUNTER — OFFICE VISIT (OUTPATIENT)
Dept: NEUROSURGERY | Facility: CLINIC | Age: 73
End: 2023-03-01
Payer: MEDICARE

## 2023-03-01 VITALS
RESPIRATION RATE: 18 BRPM | WEIGHT: 162 LBS | DIASTOLIC BLOOD PRESSURE: 77 MMHG | HEIGHT: 69 IN | BODY MASS INDEX: 23.99 KG/M2 | HEART RATE: 77 BPM | SYSTOLIC BLOOD PRESSURE: 116 MMHG

## 2023-03-01 DIAGNOSIS — G96.00 POSTOPERATIVE CEREBROSPINAL FLUID LEAK: ICD-10-CM

## 2023-03-01 DIAGNOSIS — G97.82 PSEUDOMENINGOCELE DUE TO SURGICAL PROCEDURE: Primary | ICD-10-CM

## 2023-03-01 DIAGNOSIS — G97.82 POSTOPERATIVE CEREBROSPINAL FLUID LEAK: ICD-10-CM

## 2023-03-01 PROCEDURE — 99024 POSTOP FOLLOW-UP VISIT: CPT | Mod: POP,,, | Performed by: PHYSICIAN ASSISTANT

## 2023-03-01 PROCEDURE — 99024 PR POST-OP FOLLOW-UP VISIT: ICD-10-PCS | Mod: POP,,, | Performed by: PHYSICIAN ASSISTANT

## 2023-03-01 RX ORDER — ATORVASTATIN CALCIUM 20 MG/1
20 TABLET, FILM COATED ORAL DAILY
COMMUNITY
Start: 2023-02-22

## 2023-03-01 NOTE — PROGRESS NOTES
"[facility]  History & Physical  Neurosurgery      Scott Echeverria   [id number]   1950       HPI:  Scott Echeverria is a 72 y.o. male who presents for a 2 week postoperative follow up appointment.  On 02/14/2023, the patient underwent posterior cervical re-exploration with repair of CSF leak and placement of lumbar drain.  He tolerated the procedure well.  There was no signs or symptoms of recurrent pseudomeningocele.  There were no spinal headaches.  He was able to be discharged to home on 02/20/2023.  He had to return to the hospital to have the exit hole from the lumbar drain restaged due to CSF leak.    Currently, he is doing very well.  His incisional area has remained flat.  The incision is well healed.  There has been no drainage from the incision or lumbar drain area.  He has tolerated increasing his level of activity.      Past Medical History:   Diagnosis Date    Anesthesia complication     "post op day 2-3 had to be given narcan, intubated, and sent to ICU"    Balance problems     BPH (benign prostatic hyperplasia)     Cervical pain     Cervical radiculitis     Cervical spinal stenosis     HLD (hyperlipidemia)     Hypothyroidism, unspecified     Personal history of colonic polyps     Postoperative CSF leak     Shoulder pain, bilateral     Sleep apnea     resolved since surgery       Past Surgical History:   Procedure Laterality Date    APPENDECTOMY  01/25/2022    Dr. Kerry Coats    CERVICAL SPINE SURGERY  07/12/2022    COLONOSCOPY W/ POLYPECTOMY  04/27/2021    Dr. David Montgomery    EXTRACTION OF TOOTH  01/20/2023    HEMORRHOID SURGERY  2002    INSERTION OF DRAIN INTO SPINAL CANAL N/A 02/14/2023    lumbar drain.  Dr. Rao    LAMINOPLASTY N/A 07/12/2022    left C3-4, C4-5, C5-6, C6-7 foraminotomies, C3-7 laminoplasties.  Dr. Rao    lower back surgery  1990    REPAIR OF CEREBROSPINAL FLUID LEAK OF SPINE N/A 02/14/2023    posterior cervical re-exploration with drainage of " pseudomeningocele and repair of CSF leak.  Dr. Rao    RETINAL DETACHMENT SURGERY Right 2015    SINUS SURGERY      TONSILLECTOMY  1956       Family History   Problem Relation Age of Onset    Alzheimer's disease Mother     Parkinsonism Mother     Thyroid disease Mother     Stroke Father     Heart attack Father     Hyperlipidemia Father     Hypertension Sister     Sickle cell trait Sister     Hypertension Brother        Social History     Socioeconomic History    Marital status:    Tobacco Use    Smoking status: Never    Smokeless tobacco: Never   Substance and Sexual Activity    Alcohol use: Not Currently    Drug use: Never    Sexual activity: Not Currently       Review of patient's allergies indicates:   Allergen Reactions    Iodine     Scopolamine Other (See Comments)        Medication List with Changes/Refills   Current Medications    AMITRIPTYLINE (ELAVIL) 25 MG TABLET    Take 1 tablet (25 mg total) by mouth once daily.    ASPIRIN 81 MG CHEW    Take 1 tablet (81 mg total) by mouth once daily.    ATORVASTATIN (LIPITOR) 20 MG TABLET    Take 20 mg by mouth once daily. Starting on 3/8    DOCUSATE SODIUM (COLACE) 100 MG CAPSULE    Take 100 mg by mouth 2 (two) times daily.    IBUPROFEN (ADVIL,MOTRIN) 200 MG TABLET    Take 200 mg by mouth 2 (two) times daily as needed for Pain.    LEVOTHYROXINE (SYNTHROID) 50 MCG TABLET    Take 1 tablet (50 mcg total) by mouth once daily.    MAGNESIUM HYDROXIDE 400 MG/5 ML (MILK OF MAGNESIA) 400 MG/5 ML SUSP    Take 15 mLs by mouth daily as needed.    ROSUVASTATIN (CRESTOR) 10 MG TABLET    Take 10 mg by mouth Daily.        ROS:    Review of Systems   Constitutional:  Positive for fatigue. Negative for chills and fever.   HENT:  Negative for nosebleeds and sore throat.    Eyes:  Negative for pain and visual disturbance.   Respiratory:  Negative for cough, chest tightness and shortness of breath.    Cardiovascular:  Negative for chest pain.   Gastrointestinal:  Negative for  "diarrhea, nausea and vomiting.   Genitourinary:  Negative for difficulty urinating, dysuria and hematuria.   Musculoskeletal:  Positive for neck pain. Negative for gait problem and myalgias.   Skin:  Negative for rash.   Neurological:  Negative for dizziness, facial asymmetry, weakness, numbness and headaches.   Psychiatric/Behavioral:  Negative for confusion and sleep disturbance. The patient is not nervous/anxious.        Physical Examination:    Vital Signs:  /77 (BP Location: Other (Comment), Patient Position: Sitting)   Pulse 77   Resp 18   Ht 5' 9" (1.753 m)   Wt 73.5 kg (162 lb)   BMI 23.92 kg/m²      General:  Pleasant. Well-nourished. Well-groomed.    Lungs:  Breathing is quiet, non-labored     Musculoskeletal:  Posterior cervical incision is well healed.  The area is flat.    Neurological:    Oriented to Person, Place, Time   He is awake, alert, and oriented in all 4 spheres.  His memory, cognition, and affect are appropriate.  Speech is fluent.  He is moving all extremities well.  Gait is normal.      ASSESSMENT/PLAN:     1. 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 Postoperative cerebrospinal fluid leak          Postoperatively, the patient is doing very well.  He has tolerated increasing his level of activity although not back to normal which is not unusual.  He is 2 weeks postop.  No longer has headaches.  Posterior cervical region has remained flat.  We discussed a gradual increase in his level of activity.  He voiced understanding.  He will return for follow-up at 3 months postop.        Dottie Sauceda PA-C    "

## 2023-03-07 ENCOUNTER — PATIENT MESSAGE (OUTPATIENT)
Dept: NEUROSURGERY | Facility: CLINIC | Age: 73
End: 2023-03-07
Payer: MEDICARE

## 2023-03-08 ENCOUNTER — TELEPHONE (OUTPATIENT)
Dept: NEUROSURGERY | Facility: CLINIC | Age: 73
End: 2023-03-08
Payer: MEDICARE

## 2023-03-08 ENCOUNTER — PATIENT MESSAGE (OUTPATIENT)
Dept: NEUROSURGERY | Facility: CLINIC | Age: 73
End: 2023-03-08
Payer: MEDICARE

## 2023-03-08 NOTE — TELEPHONE ENCOUNTER
----- Message from Dottie Sauceda PA-C sent at 3/7/2023  6:46 PM CST -----  Regarding: follow up  Please change his follow up appointment from 6 weeks postop to 3 months postop.  I sent a message to the patient through the portal.

## 2023-03-09 NOTE — TELEPHONE ENCOUNTER
Spoke with patient. He is s/p posterior cervical re-exploration, drainage of pseudomeningocele/repair of CSF leak on 2/14/23. He is doing really well, just occasional tightness and soreness but no pain. Any specific restrictions that I need to tell him?

## 2023-03-09 NOTE — TELEPHONE ENCOUNTER
I believe we discussed this when I saw him at his postop follow up appointment.  Gradual increase in his activity.  He needs to listen to his body.  If he is sore or hurting, he may have done too much 1-2 days prior.  No heavy lifting for 6-8 weeks postop.

## 2023-04-17 DIAGNOSIS — R79.9 ABNORMAL FINDING OF BLOOD CHEMISTRY, UNSPECIFIED: ICD-10-CM

## 2023-04-17 DIAGNOSIS — E07.9 THYROID DISORDER: Primary | ICD-10-CM

## 2023-04-18 ENCOUNTER — TELEPHONE (OUTPATIENT)
Dept: INTERNAL MEDICINE | Facility: CLINIC | Age: 73
End: 2023-04-18
Payer: MEDICARE

## 2023-04-18 NOTE — TELEPHONE ENCOUNTER
----- Message from Parmjit Salazar LPN sent at 4/17/2023 11:28 AM CDT -----  Regarding: antonella narvaez 4/26 @10  Are there any outstanding tasks in patient chart? Needs fasting labs    Is there documentation of outstanding tasks in patient chart? no    Has patient been to the ER, urgent care, or another physician since last visit?    Has patient done any blood work or x-rays since last visit?

## 2023-04-21 ENCOUNTER — LAB VISIT (OUTPATIENT)
Dept: LAB | Facility: HOSPITAL | Age: 73
End: 2023-04-21
Attending: INTERNAL MEDICINE
Payer: MEDICARE

## 2023-04-21 DIAGNOSIS — E07.9 THYROID DISORDER: ICD-10-CM

## 2023-04-21 DIAGNOSIS — R79.9 ABNORMAL FINDING OF BLOOD CHEMISTRY, UNSPECIFIED: ICD-10-CM

## 2023-04-21 LAB
CHOLEST SERPL-MCNC: 161 MG/DL
CHOLEST/HDLC SERPL: 3 {RATIO} (ref 0–5)
HDLC SERPL-MCNC: 59 MG/DL (ref 35–60)
LDLC SERPL CALC-MCNC: 88 MG/DL (ref 50–140)
TRIGL SERPL-MCNC: 71 MG/DL (ref 34–140)
TSH SERPL-ACNC: 2.32 UIU/ML (ref 0.35–4.94)
VLDLC SERPL CALC-MCNC: 14 MG/DL

## 2023-04-21 PROCEDURE — 36415 COLL VENOUS BLD VENIPUNCTURE: CPT

## 2023-04-21 PROCEDURE — 80061 LIPID PANEL: CPT

## 2023-04-21 PROCEDURE — 84443 ASSAY THYROID STIM HORMONE: CPT

## 2023-04-24 ENCOUNTER — TELEPHONE (OUTPATIENT)
Dept: NEUROSURGERY | Facility: CLINIC | Age: 73
End: 2023-04-24
Payer: MEDICARE

## 2023-04-25 NOTE — TELEPHONE ENCOUNTER
I spoke with patient and he is moving in mid May, but also will be driving back and forth from now until then. We agreed on 05/08 at 1:00pm on Dr. Rao's schedule. He stated he will try to make the 6 hour drive in time to make the appt, but if something happens requested to do a telemed. I offered a telemed regardless and he insisted on face to face and a telemed as a back up plan. I stated that was fine, but to notify us ahead of time. He was grateful and appreciative of our efforts in working with him on his appt change during this time.

## 2023-04-26 ENCOUNTER — OFFICE VISIT (OUTPATIENT)
Dept: INTERNAL MEDICINE | Facility: CLINIC | Age: 73
End: 2023-04-26
Payer: MEDICARE

## 2023-04-26 VITALS
HEIGHT: 69 IN | OXYGEN SATURATION: 98 % | RESPIRATION RATE: 18 BRPM | SYSTOLIC BLOOD PRESSURE: 122 MMHG | WEIGHT: 169 LBS | BODY MASS INDEX: 25.03 KG/M2 | DIASTOLIC BLOOD PRESSURE: 78 MMHG | HEART RATE: 80 BPM

## 2023-04-26 DIAGNOSIS — M47.22 CERVICAL SPONDYLOSIS WITH RADICULOPATHY: Primary | ICD-10-CM

## 2023-04-26 DIAGNOSIS — G99.2 STENOSIS OF CERVICAL SPINE WITH MYELOPATHY: ICD-10-CM

## 2023-04-26 DIAGNOSIS — M96.842 POSTOPERATIVE SEROMA OF MUSCULOSKELETAL STRUCTURE AFTER MUSCULOSKELETAL PROCEDURE: ICD-10-CM

## 2023-04-26 DIAGNOSIS — M48.02 STENOSIS OF CERVICAL SPINE WITH MYELOPATHY: ICD-10-CM

## 2023-04-26 DIAGNOSIS — E03.8 SUBCLINICAL HYPOTHYROIDISM: ICD-10-CM

## 2023-04-26 DIAGNOSIS — N40.0 BENIGN PROSTATIC HYPERPLASIA WITHOUT LOWER URINARY TRACT SYMPTOMS: ICD-10-CM

## 2023-04-26 DIAGNOSIS — E78.00 PURE HYPERCHOLESTEROLEMIA: ICD-10-CM

## 2023-04-26 DIAGNOSIS — I82.401 DEEP VEIN THROMBOSIS (DVT) OF RIGHT LOWER EXTREMITY, UNSPECIFIED CHRONICITY, UNSPECIFIED VEIN: ICD-10-CM

## 2023-04-26 DIAGNOSIS — H43.11 VITREOUS HEMORRHAGE, RIGHT EYE: ICD-10-CM

## 2023-04-26 DIAGNOSIS — E03.9 ACQUIRED HYPOTHYROIDISM: ICD-10-CM

## 2023-04-26 DIAGNOSIS — G47.33 OSA (OBSTRUCTIVE SLEEP APNEA): ICD-10-CM

## 2023-04-26 PROCEDURE — 99214 PR OFFICE/OUTPT VISIT, EST, LEVL IV, 30-39 MIN: ICD-10-PCS | Mod: ,,, | Performed by: INTERNAL MEDICINE

## 2023-04-26 PROCEDURE — 99214 OFFICE O/P EST MOD 30 MIN: CPT | Mod: ,,, | Performed by: INTERNAL MEDICINE

## 2023-04-26 NOTE — PROGRESS NOTES
"Patient ID: Scott Echeverria is a 72 y.o. male.    Chief Complaint: Follow-up (6 month f/u)      HPI:   Patient presents here today for above reason.     Jason is a 72-year-old gentleman presents today in follow-up.  Labs all within normal limits cholesterol is under control on statin.  Underwent 2nd cervical surgery to remove a postoperative seroma doing much better in that regard.  He was having some syncopal events discontinued his Flomax and he has not had Ace events since.  Otherwise rest of his age-appropriate screening up-to-date he is here for follow-up.    The patient's Health Maintenance was reviewed and the following appears to be due at this time:   Health Maintenance Due   Topic Date Due    Hepatitis C Screening  Never done    Shingles Vaccine (1 of 2) Never done    Pneumococcal Vaccines (Age 65+) (2 - PCV) 11/13/2013    COVID-19 Vaccine (3 - Booster for Moderna series) 05/05/2021    TETANUS VACCINE  11/13/2022        Past Medical History:  Past Medical History:   Diagnosis Date    Anesthesia complication     "post op day 2-3 had to be given narcan, intubated, and sent to ICU"    Balance problems     BPH (benign prostatic hyperplasia)     Cervical pain     Cervical radiculitis     Cervical spinal stenosis     HLD (hyperlipidemia)     Hypothyroidism, unspecified     Personal history of colonic polyps     Postoperative CSF leak     Shoulder pain, bilateral     Sleep apnea     resolved since surgery     Past Surgical History:   Procedure Laterality Date    APPENDECTOMY  01/25/2022    Dr. Kerry Coats    CERVICAL SPINE SURGERY  07/12/2022    COLONOSCOPY W/ POLYPECTOMY  04/27/2021    Dr. David Montgomery    EXTRACTION OF TOOTH  01/20/2023    HEMORRHOID SURGERY  2002    INSERTION OF DRAIN INTO SPINAL CANAL N/A 02/14/2023    lumbar drain.  Dr. Rao    LAMINOPLASTY N/A 07/12/2022    left C3-4, C4-5, C5-6, C6-7 foraminotomies, C3-7 laminoplasties.  Dr. Rao    lower back surgery  1990    REPAIR OF CEREBROSPINAL " FLUID LEAK OF SPINE N/A 02/14/2023    posterior cervical re-exploration with drainage of pseudomeningocele and repair of CSF leak.  Dr. Rao    RETINAL DETACHMENT SURGERY Right 2015    SINUS SURGERY      TONSILLECTOMY  1956     Review of patient's allergies indicates:   Allergen Reactions    Iodine     Scopolamine Other (See Comments)     Current Outpatient Medications on File Prior to Visit   Medication Sig Dispense Refill    amitriptyline (ELAVIL) 25 MG tablet Take 1 tablet (25 mg total) by mouth once daily. (Patient taking differently: Take 25 mg by mouth every evening.) 90 tablet 3    aspirin 81 MG Chew Take 1 tablet (81 mg total) by mouth once daily. 90 tablet 0    atorvastatin (LIPITOR) 20 MG tablet Take 20 mg by mouth once daily. Starting on 3/8      docusate sodium (COLACE) 100 MG capsule Take 100 mg by mouth 2 (two) times daily.      ibuprofen (ADVIL,MOTRIN) 200 MG tablet Take 200 mg by mouth 2 (two) times daily as needed for Pain.      levothyroxine (SYNTHROID) 50 MCG tablet Take 1 tablet (50 mcg total) by mouth once daily. 90 tablet 3    magnesium hydroxide 400 mg/5 ml (MILK OF MAGNESIA) 400 mg/5 mL Susp Take 15 mLs by mouth daily as needed.      [DISCONTINUED] fexofenadine (ALLEGRA) 180 MG tablet Take 180 mg by mouth once daily.      [DISCONTINUED] rosuvastatin (CRESTOR) 10 MG tablet Take 10 mg by mouth Daily.       No current facility-administered medications on file prior to visit.     Social History     Socioeconomic History    Marital status:    Tobacco Use    Smoking status: Never    Smokeless tobacco: Never   Substance and Sexual Activity    Alcohol use: Not Currently    Drug use: Never    Sexual activity: Not Currently     Family History   Problem Relation Age of Onset    Alzheimer's disease Mother     Parkinsonism Mother     Thyroid disease Mother     Stroke Father     Heart attack Father     Hyperlipidemia Father     Hypertension Sister     Sickle cell trait Sister     Hypertension  "Brother        ROS:   Comprehensive review of systems was performed and is negative except as noted above    Vitals/PE:   /78 (BP Location: Left arm, Patient Position: Sitting)   Pulse 80   Resp 18   Ht 5' 9" (1.753 m)   Wt 76.7 kg (169 lb)   SpO2 98%   BMI 24.96 kg/m²   Physical Exam    General: Alert and oriented, No acute distress.   Eye: Normal conjunctiva without exudate.  HENMT: Normocephalic/AT, Normal hearing, Oral mucosa is moist and pink   Neck: No goiter visualized.   Respiratory: Lungs CTAB, Respirations are non-labored, Breath sounds are equal, Symmetrical chest wall expansion.  Cardiovascular: Normal rate, Regular rhythm, No murmur, No edema.   Gastrointestinal: Non-distended.   Genitourinary: Deferred.  Musculoskeletal: Normal ROM, Normal gait, No deformities or amputations.  Integumentary: Warm, Dry, Intact. No diaphoresis, or flushing.  Neurologic: No focal deficits, Cranial Nerves II-XII are grossly intact.   Psychiatric: Cooperative, Appropriate mood & affect, Normal judgment, Non-suicidal.    Assessment/Plan:       1. Cervical spondylosis with radiculopathy  Assessment & Plan:  Much better after surgery        2. Benign prostatic hyperplasia without lower urinary tract symptoms  Assessment & Plan:  Off of his flomax 2/2 syncope, no further issues since      3. Subclinical hypothyroidism  Assessment & Plan:  corrected      4. Postoperative seroma   Assessment & Plan:  resolved      5. VIJI (obstructive sleep apnea)  Assessment & Plan:  Compliant with pap      6. Acquired hypothyroidism  Assessment & Plan:  corrected      7. Pure hypercholesterolemia  Assessment & Plan:  At goal on statin      8. Deep vein thrombosis (DVT) of right lower extremity, unspecified chronicity, unspecified vein  Assessment & Plan:  Resolved, off oac      9. Stenosis of cervical spine with myelopathy  Assessment & Plan:  S/p surgery, doing much better      10. Vitreous hemorrhage, right eye  Assessment & " Plan:  Resolved                 Education and counseling done face to face regarding medical conditions and plan. Contact office if new symptoms develop. Should any symptoms ever significantly worsen seek emergency medical attention/go to ER. Follow up at least yearly for wellness or sooner PRN. Nurse to call patient with any results. The patient is receptive, expresses understanding and is agreeable to plan. All questions have been answered.    No follow-ups on file.

## 2023-05-08 ENCOUNTER — OFFICE VISIT (OUTPATIENT)
Dept: NEUROSURGERY | Facility: CLINIC | Age: 73
End: 2023-05-08
Payer: MEDICARE

## 2023-05-08 VITALS — BODY MASS INDEX: 25.03 KG/M2 | WEIGHT: 169 LBS | HEIGHT: 69 IN

## 2023-05-08 DIAGNOSIS — M48.02 STENOSIS OF CERVICAL SPINE WITH MYELOPATHY: ICD-10-CM

## 2023-05-08 DIAGNOSIS — G99.2 STENOSIS OF CERVICAL SPINE WITH MYELOPATHY: ICD-10-CM

## 2023-05-08 DIAGNOSIS — G96.00 POSTOPERATIVE CEREBROSPINAL FLUID LEAK: ICD-10-CM

## 2023-05-08 DIAGNOSIS — G97.82 POSTOPERATIVE CEREBROSPINAL FLUID LEAK: ICD-10-CM

## 2023-05-08 DIAGNOSIS — G97.82 PSEUDOMENINGOCELE DUE TO SURGICAL PROCEDURE: Primary | ICD-10-CM

## 2023-05-08 PROCEDURE — 99024 PR POST-OP FOLLOW-UP VISIT: ICD-10-PCS | Mod: 95,,, | Performed by: NEUROLOGICAL SURGERY

## 2023-05-08 PROCEDURE — 99024 POSTOP FOLLOW-UP VISIT: CPT | Mod: 95,,, | Performed by: NEUROLOGICAL SURGERY

## 2023-05-08 NOTE — PROGRESS NOTES
[facility]  Neurosurgery        Scott Echeverria   [id number]   1950       SUBJECTIVE:     This is a telemedicine note.  The patient was treated using telemedicine with real-time audio and video, according to [facility] protocols.  I, a distant provider, conducted the visit from my office.  The patient participated in the visit at their home in [place].  I am licensed in the state where the patient stated they are located.  The patient (or patient's representative) stated that they understood and accepted the privacy and security risks today information other location.    CHIEF COMPLAINT:    3 months post-op    HPI:    Scott Echeverria is a 72 y.o.-year-old male who presents today for post-operative follow-up.  He is s/p posterior cervical re-exploration with repair of CSF leak and placement of lumbar drain that was done on 2/14/23.  He underwent left C3-4, C4-5, C5-6, C6-7 foraminotomies and C3-7 laminoplasty on 7/12/22.  He says he is about 95% better since the second surgery.  He continues with intermittent pain and soreness in the shoulder blades.  He has stiffness in the neck, but is no longer experiencing much pain.  His wife notices that his neck starts to hang towards the end of the day when he is tired.  He continues to do home exercises that were given to him at physical therapy.  He says that some days are better than others, but overall he continues to gradually improve.  He has continued to limit his activity since surgery, mainly heavy lifting.  He feels he is ready to start doing more.        Review of patient's allergies indicates:   Allergen Reactions    Iodine     Scopolamine Other (See Comments)     Current Outpatient Medications   Medication Sig Dispense Refill    amitriptyline (ELAVIL) 25 MG tablet Take 1 tablet (25 mg total) by mouth once daily. (Patient taking differently: Take 25 mg by mouth every evening.) 90 tablet 3    atorvastatin (LIPITOR) 20 MG tablet Take 20 mg by mouth  "once daily. Starting on 3/8      docusate sodium (COLACE) 100 MG capsule Take 100 mg by mouth 2 (two) times daily.      ibuprofen (ADVIL,MOTRIN) 200 MG tablet Take 200 mg by mouth 2 (two) times daily as needed for Pain.      levothyroxine (SYNTHROID) 50 MCG tablet Take 1 tablet (50 mcg total) by mouth once daily. 90 tablet 3    magnesium hydroxide 400 mg/5 ml (MILK OF MAGNESIA) 400 mg/5 mL Susp Take 15 mLs by mouth daily as needed.      aspirin 81 MG Chew Take 1 tablet (81 mg total) by mouth once daily. 90 tablet 0     No current facility-administered medications for this visit.     Past Medical History:   Diagnosis Date    Anesthesia complication     "post op day 2-3 had to be given narcan, intubated, and sent to ICU"    Balance problems     BPH (benign prostatic hyperplasia)     Cervical pain     Cervical radiculitis     Cervical spinal stenosis     HLD (hyperlipidemia)     Hypothyroidism, unspecified     Personal history of colonic polyps     Postoperative CSF leak     Shoulder pain, bilateral     Sleep apnea     resolved since surgery     Past Surgical History:   Procedure Laterality Date    APPENDECTOMY  01/25/2022    Dr. Kerry Coats    CERVICAL SPINE SURGERY  07/12/2022    COLONOSCOPY W/ POLYPECTOMY  04/27/2021    Dr. David Montgomery    EXTRACTION OF TOOTH  01/20/2023    HEMORRHOID SURGERY  2002    INSERTION OF DRAIN INTO SPINAL CANAL N/A 02/14/2023    lumbar drain.  Dr. Rao    LAMINOPLASTY N/A 07/12/2022    left C3-4, C4-5, C5-6, C6-7 foraminotomies, C3-7 laminoplasties.  Dr. Rao    lower back surgery  1990    REPAIR OF CEREBROSPINAL FLUID LEAK OF SPINE N/A 02/14/2023    posterior cervical re-exploration with drainage of pseudomeningocele and repair of CSF leak.  Dr. Rao    RETINAL DETACHMENT SURGERY Right 2015    SINUS SURGERY      TONSILLECTOMY  1956     Family History       Problem Relation (Age of Onset)    Alzheimer's disease Mother    Heart attack Father    Hyperlipidemia Father    Hypertension " "Sister, Brother    Parkinsonism Mother    Sickle cell trait Sister    Stroke Father    Thyroid disease Mother          Social History     Socioeconomic History    Marital status:    Tobacco Use    Smoking status: Never    Smokeless tobacco: Never   Substance and Sexual Activity    Alcohol use: Not Currently    Drug use: Never    Sexual activity: Not Currently       Review of systems:    Pertinent items are noted in HPI    OBJECTIVE:     Vital Signs  Pain Score:   2  Height: 5' 9" (175.3 cm)  Weight: 76.7 kg (169 lb)  Body mass index is 24.96 kg/m².      Physical Exam:    General:  Pleasant. Alert. No acute distress.    Head:  Normocephalic, without obvious abnormality, atraumatic    Lungs:   Breathing is quiet, non-lablored    Neurological:    Oriented to Person, Place, Time   Speech:  normal  Memory, cognition, and affect are appropriate.    Cervical incision:  Well healed            ASSESSMENT/PLAN:     1. 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 Postoperative cerebrospinal fluid leak    3. Stenosis of cervical spine with myelopathy    - Okay to increase activity as tolerated, no restrictions   - Follow up as needed          I, Dr. Paulo Rao, personally performed the services described in this documentation. All medical record entries made by the scribe, Angely Vela RN, were at my direction and in my presence.  I have reviewed the chart and agree that the record reflects my personal performance and is accurate and complete. Paulo Rao MD.  1:11 PM 05/08/2023           Paulo Rao MD FACS [facility]ANS      "

## 2023-05-23 ENCOUNTER — PATIENT MESSAGE (OUTPATIENT)
Dept: RESEARCH | Facility: HOSPITAL | Age: 73
End: 2023-05-23
Payer: MEDICARE

## 2023-10-02 DIAGNOSIS — M54.2 NECK PAIN: ICD-10-CM

## 2023-10-02 DIAGNOSIS — E03.9 ACQUIRED HYPOTHYROIDISM: Primary | ICD-10-CM

## 2023-10-02 DIAGNOSIS — G47.00 INSOMNIA, UNSPECIFIED TYPE: ICD-10-CM

## 2023-10-02 RX ORDER — LEVOTHYROXINE SODIUM 50 UG/1
50 TABLET ORAL DAILY
Qty: 90 TABLET | Refills: 3 | Status: SHIPPED | OUTPATIENT
Start: 2023-10-02

## 2023-10-02 RX ORDER — AMITRIPTYLINE HYDROCHLORIDE 25 MG/1
25 TABLET, FILM COATED ORAL NIGHTLY PRN
Qty: 90 TABLET | Refills: 3 | Status: SHIPPED | OUTPATIENT
Start: 2023-10-02

## 2023-10-02 RX ORDER — AMITRIPTYLINE HYDROCHLORIDE 25 MG/1
25 TABLET, FILM COATED ORAL
Qty: 90 TABLET | Refills: 3 | Status: SHIPPED | OUTPATIENT
Start: 2023-10-02 | End: 2023-10-02 | Stop reason: SDUPTHER

## (undated) DEVICE — Device

## (undated) DEVICE — GOWN SMARTSLEEVE AAMI LVL4 LG

## (undated) DEVICE — GLOVE PROTEXIS BLUE LATEX 6.5

## (undated) DEVICE — TUBING IRR BIPOLAR CORD 12FT

## (undated) DEVICE — TRAY CATH FOL SIL URIMTR 16FR

## (undated) DEVICE — DRAPE FLUID WARMER 44X44IN

## (undated) DEVICE — KIT EXT DRNGE 80CM5FR1.6X.8MM

## (undated) DEVICE — SPONGE SURGIFOAM 100 8.5X12X10

## (undated) DEVICE — SUT 2-0 12-18IN SILK

## (undated) DEVICE — DRAIN ROUND SUCTION 10FR

## (undated) DEVICE — MARKER WRITESITE SKIN CHLRAPRP

## (undated) DEVICE — DRESSING TELFA N ADH 3X8

## (undated) DEVICE — GLOVE PROTEXIS BLUE LATEX 7.5

## (undated) DEVICE — SOL NACL IRR 1000ML BTL

## (undated) DEVICE — GLOVE PROTEXIS BLUE LATEX 7

## (undated) DEVICE — TIP SUCTION YANKAUER

## (undated) DEVICE — SEALER AQUAMANTYS 2.3 BIPOLAR

## (undated) DEVICE — GLOVE PROTEXIS BLUE LATEX 8

## (undated) DEVICE — POSITIONER HEEL FOAM CONVOLTD

## (undated) DEVICE — DISH PETRI MED 3.5IN

## (undated) DEVICE — DRAIN JACKSON PRATT TRCR 10FR

## (undated) DEVICE — DRAPE TOP 53X102IN

## (undated) DEVICE — GLOVE PROTEXIS PI SYN SURG 7.5

## (undated) DEVICE — SEALANT ADHERUS DURAL AUTOSPRY

## (undated) DEVICE — SUT GORETEX CV-6 TTC-09 24IN

## (undated) DEVICE — GLOVE PROTEXIS PI SYN SURG 6.5

## (undated) DEVICE — GOWN ECLIPSE REINF LVL4 TWL XL

## (undated) DEVICE — GLOVE PROTEXIS PI SYN SURG 6.0

## (undated) DEVICE — SYS ACCUDRAIN EXT DRNGE CSF

## (undated) DEVICE — SPONGE NEURO 1/4X1/4

## (undated) DEVICE — SUT VICRYL PLUS 0 CT-1 18IN

## (undated) DEVICE — KIT SURGIFLO HEMOSTATIC MATRIX

## (undated) DEVICE — COVER HD BACK TABLE 6FT

## (undated) DEVICE — DRESSING TEGADERM CHG 4X7IN

## (undated) DEVICE — TUBING SILICON CLR 3/16IN 10FT

## (undated) DEVICE — SOL NACL .9P 500ML

## (undated) DEVICE — SUT VICRYL 2-0 8-18 CP-2

## (undated) DEVICE — SOL .9NACL PF 100 ML

## (undated) DEVICE — ELECTRODE BLADE E-Z CLEAN 4IN

## (undated) DEVICE — TAPE CLOTH MEDIPORE SOFT 2X2YD

## (undated) DEVICE — TAPE MEDIPORE 1 X 10YD

## (undated) DEVICE — GLOVE PROTEXIS PI SYN SURG 7

## (undated) DEVICE — DURAPREP SURG SCRUB 26ML

## (undated) DEVICE — ELECTRODE PATIENT RETURN DISP

## (undated) DEVICE — PILLOW HEAD REST

## (undated) DEVICE — RESERVOIR JACKSON-PRATT 100CC

## (undated) DEVICE — DRAPE ORTH SPLIT 77X108IN

## (undated) DEVICE — SEE L#804

## (undated) DEVICE — DRESSING TRANS 4X4 TEGADERM

## (undated) DEVICE — DRAPE C-ARM COVER EZ 36X28IN

## (undated) DEVICE — ADHESIVE DERMABOND ADVANCED

## (undated) DEVICE — GAUZE SPONGE 4X4 12PLY

## (undated) DEVICE — KIT SURGICAL TURNOVER

## (undated) DEVICE — NDL HYPO 22GX1 1/2 SYR 10ML LL

## (undated) DEVICE — SPONGE GAUZE 16PLY 4X4

## (undated) DEVICE — SEALANT ADHERUS AUTOSPRY DURAL

## (undated) DEVICE — CASSETTE SONOPET IQ IRRIGATION

## (undated) DEVICE — DRAPE OPMI STERILE

## (undated) DEVICE — KIT POS JACKSON TABLE NO HDRST

## (undated) DEVICE — GLOVE PROTEXIS LTX MICRO  7.5

## (undated) DEVICE — SEE MEDLINE ITEM 157166

## (undated) DEVICE — SEE MEDLINE ITEM 157150

## (undated) DEVICE — SUT VICRYL PLUS 4-0 TF 18IN

## (undated) DEVICE — BENZOIN TINCTURE 0.66ML

## (undated) DEVICE — DRAPE SURG W/TWL 17 5/8X23